# Patient Record
Sex: FEMALE | Race: WHITE | NOT HISPANIC OR LATINO | Employment: OTHER | URBAN - METROPOLITAN AREA
[De-identification: names, ages, dates, MRNs, and addresses within clinical notes are randomized per-mention and may not be internally consistent; named-entity substitution may affect disease eponyms.]

---

## 2017-02-16 ENCOUNTER — CONVERSION ENCOUNTER (OUTPATIENT)
Dept: PAIN MEDICINE | Facility: CLINIC | Age: 62
End: 2017-02-16

## 2017-02-16 ENCOUNTER — HOSPITAL ENCOUNTER (OUTPATIENT)
Dept: FAMILY MEDICINE CLINIC | Facility: CLINIC | Age: 62
Setting detail: SPECIMEN
Discharge: HOME | End: 2017-02-16
Attending: NURSE PRACTITIONER | Admitting: NURSE PRACTITIONER

## 2017-02-16 LAB
ALBUMIN SERPL-MCNC: 3.5 G/DL (ref 3.5–4.8)
ALBUMIN/GLOB SERPL: 1.2 {RATIO} (ref 1–1.7)
ALP SERPL-CCNC: 143 IU/L (ref 32–91)
ALT SERPL-CCNC: 15 IU/L (ref 14–54)
AMPICILLIN SUSC ISLT: NORMAL
ANION GAP SERPL CALC-SCNC: 12.3 MMOL/L (ref 10–20)
AST SERPL-CCNC: 17 IU/L (ref 15–41)
AZTREONAM SUSC ISLT: NORMAL
BACTERIA ISLT: NORMAL
BACTERIA SPEC AEROBE CULT: NORMAL
BILIRUB SERPL-MCNC: 0.6 MG/DL (ref 0.3–1.2)
BUN SERPL-MCNC: 10 MG/DL (ref 8–20)
BUN/CREAT SERPL: 12.5 (ref 5.4–26.2)
CALCIUM SERPL-MCNC: 9.7 MG/DL (ref 8.9–10.3)
CEFAZOLIN SUSC ISLT: NORMAL
CEFEPIME SUSC ISLT: NORMAL
CEFTRIAXONE SUSC ISLT: NORMAL
CHLORIDE SERPL-SCNC: 103 MMOL/L (ref 101–111)
CIPROFLOXACIN SUSC ISLT: NORMAL
COLONY COUNT: NORMAL
CONV CO2: 29 MMOL/L (ref 22–32)
CONV TOTAL PROTEIN: 6.5 G/DL (ref 6.1–7.9)
CREAT UR-MCNC: 0.8 MG/DL (ref 0.4–1)
ERTAPENEM SUSC ISLT: NORMAL
GLOBULIN UR ELPH-MCNC: 3 G/DL (ref 2.5–3.8)
GLUCOSE SERPL-MCNC: 94 MG/DL (ref 65–99)
LEVOFLOXACIN SUSC ISLT: NORMAL
Lab: NORMAL
MEROPENEM SUSC ISLT: NORMAL
MICRO REPORT STATUS: NORMAL
NITROFURANTOIN SUSC ISLT: NORMAL
PIP+TAZO SUSC ISLT: NORMAL
POTASSIUM SERPL-SCNC: 4.3 MMOL/L (ref 3.6–5.1)
SODIUM SERPL-SCNC: 140 MMOL/L (ref 136–144)
SPECIMEN SOURCE: NORMAL
SUSC METH SPEC: NORMAL
TETRACYCLINE SUSC ISLT: NORMAL
TOBRAMYCIN SUSC ISLT: NORMAL
TRIMETHOPRIM/SULFA: NORMAL

## 2017-04-20 ENCOUNTER — ON CAMPUS - OUTPATIENT (AMBULATORY)
Dept: URBAN - METROPOLITAN AREA HOSPITAL 77 | Facility: HOSPITAL | Age: 62
End: 2017-04-20

## 2017-04-20 DIAGNOSIS — R13.10 DYSPHAGIA, UNSPECIFIED: ICD-10-CM

## 2017-04-20 PROCEDURE — 43450 DILATE ESOPHAGUS 1/MULT PASS: CPT | Performed by: INTERNAL MEDICINE

## 2017-04-20 PROCEDURE — 43235 EGD DIAGNOSTIC BRUSH WASH: CPT | Performed by: INTERNAL MEDICINE

## 2017-07-07 ENCOUNTER — CONVERSION ENCOUNTER (OUTPATIENT)
Dept: PAIN MEDICINE | Facility: CLINIC | Age: 62
End: 2017-07-07

## 2017-09-29 ENCOUNTER — CONVERSION ENCOUNTER (OUTPATIENT)
Dept: PAIN MEDICINE | Facility: CLINIC | Age: 62
End: 2017-09-29

## 2017-12-04 ENCOUNTER — CONVERSION ENCOUNTER (OUTPATIENT)
Dept: PAIN MEDICINE | Facility: CLINIC | Age: 62
End: 2017-12-04

## 2017-12-11 ENCOUNTER — CONVERSION ENCOUNTER (OUTPATIENT)
Dept: PAIN MEDICINE | Facility: CLINIC | Age: 62
End: 2017-12-11

## 2019-01-16 ENCOUNTER — CONVERSION ENCOUNTER (OUTPATIENT)
Dept: PAIN MEDICINE | Facility: CLINIC | Age: 64
End: 2019-01-16

## 2019-01-16 ENCOUNTER — HOSPITAL ENCOUNTER (OUTPATIENT)
Dept: PAIN MEDICINE | Facility: HOSPITAL | Age: 64
Discharge: HOME | End: 2019-01-16
Attending: ANESTHESIOLOGY | Admitting: ANESTHESIOLOGY

## 2019-01-25 ENCOUNTER — CONVERSION ENCOUNTER (OUTPATIENT)
Dept: PAIN MEDICINE | Facility: CLINIC | Age: 64
End: 2019-01-25

## 2019-01-25 ENCOUNTER — HOSPITAL ENCOUNTER (OUTPATIENT)
Dept: PAIN MEDICINE | Facility: HOSPITAL | Age: 64
Discharge: HOME | End: 2019-01-25
Attending: ANESTHESIOLOGY | Admitting: ANESTHESIOLOGY

## 2019-03-08 ENCOUNTER — CONVERSION ENCOUNTER (OUTPATIENT)
Dept: PAIN MEDICINE | Facility: CLINIC | Age: 64
End: 2019-03-08

## 2019-03-08 ENCOUNTER — HOSPITAL ENCOUNTER (OUTPATIENT)
Dept: PAIN MEDICINE | Facility: HOSPITAL | Age: 64
Discharge: HOME | End: 2019-03-08
Attending: ANESTHESIOLOGY | Admitting: ANESTHESIOLOGY

## 2019-06-04 VITALS
HEART RATE: 71 BPM | SYSTOLIC BLOOD PRESSURE: 124 MMHG | HEART RATE: 81 BPM | HEIGHT: 64 IN | WEIGHT: 188 LBS | BODY MASS INDEX: 31.75 KG/M2 | DIASTOLIC BLOOD PRESSURE: 75 MMHG | BODY MASS INDEX: 32.37 KG/M2 | HEART RATE: 79 BPM | DIASTOLIC BLOOD PRESSURE: 70 MMHG | RESPIRATION RATE: 16 BRPM | OXYGEN SATURATION: 93 % | SYSTOLIC BLOOD PRESSURE: 146 MMHG | DIASTOLIC BLOOD PRESSURE: 71 MMHG | WEIGHT: 188.6 LBS | BODY MASS INDEX: 32.37 KG/M2 | HEART RATE: 70 BPM | HEIGHT: 64 IN | RESPIRATION RATE: 16 BRPM | OXYGEN SATURATION: 97 % | BODY MASS INDEX: 32.27 KG/M2 | OXYGEN SATURATION: 96 % | RESPIRATION RATE: 16 BRPM | DIASTOLIC BLOOD PRESSURE: 78 MMHG | DIASTOLIC BLOOD PRESSURE: 79 MMHG | BODY MASS INDEX: 32.95 KG/M2 | OXYGEN SATURATION: 94 % | WEIGHT: 189 LBS | SYSTOLIC BLOOD PRESSURE: 106 MMHG | WEIGHT: 194 LBS | HEART RATE: 77 BPM | SYSTOLIC BLOOD PRESSURE: 141 MMHG | DIASTOLIC BLOOD PRESSURE: 74 MMHG | RESPIRATION RATE: 16 BRPM | HEART RATE: 76 BPM | HEART RATE: 78 BPM | SYSTOLIC BLOOD PRESSURE: 120 MMHG | BODY MASS INDEX: 32.27 KG/M2 | DIASTOLIC BLOOD PRESSURE: 81 MMHG | SYSTOLIC BLOOD PRESSURE: 127 MMHG | WEIGHT: 185 LBS | SYSTOLIC BLOOD PRESSURE: 153 MMHG | OXYGEN SATURATION: 93 % | OXYGEN SATURATION: 95 % | OXYGEN SATURATION: 95 % | SYSTOLIC BLOOD PRESSURE: 134 MMHG | HEIGHT: 64 IN | HEIGHT: 64 IN | HEART RATE: 75 BPM | WEIGHT: 193 LBS | OXYGEN SATURATION: 96 % | DIASTOLIC BLOOD PRESSURE: 77 MMHG | BODY MASS INDEX: 33.12 KG/M2

## 2019-06-21 RX ORDER — ATORVASTATIN CALCIUM 80 MG/1
TABLET, FILM COATED ORAL
Qty: 60 TABLET | Refills: 0 | Status: SHIPPED | OUTPATIENT
Start: 2019-06-21 | End: 2019-09-04 | Stop reason: SDUPTHER

## 2019-06-27 RX ORDER — GABAPENTIN 600 MG/1
TABLET ORAL
Qty: 90 TABLET | Refills: 0 | Status: SHIPPED | OUTPATIENT
Start: 2019-06-27 | End: 2022-03-08 | Stop reason: HOSPADM

## 2019-08-01 RX ORDER — GABAPENTIN 600 MG/1
TABLET ORAL
Qty: 90 TABLET | Refills: 0 | OUTPATIENT
Start: 2019-08-01

## 2019-08-02 RX ORDER — GABAPENTIN 600 MG/1
TABLET ORAL
Qty: 90 TABLET | Refills: 0 | OUTPATIENT
Start: 2019-08-02

## 2019-09-04 RX ORDER — ATORVASTATIN CALCIUM 80 MG/1
TABLET, FILM COATED ORAL
Qty: 60 TABLET | Refills: 0 | Status: SHIPPED | OUTPATIENT
Start: 2019-09-04 | End: 2022-03-08 | Stop reason: SDUPTHER

## 2019-10-11 ENCOUNTER — ON CAMPUS - OUTPATIENT (AMBULATORY)
Dept: URBAN - METROPOLITAN AREA HOSPITAL 77 | Facility: HOSPITAL | Age: 64
End: 2019-10-11

## 2019-10-11 DIAGNOSIS — K55.20 ANGIODYSPLASIA OF COLON WITHOUT HEMORRHAGE: ICD-10-CM

## 2019-10-11 DIAGNOSIS — Z86.010 PERSONAL HISTORY OF COLONIC POLYPS: ICD-10-CM

## 2019-10-11 DIAGNOSIS — K63.5 POLYP OF COLON: ICD-10-CM

## 2019-10-11 PROCEDURE — 45385 COLONOSCOPY W/LESION REMOVAL: CPT | Mod: PT | Performed by: INTERNAL MEDICINE

## 2020-04-25 ENCOUNTER — ON CAMPUS - OUTPATIENT (AMBULATORY)
Dept: URBAN - METROPOLITAN AREA HOSPITAL 77 | Facility: HOSPITAL | Age: 65
End: 2020-04-25
Payer: COMMERCIAL

## 2020-04-25 DIAGNOSIS — D64.9 ANEMIA, UNSPECIFIED: ICD-10-CM

## 2020-04-25 DIAGNOSIS — K92.1 MELENA: ICD-10-CM

## 2020-04-25 DIAGNOSIS — R13.19 OTHER DYSPHAGIA: ICD-10-CM

## 2020-04-25 DIAGNOSIS — C34.90 MALIGNANT NEOPLASM OF UNSPECIFIED PART OF UNSPECIFIED BRONCH: ICD-10-CM

## 2020-04-25 DIAGNOSIS — I25.10 ATHEROSCLEROTIC HEART DISEASE OF NATIVE CORONARY ARTERY WITH: ICD-10-CM

## 2020-04-25 PROCEDURE — 99215 OFFICE O/P EST HI 40 MIN: CPT | Performed by: INTERNAL MEDICINE

## 2020-05-04 ENCOUNTER — ON CAMPUS - OUTPATIENT (AMBULATORY)
Dept: URBAN - METROPOLITAN AREA HOSPITAL 2 | Facility: HOSPITAL | Age: 65
End: 2020-05-04
Payer: COMMERCIAL

## 2020-05-04 VITALS
SYSTOLIC BLOOD PRESSURE: 135 MMHG | SYSTOLIC BLOOD PRESSURE: 64 MMHG | HEART RATE: 97 BPM | RESPIRATION RATE: 16 BRPM | HEART RATE: 92 BPM | SYSTOLIC BLOOD PRESSURE: 110 MMHG | DIASTOLIC BLOOD PRESSURE: 74 MMHG | HEART RATE: 90 BPM | SYSTOLIC BLOOD PRESSURE: 92 MMHG | TEMPERATURE: 97 F | RESPIRATION RATE: 18 BRPM | DIASTOLIC BLOOD PRESSURE: 59 MMHG | HEART RATE: 98 BPM | DIASTOLIC BLOOD PRESSURE: 55 MMHG | SYSTOLIC BLOOD PRESSURE: 109 MMHG | DIASTOLIC BLOOD PRESSURE: 65 MMHG | OXYGEN SATURATION: 100 % | DIASTOLIC BLOOD PRESSURE: 62 MMHG | SYSTOLIC BLOOD PRESSURE: 106 MMHG | HEART RATE: 110 BPM | HEART RATE: 89 BPM | WEIGHT: 157 LBS | OXYGEN SATURATION: 97 % | DIASTOLIC BLOOD PRESSURE: 43 MMHG | HEIGHT: 63 IN | SYSTOLIC BLOOD PRESSURE: 83 MMHG | DIASTOLIC BLOOD PRESSURE: 49 MMHG | SYSTOLIC BLOOD PRESSURE: 97 MMHG | HEART RATE: 109 BPM | DIASTOLIC BLOOD PRESSURE: 90 MMHG | OXYGEN SATURATION: 99 % | SYSTOLIC BLOOD PRESSURE: 133 MMHG | HEART RATE: 108 BPM | SYSTOLIC BLOOD PRESSURE: 114 MMHG | RESPIRATION RATE: 15 BRPM | SYSTOLIC BLOOD PRESSURE: 99 MMHG | DIASTOLIC BLOOD PRESSURE: 75 MMHG | DIASTOLIC BLOOD PRESSURE: 72 MMHG | HEART RATE: 118 BPM | DIASTOLIC BLOOD PRESSURE: 58 MMHG

## 2020-05-04 DIAGNOSIS — D50.0 IRON DEFICIENCY ANEMIA SECONDARY TO BLOOD LOSS (CHRONIC): ICD-10-CM

## 2020-05-04 DIAGNOSIS — K55.20 ANGIODYSPLASIA OF COLON WITHOUT HEMORRHAGE: ICD-10-CM

## 2020-05-04 DIAGNOSIS — Z86.010 PERSONAL HISTORY OF COLONIC POLYPS: ICD-10-CM

## 2020-05-04 DIAGNOSIS — K22.2 ESOPHAGEAL OBSTRUCTION: ICD-10-CM

## 2020-05-04 DIAGNOSIS — R13.10 DYSPHAGIA, UNSPECIFIED: ICD-10-CM

## 2020-05-04 DIAGNOSIS — K92.1 MELENA: ICD-10-CM

## 2020-05-04 PROCEDURE — 45388 COLONOSCOPY W/ABLATION: CPT | Performed by: INTERNAL MEDICINE

## 2020-05-04 PROCEDURE — 43235 EGD DIAGNOSTIC BRUSH WASH: CPT | Performed by: INTERNAL MEDICINE

## 2020-05-04 PROCEDURE — 43450 DILATE ESOPHAGUS 1/MULT PASS: CPT | Performed by: INTERNAL MEDICINE

## 2020-06-01 ENCOUNTER — OFFICE (AMBULATORY)
Dept: URBAN - METROPOLITAN AREA CLINIC 64 | Facility: CLINIC | Age: 65
End: 2020-06-01
Payer: COMMERCIAL

## 2020-06-01 VITALS
HEIGHT: 63 IN | SYSTOLIC BLOOD PRESSURE: 116 MMHG | HEART RATE: 104 BPM | DIASTOLIC BLOOD PRESSURE: 82 MMHG | WEIGHT: 150 LBS

## 2020-06-01 DIAGNOSIS — R63.3 FEEDING DIFFICULTIES: ICD-10-CM

## 2020-06-01 DIAGNOSIS — R11.0 NAUSEA: ICD-10-CM

## 2020-06-01 DIAGNOSIS — R63.4 ABNORMAL WEIGHT LOSS: ICD-10-CM

## 2020-06-01 DIAGNOSIS — R13.10 DYSPHAGIA, UNSPECIFIED: ICD-10-CM

## 2020-06-01 PROCEDURE — 99214 OFFICE O/P EST MOD 30 MIN: CPT | Performed by: NURSE PRACTITIONER

## 2020-06-01 RX ORDER — METOCLOPRAMIDE HYDROCHLORIDE 5 MG/1
TABLET ORAL
Qty: 90 | Refills: 0 | Status: COMPLETED
Start: 2020-06-01 | End: 2021-07-14

## 2021-03-14 ENCOUNTER — ON CAMPUS - OUTPATIENT (AMBULATORY)
Dept: URBAN - METROPOLITAN AREA HOSPITAL 77 | Facility: HOSPITAL | Age: 66
End: 2021-03-14
Payer: COMMERCIAL

## 2021-03-14 DIAGNOSIS — K22.2 ESOPHAGEAL OBSTRUCTION: ICD-10-CM

## 2021-03-14 DIAGNOSIS — T18.108A UNSPECIFIED FOREIGN BODY IN ESOPHAGUS CAUSING OTHER INJURY,: ICD-10-CM

## 2021-03-14 PROCEDURE — 43247 EGD REMOVE FOREIGN BODY: CPT | Mod: 59 | Performed by: INTERNAL MEDICINE

## 2021-03-14 PROCEDURE — 43249 ESOPH EGD DILATION <30 MM: CPT | Performed by: INTERNAL MEDICINE

## 2021-07-02 ENCOUNTER — OFFICE (AMBULATORY)
Dept: URBAN - METROPOLITAN AREA CLINIC 64 | Facility: CLINIC | Age: 66
End: 2021-07-02

## 2021-07-02 VITALS
DIASTOLIC BLOOD PRESSURE: 80 MMHG | HEIGHT: 63 IN | SYSTOLIC BLOOD PRESSURE: 126 MMHG | HEART RATE: 89 BPM | WEIGHT: 112 LBS

## 2021-07-02 DIAGNOSIS — K21.9 GASTRO-ESOPHAGEAL REFLUX DISEASE WITHOUT ESOPHAGITIS: ICD-10-CM

## 2021-07-02 DIAGNOSIS — R13.10 DYSPHAGIA, UNSPECIFIED: ICD-10-CM

## 2021-07-02 DIAGNOSIS — K59.00 CONSTIPATION, UNSPECIFIED: ICD-10-CM

## 2021-07-02 PROCEDURE — 99214 OFFICE O/P EST MOD 30 MIN: CPT | Performed by: NURSE PRACTITIONER

## 2021-07-15 ENCOUNTER — ON CAMPUS - OUTPATIENT (AMBULATORY)
Dept: URBAN - METROPOLITAN AREA HOSPITAL 2 | Facility: HOSPITAL | Age: 66
End: 2021-07-15
Payer: COMMERCIAL

## 2021-07-15 VITALS
DIASTOLIC BLOOD PRESSURE: 58 MMHG | HEIGHT: 63 IN | WEIGHT: 109 LBS | DIASTOLIC BLOOD PRESSURE: 59 MMHG | SYSTOLIC BLOOD PRESSURE: 103 MMHG | RESPIRATION RATE: 90 BRPM | HEART RATE: 97 BPM | SYSTOLIC BLOOD PRESSURE: 90 MMHG | SYSTOLIC BLOOD PRESSURE: 104 MMHG | OXYGEN SATURATION: 98 % | SYSTOLIC BLOOD PRESSURE: 86 MMHG | RESPIRATION RATE: 20 BRPM | SYSTOLIC BLOOD PRESSURE: 70 MMHG | DIASTOLIC BLOOD PRESSURE: 56 MMHG | SYSTOLIC BLOOD PRESSURE: 92 MMHG | SYSTOLIC BLOOD PRESSURE: 100 MMHG | TEMPERATURE: 96.9 F | DIASTOLIC BLOOD PRESSURE: 63 MMHG | HEART RATE: 89 BPM | DIASTOLIC BLOOD PRESSURE: 69 MMHG | OXYGEN SATURATION: 100 % | DIASTOLIC BLOOD PRESSURE: 57 MMHG | HEART RATE: 103 BPM | RESPIRATION RATE: 18 BRPM | SYSTOLIC BLOOD PRESSURE: 80 MMHG | HEART RATE: 95 BPM | HEART RATE: 99 BPM | RESPIRATION RATE: 16 BRPM | OXYGEN SATURATION: 99 % | OXYGEN SATURATION: 97 % | DIASTOLIC BLOOD PRESSURE: 49 MMHG

## 2021-07-15 DIAGNOSIS — K22.2 ESOPHAGEAL OBSTRUCTION: ICD-10-CM

## 2021-07-15 DIAGNOSIS — R13.10 DYSPHAGIA, UNSPECIFIED: ICD-10-CM

## 2021-07-15 PROCEDURE — 43249 ESOPH EGD DILATION <30 MM: CPT | Performed by: INTERNAL MEDICINE

## 2021-07-15 RX ORDER — SUCRALFATE 1 G/1
3 TABLET ORAL
Qty: 90 | Refills: 12 | Status: COMPLETED
Start: 2021-07-15 | End: 2021-09-03

## 2022-03-23 ENCOUNTER — OFFICE (AMBULATORY)
Dept: URBAN - METROPOLITAN AREA CLINIC 64 | Facility: CLINIC | Age: 67
End: 2022-03-23

## 2022-03-23 VITALS
DIASTOLIC BLOOD PRESSURE: 68 MMHG | WEIGHT: 109 LBS | HEART RATE: 109 BPM | SYSTOLIC BLOOD PRESSURE: 102 MMHG | HEIGHT: 63 IN

## 2022-03-23 DIAGNOSIS — R14.0 ABDOMINAL DISTENSION (GASEOUS): ICD-10-CM

## 2022-03-23 DIAGNOSIS — R10.9 UNSPECIFIED ABDOMINAL PAIN: ICD-10-CM

## 2022-03-23 PROCEDURE — 99214 OFFICE O/P EST MOD 30 MIN: CPT | Performed by: INTERNAL MEDICINE

## 2022-04-21 ENCOUNTER — OFFICE (AMBULATORY)
Dept: URBAN - METROPOLITAN AREA PATHOLOGY 4 | Facility: PATHOLOGY | Age: 67
End: 2022-04-21
Payer: COMMERCIAL

## 2022-04-21 ENCOUNTER — ON CAMPUS - OUTPATIENT (AMBULATORY)
Dept: URBAN - METROPOLITAN AREA HOSPITAL 2 | Facility: HOSPITAL | Age: 67
End: 2022-04-21
Payer: COMMERCIAL

## 2022-04-21 VITALS
HEART RATE: 99 BPM | SYSTOLIC BLOOD PRESSURE: 128 MMHG | OXYGEN SATURATION: 100 % | SYSTOLIC BLOOD PRESSURE: 98 MMHG | DIASTOLIC BLOOD PRESSURE: 82 MMHG | HEART RATE: 85 BPM | OXYGEN SATURATION: 97 % | HEART RATE: 88 BPM | SYSTOLIC BLOOD PRESSURE: 102 MMHG | SYSTOLIC BLOOD PRESSURE: 115 MMHG | OXYGEN SATURATION: 98 % | HEART RATE: 84 BPM | SYSTOLIC BLOOD PRESSURE: 109 MMHG | DIASTOLIC BLOOD PRESSURE: 77 MMHG | DIASTOLIC BLOOD PRESSURE: 64 MMHG | DIASTOLIC BLOOD PRESSURE: 69 MMHG | HEART RATE: 87 BPM | SYSTOLIC BLOOD PRESSURE: 105 MMHG | HEART RATE: 98 BPM | DIASTOLIC BLOOD PRESSURE: 68 MMHG | TEMPERATURE: 97.8 F | OXYGEN SATURATION: 99 % | SYSTOLIC BLOOD PRESSURE: 90 MMHG | SYSTOLIC BLOOD PRESSURE: 117 MMHG | RESPIRATION RATE: 16 BRPM | RESPIRATION RATE: 18 BRPM | DIASTOLIC BLOOD PRESSURE: 61 MMHG | SYSTOLIC BLOOD PRESSURE: 94 MMHG | HEART RATE: 101 BPM | HEART RATE: 97 BPM | WEIGHT: 108 LBS | HEIGHT: 63 IN

## 2022-04-21 DIAGNOSIS — D12.0 BENIGN NEOPLASM OF CECUM: ICD-10-CM

## 2022-04-21 DIAGNOSIS — R13.10 DYSPHAGIA, UNSPECIFIED: ICD-10-CM

## 2022-04-21 DIAGNOSIS — K55.20 ANGIODYSPLASIA OF COLON WITHOUT HEMORRHAGE: ICD-10-CM

## 2022-04-21 DIAGNOSIS — D12.5 BENIGN NEOPLASM OF SIGMOID COLON: ICD-10-CM

## 2022-04-21 DIAGNOSIS — K22.2 ESOPHAGEAL OBSTRUCTION: ICD-10-CM

## 2022-04-21 DIAGNOSIS — R93.3 ABNORMAL FINDINGS ON DIAGNOSTIC IMAGING OF OTHER PARTS OF DI: ICD-10-CM

## 2022-04-21 DIAGNOSIS — K57.30 DIVERTICULOSIS OF LARGE INTESTINE WITHOUT PERFORATION OR ABS: ICD-10-CM

## 2022-04-21 DIAGNOSIS — K64.1 SECOND DEGREE HEMORRHOIDS: ICD-10-CM

## 2022-04-21 PROBLEM — K63.5 POLYP OF COLON: Status: ACTIVE | Noted: 2022-04-21

## 2022-04-21 LAB
GI HISTOLOGY: A. UNSPECIFIED: (no result)
GI HISTOLOGY: B. UNSPECIFIED: (no result)
GI HISTOLOGY: PDF REPORT: (no result)

## 2022-04-21 PROCEDURE — 88305 TISSUE EXAM BY PATHOLOGIST: CPT | Mod: 26 | Performed by: INTERNAL MEDICINE

## 2022-04-21 PROCEDURE — 43235 EGD DIAGNOSTIC BRUSH WASH: CPT | Performed by: INTERNAL MEDICINE

## 2022-04-21 PROCEDURE — 45388 COLONOSCOPY W/ABLATION: CPT | Performed by: INTERNAL MEDICINE

## 2022-04-21 PROCEDURE — 43450 DILATE ESOPHAGUS 1/MULT PASS: CPT | Performed by: INTERNAL MEDICINE

## 2022-04-21 PROCEDURE — 45385 COLONOSCOPY W/LESION REMOVAL: CPT | Mod: 59 | Performed by: INTERNAL MEDICINE

## 2022-05-18 ENCOUNTER — OFFICE VISIT (OUTPATIENT)
Dept: CARDIOLOGY | Facility: CLINIC | Age: 67
End: 2022-05-18

## 2022-05-18 VITALS
WEIGHT: 112 LBS | OXYGEN SATURATION: 97 % | DIASTOLIC BLOOD PRESSURE: 80 MMHG | SYSTOLIC BLOOD PRESSURE: 112 MMHG | HEART RATE: 88 BPM | HEIGHT: 63 IN | BODY MASS INDEX: 19.84 KG/M2

## 2022-05-18 DIAGNOSIS — I65.21 STENOSIS OF RIGHT CAROTID ARTERY: ICD-10-CM

## 2022-05-18 DIAGNOSIS — R13.12 OROPHARYNGEAL DYSPHAGIA: ICD-10-CM

## 2022-05-18 DIAGNOSIS — C34.90 SMALL CELL LUNG CANCER: ICD-10-CM

## 2022-05-18 DIAGNOSIS — I25.118 CORONARY ARTERY DISEASE OF NATIVE ARTERY OF NATIVE HEART WITH STABLE ANGINA PECTORIS: Primary | ICD-10-CM

## 2022-05-18 DIAGNOSIS — I20.8 CHRONIC STABLE ANGINA: ICD-10-CM

## 2022-05-18 DIAGNOSIS — E78.2 MIXED HYPERLIPIDEMIA: ICD-10-CM

## 2022-05-18 PROBLEM — R23.3 BRUISES EASILY: Status: ACTIVE | Noted: 2022-05-18

## 2022-05-18 PROBLEM — G47.33 OBSTRUCTIVE SLEEP APNEA, ADULT: Status: ACTIVE | Noted: 2017-02-16

## 2022-05-18 PROBLEM — D64.9 NORMOCYTIC ANEMIA: Status: ACTIVE | Noted: 2020-04-24

## 2022-05-18 PROBLEM — R12 HEARTBURN: Status: ACTIVE | Noted: 2022-05-18

## 2022-05-18 PROBLEM — R59.0 HILAR LYMPHADENOPATHY: Status: ACTIVE | Noted: 2020-01-20

## 2022-05-18 PROBLEM — R91.1 LUNG NODULE: Status: ACTIVE | Noted: 2020-01-20

## 2022-05-18 PROBLEM — G89.29 CHRONIC PAIN: Status: ACTIVE | Noted: 2019-01-16

## 2022-05-18 PROBLEM — K21.9 GASTROESOPHAGEAL REFLUX DISEASE: Status: ACTIVE | Noted: 2022-05-18

## 2022-05-18 PROBLEM — I25.10 CAD (CORONARY ARTERY DISEASE): Status: ACTIVE | Noted: 2022-05-18

## 2022-05-18 PROCEDURE — 99214 OFFICE O/P EST MOD 30 MIN: CPT | Performed by: INTERNAL MEDICINE

## 2022-05-18 PROCEDURE — 93000 ELECTROCARDIOGRAM COMPLETE: CPT | Performed by: INTERNAL MEDICINE

## 2022-05-18 RX ORDER — METOPROLOL SUCCINATE 25 MG/1
25 TABLET, EXTENDED RELEASE ORAL DAILY
COMMUNITY
Start: 2022-05-03

## 2022-05-18 RX ORDER — GABAPENTIN 300 MG/1
300 CAPSULE ORAL 2 TIMES DAILY
COMMUNITY
Start: 2022-05-10

## 2022-05-18 RX ORDER — PRASUGREL 10 MG/1
10 TABLET, FILM COATED ORAL DAILY
Qty: 30 TABLET | Refills: 2 | Status: SHIPPED | OUTPATIENT
Start: 2022-05-18 | End: 2022-06-08 | Stop reason: SINTOL

## 2022-05-18 NOTE — PROGRESS NOTES
Cardiology Office Visit      Encounter Date:  05/18/2022    Patient ID:   Miryam Fernández is a 67 y.o. female.    Reason For Followup:  Coronary artery disease  Peripheral vascular disease    Brief Clinical History:  Dear Dr. Langford, Ted Liu MD    I had the pleasure of seeing Miryam Fernández today. As you are well aware, this is a 67 y.o. female past medical history that is significant for history of coronary artery disease history of peripheral vascular disease prior coronary intervention prior peripheral intervention prior carotid endarterectomy prior PCI and endovascular intervention on the lower extremities was recently diagnosed with a non-small cell lung cancer currently being treated and evaluated by [facility] with oncology and patient underwent radiation and chemotherapy here for follow-up from cardiac standpoint to establish care    Interval History:  Patient is having some side effects with taking Plavix some of it is secondary to dyspepsia and trouble swallowing that she is having secondary to recurrent esophageal strictures some of it she thinks it is side effects from the medication but she is not currently taking any antiplatelet therapy secondary to the side effects  She had a hospitalization in December at [facility] where pericardiocentesis was done with no repeat work-up or evaluation  Complaining of some intermittent chest discomfort  Some shortness of breath and dyspnea on exertion      Assessment & Plan    Impressions:  Coronary artery disease prior coronary intervention  Chest discomfort  Dyspepsia and trouble swallowing  Hypertension  Hyperlipidemia  Peripheral arterial disease with prior carotid endarterectomy prior PCI and stenting of the bilateral lower extremities  Non-small cell lung cancer  Prior history of subclavian steal syndrome  History of pulmonary embolism    Recommendations:  Schedule patient for an echocardiogram to rule out any pericardial  "pathology and pericardial effusion contributing to patient's symptoms  Schedule patient for myocardial perfusion study for further restratification for symptoms of chest discomfort with a prior history of known coronary artery disease prior PCI and stenting  Need for compliance with medical therapy and close monitoring and follow-up reviewed and discussed with the patient  She thinks some of her symptoms with chest discomfort could be a side effect from the Plavix  He is not on aspirin  Plans to start patient on Effient 10 mg p.o. once a day  Risk benefits and alternatives reviewed and discussed with patient  Okay to discontinue Effient due to being EGD procedure for stricture dilatation  Recent work-up and evaluation including labs CT echo findings that was done at [facility] with hematology and oncology reviewed and discussed with patient  Continue current medical therapy with Effient 10 mg p.o. once a day Lipitor 80 mg p.o. once a day Toprol-XL 25 mg p.o. once a day Lasix and potassium supplements  Further recommendations based on patient course  Need for close monitoring and follow-up reviewed and discussed with patient  Follow-up in office in 2 months    Objective:    Vitals:  Vitals:    05/18/22 1015   BP: 112/80   Pulse: 88   SpO2: 97%   Weight: 50.8 kg (112 lb)   Height: 160 cm (63\")       Physical Exam:    General: Alert, cooperative, no distress, appears stated age  Head:  Normocephalic, atraumatic, mucous membranes moist  Eyes:  Conjunctiva/corneas clear, EOM's intact     Neck:  Supple,  no adenopathy;      Lungs: Clear to auscultation bilaterally, no wheezes rhonchi rales are noted  Chest wall: No tenderness  Heart::  Regular rate and rhythm, S1 and S2 normal, no murmur, rub or gallop  Abdomen: Soft, non-tender, nondistended bowel sounds active  Extremities: No cyanosis, clubbing, or edema  Pulses: 2+ and symmetric all extremities  Skin:  No rashes or lesions  Neuro/psych: A&O x3. CN II through " XII are grossly intact with appropriate affect      Allergies:  Allergies   Allergen Reactions   • Hydrocodone Palpitations   • Sulfa Antibiotics Swelling   • Benzalkonium Chloride Hives   • Nickel Rash   • Penicillins Rash       Medication Review:     Current Outpatient Medications:   •  furosemide (LASIX) 20 MG tablet, Take 20 mg by mouth Daily., Disp: , Rfl:   •  gabapentin (NEURONTIN) 300 MG capsule, Take 300 mg by mouth 2 (Two) Times a Day., Disp: , Rfl:   •  hydroCHLOROthiazide (HYDRODIURIL) 12.5 MG tablet, 12.5 mg., Disp: , Rfl:   •  metoprolol succinate XL (TOPROL-XL) 25 MG 24 hr tablet, Take 25 mg by mouth Daily., Disp: , Rfl:   •  oxyCODONE-acetaminophen (PERCOCET) 5-325 MG per tablet, , Disp: , Rfl:   •  potassium chloride (KAYCIEL) 20 mEq/15 mL solution, Take 20 mEq by mouth Daily., Disp: , Rfl:   •  atorvastatin (LIPITOR) 80 MG tablet, 80 mg., Disp: , Rfl:   •  levalbuterol (XOPENEX) 1.25 MG/3ML nebulizer solution, XOPENEX 1.25 MG/3ML NEBU, Disp: , Rfl:   •  meclizine (ANTIVERT) 25 MG tablet, Every 8 (Eight) Hours., Disp: , Rfl:   •  prasugrel (Effient) 10 MG tablet, Take 1 tablet by mouth Daily., Disp: 30 tablet, Rfl: 2    Family History:  Family History   Problem Relation Age of Onset   • Heart disease Mother    • Hypertension Mother    • Hyperlipidemia Father    • Lung cancer Father    • Diabetes Paternal Uncle    • Leukemia Paternal Grandmother        Past Medical History:  Past Medical History:   Diagnosis Date   • Cancer (HCC)    • Carotid stenosis    • COPD (chronic obstructive pulmonary disease) (HCC)    • Coronary artery disease    • Esophageal stricture     Dr Domingo   • Hyperlipidemia    • Pulmonary embolism (HCC)     seen at [facility], fluid around her heart at the time-right lung       Past surgical History:  Past Surgical History:   Procedure Laterality Date   • ANGIOPLASTY / STENTING FEMORAL     • CARDIAC CATHETERIZATION     • CAROTID STENT     • CORONARY STENT PLACEMENT     • ESOPHAGOSCOPY  / EGD     • HYSTERECTOMY         Social History:  Social History     Socioeconomic History   • Marital status:    Tobacco Use   • Smoking status: Current Every Day Smoker     Packs/day: 2.00     Types: Cigarettes   • Smokeless tobacco: Never Used   Vaping Use   • Vaping Use: Never used   Substance and Sexual Activity   • Alcohol use: Never   • Drug use: Never   • Sexual activity: Defer       Review of Systems:  The following systems were reviewed as they relate to the cardiovascular system: Constitutional, Eyes, ENT, Cardiovascular, Respiratory, Gastrointestinal, Integumentary, Neurological, Psychiatric, Hematologic, Endocrine, Musculoskeletal, and Genitourinary. The pertinent cardiovascular findings are reported above with all other cardiovascular points within those systems being negative.    Diagnostic Study Review:     Current Electrocardiogram:    ECG 12 Lead    Date/Time: 5/18/2022 11:42 AM  Performed by: Nai Lorenzana MD  Authorized by: Nai Lorenzana MD   Comparison: compared with previous ECG   Similar to previous ECG  Rhythm: sinus rhythm  Rate: normal  BPM: 79  Conduction: conduction normal  ST Segments: ST segments normal  T Waves: T waves normal  QRS axis: normal    Clinical impression: normal ECG              NOTE: The following portions of the patient's history were reviewed and updated this visit as appropriate: allergies, current medications, past family history, past medical history, past social history, past surgical history and problem list.

## 2022-06-02 ENCOUNTER — HOSPITAL ENCOUNTER (EMERGENCY)
Facility: HOSPITAL | Age: 67
Discharge: HOME | End: 2022-06-02
Admitting: EMERGENCY MEDICINE

## 2022-06-02 ENCOUNTER — APPOINTMENT (OUTPATIENT)
Dept: GENERAL RADIOLOGY | Facility: HOSPITAL | Age: 67
End: 2022-06-02

## 2022-06-02 ENCOUNTER — APPOINTMENT (OUTPATIENT)
Dept: CT IMAGING | Facility: HOSPITAL | Age: 67
End: 2022-06-02

## 2022-06-02 VITALS
RESPIRATION RATE: 19 BRPM | BODY MASS INDEX: 19.1 KG/M2 | WEIGHT: 107.81 LBS | SYSTOLIC BLOOD PRESSURE: 89 MMHG | TEMPERATURE: 97.5 F | DIASTOLIC BLOOD PRESSURE: 61 MMHG | OXYGEN SATURATION: 100 % | HEART RATE: 94 BPM | HEIGHT: 63 IN

## 2022-06-02 DIAGNOSIS — R42 DIZZINESS: Primary | ICD-10-CM

## 2022-06-02 DIAGNOSIS — I95.1 ORTHOSTASIS: ICD-10-CM

## 2022-06-02 LAB
ALBUMIN SERPL-MCNC: 3.8 G/DL (ref 3.5–5.2)
ALBUMIN/GLOB SERPL: 1.5 G/DL
ALP SERPL-CCNC: 95 U/L (ref 39–117)
ALT SERPL W P-5'-P-CCNC: 5 U/L (ref 1–33)
ANION GAP SERPL CALCULATED.3IONS-SCNC: 10 MMOL/L (ref 5–15)
AST SERPL-CCNC: 8 U/L (ref 1–32)
BASOPHILS # BLD AUTO: 0.1 10*3/MM3 (ref 0–0.2)
BASOPHILS NFR BLD AUTO: 0.9 % (ref 0–1.5)
BILIRUB SERPL-MCNC: 0.3 MG/DL (ref 0–1.2)
BUN SERPL-MCNC: 20 MG/DL (ref 8–23)
BUN/CREAT SERPL: 24.7 (ref 7–25)
CALCIUM SPEC-SCNC: 9.1 MG/DL (ref 8.6–10.5)
CHLORIDE SERPL-SCNC: 101 MMOL/L (ref 98–107)
CO2 SERPL-SCNC: 27 MMOL/L (ref 22–29)
CREAT SERPL-MCNC: 0.81 MG/DL (ref 0.57–1)
DEPRECATED RDW RBC AUTO: 47.3 FL (ref 37–54)
EGFRCR SERPLBLD CKD-EPI 2021: 80.7 ML/MIN/1.73
EOSINOPHIL # BLD AUTO: 0.2 10*3/MM3 (ref 0–0.4)
EOSINOPHIL NFR BLD AUTO: 3.8 % (ref 0.3–6.2)
ERYTHROCYTE [DISTWIDTH] IN BLOOD BY AUTOMATED COUNT: 14.4 % (ref 12.3–15.4)
GLOBULIN UR ELPH-MCNC: 2.5 GM/DL
GLUCOSE BLDC GLUCOMTR-MCNC: 99 MG/DL (ref 70–105)
GLUCOSE SERPL-MCNC: 86 MG/DL (ref 65–99)
HCT VFR BLD AUTO: 35.6 % (ref 34–46.6)
HGB BLD-MCNC: 12.2 G/DL (ref 12–15.9)
LIPASE SERPL-CCNC: 20 U/L (ref 13–60)
LYMPHOCYTES # BLD AUTO: 0.8 10*3/MM3 (ref 0.7–3.1)
LYMPHOCYTES NFR BLD AUTO: 12.4 % (ref 19.6–45.3)
MCH RBC QN AUTO: 32.4 PG (ref 26.6–33)
MCHC RBC AUTO-ENTMCNC: 34.2 G/DL (ref 31.5–35.7)
MCV RBC AUTO: 94.6 FL (ref 79–97)
MONOCYTES # BLD AUTO: 0.6 10*3/MM3 (ref 0.1–0.9)
MONOCYTES NFR BLD AUTO: 9.6 % (ref 5–12)
NEUTROPHILS NFR BLD AUTO: 4.5 10*3/MM3 (ref 1.7–7)
NEUTROPHILS NFR BLD AUTO: 73.3 % (ref 42.7–76)
NRBC BLD AUTO-RTO: 0 /100 WBC (ref 0–0.2)
PLATELET # BLD AUTO: 212 10*3/MM3 (ref 140–450)
PMV BLD AUTO: 7.6 FL (ref 6–12)
POTASSIUM SERPL-SCNC: 3.9 MMOL/L (ref 3.5–5.2)
PROT SERPL-MCNC: 6.3 G/DL (ref 6–8.5)
RBC # BLD AUTO: 3.77 10*6/MM3 (ref 3.77–5.28)
SODIUM SERPL-SCNC: 138 MMOL/L (ref 136–145)
TROPONIN T SERPL-MCNC: <0.01 NG/ML (ref 0–0.03)
TROPONIN T SERPL-MCNC: <0.01 NG/ML (ref 0–0.03)
WBC NRBC COR # BLD: 6.1 10*3/MM3 (ref 3.4–10.8)

## 2022-06-02 PROCEDURE — 25010000002 HEPARIN LOCK FLUSH PER 10 UNITS: Performed by: EMERGENCY MEDICINE

## 2022-06-02 PROCEDURE — 36415 COLL VENOUS BLD VENIPUNCTURE: CPT

## 2022-06-02 PROCEDURE — 99283 EMERGENCY DEPT VISIT LOW MDM: CPT

## 2022-06-02 PROCEDURE — 85025 COMPLETE CBC W/AUTO DIFF WBC: CPT | Performed by: NURSE PRACTITIONER

## 2022-06-02 PROCEDURE — 70450 CT HEAD/BRAIN W/O DYE: CPT

## 2022-06-02 PROCEDURE — 80053 COMPREHEN METABOLIC PANEL: CPT | Performed by: NURSE PRACTITIONER

## 2022-06-02 PROCEDURE — 84484 ASSAY OF TROPONIN QUANT: CPT | Performed by: NURSE PRACTITIONER

## 2022-06-02 PROCEDURE — 82962 GLUCOSE BLOOD TEST: CPT

## 2022-06-02 PROCEDURE — 93005 ELECTROCARDIOGRAM TRACING: CPT

## 2022-06-02 PROCEDURE — 83690 ASSAY OF LIPASE: CPT | Performed by: NURSE PRACTITIONER

## 2022-06-02 PROCEDURE — 71045 X-RAY EXAM CHEST 1 VIEW: CPT

## 2022-06-02 RX ORDER — HEPARIN SODIUM (PORCINE) LOCK FLUSH IV SOLN 100 UNIT/ML 100 UNIT/ML
500 SOLUTION INTRAVENOUS ONCE
Status: COMPLETED | OUTPATIENT
Start: 2022-06-02 | End: 2022-06-02

## 2022-06-02 RX ADMIN — HEPARIN SODIUM (PORCINE) LOCK FLUSH IV SOLN 100 UNIT/ML 500 UNITS: 100 SOLUTION at 22:07

## 2022-06-02 RX ADMIN — SODIUM CHLORIDE 500 ML: 9 INJECTION, SOLUTION INTRAVENOUS at 20:45

## 2022-06-03 NOTE — ED PROVIDER NOTES
Subjective   Chief complaint: Fall      Context: Patient is 67-year-old female who comes in with daughter complaining of intermittent dizziness, states been a little worse over the last week but has a history of dizziness with gait abnormalities.  She denies any chest pain or shortness of breath.  Has had an intermittent nonproductive cough without hemoptysis.  She states she leaned forward today lost her balance and fell.  There was no syncope or loss of consciousness.  No unilateral focal deficits confusion ataxia or lethargy.  Reportedly took her blood pressure sitting and standing and had a drop in systolic pressure.  Denies any headache.    Location:   Duration: As above  Timing: Waxes and wanes  Quality/Severity: Denies  Modifying factors: Worse with position changes  Associated symptoms: Worse with position changes        Additional hx provided by:  daughter    PCP: toño    LNMP: Postmenopausal                 Review of Systems   Constitutional: Negative for fever.   HENT: Negative.    Eyes: Negative for visual disturbance.   Respiratory: Negative.    Cardiovascular: Negative.    Gastrointestinal: Negative.    Genitourinary: Negative.    Musculoskeletal: Negative.    Skin: Positive for wound.   Allergic/Immunologic: Negative for immunocompromised state.   Neurological: Positive for light-headedness.   Hematological: Bruises/bleeds easily.   Psychiatric/Behavioral: Negative for confusion.       Past Medical History:   Diagnosis Date   • Cancer (HCC)    • Carotid stenosis    • COPD (chronic obstructive pulmonary disease) (HCC)    • Coronary artery disease    • Esophageal stricture     Dr Domingo   • Hyperlipidemia    • Pulmonary embolism (HCC)     seen at [facility], fluid around her heart at the time-right lung       Allergies   Allergen Reactions   • Hydrocodone Palpitations   • Sulfa Antibiotics Swelling   • Benzalkonium Chloride Hives   • Nickel Rash   • Penicillins Rash       Past Surgical History:    Procedure Laterality Date   • ANGIOPLASTY / STENTING FEMORAL     • CARDIAC CATHETERIZATION     • CAROTID STENT     • CORONARY STENT PLACEMENT     • ESOPHAGOSCOPY / EGD     • HYSTERECTOMY         Family History   Problem Relation Age of Onset   • Heart disease Mother    • Hypertension Mother    • Hyperlipidemia Father    • Lung cancer Father    • Diabetes Paternal Uncle    • Leukemia Paternal Grandmother        Social History     Socioeconomic History   • Marital status:    Tobacco Use   • Smoking status: Current Every Day Smoker     Packs/day: 2.00     Types: Cigarettes   • Smokeless tobacco: Never Used   Vaping Use   • Vaping Use: Never used   Substance and Sexual Activity   • Alcohol use: Never   • Drug use: Never   • Sexual activity: Defer           Objective   Physical Exam     Vital signs in triage nurse note reviewed.  Constitutional: Awake, alert, thin  HEENT: Normocephalic, atraumatic; pupils are PERRL with intact EOM; oropharynx is pink and moist without exudate or erythema.  Neck: Supple, full range of motion without pain; no cervical lymphadenopathy.  Cardiovascular: Regular rate and rhythm, normal S1-S2.  No murmurs or rubs  Pulmonary: Respiratory effort regular, nonlabored; breath sounds clear to auscultation all fields.  Abdomen: Soft, nontender, nondistended with normoactive bowel sounds; no rebound or guarding.  Musculoskeletal: Independent range of motion of all extremities, no palpable tenderness or edema. Spine is midline without obvious curvature scoliosis. No bony tenderness, soft tissue swelling, deformity is noted. Paraspinal musculature is soft, nontender.  Neuro: Alert oriented x3, speech is clear and appropriate.  Normal shoulder shrug, equal palate rise, no peripheral vision loss, no facial asymmetry,no pronator drift, normal coordination.      ECG 12 Lead      Date/Time: 6/2/2022 2:17 PM  Performed by: Radha Cloud APRN  Authorized by: Radha Cloud APRN   Interpreted  by physician (emily)  Comparison: compared with previous ECG from 5/18/2022  Comparison to previous ECG: Sinus rhythm  Rhythm: sinus rhythm  BPM: 80  Comments: No significant change from previous                       ED Course  ED Course as of 06/02/22 2152   u Jun 02, 2022   1745 Seen after being placed in room from triage [JW]   1948 Waiting on urine to be collected [JW]   2035 Care transferred to dr todd [JW]      ED Course User Index  [JW] Radha Cloud, KENNY           Labs Reviewed   CBC WITH AUTO DIFFERENTIAL - Abnormal; Notable for the following components:       Result Value    Lymphocyte % 12.4 (*)     All other components within normal limits   TROPONIN (IN-HOUSE) - Normal    Narrative:     Troponin T Reference Range:  <= 0.03 ng/mL-   Negative for AMI  >0.03 ng/mL-     Abnormal for myocardial necrosis.  Clinicians would have to utilize clinical acumen, EKG, Troponin and serial changes to determine if it is an Acute Myocardial Infarction or myocardial injury due to an underlying chronic condition.       Results may be falsely decreased if patient taking Biotin.     LIPASE - Normal   TROPONIN (IN-HOUSE) - Normal    Narrative:     Troponin T Reference Range:  <= 0.03 ng/mL-   Negative for AMI  >0.03 ng/mL-     Abnormal for myocardial necrosis.  Clinicians would have to utilize clinical acumen, EKG, Troponin and serial changes to determine if it is an Acute Myocardial Infarction or myocardial injury due to an underlying chronic condition.       Results may be falsely decreased if patient taking Biotin.     POCT GLUCOSE FINGERSTICK - Normal   COMPREHENSIVE METABOLIC PANEL    Narrative:     GFR Normal >60  Chronic Kidney Disease <60  Kidney Failure <15     URINALYSIS W/ CULTURE IF INDICATED   CBC AND DIFFERENTIAL    Narrative:     The following orders were created for panel order CBC & Differential.  Procedure                               Abnormality         Status                     ---------                                -----------         ------                     CBC Auto Differential[[id number]]        Abnormal            Final result                 Please view results for these tests on the individual orders.     Medications   sodium chloride 0.9 % bolus 500 mL (500 mL Intravenous New Bag 6/2/22 2045)     CT Head Without Contrast    Result Date: 6/2/2022   1. No acute intracranial abnormality. 2. Diffuse white matter hypoattenuation, which could be related to advanced chronic small vessel ischemic change.  Electronically Signed By-Phoenix Carl MD On:6/2/2022 6:37 PM This report was finalized on [id number] by  Phoenix Carl MD.    XR Chest 1 View    Result Date: 6/2/2022  Findings suggestive of COPD without acute cardiopulmonary abnormality.  Electronically Signed By-Phoenix Carl MD On:6/2/2022 8:24 PM This report was finalized on [id number] by  Phoenix Carl MD.    Prior to Admission medications    Medication Sig Start Date End Date Taking? Authorizing Provider   atorvastatin (LIPITOR) 80 MG tablet 80 mg. 12/8/21   Emergency, Nurse Ervin RN   furosemide (LASIX) 20 MG tablet Take 20 mg by mouth Daily. 1/12/22   Emergency, Nurse Ervin RN   gabapentin (NEURONTIN) 300 MG capsule Take 300 mg by mouth 2 (Two) Times a Day. 5/10/22   Ted Resendiz MD   hydroCHLOROthiazide (HYDRODIURIL) 12.5 MG tablet 12.5 mg. 12/8/21   Ivonne, Nurse Ervin RN   levalbuterol (XOPENEX) 1.25 MG/3ML nebulizer solution XOPENEX 1.25 MG/3ML NEBU 2/17/17   Emergency, Nurse Epic, RN   meclizine (ANTIVERT) 25 MG tablet Every 8 (Eight) Hours. 1/24/18   Ivonne, Nurse Ervin RN   metoprolol succinate XL (TOPROL-XL) 25 MG 24 hr tablet Take 25 mg by mouth Daily. 5/3/22   Ted Langford MD   oxyCODONE-acetaminophen (PERCOCET) 5-325 MG per tablet  8/23/21   Nurse Ervin Coronado RN   potassium chloride (KAYCIEL) 20 mEq/15 mL solution Take 20 mEq by mouth Daily. 1/12/22   Ivonne, Nurse Epic, RN   prasugrel  "(Effient) 10 MG tablet Take 1 tablet by mouth Daily. 5/18/22   Nai Lorenzana MD                                           MDM  Number of Diagnoses or Management Options  Diagnosis management comments: BP (!) 89/61 (BP Location: Left arm, Patient Position: Standing)   Pulse 94   Temp 97.5 °F (36.4 °C) (Oral)   Resp 19   Ht 160 cm (63\")   Wt 48.9 kg (107 lb 12.9 oz)   SpO2 100%   BMI 19.10 kg/m²      Chart review:      Comorbidities:  has a past medical history of Cancer (HCC), Carotid stenosis, COPD (chronic obstructive pulmonary disease) (HCC), Coronary artery disease, Esophageal stricture, Hyperlipidemia, and Pulmonary embolism (HCC).  Differentials:   not all inclusive of differentials considered  Discussion with provider:  Radiology interpretation:  X-rays reviewed by me and interpreted by radiologist,   CT Head Without Contrast    Result Date: 6/2/2022   1. No acute intracranial abnormality. 2. Diffuse white matter hypoattenuation, which could be related to advanced chronic small vessel ischemic change.  Electronically Signed By-Phoenix Carl MD On:6/2/2022 6:37 PM This report was finalized on [id number] by  Phoenix Carl MD.    Lab interpretation:  Labs viewed by me significant for,    Appropriate PPE worn during exam.  Patient had an IV established labs obtained.  Noted to have some minor orthostatic hypotension    This document is intended for medical expert use only. Reading of this document by patients and/or patient's family without participating medical staff guidance may result in misinterpretation and unintended morbidity.  Any interpretation of such data is the responsibility of the patient and/or family member responsible for the patient in concert with their primary or specialist providers, not to be left for sources of online searches such as [employer], [employer] or similar queries. Relying on these approaches to knowledge may result in misinterpretation, misguided goals of care and " even death should patients or family members try recommendations outside of the realm of professional medical care in a supervised inpatient environment.     Repeat evaluation patient reports feels improved she does not feel lightheaded or dizzy she wants to go home.  Repeat troponin was negative.  Patient was discharged.  Advised to follow-up with prime provider to drink plenty fluids.  To avoid sudden position change return new or worsening symptoms    Final diagnoses:   Dizziness   Orthostasis       ED Disposition  ED Disposition     ED Disposition   Discharge    Condition   Stable    Comment   --             Ted Langford MD  [address] [place] [address]  [phone]               Medication List      No changes were made to your prescriptions during this visit.          Checo Nicole MD  06/02/22 2150       Checo Nicole MD  06/02/22 2152

## 2022-06-05 LAB — QT INTERVAL: 368 MS

## 2022-06-09 ENCOUNTER — TELEPHONE (OUTPATIENT)
Dept: CARDIOLOGY | Facility: CLINIC | Age: 67
End: 2022-06-09

## 2022-06-09 NOTE — TELEPHONE ENCOUNTER
Patient daughter Neva has been notified, verbalized understanding    ------------------------------    Lashell Banegas APRN [place], MA    I'll send Brilinta 60mg bid             Previous Messages       ----- Message -----   From: MONET Renee   Sent: 6/8/2022   4:13 PM EDT   To: KENNY Azevedo   Subject: RE: Effient                                       She said it was about 9 years ago and she had 1 stent.   She is also ok with trying the Brilinta.     She is scheduled for the stress and echo on 6/30/22     Please advise       ----- Message -----   From: Lashell Banegas APRN   Sent: 6/8/2022   3:48 PM EDT   To: MONET Renee   Subject: RE: Effient                                       In reviewing her note, I'm not sure when her heart cath was even done.  Ask her if she knows.  She had a reaction to Plavix also, so the only one left is Brilinta.  I can start Brilinta 90mg bid.  Also, Randolph  ordered stress test and echo.  Please make sure she plans to get these done.  So...the question for her is if she has any stents.  If so, when that was done.  Can she take Brilinta?  Does she have stress/echo scheduled to do.   ----- Message -----   From: MONET Renee   Sent: 6/8/2022  11:38 AM EDT   To: KENNY Azevedo   Subject: Effient                                           Patient called states Dr Lorenzana started her on Effient on 5/18/22. She has been having dizzy spells and has fallen. She went to the ER on 6/2/22 for falling and dizziness. She wants to know if she can take something else.     She has a follow up with Dr Lorenzana on 6/30/22. She doesn't want to wait to discuss it at her appt.     Please advise   Thank you

## 2022-06-30 ENCOUNTER — HOSPITAL ENCOUNTER (OUTPATIENT)
Dept: CARDIOLOGY | Facility: HOSPITAL | Age: 67
Discharge: HOME | End: 2022-06-30

## 2022-06-30 VITALS
SYSTOLIC BLOOD PRESSURE: 141 MMHG | WEIGHT: 107 LBS | BODY MASS INDEX: 18.96 KG/M2 | HEIGHT: 63 IN | HEART RATE: 87 BPM | DIASTOLIC BLOOD PRESSURE: 83 MMHG

## 2022-06-30 DIAGNOSIS — I25.118 CORONARY ARTERY DISEASE OF NATIVE ARTERY OF NATIVE HEART WITH STABLE ANGINA PECTORIS: ICD-10-CM

## 2022-06-30 DIAGNOSIS — I20.8 CHRONIC STABLE ANGINA: ICD-10-CM

## 2022-06-30 LAB
BH CV REST NUCLEAR ISOTOPE DOSE: 11.4 MCI
BH CV STRESS COMMENTS STAGE 1: NORMAL
BH CV STRESS DOSE REGADENOSON STAGE 1: 0.4
BH CV STRESS DURATION MIN STAGE 1: 0
BH CV STRESS DURATION SEC STAGE 1: 10
BH CV STRESS NUCLEAR ISOTOPE DOSE: 36.3 MCI
BH CV STRESS PROTOCOL 1: NORMAL
BH CV STRESS RECOVERY BP: NORMAL MMHG
BH CV STRESS RECOVERY HR: 118 BPM
BH CV STRESS STAGE 1: 1
LV EF NUC BP: 75 %
MAXIMAL PREDICTED HEART RATE: 155 BPM
PERCENT MAX PREDICTED HR: 83.23 %
STRESS BASELINE BP: NORMAL MMHG
STRESS BASELINE HR: 89 BPM
STRESS PERCENT HR: 98 %
STRESS POST PEAK BP: NORMAL MMHG
STRESS POST PEAK HR: 129 BPM
STRESS TARGET HR: 132 BPM

## 2022-06-30 PROCEDURE — 0 TECHNETIUM SESTAMIBI: Performed by: INTERNAL MEDICINE

## 2022-06-30 PROCEDURE — 78452 HT MUSCLE IMAGE SPECT MULT: CPT

## 2022-06-30 PROCEDURE — 93016 CV STRESS TEST SUPVJ ONLY: CPT | Performed by: INTERNAL MEDICINE

## 2022-06-30 PROCEDURE — 93017 CV STRESS TEST TRACING ONLY: CPT

## 2022-06-30 PROCEDURE — A9500 TC99M SESTAMIBI: HCPCS | Performed by: INTERNAL MEDICINE

## 2022-06-30 PROCEDURE — 78452 HT MUSCLE IMAGE SPECT MULT: CPT | Performed by: INTERNAL MEDICINE

## 2022-06-30 PROCEDURE — 93306 TTE W/DOPPLER COMPLETE: CPT | Performed by: INTERNAL MEDICINE

## 2022-06-30 PROCEDURE — 93018 CV STRESS TEST I&R ONLY: CPT | Performed by: INTERNAL MEDICINE

## 2022-06-30 PROCEDURE — 25010000002 REGADENOSON 0.4 MG/5ML SOLUTION: Performed by: INTERNAL MEDICINE

## 2022-06-30 PROCEDURE — 93306 TTE W/DOPPLER COMPLETE: CPT

## 2022-06-30 RX ADMIN — TECHNETIUM TC 99M SESTAMIBI 1 DOSE: 1 INJECTION INTRAVENOUS at 10:42

## 2022-06-30 RX ADMIN — TECHNETIUM TC 99M SESTAMIBI 1 DOSE: 1 INJECTION INTRAVENOUS at 08:22

## 2022-06-30 RX ADMIN — REGADENOSON 0.4 MG: 0.08 INJECTION, SOLUTION INTRAVENOUS at 10:42

## 2022-07-01 ENCOUNTER — TELEPHONE (OUTPATIENT)
Dept: CARDIOLOGY | Facility: CLINIC | Age: 67
End: 2022-07-01

## 2022-07-01 LAB
ASCENDING AORTA: 3.4 CM
BH CV ECHO MEAS - ACS: 2.09 CM
BH CV ECHO MEAS - AO MAX PG: 4.5 MMHG
BH CV ECHO MEAS - AO MEAN PG: 2.43 MMHG
BH CV ECHO MEAS - AO ROOT DIAM: 3.6 CM
BH CV ECHO MEAS - AO V2 MAX: 105.7 CM/SEC
BH CV ECHO MEAS - AO V2 VTI: 22.4 CM
BH CV ECHO MEAS - AVA(I,D): 2.6 CM2
BH CV ECHO MEAS - EDV(CUBED): 80.2 ML
BH CV ECHO MEAS - EDV(MOD-SP2): 62.7 ML
BH CV ECHO MEAS - EDV(MOD-SP4): 67 ML
BH CV ECHO MEAS - EF(MOD-BP): 50 %
BH CV ECHO MEAS - EF(MOD-SP2): 45.5 %
BH CV ECHO MEAS - EF(MOD-SP4): 45 %
BH CV ECHO MEAS - ESV(CUBED): 39.4 ML
BH CV ECHO MEAS - ESV(MOD-SP2): 34.2 ML
BH CV ECHO MEAS - ESV(MOD-SP4): 36.8 ML
BH CV ECHO MEAS - FS: 21.1 %
BH CV ECHO MEAS - IVS/LVPW: 1.1 CM
BH CV ECHO MEAS - IVSD: 1.06 CM
BH CV ECHO MEAS - LA A2CS (ATRIAL LENGTH): 4.6 CM
BH CV ECHO MEAS - LA A4C LENGTH: 4.4 CM
BH CV ECHO MEAS - LA DIMENSION: 3.1 CM
BH CV ECHO MEAS - LAT PEAK E' VEL: 8 CM/SEC
BH CV ECHO MEAS - LV DIASTOLIC VOL/BSA (35-75): 41.8 CM2
BH CV ECHO MEAS - LV MASS(C)D: 145 GRAMS
BH CV ECHO MEAS - LV MAX PG: 2.41 MMHG
BH CV ECHO MEAS - LV MEAN PG: 1.49 MMHG
BH CV ECHO MEAS - LV SYSTOLIC VOL/BSA (12-30): 23 CM2
BH CV ECHO MEAS - LV V1 MAX: 77.6 CM/SEC
BH CV ECHO MEAS - LV V1 VTI: 17.6 CM
BH CV ECHO MEAS - LVIDD: 4.3 CM
BH CV ECHO MEAS - LVIDS: 3.4 CM
BH CV ECHO MEAS - LVOT AREA: 3.3 CM2
BH CV ECHO MEAS - LVOT DIAM: 2.05 CM
BH CV ECHO MEAS - LVPWD: 0.96 CM
BH CV ECHO MEAS - MED PEAK E' VEL: 5 CM/SEC
BH CV ECHO MEAS - MV A MAX VEL: 63.9 CM/SEC
BH CV ECHO MEAS - MV DEC SLOPE: 593.9 CM/SEC2
BH CV ECHO MEAS - MV DEC TIME: 0.15 MSEC
BH CV ECHO MEAS - MV E MAX VEL: 86.6 CM/SEC
BH CV ECHO MEAS - MV E/A: 1.35
BH CV ECHO MEAS - MV MAX PG: 3.4 MMHG
BH CV ECHO MEAS - MV MEAN PG: 1.38 MMHG
BH CV ECHO MEAS - MV P1/2T: 50 MSEC
BH CV ECHO MEAS - MV V2 VTI: 18.3 CM
BH CV ECHO MEAS - MVA(P1/2T): 4.4 CM2
BH CV ECHO MEAS - MVA(VTI): 3.2 CM2
BH CV ECHO MEAS - PA ACC SLOPE: 356 CM/SEC2
BH CV ECHO MEAS - PA ACC TIME: 0.13 SEC
BH CV ECHO MEAS - PA PR(ACCEL): 19.3 MMHG
BH CV ECHO MEAS - PA V2 MAX: 58.1 CM/SEC
BH CV ECHO MEAS - PAPD(PI EDV): 7 MMHG
BH CV ECHO MEAS - PI END-D VEL: 102 CM/SEC
BH CV ECHO MEAS - PULM A REVS DUR: 0.08 SEC
BH CV ECHO MEAS - PULM A REVS VEL: 22.7 CM/SEC
BH CV ECHO MEAS - PULM DIAS VEL: 64.2 CM/SEC
BH CV ECHO MEAS - PULM S/D: 0.69
BH CV ECHO MEAS - PULM SYS VEL: 44.5 CM/SEC
BH CV ECHO MEAS - QP/QS: 0.88
BH CV ECHO MEAS - RAP SYSTOLE: 3 MMHG
BH CV ECHO MEAS - RV MAX PG: 1.02 MMHG
BH CV ECHO MEAS - RV V1 MAX: 50.6 CM/SEC
BH CV ECHO MEAS - RV V1 VTI: 11.9 CM
BH CV ECHO MEAS - RVOT DIAM: 2.34 CM
BH CV ECHO MEAS - RVSP: 27.9 MMHG
BH CV ECHO MEAS - SI(MOD-SP2): 17.8 ML/M2
BH CV ECHO MEAS - SI(MOD-SP4): 18.8 ML/M2
BH CV ECHO MEAS - SUP REN AO DIAM: 2.5 CM
BH CV ECHO MEAS - SV(LVOT): 58.1 ML
BH CV ECHO MEAS - SV(MOD-SP2): 28.5 ML
BH CV ECHO MEAS - SV(MOD-SP4): 30.1 ML
BH CV ECHO MEAS - SV(RVOT): 51.1 ML
BH CV ECHO MEAS - TAPSE (>1.6): 1.7 CM
BH CV ECHO MEAS - TR MAX PG: 24.9 MMHG
BH CV ECHO MEAS - TR MAX VEL: 249.7 CM/SEC
BH CV ECHO MEASUREMENTS AVERAGE E/E' RATIO: 13.32
BH CV VAS BP LEFT ARM: NORMAL MMHG
BH CV XLRA - RV BASE: 3.3 CM
BH CV XLRA - RV MID: 2.3 CM
BH CV XLRA - TDI S': 12 CM/SEC
IVRT: 83 MSEC
LEFT ATRIUM VOLUME INDEX: 22 ML/M2
LEFT ATRIUM VOLUME: 35 ML
LV EF 2D ECHO EST: 50 %
MAXIMAL PREDICTED HEART RATE: 155 BPM
PV VALVE AREA: 4.3 CM2
STRESS TARGET HR: 132 BPM

## 2022-07-01 NOTE — TELEPHONE ENCOUNTER
Called and notified, patient verbalized understanding  ------------------------------------    Nai Lorenzana MD [place], MA    Echocardiogram looks okay except for small pericardial effusion   Continue the same therapy and follow-up as scheduled     Normal stress test

## 2023-10-31 ENCOUNTER — APPOINTMENT (OUTPATIENT)
Dept: CT IMAGING | Facility: HOSPITAL | Age: 68
End: 2023-10-31
Payer: MEDICARE

## 2023-10-31 ENCOUNTER — HOSPITAL ENCOUNTER (EMERGENCY)
Facility: HOSPITAL | Age: 68
Discharge: HOME | End: 2023-10-31
Attending: EMERGENCY MEDICINE | Admitting: EMERGENCY MEDICINE
Payer: MEDICARE

## 2023-10-31 ENCOUNTER — APPOINTMENT (OUTPATIENT)
Dept: GENERAL RADIOLOGY | Facility: HOSPITAL | Age: 68
End: 2023-10-31
Payer: MEDICARE

## 2023-10-31 VITALS
HEIGHT: 63 IN | SYSTOLIC BLOOD PRESSURE: 137 MMHG | RESPIRATION RATE: 16 BRPM | TEMPERATURE: 97.9 F | DIASTOLIC BLOOD PRESSURE: 68 MMHG | WEIGHT: 127 LBS | HEART RATE: 68 BPM | OXYGEN SATURATION: 98 % | BODY MASS INDEX: 22.5 KG/M2

## 2023-10-31 DIAGNOSIS — I31.39 PERICARDIAL EFFUSION: ICD-10-CM

## 2023-10-31 DIAGNOSIS — R06.02 SHORTNESS OF BREATH: Primary | ICD-10-CM

## 2023-10-31 LAB
ALBUMIN SERPL-MCNC: 3.8 G/DL (ref 3.5–5.2)
ALBUMIN/GLOB SERPL: 1.3 G/DL
ALP SERPL-CCNC: 124 U/L (ref 39–117)
ALT SERPL W P-5'-P-CCNC: <5 U/L (ref 1–33)
ANION GAP SERPL CALCULATED.3IONS-SCNC: 7 MMOL/L (ref 5–15)
AST SERPL-CCNC: 9 U/L (ref 1–32)
BASOPHILS # BLD AUTO: 0 10*3/MM3 (ref 0–0.2)
BASOPHILS NFR BLD AUTO: 0.7 % (ref 0–1.5)
BILIRUB SERPL-MCNC: 0.5 MG/DL (ref 0–1.2)
BUN SERPL-MCNC: 14 MG/DL (ref 8–23)
BUN/CREAT SERPL: 13.6 (ref 7–25)
CALCIUM SPEC-SCNC: 9.8 MG/DL (ref 8.6–10.5)
CHLORIDE SERPL-SCNC: 105 MMOL/L (ref 98–107)
CO2 SERPL-SCNC: 30 MMOL/L (ref 22–29)
CREAT SERPL-MCNC: 1.03 MG/DL (ref 0.57–1)
D DIMER PPP FEU-MCNC: 1.42 MG/L (FEU) (ref 0–0.66)
DEPRECATED RDW RBC AUTO: 49.9 FL (ref 37–54)
EGFRCR SERPLBLD CKD-EPI 2021: 60.1 ML/MIN/1.73
EOSINOPHIL # BLD AUTO: 0.2 10*3/MM3 (ref 0–0.4)
EOSINOPHIL NFR BLD AUTO: 3.2 % (ref 0.3–6.2)
ERYTHROCYTE [DISTWIDTH] IN BLOOD BY AUTOMATED COUNT: 14.9 % (ref 12.3–15.4)
GLOBULIN UR ELPH-MCNC: 3 GM/DL
GLUCOSE SERPL-MCNC: 100 MG/DL (ref 65–99)
HCT VFR BLD AUTO: 38.1 % (ref 34–46.6)
HGB BLD-MCNC: 12.6 G/DL (ref 12–15.9)
HOLD SPECIMEN: NORMAL
LYMPHOCYTES # BLD AUTO: 0.5 10*3/MM3 (ref 0.7–3.1)
LYMPHOCYTES NFR BLD AUTO: 10 % (ref 19.6–45.3)
MCH RBC QN AUTO: 31.8 PG (ref 26.6–33)
MCHC RBC AUTO-ENTMCNC: 33 G/DL (ref 31.5–35.7)
MCV RBC AUTO: 96.2 FL (ref 79–97)
MONOCYTES # BLD AUTO: 0.5 10*3/MM3 (ref 0.1–0.9)
MONOCYTES NFR BLD AUTO: 8.7 % (ref 5–12)
NEUTROPHILS NFR BLD AUTO: 4.2 10*3/MM3 (ref 1.7–7)
NEUTROPHILS NFR BLD AUTO: 77.4 % (ref 42.7–76)
NRBC BLD AUTO-RTO: 0 /100 WBC (ref 0–0.2)
NT-PROBNP SERPL-MCNC: 550.6 PG/ML (ref 0–900)
PLATELET # BLD AUTO: 246 10*3/MM3 (ref 140–450)
PMV BLD AUTO: 7.6 FL (ref 6–12)
POTASSIUM SERPL-SCNC: 4.6 MMOL/L (ref 3.5–5.2)
PROT SERPL-MCNC: 6.8 G/DL (ref 6–8.5)
QT INTERVAL: 373 MS
QTC INTERVAL: 426 MS
RBC # BLD AUTO: 3.95 10*6/MM3 (ref 3.77–5.28)
SODIUM SERPL-SCNC: 142 MMOL/L (ref 136–145)
TROPONIN T SERPL HS-MCNC: 30 NG/L
TROPONIN T SERPL HS-MCNC: 32 NG/L
WBC NRBC COR # BLD: 5.4 10*3/MM3 (ref 3.4–10.8)

## 2023-10-31 PROCEDURE — 85379 FIBRIN DEGRADATION QUANT: CPT | Performed by: EMERGENCY MEDICINE

## 2023-10-31 PROCEDURE — 83880 ASSAY OF NATRIURETIC PEPTIDE: CPT | Performed by: EMERGENCY MEDICINE

## 2023-10-31 PROCEDURE — 36415 COLL VENOUS BLD VENIPUNCTURE: CPT

## 2023-10-31 PROCEDURE — 71275 CT ANGIOGRAPHY CHEST: CPT

## 2023-10-31 PROCEDURE — 85025 COMPLETE CBC W/AUTO DIFF WBC: CPT | Performed by: EMERGENCY MEDICINE

## 2023-10-31 PROCEDURE — 93005 ELECTROCARDIOGRAM TRACING: CPT | Performed by: EMERGENCY MEDICINE

## 2023-10-31 PROCEDURE — 99285 EMERGENCY DEPT VISIT HI MDM: CPT

## 2023-10-31 PROCEDURE — 80053 COMPREHEN METABOLIC PANEL: CPT | Performed by: EMERGENCY MEDICINE

## 2023-10-31 PROCEDURE — 71045 X-RAY EXAM CHEST 1 VIEW: CPT

## 2023-10-31 PROCEDURE — 84484 ASSAY OF TROPONIN QUANT: CPT | Performed by: EMERGENCY MEDICINE

## 2023-10-31 PROCEDURE — 93005 ELECTROCARDIOGRAM TRACING: CPT

## 2023-10-31 PROCEDURE — 25510000001 IOPAMIDOL PER 1 ML: Performed by: EMERGENCY MEDICINE

## 2023-10-31 RX ORDER — SODIUM CHLORIDE 0.9 % (FLUSH) 0.9 %
10 SYRINGE (ML) INJECTION AS NEEDED
Status: DISCONTINUED | OUTPATIENT
Start: 2023-10-31 | End: 2023-10-31 | Stop reason: HOSPADM

## 2023-10-31 RX ORDER — IPRATROPIUM BROMIDE AND ALBUTEROL SULFATE 2.5; .5 MG/3ML; MG/3ML
3 SOLUTION RESPIRATORY (INHALATION) ONCE
Status: DISCONTINUED | OUTPATIENT
Start: 2023-10-31 | End: 2023-10-31 | Stop reason: HOSPADM

## 2023-10-31 RX ADMIN — IOPAMIDOL 100 ML: 755 INJECTION, SOLUTION INTRAVENOUS at 13:22

## 2023-10-31 NOTE — DISCHARGE INSTRUCTIONS
Your CT was somewhat concerning for recurrence of your cancer.  Recommend outpatient PET scan and follow-up with oncology.

## 2023-10-31 NOTE — ED PROVIDER NOTES
Subjective   History of Present Illness  68-year-old female presents for lower extremity shortness of breath.  Been going on for weeks.  No chest pain.  States he is also had some mild lower extremity edema.  Denies orthopnea.  Has had mild cough.  Review of Systems  See HPI.  Past Medical History:   Diagnosis Date    Cancer     Carotid stenosis     COPD (chronic obstructive pulmonary disease)     Coronary artery disease     Esophageal stricture     Dr Domingo    Hyperlipidemia     Pulmonary embolism     seen at [facility], fluid around her heart at the time-right lung       Allergies   Allergen Reactions    Hydrocodone Palpitations    Sulfa Antibiotics Swelling    Bacitracin Other (See Comments)    Benzalkonium Chloride Hives    Codeine Other (See Comments)    Neomycin Other (See Comments)    Polymyxin B Other (See Comments)    Nickel Rash    Penicillins Rash       Past Surgical History:   Procedure Laterality Date    ANGIOPLASTY / STENTING FEMORAL      CARDIAC CATHETERIZATION      CAROTID STENT      CORONARY STENT PLACEMENT      ESOPHAGOSCOPY / EGD      HYSTERECTOMY         Family History   Problem Relation Age of Onset    Heart disease Mother     Hypertension Mother     Hyperlipidemia Father     Lung cancer Father     Diabetes Paternal Uncle     Leukemia Paternal Grandmother        Social History     Socioeconomic History    Marital status:    Tobacco Use    Smoking status: Every Day     Packs/day: 2     Types: Cigarettes    Smokeless tobacco: Never   Vaping Use    Vaping Use: Never used   Substance and Sexual Activity    Alcohol use: Never    Drug use: Never    Sexual activity: Defer           Objective   Physical Exam  Constitutional:  No acute distress.  Head:  Atraumatic.  Normocephalic.   Eyes:  No scleral icterus. Normal conjunctivae  ENT:  Moist mucosa.  No nasal discharge present.  Cardiovascular:  Well perfused.  Equal pulses.  Regular rhythm and normal rate.  Normal capillary refill.   "  Pulmonary/Chest:  No respiratory distress.  Airway patent.  No tachypnea.  No accessory muscle usage.  Clear auscultation bilaterally  Abdominal:  Nondistended. Nontender.   Extremities: Trace bilateral lower extremity peripheral edema.  No Deformity  Skin:  Warm, dry  Neurological:  Alert, awake, and appropriate.  Normal speech.      Procedures           ED Course      /68   Pulse 68   Temp 97.9 °F (36.6 °C) (Oral)   Resp 16   Ht 160 cm (63\")   Wt 57.6 kg (127 lb)   SpO2 98%   BMI 22.50 kg/m²   Labs Reviewed   COMPREHENSIVE METABOLIC PANEL - Abnormal; Notable for the following components:       Result Value    Glucose 100 (*)     Creatinine 1.03 (*)     CO2 30.0 (*)     Alkaline Phosphatase 124 (*)     All other components within normal limits    Narrative:     GFR Normal >60  Chronic Kidney Disease <60  Kidney Failure <15     SINGLE HSTROPONIN T - Abnormal; Notable for the following components:    HS Troponin T 32 (*)     All other components within normal limits    Narrative:     High Sensitive Troponin T Reference Range:  <10.0 ng/L- Negative Female for AMI  <15.0 ng/L- Negative Male for AMI  >=10 - Abnormal Female indicating possible myocardial injury.  >=15 - Abnormal Male indicating possible myocardial injury.   Clinicians would have to utilize clinical acumen, EKG, Troponin, and serial changes to determine if it is an Acute Myocardial Infarction or myocardial injury due to an underlying chronic condition.        D-DIMER, QUANTITATIVE - Abnormal; Notable for the following components:    D-Dimer, Quantitative 1.42 (*)     All other components within normal limits    Narrative:     According to the assay 's published package insert, a normal (<0.50 mg/L (FEU)) D-dimer result in conjunction with a non-high clinical probability assessment, excludes deep vein thrombosis (DVT) and pulmonary embolism (PE) with high sensitivity.    D-dimer values increase with age and this can make VTE " "exclusion of an older population difficult. To address this, the [employer], based on best available evidence and recent guidelines, recommends that clinicians use age-adjusted D-dimer thresholds in patients greater than 50 years of age with: a) a low probability of PE who do not meet all Pulmonary Embolism Rule Out Criteria, or b) in those with intermediate probability of PE.   The formula for an age-adjusted D-dimer cut-off is \"age/100\".  For example, a 60 year old patient would have an age-adjusted cut-off of 0.60 mg/L (FEU) and an 80 year old 0.80 mg/L (FEU).   CBC WITH AUTO DIFFERENTIAL - Abnormal; Notable for the following components:    Neutrophil % 77.4 (*)     Lymphocyte % 10.0 (*)     Lymphocytes, Absolute 0.50 (*)     All other components within normal limits   SINGLE HSTROPONIN T - Abnormal; Notable for the following components:    HS Troponin T 30 (*)     All other components within normal limits    Narrative:     High Sensitive Troponin T Reference Range:  <10.0 ng/L- Negative Female for AMI  <15.0 ng/L- Negative Male for AMI  >=10 - Abnormal Female indicating possible myocardial injury.  >=15 - Abnormal Male indicating possible myocardial injury.   Clinicians would have to utilize clinical acumen, EKG, Troponin, and serial changes to determine if it is an Acute Myocardial Infarction or myocardial injury due to an underlying chronic condition.        BNP (IN-HOUSE) - Normal    Narrative:     This assay is used as an aid in the diagnosis of individuals suspected of having heart failure. It can be used as an aid in the diagnosis of acute decompensated heart failure (ADHF) in patients presenting with signs and symptoms of ADHF to the emergency department (ED). In addition, NT-proBNP of <300 pg/mL indicates ADHF is not likely.    Age Range Result Interpretation  NT-proBNP Concentration (pg/mL:      <50             Positive            >450                   Hay                 " 300-450                    Negative             <300    50-75           Positive            >900                  Gray                300-900                  Negative            <300      >75             Positive            >1800                  Gray                300-1800                  Negative            <300   CBC AND DIFFERENTIAL    Narrative:     The following orders were created for panel order CBC & Differential.  Procedure                               Abnormality         Status                     ---------                               -----------         ------                     CBC Auto Differential[[id number]]        Abnormal            Final result                 Please view results for these tests on the individual orders.   EXTRA TUBES    Narrative:     The following orders were created for panel order Extra Tubes.  Procedure                               Abnormality         Status                     ---------                               -----------         ------                     Gold Top - SST[[id number]]                                   Final result                 Please view results for these tests on the individual orders.   GOLD TOP - SST     Medications   iopamidol (ISOVUE-370) 76 % injection 100 mL (100 mL Intravenous Given 10/31/23 1322)     CT Angiogram Chest Pulmonary Embolism    Result Date: 10/31/2023  Negative exam for pulmonary embolism. Scarring in the left suprahilar region appears slightly more prominent as does the left suprahilar adenopathy. This may be secondary to post treatment changes although recurrent neoplasm could give a similar appearance and correlation with pet imaging is  therefore recommended. Electronically Signed: Ruthy Perrin MD  10/31/2023 1:34 PM EDT  Workstation ID: [id number]    XR Chest 1 View    Result Date: 10/31/2023  Impression: Left hilar retraction and suprahilar prominence which may be treatment related change in this patient with a  history of lung cancer. Residual or active malignancy in this area cannot be excluded on this portable chest radiograph. There is underlying emphysema. No acute cardiopulmonary process. Electronically Signed: Gregory Toledo DO  10/31/2023 11:53 AM EDT  Workstation ID: [id number]                                        Medical Decision Making  Problems Addressed:  Pericardial effusion: complicated acute illness or injury  Shortness of breath: complicated acute illness or injury    Amount and/or Complexity of Data Reviewed  Labs: ordered.  Radiology: ordered.  ECG/medicine tests: ordered.    Risk  Prescription drug management.      EKG interpretation performed by ED provider: 11:15 AM, normal sinus rhythm, axis and intervals within normal limits, nonspecific ST changes    My interpretation of CT chest is central PE or pneumothorax.  See above radiology interpretation.    Troponin flat.  CTA without PE.  Somewhat concerning for possible recurrence of malignancy.  Referred to oncology.  Advise following up with PCP for possible PET scan.  Has trace pericardial effusion that was also present on echo done in July of last year.  DC'd home.  Patient agreeable with this plan.  Satting 98% on room air in no respiratory distress.  Return ER precautions discussed.  Final diagnoses:   Shortness of breath   Pericardial effusion       ED Disposition  ED Disposition       ED Disposition   Discharge    Condition   Stable    Comment   --               Ted Langford MD  [address] [facility] [place] [address]  [phone]    In 3 days      [facility]  [facility] [facility] [address]  [facility] [address] [phone]             Medication List      No changes were made to your prescriptions during this visit.            Ino Mckeon MD  10/31/23 7188

## 2023-11-08 ENCOUNTER — TELEPHONE (OUTPATIENT)
Dept: ONCOLOGY | Facility: OTHER | Age: 68
End: 2023-11-08
Payer: MEDICARE

## 2023-11-08 NOTE — TELEPHONE ENCOUNTER
PATIENT CALLING IN SAID WANTED TO SEE DR. HENDERSON FOR CONTINUE OF CARE, ALSO DR. ALVARO BELL IS OUT OF NETWORK, PHONE NUMBER ([phone]), SPOKE WITH SEMAJ AT DR. BELL OFFICE AND THEY WILL BE FAXING OVER REFERRAL [phone], WITH PATIENT'S DIAGNOSIS.   TRANSFERRED PATIENT OVER TO DR. BELL OFFICE SO SHE COULD SPEAK TO THEM AS WELL.

## 2023-11-16 ENCOUNTER — APPOINTMENT (OUTPATIENT)
Dept: LAB | Facility: HOSPITAL | Age: 68
End: 2023-11-16
Payer: MEDICARE

## 2023-11-16 ENCOUNTER — CONSULT (OUTPATIENT)
Dept: ONCOLOGY | Facility: CLINIC | Age: 68
End: 2023-11-16
Payer: MEDICARE

## 2023-11-16 VITALS
BODY MASS INDEX: 23.35 KG/M2 | HEIGHT: 63 IN | OXYGEN SATURATION: 94 % | DIASTOLIC BLOOD PRESSURE: 84 MMHG | SYSTOLIC BLOOD PRESSURE: 134 MMHG | TEMPERATURE: 96.8 F | RESPIRATION RATE: 16 BRPM | HEART RATE: 83 BPM | WEIGHT: 131.8 LBS

## 2023-11-16 DIAGNOSIS — C34.90 SMALL CELL LUNG CANCER: Primary | ICD-10-CM

## 2023-11-16 LAB
BASOPHILS # BLD AUTO: 0.03 10*3/MM3 (ref 0–0.2)
BASOPHILS NFR BLD AUTO: 0.4 % (ref 0–1.5)
DEPRECATED RDW RBC AUTO: 49.1 FL (ref 37–54)
EOSINOPHIL # BLD AUTO: 0.45 10*3/MM3 (ref 0–0.4)
EOSINOPHIL NFR BLD AUTO: 5.4 % (ref 0.3–6.2)
ERYTHROCYTE [DISTWIDTH] IN BLOOD BY AUTOMATED COUNT: 14.2 % (ref 12.3–15.4)
HCT VFR BLD AUTO: 43.2 % (ref 34–46.6)
HGB BLD-MCNC: 14.2 G/DL (ref 12–15.9)
LYMPHOCYTES # BLD AUTO: 1.15 10*3/MM3 (ref 0.7–3.1)
LYMPHOCYTES NFR BLD AUTO: 13.9 % (ref 19.6–45.3)
MCH RBC QN AUTO: 32 PG (ref 26.6–33)
MCHC RBC AUTO-ENTMCNC: 32.9 G/DL (ref 31.5–35.7)
MCV RBC AUTO: 97.3 FL (ref 79–97)
MONOCYTES # BLD AUTO: 0.84 10*3/MM3 (ref 0.1–0.9)
MONOCYTES NFR BLD AUTO: 10.2 % (ref 5–12)
NEUTROPHILS NFR BLD AUTO: 5.8 10*3/MM3 (ref 1.7–7)
NEUTROPHILS NFR BLD AUTO: 70.1 % (ref 42.7–76)
PLATELET # BLD AUTO: 215 10*3/MM3 (ref 140–450)
PMV BLD AUTO: 10.1 FL (ref 6–12)
RBC # BLD AUTO: 4.44 10*6/MM3 (ref 3.77–5.28)
WBC NRBC COR # BLD AUTO: 8.27 10*3/MM3 (ref 3.4–10.8)

## 2023-11-16 PROCEDURE — 99204 OFFICE O/P NEW MOD 45 MIN: CPT | Performed by: INTERNAL MEDICINE

## 2023-11-16 PROCEDURE — 1160F RVW MEDS BY RX/DR IN RCRD: CPT | Performed by: INTERNAL MEDICINE

## 2023-11-16 PROCEDURE — 36415 COLL VENOUS BLD VENIPUNCTURE: CPT | Performed by: INTERNAL MEDICINE

## 2023-11-16 PROCEDURE — 85025 COMPLETE CBC W/AUTO DIFF WBC: CPT | Performed by: INTERNAL MEDICINE

## 2023-11-16 PROCEDURE — 1126F AMNT PAIN NOTED NONE PRSNT: CPT | Performed by: INTERNAL MEDICINE

## 2023-11-16 PROCEDURE — 1159F MED LIST DOCD IN RCRD: CPT | Performed by: INTERNAL MEDICINE

## 2023-11-16 RX ORDER — PANTOPRAZOLE SODIUM 40 MG/1
1 TABLET, DELAYED RELEASE ORAL EVERY 12 HOURS SCHEDULED
COMMUNITY
Start: 2023-10-13

## 2023-11-16 NOTE — LETTER
2023     Ted Langford MD  [address] [facility] [place] [address]    Patient: Miryam Fernández   YOB: 1955   Date of Visit: 2023     Dear Tde Langford MD:       Thank you for referring Miryam Fernández to me for evaluation. Below are the relevant portions of my assessment and plan of care.    If you have questions, please do not hesitate to call me. I look forward to following Miryam along with you.         Sincerely,        Wyatt Morales MD        CC: No Recipients    Wyatt Morales MD  23 0948  Sign when Signing Visit                           HEMATOLOGY ONCOLOGY OUTPATIENT CONSULTATION       Patient name: Miryam Fernández  : 1955  MRN: [id number]  Primary Care Physician: Ted Langford MD  Referring Physician: Sonia Cortes MD  Reason For Consult: limited stage small cell lung cancer      History of Present Illness:  Patient is a 68 y.o. female diagnosed and treated for limited stage small cell lung cancer with chemoradiation currently undergoing surveillance.     Ms eFrnández is a 66-year-old female who was diagnosed in   with small cell lung cancer after presenting with dyspnea, chest pain, nausea and vomiting. CT chest was suspicious for lung     20 PET CT -  showed hypermetabolic lower cervical, upper and middle mediastinal lymphadenopathy and left perihilar lung nodule. There was also intense focal uptake in the right thyroid lobe.   MRI brain was negative for metastases.      20  biopsy of the left supraclavicular node that showed small cell lung  cancer. She was diagnosed with limited stage disease and was treated with definitive chemoradiation.     20 - C1 of cisplatin/etoposide with radiation  2020 C4     2020 - PCI     CT chest on 20 showed significant interval reduction in mediastinal masses though there was a new PE for which she was started on Eliquis. She continued following with Dr Castillo and has had stable  disease on imaging.      CT C/A/P on 9/1/21 showed a more conspicuous 5 mm LLL nodule compared to previous scans with numerous additional bilateral nodules that are stable and unchanged since 2020. There was an increasing moderate pericardial effusion and stable perihilar scarring as well as mediastinal soft tissue density thought to be treated carcinoma. There was no clear evidence of progressive or recurrence malignancy in the chest.    She transferred care to [facility] in September 2021 and had repeat imaging in December including MRI brain that did not show disease progression. However, the pericardial effusion had increased. 2D echo on 12/1/21 showed hemodynamic compromise and concerns  for cardiac tamponade. She was subsequently admitted to the cardiology service and had a pericardiocentesis on 12/10/21 with the fluid negative for malignancy.    No further recurrence of disease.     Last CT imaging in 5/2023 with no evidence of recurrence. No recurrence of effusion.     Subjective:  Patient presents for initial consultation. Symptomatically doing well.       Past Medical History:   Diagnosis Date   • Cancer    • Carotid stenosis    • COPD (chronic obstructive pulmonary disease)    • Coronary artery disease    • Esophageal stricture     Dr Domingo   • Hyperlipidemia    • Pulmonary embolism     seen at [facility], fluid around her heart at the time-right lung       Past Surgical History:   Procedure Laterality Date   • ANGIOPLASTY / STENTING FEMORAL     • CARDIAC CATHETERIZATION     • CAROTID STENT     • CORONARY STENT PLACEMENT     • ESOPHAGOSCOPY / EGD     • HYSTERECTOMY           Current Outpatient Medications:   •  metoprolol succinate XL (TOPROL-XL) 25 MG 24 hr tablet, Take 1 tablet by mouth Daily., Disp: , Rfl:   •  oxyCODONE-acetaminophen (PERCOCET) 5-325 MG per tablet, , Disp: , Rfl:   •  pantoprazole (PROTONIX) 40 MG EC tablet, Take 1 tablet by mouth Every 12 (Twelve) Hours., Disp: , Rfl:   •  ticagrelor  "(Brilinta) 60 MG tablet tablet, Take 1 tablet by mouth 2 (Two) Times a Day., Disp: 60 tablet, Rfl: 5    Allergies   Allergen Reactions   • Hydrocodone Palpitations   • Sulfa Antibiotics Swelling   • Bacitracin Other (See Comments)   • Benzalkonium Chloride Hives   • Codeine Other (See Comments)   • Neomycin Other (See Comments)   • Polymyxin B Other (See Comments)   • Nickel Rash   • Penicillins Rash       Family History   Problem Relation Age of Onset   • Heart disease Mother    • Hypertension Mother    • Hyperlipidemia Father    • Lung cancer Father    • Diabetes Paternal Uncle    • Leukemia Paternal Grandmother        Cancer-related family history includes Lung cancer in her father.      Social History     Tobacco Use   • Smoking status: Every Day     Packs/day: 2     Types: Cigarettes   • Smokeless tobacco: Never   Vaping Use   • Vaping Use: Never used   Substance Use Topics   • Alcohol use: Never   • Drug use: Never     Social History     Social History Narrative   • Not on file       ROS:   Review of Systems   Constitutional:  Negative for fatigue and fever.   HENT:  Negative for congestion and nosebleeds.    Eyes:  Negative for pain.   Respiratory:  Negative for cough and shortness of breath.    Cardiovascular:  Negative for chest pain.   Gastrointestinal:  Negative for abdominal pain, blood in stool, diarrhea, nausea and vomiting.   Endocrine: Negative for cold intolerance and heat intolerance.   Genitourinary:  Negative for difficulty urinating.   Musculoskeletal:  Negative for arthralgias.   Skin:  Negative for rash.   Neurological:  Negative for dizziness and headaches.   Hematological:  Does not bruise/bleed easily.   Psychiatric/Behavioral:  Negative for behavioral problems.          Objective:    Vital Signs:  Vitals:    11/16/23 1339   BP: 134/84   Pulse: 83   Resp: 16   Temp: 96.8 °F (36 °C)   SpO2: 94%   Weight: 59.8 kg (131 lb 12.8 oz)   Height: 160 cm (63\")   PainSc: 0-No pain     Body mass index " is 23.35 kg/m².    ECOG  (1) Restricted in physically strenuous activity, ambulatory and able to do work of light nature    Physical Exam:   Physical Exam  Constitutional:       Appearance: Normal appearance.   HENT:      Head: Normocephalic and atraumatic.   Eyes:      Pupils: Pupils are equal, round, and reactive to light.   Cardiovascular:      Rate and Rhythm: Normal rate and regular rhythm.      Pulses: Normal pulses.      Heart sounds: No murmur heard.  Pulmonary:      Effort: Pulmonary effort is normal.      Breath sounds: Normal breath sounds.   Abdominal:      General: There is no distension.      Palpations: Abdomen is soft. There is no mass.      Tenderness: There is no abdominal tenderness.   Musculoskeletal:         General: Normal range of motion.      Cervical back: Normal range of motion and neck supple.   Skin:     General: Skin is warm.   Neurological:      General: No focal deficit present.      Mental Status: She is alert.   Psychiatric:         Mood and Affect: Mood normal.         Lab Results - Last 18 Months   Lab Units 11/16/23  1313 10/31/23  1129 06/02/22  1918   WBC 10*3/mm3 8.27 5.40 6.10   HEMOGLOBIN g/dL 14.2 12.6 12.2   HEMATOCRIT % 43.2 38.1 35.6   PLATELETS 10*3/mm3 215 246 212   MCV fL 97.3* 96.2 94.6     Lab Results - Last 18 Months   Lab Units 10/31/23  1129 06/02/22  1918   SODIUM mmol/L 142 138   POTASSIUM mmol/L 4.6 3.9   CHLORIDE mmol/L 105 101   CO2 mmol/L 30.0* 27.0   BUN mg/dL 14 20   CREATININE mg/dL 1.03* 0.81   CALCIUM mg/dL 9.8 9.1   BILIRUBIN mg/dL 0.5 0.3   ALK PHOS U/L 124* 95   ALT (SGPT) U/L <5 5   AST (SGOT) U/L 9 8   GLUCOSE mg/dL 100* 86       Lab Results   Component Value Date    GLUCOSE 100 (H) 10/31/2023    BUN 14 10/31/2023    CREATININE 1.03 (H) 10/31/2023    BCR 13.6 10/31/2023    K 4.6 10/31/2023    CO2 30.0 (H) 10/31/2023    CALCIUM 9.8 10/31/2023    ALBUMIN 3.8 10/31/2023    LABIL2 1.2 03/10/2021    AST 9 10/31/2023    ALT <5 10/31/2023       Lab  "Results - Last 18 Months   Lab Units 06/01/23  0751   INR  0.9   APTT Second 23.3       Lab Results   Component Value Date    IRON 228 (H) 03/19/2020    TIBC 266 03/19/2020    FERRITIN 171.0 03/19/2020       No results found for: \"FOLATE\"    No results found for: \"OCCULTBLD\"    No results found for: \"RETICCTPCT\"  No results found for: \"WMKHUODW27\"  No results found for: \"SPEP\", \"UPEP\"  No results found for: \"LDH\", \"URICACID\"  No results found for: \"YOLANDA\", \"RF\", \"SEDRATE\"  No results found for: \"FIBRINOGEN\", \"HAPTOGLOBIN\"  Lab Results   Component Value Date    PTT 23.3 06/01/2023    INR 0.9 06/01/2023     No results found for: \"\"  No results found for: \"CEA\"  No components found for: \"CA-19-9\"  No results found for: \"PSA\"  No results found for: \"SEDRATE\"       Assessment & Plan    Patient is a 68 yr old female with limited stage small cell carcinoma s/p chemoradiation and PCI now on surveillance with no recurrence.    Limited stage small cell carcinoma  Good response to treatment. S/p chemo radiation with cisplatin/etoposide   5/2023 CT imaging with no evidence of recurrence  Plan for repeat CT imaging now in 11/2023 will also get MRI brain WOW contrast.    History of PE   Now off Eliquis, she took herself off eliquis after 6 months  No s/s of recurrent VTE, continue to monitor    History of pericardial effusion  S/p pericardiocentesis negative for cytology  No recurrence  Monitor for now     Order imaging now and repeat in 6 months if negative    Thank you very much for providing the opportunity to participate in this patient’s care. Please do not hesitate to call if there are any other questions.    "

## 2023-11-16 NOTE — PROGRESS NOTES
HEMATOLOGY ONCOLOGY OUTPATIENT CONSULTATION       Patient name: Miryam Fernández  : 1955  MRN: [id number]  Primary Care Physician: Ted Langford MD  Referring Physician: Sonia Cortes MD  Reason For Consult: limited stage small cell lung cancer      History of Present Illness:  Patient is a 68 y.o. female diagnosed and treated for limited stage small cell lung cancer with chemoradiation currently undergoing surveillance.     Ms Fernández is a 66-year-old female who was diagnosed in   with small cell lung cancer after presenting with dyspnea, chest pain, nausea and vomiting. CT chest was suspicious for lung     20 PET CT -  showed hypermetabolic lower cervical, upper and middle mediastinal lymphadenopathy and left perihilar lung nodule. There was also intense focal uptake in the right thyroid lobe.   MRI brain was negative for metastases.      20  biopsy of the left supraclavicular node that showed small cell lung  cancer. She was diagnosed with limited stage disease and was treated with definitive chemoradiation.     20 - C1 of cisplatin/etoposide with radiation  2020 C4     2020 - PCI     CT chest on 20 showed significant interval reduction in mediastinal masses though there was a new PE for which she was started on Eliquis. She continued following with Dr Castillo and has had stable disease on imaging.      CT C/A/P on 21 showed a more conspicuous 5 mm LLL nodule compared to previous scans with numerous additional bilateral nodules that are stable and unchanged since . There was an increasing moderate pericardial effusion and stable perihilar scarring as well as mediastinal soft tissue density thought to be treated carcinoma. There was no clear evidence of progressive or recurrence malignancy in the chest.    She transferred care to [facility] in 2021 and had repeat imaging in December including MRI brain that did not show disease  progression. However, the pericardial effusion had increased. 2D echo on 12/1/21 showed hemodynamic compromise and concerns  for cardiac tamponade. She was subsequently admitted to the cardiology service and had a pericardiocentesis on 12/10/21 with the fluid negative for malignancy.    No further recurrence of disease.     Last CT imaging in 5/2023 with no evidence of recurrence. No recurrence of effusion.     Subjective:  Patient presents for initial consultation. Symptomatically doing well.       Past Medical History:   Diagnosis Date    Cancer     Carotid stenosis     COPD (chronic obstructive pulmonary disease)     Coronary artery disease     Esophageal stricture     Dr Domingo    Hyperlipidemia     Pulmonary embolism     seen at [facility], fluid around her heart at the time-right lung       Past Surgical History:   Procedure Laterality Date    ANGIOPLASTY / STENTING FEMORAL      CARDIAC CATHETERIZATION      CAROTID STENT      CORONARY STENT PLACEMENT      ESOPHAGOSCOPY / EGD      HYSTERECTOMY           Current Outpatient Medications:     metoprolol succinate XL (TOPROL-XL) 25 MG 24 hr tablet, Take 1 tablet by mouth Daily., Disp: , Rfl:     oxyCODONE-acetaminophen (PERCOCET) 5-325 MG per tablet, , Disp: , Rfl:     pantoprazole (PROTONIX) 40 MG EC tablet, Take 1 tablet by mouth Every 12 (Twelve) Hours., Disp: , Rfl:     ticagrelor (Brilinta) 60 MG tablet tablet, Take 1 tablet by mouth 2 (Two) Times a Day., Disp: 60 tablet, Rfl: 5    Allergies   Allergen Reactions    Hydrocodone Palpitations    Sulfa Antibiotics Swelling    Bacitracin Other (See Comments)    Benzalkonium Chloride Hives    Codeine Other (See Comments)    Neomycin Other (See Comments)    Polymyxin B Other (See Comments)    Nickel Rash    Penicillins Rash       Family History   Problem Relation Age of Onset    Heart disease Mother     Hypertension Mother     Hyperlipidemia Father     Lung cancer Father     Diabetes Paternal Uncle     Leukemia Paternal  "Grandmother        Cancer-related family history includes Lung cancer in her father.      Social History     Tobacco Use    Smoking status: Every Day     Packs/day: 2     Types: Cigarettes    Smokeless tobacco: Never   Vaping Use    Vaping Use: Never used   Substance Use Topics    Alcohol use: Never    Drug use: Never     Social History     Social History Narrative    Not on file       ROS:   Review of Systems   Constitutional:  Negative for fatigue and fever.   HENT:  Negative for congestion and nosebleeds.    Eyes:  Negative for pain.   Respiratory:  Negative for cough and shortness of breath.    Cardiovascular:  Negative for chest pain.   Gastrointestinal:  Negative for abdominal pain, blood in stool, diarrhea, nausea and vomiting.   Endocrine: Negative for cold intolerance and heat intolerance.   Genitourinary:  Negative for difficulty urinating.   Musculoskeletal:  Negative for arthralgias.   Skin:  Negative for rash.   Neurological:  Negative for dizziness and headaches.   Hematological:  Does not bruise/bleed easily.   Psychiatric/Behavioral:  Negative for behavioral problems.          Objective:    Vital Signs:  Vitals:    11/16/23 1339   BP: 134/84   Pulse: 83   Resp: 16   Temp: 96.8 °F (36 °C)   SpO2: 94%   Weight: 59.8 kg (131 lb 12.8 oz)   Height: 160 cm (63\")   PainSc: 0-No pain     Body mass index is 23.35 kg/m².    ECOG  (1) Restricted in physically strenuous activity, ambulatory and able to do work of light nature    Physical Exam:   Physical Exam  Constitutional:       Appearance: Normal appearance.   HENT:      Head: Normocephalic and atraumatic.   Eyes:      Pupils: Pupils are equal, round, and reactive to light.   Cardiovascular:      Rate and Rhythm: Normal rate and regular rhythm.      Pulses: Normal pulses.      Heart sounds: No murmur heard.  Pulmonary:      Effort: Pulmonary effort is normal.      Breath sounds: Normal breath sounds.   Abdominal:      General: There is no distension.      " "Palpations: Abdomen is soft. There is no mass.      Tenderness: There is no abdominal tenderness.   Musculoskeletal:         General: Normal range of motion.      Cervical back: Normal range of motion and neck supple.   Skin:     General: Skin is warm.   Neurological:      General: No focal deficit present.      Mental Status: She is alert.   Psychiatric:         Mood and Affect: Mood normal.         Lab Results - Last 18 Months   Lab Units 11/16/23  1313 10/31/23  1129 06/02/22  1918   WBC 10*3/mm3 8.27 5.40 6.10   HEMOGLOBIN g/dL 14.2 12.6 12.2   HEMATOCRIT % 43.2 38.1 35.6   PLATELETS 10*3/mm3 215 246 212   MCV fL 97.3* 96.2 94.6     Lab Results - Last 18 Months   Lab Units 10/31/23  1129 06/02/22  1918   SODIUM mmol/L 142 138   POTASSIUM mmol/L 4.6 3.9   CHLORIDE mmol/L 105 101   CO2 mmol/L 30.0* 27.0   BUN mg/dL 14 20   CREATININE mg/dL 1.03* 0.81   CALCIUM mg/dL 9.8 9.1   BILIRUBIN mg/dL 0.5 0.3   ALK PHOS U/L 124* 95   ALT (SGPT) U/L <5 5   AST (SGOT) U/L 9 8   GLUCOSE mg/dL 100* 86       Lab Results   Component Value Date    GLUCOSE 100 (H) 10/31/2023    BUN 14 10/31/2023    CREATININE 1.03 (H) 10/31/2023    BCR 13.6 10/31/2023    K 4.6 10/31/2023    CO2 30.0 (H) 10/31/2023    CALCIUM 9.8 10/31/2023    ALBUMIN 3.8 10/31/2023    LABIL2 1.2 03/10/2021    AST 9 10/31/2023    ALT <5 10/31/2023       Lab Results - Last 18 Months   Lab Units 06/01/23  0751   INR  0.9   APTT Second 23.3       Lab Results   Component Value Date    IRON 228 (H) 03/19/2020    TIBC 266 03/19/2020    FERRITIN 171.0 03/19/2020       No results found for: \"FOLATE\"    No results found for: \"OCCULTBLD\"    No results found for: \"RETICCTPCT\"  No results found for: \"CBIRZTLF32\"  No results found for: \"SPEP\", \"UPEP\"  No results found for: \"LDH\", \"URICACID\"  No results found for: \"YOLANDA\", \"RF\", \"SEDRATE\"  No results found for: \"FIBRINOGEN\", \"HAPTOGLOBIN\"  Lab Results   Component Value Date    PTT 23.3 06/01/2023    INR 0.9 06/01/2023     No " "results found for: \"\"  No results found for: \"CEA\"  No components found for: \"CA-19-9\"  No results found for: \"PSA\"  No results found for: \"SEDRATE\"       Assessment & Plan     Patient is a 68 yr old female with limited stage small cell carcinoma s/p chemoradiation and PCI now on surveillance with no recurrence.    Limited stage small cell carcinoma  Good response to treatment. S/p chemo radiation with cisplatin/etoposide   5/2023 CT imaging with no evidence of recurrence  Plan for repeat CT imaging now in 11/2023 will also get MRI brain WOW contrast.    History of PE   Now off Eliquis, she took herself off eliquis after 6 months  No s/s of recurrent VTE, continue to monitor    History of pericardial effusion  S/p pericardiocentesis negative for cytology  No recurrence  Monitor for now     Order imaging now and repeat in 6 months if negative    Thank you very much for providing the opportunity to participate in this patient’s care. Please do not hesitate to call if there are any other questions.    "

## 2023-11-16 NOTE — LETTER
2023       No Recipients    Patient: Miryam Fernández   YOB: 1955   Date of Visit: 2023     Dear Sonia Cortes MD:       Thank you for referring Miryam Fernández to me for evaluation. Below are the relevant portions of my assessment and plan of care.    If you have questions, please do not hesitate to call me. I look forward to following Miryam along with you.         Sincerely,        Wyatt Morales MD        CC:   No Recipients    Wyatt Morales MD  23 [id number]  Sign when Signing Visit                           HEMATOLOGY ONCOLOGY OUTPATIENT CONSULTATION       Patient name: Miryam Fernández  : 1955  MRN: [id number]  Primary Care Physician: Ted Langford MD  Referring Physician: Sonia Cortes MD  Reason For Consult: limited stage small cell lung cancer      History of Present Illness:  Patient is a 68 y.o. female diagnosed and treated for limited stage small cell lung cancer with chemoradiation currently undergoing surveillance.     Ms Fernández is a 66-year-old female who was diagnosed in   with small cell lung cancer after presenting with dyspnea, chest pain, nausea and vomiting. CT chest was suspicious for lung     20 PET CT -  showed hypermetabolic lower cervical, upper and middle mediastinal lymphadenopathy and left perihilar lung nodule. There was also intense focal uptake in the right thyroid lobe.   MRI brain was negative for metastases.      20  biopsy of the left supraclavicular node that showed small cell lung  cancer. She was diagnosed with limited stage disease and was treated with definitive chemoradiation.     20 - C1 of cisplatin/etoposide with radiation  2020 C4     2020 - PCI     CT chest on 20 showed significant interval reduction in mediastinal masses though there was a new PE for which she was started on Eliquis. She continued following with Dr Castillo and has had stable disease on imaging.      CT C/A/P on 21  showed a more conspicuous 5 mm LLL nodule compared to previous scans with numerous additional bilateral nodules that are stable and unchanged since 2020. There was an increasing moderate pericardial effusion and stable perihilar scarring as well as mediastinal soft tissue density thought to be treated carcinoma. There was no clear evidence of progressive or recurrence malignancy in the chest.    She transferred care to [facility] in September 2021 and had repeat imaging in December including MRI brain that did not show disease progression. However, the pericardial effusion had increased. 2D echo on 12/1/21 showed hemodynamic compromise and concerns  for cardiac tamponade. She was subsequently admitted to the cardiology service and had a pericardiocentesis on 12/10/21 with the fluid negative for malignancy.    No further recurrence of disease.     Last CT imaging in 5/2023 with no evidence of recurrence. No recurrence of effusion.     Subjective:  Patient presents for initial consultation. Symptomatically doing well.       Past Medical History:   Diagnosis Date   • Cancer    • Carotid stenosis    • COPD (chronic obstructive pulmonary disease)    • Coronary artery disease    • Esophageal stricture     Dr Domingo   • Hyperlipidemia    • Pulmonary embolism     seen at [facility], fluid around her heart at the time-right lung       Past Surgical History:   Procedure Laterality Date   • ANGIOPLASTY / STENTING FEMORAL     • CARDIAC CATHETERIZATION     • CAROTID STENT     • CORONARY STENT PLACEMENT     • ESOPHAGOSCOPY / EGD     • HYSTERECTOMY           Current Outpatient Medications:   •  metoprolol succinate XL (TOPROL-XL) 25 MG 24 hr tablet, Take 1 tablet by mouth Daily., Disp: , Rfl:   •  oxyCODONE-acetaminophen (PERCOCET) 5-325 MG per tablet, , Disp: , Rfl:   •  pantoprazole (PROTONIX) 40 MG EC tablet, Take 1 tablet by mouth Every 12 (Twelve) Hours., Disp: , Rfl:   •  ticagrelor (Brilinta) 60 MG tablet tablet, Take 1 tablet by  "mouth 2 (Two) Times a Day., Disp: 60 tablet, Rfl: 5    Allergies   Allergen Reactions   • Hydrocodone Palpitations   • Sulfa Antibiotics Swelling   • Bacitracin Other (See Comments)   • Benzalkonium Chloride Hives   • Codeine Other (See Comments)   • Neomycin Other (See Comments)   • Polymyxin B Other (See Comments)   • Nickel Rash   • Penicillins Rash       Family History   Problem Relation Age of Onset   • Heart disease Mother    • Hypertension Mother    • Hyperlipidemia Father    • Lung cancer Father    • Diabetes Paternal Uncle    • Leukemia Paternal Grandmother        Cancer-related family history includes Lung cancer in her father.      Social History     Tobacco Use   • Smoking status: Every Day     Packs/day: 2     Types: Cigarettes   • Smokeless tobacco: Never   Vaping Use   • Vaping Use: Never used   Substance Use Topics   • Alcohol use: Never   • Drug use: Never     Social History     Social History Narrative   • Not on file       ROS:   Review of Systems   Constitutional:  Negative for fatigue and fever.   HENT:  Negative for congestion and nosebleeds.    Eyes:  Negative for pain.   Respiratory:  Negative for cough and shortness of breath.    Cardiovascular:  Negative for chest pain.   Gastrointestinal:  Negative for abdominal pain, blood in stool, diarrhea, nausea and vomiting.   Endocrine: Negative for cold intolerance and heat intolerance.   Genitourinary:  Negative for difficulty urinating.   Musculoskeletal:  Negative for arthralgias.   Skin:  Negative for rash.   Neurological:  Negative for dizziness and headaches.   Hematological:  Does not bruise/bleed easily.   Psychiatric/Behavioral:  Negative for behavioral problems.          Objective:    Vital Signs:  Vitals:    11/16/23 1339   BP: 134/84   Pulse: 83   Resp: 16   Temp: 96.8 °F (36 °C)   SpO2: 94%   Weight: 59.8 kg (131 lb 12.8 oz)   Height: 160 cm (63\")   PainSc: 0-No pain     Body mass index is 23.35 kg/m².    ECOG  (1) Restricted in " physically strenuous activity, ambulatory and able to do work of light nature    Physical Exam:   Physical Exam  Constitutional:       Appearance: Normal appearance.   HENT:      Head: Normocephalic and atraumatic.   Eyes:      Pupils: Pupils are equal, round, and reactive to light.   Cardiovascular:      Rate and Rhythm: Normal rate and regular rhythm.      Pulses: Normal pulses.      Heart sounds: No murmur heard.  Pulmonary:      Effort: Pulmonary effort is normal.      Breath sounds: Normal breath sounds.   Abdominal:      General: There is no distension.      Palpations: Abdomen is soft. There is no mass.      Tenderness: There is no abdominal tenderness.   Musculoskeletal:         General: Normal range of motion.      Cervical back: Normal range of motion and neck supple.   Skin:     General: Skin is warm.   Neurological:      General: No focal deficit present.      Mental Status: She is alert.   Psychiatric:         Mood and Affect: Mood normal.         Lab Results - Last 18 Months   Lab Units 11/16/23  1313 10/31/23  1129 06/02/22  1918   WBC 10*3/mm3 8.27 5.40 6.10   HEMOGLOBIN g/dL 14.2 12.6 12.2   HEMATOCRIT % 43.2 38.1 35.6   PLATELETS 10*3/mm3 215 246 212   MCV fL 97.3* 96.2 94.6     Lab Results - Last 18 Months   Lab Units 10/31/23  1129 06/02/22  1918   SODIUM mmol/L 142 138   POTASSIUM mmol/L 4.6 3.9   CHLORIDE mmol/L 105 101   CO2 mmol/L 30.0* 27.0   BUN mg/dL 14 20   CREATININE mg/dL 1.03* 0.81   CALCIUM mg/dL 9.8 9.1   BILIRUBIN mg/dL 0.5 0.3   ALK PHOS U/L 124* 95   ALT (SGPT) U/L <5 5   AST (SGOT) U/L 9 8   GLUCOSE mg/dL 100* 86       Lab Results   Component Value Date    GLUCOSE 100 (H) 10/31/2023    BUN 14 10/31/2023    CREATININE 1.03 (H) 10/31/2023    BCR 13.6 10/31/2023    K 4.6 10/31/2023    CO2 30.0 (H) 10/31/2023    CALCIUM 9.8 10/31/2023    ALBUMIN 3.8 10/31/2023    LABIL2 1.2 03/10/2021    AST 9 10/31/2023    ALT <5 10/31/2023       Lab Results - Last 18 Months   Lab Units  "06/01/23  0751   INR  0.9   APTT Second 23.3       Lab Results   Component Value Date    IRON 228 (H) 03/19/2020    TIBC 266 03/19/2020    FERRITIN 171.0 03/19/2020       No results found for: \"FOLATE\"    No results found for: \"OCCULTBLD\"    No results found for: \"RETICCTPCT\"  No results found for: \"[id number]\"  No results found for: \"SPEP\", \"UPEP\"  No results found for: \"LDH\", \"URICACID\"  No results found for: \"YOLANDA\", \"RF\", \"SEDRATE\"  No results found for: \"FIBRINOGEN\", \"HAPTOGLOBIN\"  Lab Results   Component Value Date    PTT 23.3 06/01/2023    INR 0.9 06/01/2023     No results found for: \"\"  No results found for: \"CEA\"  No components found for: \"CA-19-9\"  No results found for: \"PSA\"  No results found for: \"SEDRATE\"       Assessment & Plan    Patient is a 68 yr old female with limited stage small cell carcinoma s/p chemoradiation and PCI now on surveillance with no recurrence.    Limited stage small cell carcinoma  Good response to treatment. S/p chemo radiation with cisplatin/etoposide   5/2023 CT imaging with no evidence of recurrence  Plan for repeat CT imaging now in 11/2023 will also get MRI brain WOW contrast.    History of PE   Now off Eliquis, she took herself off eliquis after 6 months  No s/s of recurrent VTE, continue to monitor    History of pericardial effusion  S/p pericardiocentesis negative for cytology  No recurrence  Monitor for now     Order imaging now and repeat in 6 months if negative    Thank you very much for providing the opportunity to participate in this patient’s care. Please do not hesitate to call if there are any other questions.    "

## 2023-12-11 ENCOUNTER — OFFICE VISIT (OUTPATIENT)
Dept: FAMILY MEDICINE CLINIC | Facility: CLINIC | Age: 68
End: 2023-12-11
Payer: MEDICARE

## 2023-12-11 VITALS
OXYGEN SATURATION: 97 % | HEART RATE: 94 BPM | BODY MASS INDEX: 23.04 KG/M2 | DIASTOLIC BLOOD PRESSURE: 80 MMHG | WEIGHT: 130 LBS | HEIGHT: 63 IN | SYSTOLIC BLOOD PRESSURE: 138 MMHG

## 2023-12-11 DIAGNOSIS — M51.36 DDD (DEGENERATIVE DISC DISEASE), LUMBAR: ICD-10-CM

## 2023-12-11 DIAGNOSIS — Z12.31 SCREENING MAMMOGRAM FOR BREAST CANCER: ICD-10-CM

## 2023-12-11 DIAGNOSIS — C34.90 SMALL CELL LUNG CANCER: ICD-10-CM

## 2023-12-11 DIAGNOSIS — Z00.00 PREVENTATIVE HEALTH CARE: ICD-10-CM

## 2023-12-11 DIAGNOSIS — Z23 NEED FOR INFLUENZA VACCINATION: Primary | ICD-10-CM

## 2023-12-11 DIAGNOSIS — I10 PRIMARY HYPERTENSION: ICD-10-CM

## 2023-12-11 DIAGNOSIS — J44.9 CHRONIC OBSTRUCTIVE PULMONARY DISEASE, UNSPECIFIED COPD TYPE: ICD-10-CM

## 2023-12-11 DIAGNOSIS — Z79.891 LONG TERM (CURRENT) USE OF OPIATE ANALGESIC: ICD-10-CM

## 2023-12-11 DIAGNOSIS — I65.21 STENOSIS OF RIGHT CAROTID ARTERY: ICD-10-CM

## 2023-12-11 DIAGNOSIS — E55.9 VITAMIN D DEFICIENCY: ICD-10-CM

## 2023-12-11 DIAGNOSIS — H81.09 MENIERE'S DISEASE, UNSPECIFIED LATERALITY: ICD-10-CM

## 2023-12-11 DIAGNOSIS — F33.1 MODERATE EPISODE OF RECURRENT MAJOR DEPRESSIVE DISORDER: ICD-10-CM

## 2023-12-11 DIAGNOSIS — E78.2 MIXED HYPERLIPIDEMIA: ICD-10-CM

## 2023-12-11 PROBLEM — M51.369 DDD (DEGENERATIVE DISC DISEASE), LUMBAR: Status: ACTIVE | Noted: 2023-12-11

## 2023-12-11 PROCEDURE — 80307 DRUG TEST PRSMV CHEM ANLYZR: CPT | Performed by: NURSE PRACTITIONER

## 2023-12-11 PROCEDURE — 84443 ASSAY THYROID STIM HORMONE: CPT | Performed by: NURSE PRACTITIONER

## 2023-12-11 PROCEDURE — 80053 COMPREHEN METABOLIC PANEL: CPT | Performed by: NURSE PRACTITIONER

## 2023-12-11 PROCEDURE — 82306 VITAMIN D 25 HYDROXY: CPT | Performed by: NURSE PRACTITIONER

## 2023-12-11 PROCEDURE — 86803 HEPATITIS C AB TEST: CPT | Performed by: NURSE PRACTITIONER

## 2023-12-11 PROCEDURE — 83036 HEMOGLOBIN GLYCOSYLATED A1C: CPT | Performed by: NURSE PRACTITIONER

## 2023-12-11 PROCEDURE — 80061 LIPID PANEL: CPT | Performed by: NURSE PRACTITIONER

## 2023-12-11 PROCEDURE — 85027 COMPLETE CBC AUTOMATED: CPT | Performed by: NURSE PRACTITIONER

## 2023-12-11 RX ORDER — CITALOPRAM HYDROBROMIDE 10 MG/1
10 TABLET ORAL DAILY
COMMUNITY
Start: 2023-11-28

## 2023-12-11 RX ORDER — MONTELUKAST SODIUM 10 MG/1
10 TABLET ORAL NIGHTLY
Qty: 30 TABLET | Refills: 1 | Status: SHIPPED | OUTPATIENT
Start: 2023-12-11

## 2023-12-11 RX ORDER — MECLIZINE HYDROCHLORIDE 25 MG/1
25 TABLET ORAL 3 TIMES DAILY PRN
Qty: 30 TABLET | Refills: 0 | Status: SHIPPED | OUTPATIENT
Start: 2023-12-11

## 2023-12-11 RX ORDER — ALBUTEROL SULFATE 90 UG/1
2 AEROSOL, METERED RESPIRATORY (INHALATION) EVERY 4 HOURS PRN
Qty: 8 G | Refills: 1 | Status: SHIPPED | OUTPATIENT
Start: 2023-12-11

## 2023-12-11 NOTE — PROGRESS NOTES
Venipuncture Blood Specimen Collection  Venipuncture performed in the right arm by Leanne Palma MA with good hemostasis. Patient tolerated the procedure well without complications.   12/11/23   Leanne Palma MA

## 2023-12-11 NOTE — PROGRESS NOTES
Chief Complaint  Establish Care, Fall (Had a fall 2 days ago and injured left arm. ), and Depression (Was put on citalopram about 2 weeks ago feels dizzy since starting that medication. )    Subjective        Miryam Fernández presents to [facility] PR[facility]  History of Present Illness    Patient presents to [facility] care. PMH includes small cell lung CA, depression, COPD, PE, carotid stenosis and hyperlipidemia.  Hypertension is stable on metoprolol XL 25 mg daily.  She also takes Brilinta 60 mg twice daily per cardiology, followed by Dr. Lorenzana. Patient is followed by Dr. Morales for history of lung cancer. There is a hx PE - patient took herself off of Eliquis in the past. Hematology plans to monitor.  Patient reports HH put her on Celexa for depression - reports caused severe dizziness. Patient does have hx Ménière's disease.  She then sustained a fall. Patient is prescribed Percocet 5/325 mg PRN for chronic low back pain per previous PCP. She has LESI in 2019 without relief. MRI in 2019 showed mild degenerative changes at L4-5. Also hx spondylosis.       PHQ-9 Depression Screening  Little interest or pleasure in doing things? 3-->nearly every day   Feeling down, depressed, or hopeless? 3-->nearly every day   Trouble falling or staying asleep, or sleeping too much? 3-->nearly every day   Feeling tired or having little energy? 3-->nearly every day   Poor appetite or overeating? 3-->nearly every day   Feeling bad about yourself - or that you are a failure or have let yourself or your family down? 0-->not at all   Trouble concentrating on things, such as reading the newspaper or watching television? 3-->nearly every day   Moving or speaking so slowly that other people could have noticed? Or the opposite - being so fidgety or restless that you have been moving around a lot more than usual? 0-->not at all   Thoughts that you would be better off dead, or of hurting yourself in some way? 0-->not at  "all   PHQ-9 Total Score 18   If you checked off any problems, how difficult have these problems made it for you to do your work, take care of things at home, or get along with other people? somewhat difficult      Objective   Vital Signs:  /80 (BP Location: Left arm, Patient Position: Sitting, Cuff Size: Adult)   Pulse 94   Ht 160 cm (63\")   Wt 59 kg (130 lb)   SpO2 97%   BMI 23.03 kg/m²   Estimated body mass index is 23.03 kg/m² as calculated from the following:    Height as of this encounter: 160 cm (63\").    Weight as of this encounter: 59 kg (130 lb).       BMI is within normal parameters. No other follow-up for BMI required.      Physical Exam  Constitutional:       Appearance: Normal appearance.   HENT:      Head: Normocephalic.   Cardiovascular:      Rate and Rhythm: Normal rate and regular rhythm.   Pulmonary:      Effort: Pulmonary effort is normal.      Breath sounds: Examination of the right-lower field reveals decreased breath sounds. Examination of the left-lower field reveals decreased breath sounds. Decreased breath sounds present.   Abdominal:      General: Abdomen is flat. Bowel sounds are normal.      Palpations: Abdomen is soft.   Musculoskeletal:         General: Normal range of motion.      Cervical back: Neck supple.      Right lower leg: No edema.      Left lower leg: No edema.   Skin:     General: Skin is warm and dry.          Neurological:      Mental Status: She is alert and oriented to person, place, and time.      Gait: Gait is intact.   Psychiatric:         Attention and Perception: Attention normal.         Mood and Affect: Mood normal.         Speech: Speech normal.        Result Review :    CMP          10/31/2023    11:29   CMP   Glucose 100    BUN 14    Creatinine 1.03    EGFR 60.1    Sodium 142    Potassium 4.6    Chloride 105    Calcium 9.8    Total Protein 6.8    Albumin 3.8    Globulin 3.0    Total Bilirubin 0.5    Alkaline Phosphatase 124    AST (SGOT) 9    ALT " (SGPT) <5    Albumin/Globulin Ratio 1.3    BUN/Creatinine Ratio 13.6    Anion Gap 7.0      CBC          10/31/2023    11:29 11/16/2023    13:13   CBC   WBC 5.40  8.27    RBC 3.95  4.44    Hemoglobin 12.6  14.2    Hematocrit 38.1  43.2    MCV 96.2  97.3    MCH 31.8  32.0    MCHC 33.0  32.9    RDW 14.9  14.2    Platelets 246  215            Data reviewed : Consultant notes Hematology/Pain Management             Assessment and Plan   Diagnoses and all orders for this visit:    1. Need for influenza vaccination (Primary)  -     Fluzone High-Dose 65+yrs (2539-9507)    2. Preventative health care  -     CBC (No Diff)  -     Comprehensive Metabolic Panel  -     Hemoglobin A1c  -     Lipid Panel  -     TSH  -     Hepatitis C Antibody    3. Vitamin D deficiency  -     Vitamin D,25-Hydroxy    4. Screening mammogram for breast cancer  -     Mammo Screening Digital Tomosynthesis Bilateral With CAD; Future    5. DDD (degenerative disc disease), lumbar  Assessment & Plan:  Urine drug screen  Discussed with patient that I cannot refill this medication if using on a sparingly basis.  If she is needing medication refilled monthly, will refer to pain management    Orders:  -     Urine Drug Screen - Urine, Clean Catch    6. Long term (current) use of opiate analgesic  -     Urine Drug Screen - Urine, Clean Catch    7. Primary hypertension  Assessment & Plan:  Blood pressure at goal of less than 140/90  Continue metoprolol as prescribed      8. Mixed hyperlipidemia  Assessment & Plan:  Labs today      9. Stenosis of right carotid artery  Assessment & Plan:  Continue Brilinta per cardiology      10. Meniere's disease, unspecified laterality  Assessment & Plan:  Meclizine as needed  Refer to ENT if necessary      11. Small cell lung cancer  Assessment & Plan:  Reviewed hematology note      12. Chronic obstructive pulmonary disease, unspecified COPD type  Assessment & Plan:  Start singular 10 mg nightly  Ventolin as needed for dyspnea or  wheezing        13. Moderate episode of recurrent major depressive disorder  Assessment & Plan:  Hold Celexa this week.  If dizziness resolves, will try Lexapro.  If dizziness seems to be unrelated to Celexa, will continue Celexa as prescribed      Other orders  -     mupirocin (BACTROBAN) 2 % ointment; Apply 1 application  topically to the appropriate area as directed 3 (Three) Times a Day.  Dispense: 30 g; Refill: 0  -     meclizine (ANTIVERT) 25 MG tablet; Take 1 tablet by mouth 3 (Three) Times a Day As Needed for Dizziness.  Dispense: 30 tablet; Refill: 0  -     montelukast (Singulair) 10 MG tablet; Take 1 tablet by mouth Every Night.  Dispense: 30 tablet; Refill: 1  -     albuterol sulfate  (90 Base) MCG/ACT inhaler; Inhale 2 puffs Every 4 (Four) Hours As Needed for Wheezing or Shortness of Air.  Dispense: 8 g; Refill: 1  -     carbamide peroxide (Debrox) 6.5 % otic solution; Administer 5 drops into both ears 2 (Two) Times a Day for 4 days.  Dispense: 2 mL; Refill: 0           I spent 30 minutes caring for Miryam on this date of service. This time includes time spent by me in the following activities:preparing for the visit, reviewing tests, obtaining and/or reviewing a separately obtained history, performing a medically appropriate examination and/or evaluation , counseling and educating the patient/family/caregiver, ordering medications, tests, or procedures, documenting information in the medical record, and care coordination  Follow Up   Return in about 3 months (around 3/11/2024) for Depression/Chronic Back Pain.  Patient was given instructions and counseling regarding her condition or for health maintenance advice. Please see specific information pulled into the AVS if appropriate.

## 2023-12-11 NOTE — ASSESSMENT & PLAN NOTE
Hold Celexa this week.  If dizziness resolves, will try Lexapro.  If dizziness seems to be unrelated to Celexa, will continue Celexa as prescribed

## 2023-12-12 ENCOUNTER — PATIENT ROUNDING (BHMG ONLY) (OUTPATIENT)
Dept: FAMILY MEDICINE CLINIC | Facility: CLINIC | Age: 68
End: 2023-12-12
Payer: MEDICARE

## 2023-12-12 LAB
25(OH)D3 SERPL-MCNC: 14.7 NG/ML (ref 30–100)
ALBUMIN SERPL-MCNC: 4.7 G/DL (ref 3.5–5.2)
ALBUMIN/GLOB SERPL: 1.8 G/DL
ALP SERPL-CCNC: 117 U/L (ref 39–117)
ALT SERPL W P-5'-P-CCNC: 7 U/L (ref 1–33)
AMPHET+METHAMPHET UR QL: NEGATIVE
ANION GAP SERPL CALCULATED.3IONS-SCNC: 12 MMOL/L (ref 5–15)
AST SERPL-CCNC: 12 U/L (ref 1–32)
BARBITURATES UR QL SCN: NEGATIVE
BENZODIAZ UR QL SCN: NEGATIVE
BILIRUB SERPL-MCNC: 0.2 MG/DL (ref 0–1.2)
BUN SERPL-MCNC: 19 MG/DL (ref 8–23)
BUN/CREAT SERPL: 19.2 (ref 7–25)
CALCIUM SPEC-SCNC: 10.1 MG/DL (ref 8.6–10.5)
CANNABINOIDS SERPL QL: NEGATIVE
CHLORIDE SERPL-SCNC: 100 MMOL/L (ref 98–107)
CHOLEST SERPL-MCNC: 294 MG/DL (ref 0–200)
CO2 SERPL-SCNC: 27 MMOL/L (ref 22–29)
COCAINE UR QL: NEGATIVE
CREAT SERPL-MCNC: 0.99 MG/DL (ref 0.57–1)
DEPRECATED RDW RBC AUTO: 44.7 FL (ref 37–54)
EGFRCR SERPLBLD CKD-EPI 2021: 63 ML/MIN/1.73
ERYTHROCYTE [DISTWIDTH] IN BLOOD BY AUTOMATED COUNT: 13.1 % (ref 12.3–15.4)
FENTANYL UR-MCNC: NEGATIVE NG/ML
GLOBULIN UR ELPH-MCNC: 2.6 GM/DL
GLUCOSE SERPL-MCNC: 93 MG/DL (ref 65–99)
HBA1C MFR BLD: 5.2 % (ref 4.8–5.6)
HCT VFR BLD AUTO: 41.8 % (ref 34–46.6)
HCV AB SER DONR QL: NORMAL
HDLC SERPL-MCNC: 46 MG/DL (ref 40–60)
HGB BLD-MCNC: 14.1 G/DL (ref 12–15.9)
LDLC SERPL CALC-MCNC: 200 MG/DL (ref 0–100)
LDLC/HDLC SERPL: 4.33 {RATIO}
MCH RBC QN AUTO: 31.6 PG (ref 26.6–33)
MCHC RBC AUTO-ENTMCNC: 33.7 G/DL (ref 31.5–35.7)
MCV RBC AUTO: 93.7 FL (ref 79–97)
METHADONE UR QL SCN: NEGATIVE
OPIATES UR QL: NEGATIVE
OXYCODONE UR QL SCN: NEGATIVE
PLATELET # BLD AUTO: 284 10*3/MM3 (ref 140–450)
PMV BLD AUTO: 10.6 FL (ref 6–12)
POTASSIUM SERPL-SCNC: 4.5 MMOL/L (ref 3.5–5.2)
PROT SERPL-MCNC: 7.3 G/DL (ref 6–8.5)
RBC # BLD AUTO: 4.46 10*6/MM3 (ref 3.77–5.28)
SODIUM SERPL-SCNC: 139 MMOL/L (ref 136–145)
TRIGL SERPL-MCNC: 245 MG/DL (ref 0–150)
TSH SERPL DL<=0.05 MIU/L-ACNC: 1.25 UIU/ML (ref 0.27–4.2)
VLDLC SERPL-MCNC: 48 MG/DL (ref 5–40)
WBC NRBC COR # BLD AUTO: 5.16 10*3/MM3 (ref 3.4–10.8)

## 2023-12-12 RX ORDER — ACETAMINOPHEN 160 MG
2000 TABLET,DISINTEGRATING ORAL DAILY
Qty: 30 CAPSULE | Refills: 11 | Status: SHIPPED | OUTPATIENT
Start: 2023-12-12 | End: 2023-12-15 | Stop reason: SDUPTHER

## 2023-12-12 NOTE — PROGRESS NOTES
12/12/2023        My name is September and I am  with [facility].    I am writing to officially welcome you to our practice and ask about your recent visit.  Tell me about your visit with us. What things went well?  We're always looking for ways to make our patients' experiences even better. Do you have recommendations on ways we may improve?   Overall were you satisfied with your first visit to our practice?   Thank you for taking the time to answer a few questions today.     Thank you, and have a great day.  September

## 2023-12-12 NOTE — PROGRESS NOTES
Please let patient know that her vitamin D is very low.  I am sending in vitamin D3 for her to take daily.  Her cholesterol panel is quite elevated and I would recommend that we start her on Lipitor.  If agreeable, let me know.  The rest of her labs are normal.  Hold Celexa and call Friday with an update regarding dizziness

## 2023-12-15 ENCOUNTER — TELEPHONE (OUTPATIENT)
Dept: FAMILY MEDICINE CLINIC | Facility: CLINIC | Age: 68
End: 2023-12-15
Payer: MEDICARE

## 2023-12-15 RX ORDER — ACETAMINOPHEN 160 MG
2000 TABLET,DISINTEGRATING ORAL DAILY
Qty: 30 CAPSULE | Refills: 11 | Status: SHIPPED | OUTPATIENT
Start: 2023-12-15 | End: 2024-12-14

## 2023-12-15 RX ORDER — ATORVASTATIN CALCIUM 20 MG/1
20 TABLET, FILM COATED ORAL DAILY
Qty: 90 TABLET | Refills: 0 | Status: SHIPPED | OUTPATIENT
Start: 2023-12-15

## 2023-12-15 NOTE — TELEPHONE ENCOUNTER
Caller: TOMMY TOMLIN    Relationship: Emergency Contact    Best call back number: [phone]    What medication are you requesting: atorvastatin (LIPITOR) 80 MG tablet  AND VITAMIN D    SAMIRA WAS SEEN ON 12/11/2023 TO ESTABLISH AND STATES THERE WERE A COUPLE OF SCRIPTS THAT THE PHARMACY DID NOT HAVE THAT NEEDS TO BE RE SENT    If a prescription is needed, what is your preferred pharmacy and phone number: [facility]Y #[id number] - [facility] [place] [address] AT [place] [address] - [phone]  - [phone] FX     Additional notes:

## 2023-12-15 NOTE — TELEPHONE ENCOUNTER
Vitamin D was sent but I will resend.  Lipitor was not on patient's MAR.  After labs, I did recommend starting a statin but was waiting on a return note if patient was agreeable.  Has patient been taking Lipitor 80 mg?  Because I had sent a 20 mg tablet for her to start on

## 2023-12-15 NOTE — TELEPHONE ENCOUNTER
Spoke with Kimberley and she said that her mom was previously on the 80mg from her old PCP, but she had run out a while ago and has not had the medication for a little while. She said she will start on whatever dose you think is best. Please advise.

## 2023-12-21 ENCOUNTER — HOSPITAL ENCOUNTER (OUTPATIENT)
Dept: MAMMOGRAPHY | Facility: HOSPITAL | Age: 68
Discharge: HOME | End: 2023-12-21
Admitting: NURSE PRACTITIONER
Payer: MEDICARE

## 2023-12-21 DIAGNOSIS — Z12.31 SCREENING MAMMOGRAM FOR BREAST CANCER: ICD-10-CM

## 2023-12-21 PROCEDURE — 77067 SCR MAMMO BI INCL CAD: CPT

## 2023-12-21 PROCEDURE — 77063 BREAST TOMOSYNTHESIS BI: CPT

## 2023-12-26 ENCOUNTER — OFFICE VISIT (OUTPATIENT)
Dept: FAMILY MEDICINE CLINIC | Facility: CLINIC | Age: 68
End: 2023-12-26
Payer: MEDICARE

## 2023-12-26 VITALS
WEIGHT: 130 LBS | OXYGEN SATURATION: 96 % | SYSTOLIC BLOOD PRESSURE: 102 MMHG | DIASTOLIC BLOOD PRESSURE: 72 MMHG | BODY MASS INDEX: 23.04 KG/M2 | TEMPERATURE: 98.2 F | HEIGHT: 63 IN | RESPIRATION RATE: 19 BRPM | HEART RATE: 108 BPM

## 2023-12-26 DIAGNOSIS — R09.89 SYMPTOMS OF UPPER RESPIRATORY INFECTION (URI): Primary | ICD-10-CM

## 2023-12-26 DIAGNOSIS — U07.1 ACUTE COVID-19: Primary | ICD-10-CM

## 2023-12-26 DIAGNOSIS — J44.9 CHRONIC OBSTRUCTIVE PULMONARY DISEASE, UNSPECIFIED COPD TYPE: ICD-10-CM

## 2023-12-26 LAB
B PARAPERT DNA SPEC QL NAA+PROBE: NOT DETECTED
B PERT DNA SPEC QL NAA+PROBE: NOT DETECTED
C PNEUM DNA NPH QL NAA+NON-PROBE: NOT DETECTED
FLUAV SUBTYP SPEC NAA+PROBE: NOT DETECTED
FLUBV RNA ISLT QL NAA+PROBE: NOT DETECTED
HADV DNA SPEC NAA+PROBE: NOT DETECTED
HCOV 229E RNA SPEC QL NAA+PROBE: NOT DETECTED
HCOV HKU1 RNA SPEC QL NAA+PROBE: NOT DETECTED
HCOV NL63 RNA SPEC QL NAA+PROBE: NOT DETECTED
HCOV OC43 RNA SPEC QL NAA+PROBE: NOT DETECTED
HMPV RNA NPH QL NAA+NON-PROBE: NOT DETECTED
HPIV1 RNA ISLT QL NAA+PROBE: NOT DETECTED
HPIV2 RNA SPEC QL NAA+PROBE: NOT DETECTED
HPIV3 RNA NPH QL NAA+PROBE: NOT DETECTED
HPIV4 P GENE NPH QL NAA+PROBE: NOT DETECTED
M PNEUMO IGG SER IA-ACNC: NOT DETECTED
RHINOVIRUS RNA SPEC NAA+PROBE: NOT DETECTED
RSV RNA NPH QL NAA+NON-PROBE: NOT DETECTED
SARS-COV-2 RNA NPH QL NAA+NON-PROBE: DETECTED

## 2023-12-26 PROCEDURE — 1159F MED LIST DOCD IN RCRD: CPT | Performed by: NURSE PRACTITIONER

## 2023-12-26 PROCEDURE — 3078F DIAST BP <80 MM HG: CPT | Performed by: NURSE PRACTITIONER

## 2023-12-26 PROCEDURE — 3074F SYST BP LT 130 MM HG: CPT | Performed by: NURSE PRACTITIONER

## 2023-12-26 PROCEDURE — 0202U NFCT DS 22 TRGT SARS-COV-2: CPT | Performed by: NURSE PRACTITIONER

## 2023-12-26 PROCEDURE — 99213 OFFICE O/P EST LOW 20 MIN: CPT | Performed by: NURSE PRACTITIONER

## 2023-12-26 PROCEDURE — 1160F RVW MEDS BY RX/DR IN RCRD: CPT | Performed by: NURSE PRACTITIONER

## 2023-12-26 NOTE — ASSESSMENT & PLAN NOTE
Advised use of home rescue inhaler every 4-6 hours as needed.  Education provided.  Discussed need for pulmonary function tests; agreeable to completion.  Return for follow-up after diagnostic testing completed.

## 2023-12-26 NOTE — PROGRESS NOTES
"Chief Complaint  Sinus Problem (Congestion / going on for a while/ several weeks)    Subjective        Miryam Fernández presents to [facility]  History of Present Illness    Patient presents today for chest congestion.  Accompanied by Feng, son in law, agreeable to shared conversation.    Chest congestion:  Onset of symptoms 2 weeks ago. Severity of symptoms are moderate. Status is worsening. Frequency is persistent. Context includes history of allergies, COPD, smoker (2 PPD).  There is a positive history of small cell lung cancer.  Chest imaging completed 10/23/23 reviewed.  Follows with oncology. Symptoms are aggravated by lying down. Symptoms are relieved by nothing; tried nothing.  There has been no use of rescue inhaler-available at home.  Associated symptoms include cough, wheezing, sputum, postnasal drainage, nasal congestion, and sinus pressure. Pertinent negatives include chills, fever, dyspnea, facial pain, fatigue, fever, headache, hemoptysis, myalgia, otalgia, pharyngitis, rash, rhinitis, tooth pain, pleuritic pain, nausea, vomiting, excessive sputum, and diarrhea.       Objective   Vital Signs:  /72 (BP Location: Left arm, Patient Position: Sitting, Cuff Size: Adult)   Pulse 108   Temp 98.2 °F (36.8 °C) (Temporal)   Resp 19   Ht 160 cm (62.99\")   Wt 59 kg (130 lb)   SpO2 96%   BMI 23.03 kg/m²   Estimated body mass index is 23.03 kg/m² as calculated from the following:    Height as of this encounter: 160 cm (62.99\").    Weight as of this encounter: 59 kg (130 lb).       BMI is within normal parameters. No other follow-up for BMI required.      Physical Exam  Constitutional:       General: She is not in acute distress.  HENT:      Head: Normocephalic and atraumatic.      Right Ear: Tympanic membrane, ear canal and external ear normal.      Left Ear: Tympanic membrane, ear canal and external ear normal.      Nose: Congestion present. No rhinorrhea.      Right " Sinus: No maxillary sinus tenderness or frontal sinus tenderness.      Left Sinus: No maxillary sinus tenderness or frontal sinus tenderness.      Comments: Mild postnasal drainage     Mouth/Throat:      Mouth: Mucous membranes are moist.      Pharynx: Oropharynx is clear. No posterior oropharyngeal erythema.   Eyes:      Conjunctiva/sclera: Conjunctivae normal.   Cardiovascular:      Rate and Rhythm: Normal rate and regular rhythm.      Chest Wall: PMI is not displaced.      Heart sounds: Normal heart sounds, S1 normal and S2 normal.   Pulmonary:      Effort: Pulmonary effort is normal.      Breath sounds: No decreased air movement. No wheezing or rhonchi.      Comments: Cough is loose.  Abdominal:      General: Bowel sounds are normal.      Palpations: Abdomen is soft.      Tenderness: There is no abdominal tenderness.   Musculoskeletal:      Cervical back: Neck supple.   Lymphadenopathy:      Cervical: No cervical adenopathy.   Skin:     General: Skin is warm and dry.   Neurological:      Mental Status: She is alert and oriented to person, place, and time.      Gait: Gait is intact.   Psychiatric:         Mood and Affect: Mood and affect normal.         Speech: Speech normal.         Thought Content: Thought content normal.         Cognition and Memory: Memory normal.         Judgment: Judgment normal.        Result Review :                   Assessment and Plan   Diagnoses and all orders for this visit:    1. Symptoms of upper respiratory infection (URI) (Primary)  Comments:  Recommended Flonase over-the-counter daily.  Orders:  -     Respiratory Panel PCR w/COVID-19(SARS-CoV-2) ADRIANE/CJ/CHARLIE/PAD/COR/KOLE In-House, NP Swab in UTM/VTM, 2 HR TAT - Swab, Nasopharynx    2. Chronic obstructive pulmonary disease, unspecified COPD type  Assessment & Plan:  Advised use of home rescue inhaler every 4-6 hours as needed.  Education provided.  Discussed need for pulmonary function tests; agreeable to completion.  Return for  follow-up after diagnostic testing completed.    Orders:  -     Respiratory Panel PCR w/COVID-19(SARS-CoV-2) ADRIANE/CJ/CHARLIE/PAD/COR/KOLE In-House, NP Swab in UTM/VTM, 2 HR TAT - Swab, Nasopharynx  -     Complete PFT - Pre & Post Bronchodilator; Future             Follow Up   Return in about 6 weeks (around 2/6/2024) for follow up COPD/PFTs  .  Patient was given instructions and counseling regarding her condition or for health maintenance advice. Please see specific information pulled into the AVS if appropriate.

## 2023-12-29 ENCOUNTER — HOSPITAL ENCOUNTER (OUTPATIENT)
Dept: GENERAL RADIOLOGY | Facility: HOSPITAL | Age: 68
Discharge: HOME | End: 2023-12-29
Admitting: NURSE PRACTITIONER
Payer: MEDICARE

## 2023-12-29 DIAGNOSIS — U07.1 ACUTE COVID-19: ICD-10-CM

## 2023-12-29 PROCEDURE — 71046 X-RAY EXAM CHEST 2 VIEWS: CPT

## 2024-01-02 DIAGNOSIS — H81.09 MENIERE'S DISEASE, UNSPECIFIED LATERALITY: Primary | ICD-10-CM

## 2024-01-02 RX ORDER — MECLIZINE HYDROCHLORIDE 25 MG/1
25 TABLET ORAL 3 TIMES DAILY PRN
Qty: 30 TABLET | Refills: 0 | Status: SHIPPED | OUTPATIENT
Start: 2024-01-02

## 2024-01-03 RX ORDER — MONTELUKAST SODIUM 10 MG/1
10 TABLET ORAL NIGHTLY
Qty: 90 TABLET | Refills: 1 | Status: SHIPPED | OUTPATIENT
Start: 2024-01-03

## 2024-01-12 ENCOUNTER — HOSPITAL ENCOUNTER (OUTPATIENT)
Dept: RESPIRATORY THERAPY | Facility: HOSPITAL | Age: 69
Discharge: HOME | End: 2024-01-12
Payer: MEDICARE

## 2024-01-12 VITALS — OXYGEN SATURATION: 97 % | HEART RATE: 110 BPM | RESPIRATION RATE: 14 BRPM

## 2024-01-12 DIAGNOSIS — J44.9 CHRONIC OBSTRUCTIVE PULMONARY DISEASE, UNSPECIFIED COPD TYPE: ICD-10-CM

## 2024-01-12 PROCEDURE — 94729 DIFFUSING CAPACITY: CPT

## 2024-01-12 PROCEDURE — 94726 PLETHYSMOGRAPHY LUNG VOLUMES: CPT

## 2024-01-12 PROCEDURE — 94060 EVALUATION OF WHEEZING: CPT

## 2024-01-12 PROCEDURE — 94664 DEMO&/EVAL PT USE INHALER: CPT

## 2024-01-12 PROCEDURE — 94799 UNLISTED PULMONARY SVC/PX: CPT

## 2024-01-12 RX ADMIN — IPRATROPIUM BROMIDE 0.5 MG: 0.5 SOLUTION RESPIRATORY (INHALATION) at 14:38

## 2024-01-13 ENCOUNTER — TELEPHONE (OUTPATIENT)
Dept: FAMILY MEDICINE CLINIC | Facility: CLINIC | Age: 69
End: 2024-01-13
Payer: MEDICARE

## 2024-01-13 NOTE — TELEPHONE ENCOUNTER
"Received message from RRT on 1/12/24:     \"This pt had a PFT completed today. The pt stated she is sensitive to albuterol. She states it makes her SOA. We used atrovent for bronchodilation. I wanted to let you know because albuterol is on her list of current medications. She did say she has not used it for about a month. I added albuterol to her list of allergies in Epic.\"     Patient has follow up with you on 2/9/24.    "

## 2024-02-09 ENCOUNTER — OFFICE VISIT (OUTPATIENT)
Dept: FAMILY MEDICINE CLINIC | Facility: CLINIC | Age: 69
End: 2024-02-09
Payer: MEDICARE

## 2024-02-09 VITALS
DIASTOLIC BLOOD PRESSURE: 80 MMHG | HEIGHT: 63 IN | HEART RATE: 100 BPM | SYSTOLIC BLOOD PRESSURE: 124 MMHG | OXYGEN SATURATION: 97 % | WEIGHT: 128 LBS | BODY MASS INDEX: 22.68 KG/M2

## 2024-02-09 DIAGNOSIS — H81.09 MENIERE'S DISEASE, UNSPECIFIED LATERALITY: ICD-10-CM

## 2024-02-09 DIAGNOSIS — I10 PRIMARY HYPERTENSION: ICD-10-CM

## 2024-02-09 DIAGNOSIS — R53.1 WEAKNESS: ICD-10-CM

## 2024-02-09 DIAGNOSIS — E55.9 VITAMIN D DEFICIENCY: ICD-10-CM

## 2024-02-09 DIAGNOSIS — J44.9 CHRONIC OBSTRUCTIVE PULMONARY DISEASE, UNSPECIFIED COPD TYPE: ICD-10-CM

## 2024-02-09 DIAGNOSIS — M51.36 DDD (DEGENERATIVE DISC DISEASE), LUMBAR: ICD-10-CM

## 2024-02-09 DIAGNOSIS — Z23 NEED FOR VACCINATION AGAINST STREPTOCOCCUS PNEUMONIAE: Primary | ICD-10-CM

## 2024-02-09 DIAGNOSIS — R29.6 FREQUENT FALLS: ICD-10-CM

## 2024-02-09 DIAGNOSIS — R41.3 MEMORY LOSS: ICD-10-CM

## 2024-02-09 DIAGNOSIS — R09.81 NASAL CONGESTION: ICD-10-CM

## 2024-02-09 DIAGNOSIS — E78.2 MIXED HYPERLIPIDEMIA: ICD-10-CM

## 2024-02-09 PROCEDURE — 82306 VITAMIN D 25 HYDROXY: CPT | Performed by: NURSE PRACTITIONER

## 2024-02-09 PROCEDURE — 80061 LIPID PANEL: CPT | Performed by: NURSE PRACTITIONER

## 2024-02-09 RX ORDER — TRIAMCINOLONE ACETONIDE 55 UG/1
2 SPRAY, METERED NASAL DAILY
Qty: 16.5 G | Refills: 11 | Status: SHIPPED | OUTPATIENT
Start: 2024-02-09 | End: 2025-02-08

## 2024-02-09 RX ORDER — MEMANTINE HYDROCHLORIDE 5 MG/1
5 TABLET ORAL DAILY
Qty: 30 TABLET | Refills: 1 | Status: SHIPPED | OUTPATIENT
Start: 2024-02-09

## 2024-02-09 NOTE — ASSESSMENT & PLAN NOTE
Reviewed MMSE with patient/daughter  MRI brain ordered previously, not scheduled - phone # provided to daughter to schedule  Start Namenda 5 mg daily  Consider Neurology referral

## 2024-02-09 NOTE — PROGRESS NOTES
Venipuncture Blood Specimen Collection  Venipuncture performed in the right arm by Leanne Palma MA with good hemostasis. Patient tolerated the procedure well without complications.   02/09/24   Leanne Palma MA

## 2024-02-09 NOTE — PROGRESS NOTES
Chief Complaint  Follow-up (Follow up COPD/PFT ) and Memory Loss (Noticed that it has declined since having radiation treatment a year ago )    Subjective        Miryam Fernández presents to [facility]  History of Present Illness    Patient presents for follow-up visit.    Hypertension is stable on metoprolol XL 25 mg daily.  She also takes Brilinta 60 mg twice daily per cardiology, followed by Dr. Lorenzana. Patient is taking Lipitor 20 mg daily for hyperlipidemia.     Patient is followed by Dr. Morales for history of lung cancer.  Hx COPD, taking Singulair 10 mg nightly. She is also using albuterol PRN.  Pulmonary function testing completed, suggestive of moderate obstructive lung disease. Patient reports nasal congestion, intermittent cough. She denies wheezing or dyspnea. She has been seen by ENT. Patient has generalized weakness, falling periodically.     Hx depression, previously on Celexa but thought it was causing dizziness so medication was continued.  Patient does have history of Ménière's disease.    Patient is prescribed Percocet 5/325 mg PRN for chronic low back pain per previous PCP. She has LESI in 2019 without relief. MRI in 2019 showed mild degenerative changes at L4-5. Also hx spondylosis.      Patient is complaining of increasing memory loss since radiation treatment last year.    ATTENTION  What is the year: incorrect  What is the month of the year: correct  What is the day of the week?: incorrect  What is the date?: incorrect  MEMORY  Repeat address three times, only score third attempt: [address], [place]: 4  HOW MANY ANIMALS DID THE PATIENT NAME  Verbal Fluency -- Animal Names (0-25): 11-13  CLOCK DRAWING  Clock Drawing: All Correct  MEMORY RECALL  Tell me what you remember about that name and address we were repeating at the beginnin  ACE TOTAL SCORE  Total ACE Score - <25/30 strongly suggests cognitive impairment; <21/30 almost  "certainly shows dementia: 15    Objective   Vital Signs:  /80 (BP Location: Left arm, Patient Position: Sitting, Cuff Size: Adult)   Pulse 100   Ht 160 cm (63\")   Wt 58.1 kg (128 lb)   SpO2 97%   BMI 22.67 kg/m²   Estimated body mass index is 22.67 kg/m² as calculated from the following:    Height as of this encounter: 160 cm (63\").    Weight as of this encounter: 58.1 kg (128 lb).       BMI is within normal parameters. No other follow-up for BMI required.      Physical Exam  Constitutional:       Appearance: Normal appearance.   HENT:      Head: Normocephalic.      Right Ear: Tympanic membrane normal.      Left Ear: Tympanic membrane normal.   Cardiovascular:      Rate and Rhythm: Normal rate and regular rhythm.   Pulmonary:      Effort: Pulmonary effort is normal.      Breath sounds: Normal breath sounds.   Abdominal:      General: Abdomen is flat. Bowel sounds are normal.      Palpations: Abdomen is soft.   Musculoskeletal:         General: Normal range of motion.      Cervical back: Neck supple.      Right lower leg: No edema.      Left lower leg: No edema.   Skin:     General: Skin is warm and dry.   Neurological:      Mental Status: She is alert and oriented to person, place, and time.      Gait: Gait is intact.   Psychiatric:         Attention and Perception: Attention normal.         Mood and Affect: Mood normal.         Speech: Speech normal.        Result Review :      CMP          10/31/2023    11:29 12/11/2023    10:07   CMP   Glucose 100  93    BUN 14  19    Creatinine 1.03  0.99    EGFR 60.1  63.0    Sodium 142  139    Potassium 4.6  4.5    Chloride 105  100    Calcium 9.8  10.1    Total Protein 6.8  7.3    Albumin 3.8  4.7    Globulin 3.0  2.6    Total Bilirubin 0.5  0.2    Alkaline Phosphatase 124  117    AST (SGOT) 9  12    ALT (SGPT) <5  7    Albumin/Globulin Ratio 1.3  1.8    BUN/Creatinine Ratio 13.6  19.2    Anion Gap 7.0  12.0      CBC          10/31/2023    11:29 11/16/2023    13:13 " 12/11/2023    10:07   CBC   WBC 5.40  8.27  5.16    RBC 3.95  4.44  4.46    Hemoglobin 12.6  14.2  14.1    Hematocrit 38.1  43.2  41.8    MCV 96.2  97.3  93.7    MCH 31.8  32.0  31.6    MCHC 33.0  32.9  33.7    RDW 14.9  14.2  13.1    Platelets 246  215  284      Lipid Panel          12/11/2023    10:07   Lipid Panel   Total Cholesterol 294    Triglycerides 245    HDL Cholesterol 46    VLDL Cholesterol 48    LDL Cholesterol  200    LDL/HDL Ratio 4.33      TSH          12/11/2023    10:07   TSH   TSH 1.250      Most Recent A1C          12/11/2023    10:07   HGBA1C Most Recent   Hemoglobin A1C 5.20                   Assessment and Plan     Diagnoses and all orders for this visit:    1. Need for vaccination against Streptococcus pneumoniae (Primary)  -     Pneumococcal Conjugate Vaccine 20-Valent (PCV20)    2. DDD (degenerative disc disease), lumbar  Assessment & Plan:  Percocet PRC    Orders:  -     Ambulatory Referral to Physical Therapy Evaluate and treat    3. Frequent falls  -     Ambulatory Referral to Physical Therapy Evaluate and treat    4. Weakness  -     Ambulatory Referral to Physical Therapy Evaluate and treat    5. Chronic obstructive pulmonary disease, unspecified COPD type  Assessment & Plan:  Reviewed PFT   Patient reports she will not taking maintenance inhaler  Continue albuterol PRN  Singulair nightly          6. Meniere's disease, unspecified laterality  Assessment & Plan:  Follow up with ENT  Refer to physical therapy    Orders:  -     Ambulatory Referral to Physical Therapy Evaluate and treat    7. Primary hypertension  Assessment & Plan:  At goal of < 140/90  Continue medications as prescribed      8. Vitamin D deficiency  -     Vitamin D,25-Hydroxy    9. Mixed hyperlipidemia  Assessment & Plan:  Continue Lipitor  Repeat fasting lipid panel     Orders:  -     Lipid Panel    10. Nasal congestion  Assessment & Plan:  Start Nasacort 2 sprays each nostril daily      11. Memory loss  Assessment &  Plan:  Reviewed MMSE with patient/daughter  MRI brain ordered previously, not scheduled - phone # provided to daughter to schedule  Start Namenda 5 mg daily  Consider Neurology referral      Other orders  -     memantine (Namenda) 5 MG tablet; Take 1 tablet by mouth Daily.  Dispense: 30 tablet; Refill: 1  -     Triamcinolone Acetonide (Nasacort Allergy 24HR) 55 MCG/ACT nasal inhaler; 2 sprays into the nostril(s) as directed by provider Daily.  Dispense: 16.5 g; Refill: 11           I spent 30 minutes caring for Miryam on this date of service. This time includes time spent by me in the following activities:preparing for the visit, reviewing tests, obtaining and/or reviewing a separately obtained history, performing a medically appropriate examination and/or evaluation , counseling and educating the patient/family/caregiver, ordering medications, tests, or procedures, documenting information in the medical record, and care coordination  Follow Up     Return in about 3 months (around 5/9/2024) for Medicare Wellness.  Patient was given instructions and counseling regarding her condition or for health maintenance advice. Please see specific information pulled into the AVS if appropriate.

## 2024-02-09 NOTE — ASSESSMENT & PLAN NOTE
Reviewed PFT   Patient reports she will not taking maintenance inhaler  Continue albuterol PRN  Singulair nightly

## 2024-02-10 LAB
25(OH)D3 SERPL-MCNC: 13.1 NG/ML (ref 30–100)
CHOLEST SERPL-MCNC: 260 MG/DL (ref 0–200)
HDLC SERPL-MCNC: 45 MG/DL (ref 40–60)
LDLC SERPL CALC-MCNC: 187 MG/DL (ref 0–100)
LDLC/HDLC SERPL: 4.11 {RATIO}
TRIGL SERPL-MCNC: 150 MG/DL (ref 0–150)
VLDLC SERPL-MCNC: 28 MG/DL (ref 5–40)

## 2024-02-11 RX ORDER — ATORVASTATIN CALCIUM 40 MG/1
40 TABLET, FILM COATED ORAL DAILY
Qty: 90 TABLET | Refills: 0 | Status: SHIPPED | OUTPATIENT
Start: 2024-02-11

## 2024-02-15 ENCOUNTER — TELEPHONE (OUTPATIENT)
Dept: FAMILY MEDICINE CLINIC | Facility: CLINIC | Age: 69
End: 2024-02-15
Payer: MEDICARE

## 2024-02-15 NOTE — TELEPHONE ENCOUNTER
Patient's cardiologist needs to be the one that gives her authorization to stop the blood thinner prior to procedure

## 2024-02-15 NOTE — TELEPHONE ENCOUNTER
Pt daughter Kimberley wilson stated that pt has to have a balloon sinuplasty and they said she needed to be off the Brillinta for a week before the procedure. She is not scheduled yet and they wanted to be sure that this was okay for the patient to hold the medication for that long. Please advise.

## 2024-02-16 DIAGNOSIS — I25.10 CORONARY ARTERY DISEASE INVOLVING NATIVE HEART WITHOUT ANGINA PECTORIS, UNSPECIFIED VESSEL OR LESION TYPE: ICD-10-CM

## 2024-02-16 DIAGNOSIS — I10 PRIMARY HYPERTENSION: Primary | ICD-10-CM

## 2024-02-16 DIAGNOSIS — I65.21 STENOSIS OF RIGHT CAROTID ARTERY: ICD-10-CM

## 2024-02-16 NOTE — TELEPHONE ENCOUNTER
Lashell Banegas is the last name on her Brilinta.  I believe that the nurse practitioner is with Dr. Suh. I will send referral back for consultation

## 2024-02-19 ENCOUNTER — OFFICE VISIT (OUTPATIENT)
Dept: FAMILY MEDICINE CLINIC | Facility: CLINIC | Age: 69
End: 2024-02-19
Payer: MEDICARE

## 2024-02-19 VITALS
BODY MASS INDEX: 22.18 KG/M2 | RESPIRATION RATE: 16 BRPM | TEMPERATURE: 97.5 F | DIASTOLIC BLOOD PRESSURE: 81 MMHG | SYSTOLIC BLOOD PRESSURE: 132 MMHG | OXYGEN SATURATION: 98 % | WEIGHT: 125.2 LBS | HEART RATE: 89 BPM | HEIGHT: 63 IN

## 2024-02-19 DIAGNOSIS — B02.21 HERPES ZOSTER OF THE EAR: Primary | ICD-10-CM

## 2024-02-19 PROCEDURE — 3079F DIAST BP 80-89 MM HG: CPT | Performed by: NURSE PRACTITIONER

## 2024-02-19 PROCEDURE — 3075F SYST BP GE 130 - 139MM HG: CPT | Performed by: NURSE PRACTITIONER

## 2024-02-19 PROCEDURE — 99213 OFFICE O/P EST LOW 20 MIN: CPT | Performed by: NURSE PRACTITIONER

## 2024-02-19 PROCEDURE — 1160F RVW MEDS BY RX/DR IN RCRD: CPT | Performed by: NURSE PRACTITIONER

## 2024-02-19 PROCEDURE — 1159F MED LIST DOCD IN RCRD: CPT | Performed by: NURSE PRACTITIONER

## 2024-02-19 RX ORDER — VALACYCLOVIR HYDROCHLORIDE 1 G/1
1000 TABLET, FILM COATED ORAL 2 TIMES DAILY
Qty: 10 TABLET | Refills: 0 | Status: SHIPPED | OUTPATIENT
Start: 2024-02-19

## 2024-02-19 RX ORDER — ACYCLOVIR 50 MG/G
1 OINTMENT TOPICAL
Qty: 15 G | Refills: 0 | Status: SHIPPED | OUTPATIENT
Start: 2024-02-19

## 2024-02-19 NOTE — PROGRESS NOTES
Chief Complaint  Chief Complaint   Patient presents with    Earache     PT is concerned due to Hx of shingles in L Ear. Frequent Ear aches, sharp pains in R Ear. Using 3 drops of vinegar in ear everyday. Pain subsides w/ pain medication            Subjective          Miryam Fernández presents to [facility] for:    Patient presents for same-day acute visit for left earache, see chief complaint.    Earache   There is pain in the left ear. The current episode started yesterday. The problem occurs constantly. The problem has been worse. Associated symptoms include hearing loss and rhinorrhea. Pertinent negatives include no coughing, drainage, headaches, neck pain, rash or sore throat.       The following portions of the patient's history were reviewed and updated as appropriate: allergies, current medications, past family history, past medical history, past social history, past surgical history and problem list.    Past Medical History:   Diagnosis Date    Cancer     Carotid stenosis     COPD (chronic obstructive pulmonary disease)     Coronary artery disease     Esophageal stricture     Hyperlipidemia     Pulmonary embolism      Past Surgical History:   Procedure Laterality Date    ANGIOPLASTY / STENTING FEMORAL      CARDIAC CATHETERIZATION      CAROTID STENT      CORONARY STENT PLACEMENT      ESOPHAGOSCOPY / EGD      HYSTERECTOMY       Family History   Problem Relation Age of Onset    Heart disease Mother     Hypertension Mother     Hyperlipidemia Father     Lung cancer Father     Diabetes Paternal Uncle     Leukemia Paternal Grandmother      Social History     Tobacco Use    Smoking status: Every Day     Packs/day: 2.00     Years: 40.00     Additional pack years: 0.00     Total pack years: 80.00     Types: Cigarettes     Passive exposure: Current    Smokeless tobacco: Not on file   Substance Use Topics    Alcohol use: Not Currently       Current Outpatient Medications:     albuterol  "sulfate  (90 Base) MCG/ACT inhaler, Inhale 2 puffs Every 4 (Four) Hours As Needed for Wheezing or Shortness of Air., Disp: 8 g, Rfl: 1    atorvastatin (Lipitor) 40 MG tablet, Take 1 tablet by mouth Daily., Disp: 90 tablet, Rfl: 0    Cholecalciferol (Vitamin D3) 50 MCG (2000 UT) capsule, Take 1 capsule by mouth Daily., Disp: 30 capsule, Rfl: 11    meclizine (ANTIVERT) 25 MG tablet, Take 1 tablet by mouth 3 (Three) Times a Day As Needed for Dizziness., Disp: 30 tablet, Rfl: 0    memantine (Namenda) 5 MG tablet, Take 1 tablet by mouth Daily., Disp: 30 tablet, Rfl: 1    metoprolol succinate XL (TOPROL-XL) 25 MG 24 hr tablet, Take 1 tablet by mouth Daily., Disp: , Rfl:     montelukast (Singulair) 10 MG tablet, Take 1 tablet by mouth Every Night., Disp: 90 tablet, Rfl: 1    mupirocin (BACTROBAN) 2 % ointment, Apply 1 application  topically to the appropriate area as directed 3 (Three) Times a Day., Disp: 30 g, Rfl: 0    oxyCODONE-acetaminophen (PERCOCET) 5-325 MG per tablet, , Disp: , Rfl:     pantoprazole (PROTONIX) 40 MG EC tablet, Take 1 tablet by mouth Every 12 (Twelve) Hours., Disp: , Rfl:     ticagrelor (Brilinta) 60 MG tablet tablet, Take 1 tablet by mouth 2 (Two) Times a Day., Disp: 60 tablet, Rfl: 5    Triamcinolone Acetonide (Nasacort Allergy 24HR) 55 MCG/ACT nasal inhaler, 2 sprays into the nostril(s) as directed by provider Daily., Disp: 16.5 g, Rfl: 11    acyclovir (Zovirax) 5 % ointment, Apply 1 Application topically to the appropriate area as directed Every 3 (Three) Hours., Disp: 15 g, Rfl: 0    valACYclovir (Valtrex) 1000 MG tablet, Take 1 tablet by mouth 2 (Two) Times a Day., Disp: 10 tablet, Rfl: 0    Objective   Vital Signs:   /81 (BP Location: Left arm, Patient Position: Sitting, Cuff Size: Adult)   Pulse 89   Temp 97.5 °F (36.4 °C) (Oral)   Resp 16   Ht 160 cm (63\")   Wt 56.8 kg (125 lb 3.2 oz)   SpO2 98%   BMI 22.18 kg/m²           Physical Exam  Vitals and nursing note reviewed. "   Constitutional:       General: She is not in acute distress.     Appearance: She is well-developed. She is not diaphoretic.   HENT:      Right Ear: Tympanic membrane and ear canal normal.      Left Ear: Tympanic membrane normal.      Ears:      Comments: L canal erythemic, edematous, no herpetic vesicles present    Neck:      Thyroid: No thyromegaly.      Vascular: No JVD.   Musculoskeletal:         General: No tenderness. Normal range of motion.   Skin:     General: Skin is warm and dry.   Neurological:      Mental Status: She is alert and oriented to person, place, and time.      Sensory: No sensory deficit.   Psychiatric:         Behavior: Behavior normal.         Thought Content: Thought content normal.         Judgment: Judgment normal.          Result Review :     No visits with results within 7 Day(s) from this visit.   Latest known visit with results is:   Office Visit on 02/09/2024   Component Date Value Ref Range Status    Total Cholesterol 02/09/2024 260 (H)  0 - 200 mg/dL Final    Triglycerides 02/09/2024 150  0 - 150 mg/dL Final    HDL Cholesterol 02/09/2024 45  40 - 60 mg/dL Final    LDL Cholesterol  02/09/2024 187 (H)  0 - 100 mg/dL Final    VLDL Cholesterol 02/09/2024 28  5 - 40 mg/dL Final    LDL/HDL Ratio 02/09/2024 4.11   Final    25 Hydroxy, Vitamin D 02/09/2024 13.1 (L)  30.0 - 100.0 ng/ml Final                  BMI is within normal parameters. No other follow-up for BMI required.           Assessment and Plan    Diagnoses and all orders for this visit:    1. Herpes zoster of the ear (Primary)    Other orders  -     valACYclovir (Valtrex) 1000 MG tablet; Take 1 tablet by mouth 2 (Two) Times a Day.  Dispense: 10 tablet; Refill: 0  -     acyclovir (Zovirax) 5 % ointment; Apply 1 Application topically to the appropriate area as directed Every 3 (Three) Hours.  Dispense: 15 g; Refill: 0    Probable early onset w/o open herpetic lesions, s/s same as prior, will treat with valtrex and rec zovirax  lamar      I spent 20 minutes caring for Miryam Fernández on this date of service. This time includes time spent by me in the following activities: preparing for the visit, reviewing tests, performing a medically appropriate examination and/or evaluation , counseling and educating the patient/family/caregiver, ordering medications, tests, or procedures and documenting information in the medical record        Follow Up     Return if symptoms worsen or fail to improve in 2-3 days.  Patient was given instructions and counseling regarding her condition or for health maintenance advice. Please see specific information pulled into the AVS if appropriate.        Part of this note may be an electronic transcription/translation of spoken language to printed text using the Dragon Dictation System

## 2024-03-13 ENCOUNTER — OFFICE VISIT (OUTPATIENT)
Dept: CARDIOLOGY | Facility: CLINIC | Age: 69
End: 2024-03-13
Payer: MEDICARE

## 2024-03-13 VITALS
HEART RATE: 98 BPM | OXYGEN SATURATION: 99 % | SYSTOLIC BLOOD PRESSURE: 118 MMHG | BODY MASS INDEX: 22.5 KG/M2 | WEIGHT: 127 LBS | DIASTOLIC BLOOD PRESSURE: 75 MMHG

## 2024-03-13 DIAGNOSIS — I25.10 CORONARY ARTERY DISEASE DUE TO LIPID RICH PLAQUE: Primary | ICD-10-CM

## 2024-03-13 DIAGNOSIS — I10 PRIMARY HYPERTENSION: ICD-10-CM

## 2024-03-13 DIAGNOSIS — Z01.810 PREOPERATIVE CARDIOVASCULAR EXAMINATION: ICD-10-CM

## 2024-03-13 DIAGNOSIS — E78.2 MIXED HYPERLIPIDEMIA: ICD-10-CM

## 2024-03-13 DIAGNOSIS — I25.83 CORONARY ARTERY DISEASE DUE TO LIPID RICH PLAQUE: Primary | ICD-10-CM

## 2024-03-13 PROCEDURE — 3074F SYST BP LT 130 MM HG: CPT | Performed by: NURSE PRACTITIONER

## 2024-03-13 PROCEDURE — 93000 ELECTROCARDIOGRAM COMPLETE: CPT | Performed by: NURSE PRACTITIONER

## 2024-03-13 PROCEDURE — 1159F MED LIST DOCD IN RCRD: CPT | Performed by: NURSE PRACTITIONER

## 2024-03-13 PROCEDURE — 3078F DIAST BP <80 MM HG: CPT | Performed by: NURSE PRACTITIONER

## 2024-03-13 PROCEDURE — 99214 OFFICE O/P EST MOD 30 MIN: CPT | Performed by: NURSE PRACTITIONER

## 2024-03-13 PROCEDURE — 1160F RVW MEDS BY RX/DR IN RCRD: CPT | Performed by: NURSE PRACTITIONER

## 2024-03-13 NOTE — PROGRESS NOTES
[facility] CARDIOLOGY      REASON FOR FOLLOW-UP:  Preoperative cardiovascular risk assessment  Coronary artery disease          Chief Complaint   Patient presents with    Coronary Artery Disease     Not seen since 2022. Needing to do balloon sinuloplasty & wanting her off Brilinta.    Hypertension         Dear Lashell De La Torre APRN        History of Present Illness   It was my pleasure to see Miryam Fernández in the office today.  She is a 69-year-old female with known history of coronary artery disease and prior PCI, history of peripheral vascular disease including carotid artery disease, history of endovascular intervention on lower extremities, history of non-small cell lung cancer status post radiation and chemotherapy, history of hypertension, dyslipidemia, dysphagia, history of subclavian steal syndrome.  Her last office visit was 5/18/2022-stress testing and 2D echocardiogram ordered to further evaluate intermittent symptoms of chest discomfort, shortness of breath, dyspnea on exertion.  2D echocardiogram showed normal LV systolic function EF 50%, small pericardial effusion.  Mild MR/TR.  Pharmacological nuclear stress testing performed with no evidence of ischemia.  Ms. Fernández stated today that she is in need of sinuplasty and needs clearance to hold her antiplatelet therapy.    ASSESSMENT:  Preoperative cardiac risk assessment  Coronary artery disease with prior PCI  Primary hypertension  Dyslipidemia  Peripheral arterial disease with prior carotid endarterectomy  Prior PCI/stenting of bilateral lower extremities  History of non-small cell lung cancer  History of subclavian steal syndrome  History of pulmonary embolism    PLAN:  The patient should be low risk to hold antiplatelet therapy.  Continue statin, BB, Lasix with potassium supplement.  Follow-up with primary cardiologist      Diagnoses and all orders for this visit:    1. Preoperative cardiovascular examination  (Primary)    2. Coronary artery disease due to lipid rich plaque    3. Mixed hyperlipidemia    4. Primary hypertension          The following portions of the patient's history were reviewed and updated as appropriate: allergies, current medications, past family history, past medical history, past social history, past surgical history, and problem list.    REVIEW OF SYSTEMS:    Review of Systems   Cardiovascular:  Positive for dyspnea on exertion.   All other systems reviewed and are negative.      Vitals:    03/13/24 0940   BP: 118/75   Pulse: 98   SpO2: 99%         PHYSICAL EXAM:    General: Alert, cooperative, no distress, appears stated age  Head:  Normocephalic, atraumatic, mucous membranes moist  Eyes:  Conjunctiva/corneas clear, EOM's intact     Neck:  Supple,  no JVD or bruit     Lungs: Diminished, coarse left lower lobe, no wheezes  Chest wall: No tenderness  Musculoskeletal:   Ambulates freely without assistance  Heart::  Regular rate and rhythm, S1 and S2 normal, no murmur, rub or gallop  Abdomen: Soft, non-tender, nondistended, bowel sounds active, no abdominal bruit  Extremities: No cyanosis, clubbing, or edema   Pulses: 2+ and symmetric all extremities  Skin:  No rashes or lesions  Neuro/psych: A&O x3. CN II through XII are grossly intact with appropriate affect        Past Medical History:   Diagnosis Date    Cancer     lung cancer    Carotid stenosis     COPD (chronic obstructive pulmonary disease)     Coronary artery disease     Esophageal stricture     Dr Domingo    Hyperlipidemia     Pulmonary embolism     seen at [facility], fluid around her heart at the time-right lung       Past Surgical History:   Procedure Laterality Date    ANGIOPLASTY / STENTING FEMORAL      CARDIAC CATHETERIZATION      CAROTID STENT      CORONARY STENT PLACEMENT      ESOPHAGOSCOPY / EGD      HYSTERECTOMY           Current Outpatient Medications:     acyclovir (Zovirax) 5 % ointment, Apply 1 Application topically to the  appropriate area as directed Every 3 (Three) Hours., Disp: 15 g, Rfl: 0    albuterol sulfate  (90 Base) MCG/ACT inhaler, Inhale 2 puffs Every 4 (Four) Hours As Needed for Wheezing or Shortness of Air., Disp: 8 g, Rfl: 1    atorvastatin (Lipitor) 40 MG tablet, Take 1 tablet by mouth Daily., Disp: 90 tablet, Rfl: 0    Cholecalciferol (Vitamin D3) 50 MCG (2000 UT) capsule, Take 1 capsule by mouth Daily., Disp: 30 capsule, Rfl: 11    meclizine (ANTIVERT) 25 MG tablet, Take 1 tablet by mouth 3 (Three) Times a Day As Needed for Dizziness., Disp: 30 tablet, Rfl: 0    memantine (Namenda) 5 MG tablet, Take 1 tablet by mouth Daily., Disp: 30 tablet, Rfl: 1    metoprolol succinate XL (TOPROL-XL) 25 MG 24 hr tablet, Take 1 tablet by mouth Daily., Disp: , Rfl:     montelukast (Singulair) 10 MG tablet, Take 1 tablet by mouth Every Night., Disp: 90 tablet, Rfl: 1    mupirocin (BACTROBAN) 2 % ointment, Apply 1 application  topically to the appropriate area as directed 3 (Three) Times a Day., Disp: 30 g, Rfl: 0    oxyCODONE-acetaminophen (PERCOCET) 5-325 MG per tablet, , Disp: , Rfl:     pantoprazole (PROTONIX) 40 MG EC tablet, Take 1 tablet by mouth Every 12 (Twelve) Hours., Disp: , Rfl:     ticagrelor (Brilinta) 60 MG tablet tablet, Take 1 tablet by mouth 2 (Two) Times a Day., Disp: 60 tablet, Rfl: 5    Triamcinolone Acetonide (Nasacort Allergy 24HR) 55 MCG/ACT nasal inhaler, 2 sprays into the nostril(s) as directed by provider Daily., Disp: 16.5 g, Rfl: 11    valACYclovir (Valtrex) 1000 MG tablet, Take 1 tablet by mouth 2 (Two) Times a Day., Disp: 10 tablet, Rfl: 0    Allergies   Allergen Reactions    Albuterol Shortness Of Breath     Pt states she has sensitivity to albuterol.  She states it causes her to be SOA.    Hydrocodone Palpitations    Sulfa Antibiotics Swelling    Bacitracin Other (See Comments)    Benzalkonium Chloride Hives    Codeine Other (See Comments)    Neomycin Other (See Comments)    Polymyxin B Other  "(See Comments)    Nickel Rash    Penicillins Rash       Family History   Problem Relation Age of Onset    Heart disease Mother     Hypertension Mother     Hyperlipidemia Father     Lung cancer Father     Diabetes Paternal Uncle     Leukemia Paternal Grandmother        Social History     Tobacco Use    Smoking status: Every Day     Current packs/day: 2.00     Average packs/day: 2.0 packs/day for 40.0 years (80.0 ttl pk-yrs)     Types: Cigarettes     Passive exposure: Current    Smokeless tobacco: Not on file   Substance Use Topics    Alcohol use: Not Currently           Current Electrocardiogram:    ECG 12 Lead    Date/Time: 3/13/2024 12:54 PM  Performed by: Lashell Banegas APRN    Authorized by: Lashell Banegas APRN  Comparison: compared with previous ECG from 5/18/2022  Similar to previous ECG  Rhythm: sinus rhythm  BPM: 98              EMR Dragon/Transcription:   \"Dictated utilizing Dragon dictation\".     Copied text in this note has been reviewed by me and is accurate as of 03/13/24.    "

## 2024-03-26 ENCOUNTER — HOSPITAL ENCOUNTER (OUTPATIENT)
Dept: MRI IMAGING | Facility: HOSPITAL | Age: 69
Discharge: HOME | End: 2024-03-26
Payer: MEDICARE

## 2024-03-26 ENCOUNTER — HOSPITAL ENCOUNTER (OUTPATIENT)
Dept: CT IMAGING | Facility: HOSPITAL | Age: 69
Discharge: HOME | End: 2024-03-26
Payer: MEDICARE

## 2024-03-26 DIAGNOSIS — C34.90 SMALL CELL LUNG CANCER: ICD-10-CM

## 2024-03-26 LAB
CREAT BLDA-MCNC: 1.1 MG/DL (ref 0.6–1.3)
EGFRCR SERPLBLD CKD-EPI 2021: 55.2 ML/MIN/1.73

## 2024-03-26 PROCEDURE — 82565 ASSAY OF CREATININE: CPT

## 2024-03-26 PROCEDURE — 74177 CT ABD & PELVIS W/CONTRAST: CPT

## 2024-03-26 PROCEDURE — A9579 GAD-BASE MR CONTRAST NOS,1ML: HCPCS | Performed by: INTERNAL MEDICINE

## 2024-03-26 PROCEDURE — 25010000002 GADOTERIDOL PER 1 ML: Performed by: INTERNAL MEDICINE

## 2024-03-26 PROCEDURE — 71260 CT THORAX DX C+: CPT

## 2024-03-26 PROCEDURE — 70553 MRI BRAIN STEM W/O & W/DYE: CPT

## 2024-03-26 PROCEDURE — 25510000001 IOPAMIDOL PER 1 ML: Performed by: INTERNAL MEDICINE

## 2024-03-26 RX ADMIN — IOPAMIDOL 100 ML: 755 INJECTION, SOLUTION INTRAVENOUS at 13:36

## 2024-03-26 RX ADMIN — GADOTERIDOL 11 ML: 279.3 INJECTION, SOLUTION INTRAVENOUS at 11:51

## 2024-03-28 ENCOUNTER — TELEPHONE (OUTPATIENT)
Dept: ONCOLOGY | Facility: CLINIC | Age: 69
End: 2024-03-28
Payer: MEDICARE

## 2024-03-28 DIAGNOSIS — I77.1 ARTERY STENOSIS: Primary | ICD-10-CM

## 2024-03-28 DIAGNOSIS — I72.9 ANEURYSM: ICD-10-CM

## 2024-03-28 NOTE — TELEPHONE ENCOUNTER
Spoke w/ pt's daughter and let her know that Dr. Morales would like for the pt to see a Vascular Surgeon due to her scan showing artery stenosis and an aneurysm.  She v/u.  Order for referral entered.

## 2024-04-02 ENCOUNTER — OFFICE VISIT (OUTPATIENT)
Dept: FAMILY MEDICINE CLINIC | Facility: CLINIC | Age: 69
End: 2024-04-02
Payer: MEDICARE

## 2024-04-02 VITALS
OXYGEN SATURATION: 97 % | WEIGHT: 127 LBS | DIASTOLIC BLOOD PRESSURE: 70 MMHG | BODY MASS INDEX: 22.5 KG/M2 | HEIGHT: 63 IN | HEART RATE: 92 BPM | SYSTOLIC BLOOD PRESSURE: 138 MMHG

## 2024-04-02 DIAGNOSIS — I25.10 CORONARY ARTERY DISEASE DUE TO LIPID RICH PLAQUE: ICD-10-CM

## 2024-04-02 DIAGNOSIS — M51.36 DDD (DEGENERATIVE DISC DISEASE), LUMBAR: ICD-10-CM

## 2024-04-02 DIAGNOSIS — H81.09 MENIERE'S DISEASE, UNSPECIFIED LATERALITY: ICD-10-CM

## 2024-04-02 DIAGNOSIS — J44.9 CHRONIC OBSTRUCTIVE PULMONARY DISEASE, UNSPECIFIED COPD TYPE: ICD-10-CM

## 2024-04-02 DIAGNOSIS — R29.6 FREQUENT FALLS: ICD-10-CM

## 2024-04-02 DIAGNOSIS — I71.40 ABDOMINAL AORTIC ANEURYSM (AAA) WITHOUT RUPTURE, UNSPECIFIED PART: ICD-10-CM

## 2024-04-02 DIAGNOSIS — R22.43 LOCALIZED SWELLING OF BOTH LOWER LEGS: ICD-10-CM

## 2024-04-02 DIAGNOSIS — I25.83 CORONARY ARTERY DISEASE DUE TO LIPID RICH PLAQUE: ICD-10-CM

## 2024-04-02 DIAGNOSIS — R41.3 MEMORY LOSS: ICD-10-CM

## 2024-04-02 DIAGNOSIS — I71.20 THORACIC AORTIC ANEURYSM WITHOUT RUPTURE, UNSPECIFIED PART: ICD-10-CM

## 2024-04-02 DIAGNOSIS — I10 PRIMARY HYPERTENSION: Primary | ICD-10-CM

## 2024-04-02 PROBLEM — H81.03 MENIERE DISEASE, BILATERAL: Status: ACTIVE | Noted: 2024-04-02

## 2024-04-02 LAB
DEPRECATED RDW RBC AUTO: 50.1 FL (ref 37–54)
ERYTHROCYTE [DISTWIDTH] IN BLOOD BY AUTOMATED COUNT: 14.9 % (ref 12.3–15.4)
HCT VFR BLD AUTO: 38.2 % (ref 34–46.6)
HGB BLD-MCNC: 12.9 G/DL (ref 12–15.9)
MCH RBC QN AUTO: 31.5 PG (ref 26.6–33)
MCHC RBC AUTO-ENTMCNC: 33.8 G/DL (ref 31.5–35.7)
MCV RBC AUTO: 93.2 FL (ref 79–97)
PLATELET # BLD AUTO: 250 10*3/MM3 (ref 140–450)
PMV BLD AUTO: 10.9 FL (ref 6–12)
RBC # BLD AUTO: 4.1 10*6/MM3 (ref 3.77–5.28)
WBC NRBC COR # BLD AUTO: 4.51 10*3/MM3 (ref 3.4–10.8)

## 2024-04-02 PROCEDURE — 83880 ASSAY OF NATRIURETIC PEPTIDE: CPT | Performed by: NURSE PRACTITIONER

## 2024-04-02 PROCEDURE — 80053 COMPREHEN METABOLIC PANEL: CPT | Performed by: NURSE PRACTITIONER

## 2024-04-02 PROCEDURE — 85027 COMPLETE CBC AUTOMATED: CPT | Performed by: NURSE PRACTITIONER

## 2024-04-02 RX ORDER — FUROSEMIDE 20 MG/1
10 TABLET ORAL DAILY PRN
Qty: 15 TABLET | Refills: 2 | Status: SHIPPED | OUTPATIENT
Start: 2024-04-02 | End: 2024-05-02

## 2024-04-02 NOTE — PROGRESS NOTES
Venipuncture Blood Specimen Collection  Venipuncture performed in the right arm by Leanne Palma MA with good hemostasis. Patient tolerated the procedure well without complications.   04/02/24   Leanne Palma MA

## 2024-04-02 NOTE — PROGRESS NOTES
Chief Complaint  Follow-up (3 month follow up depression/chronic back pain ) and Edema (Bilateral leg swelling into the feet as well )    Subjective        Miryam Fernández presents to [facility]  History of Present Illness    Patient presents for follow-up visit.    Hypertension is stable on metoprolol XL 25 mg daily.  She also takes Brilinta 60 mg twice daily per cardiology, followed by Dr. Lorenzana. Patient is taking Lipitor 20 mg daily for hyperlipidemia. She is c/o swelling to bilateral lower extremities, fluctuates in severity. Patient reports previously on Lasix. She denies redness, pain or warmth.      Patient is followed by Dr. Morales for history of lung cancer.  Hx COPD, taking Singulair 10 mg nightly. She is also using albuterol PRN.  Pulmonary function testing completed, suggestive of moderate obstructive lung disease. CT chest completed per Hematology - results are as follows: 1. Features suggestive of posttreatment fibrosis in the paramediastinal/perihilar left upper lobe, without significant change from 10/31/2023. 2. Multiple small noncalcified bilateral pulmonary nodules are unchanged from 9/1/2021, confirming benignity. There are no new pulmonary nodules. 3. Moderate emphysema. 4. Severe stenosis of the distal left external iliac artery, new since 2021.   3.0 cm fusiform aneurysm of the infrarenal abdominal aorta, new since 2021. Suspected severe right renal artery ostial stenosis.  5. 3.6 cm fusiform aneurysm of the thoracic aorta at the diaphragmatic hiatus, similar to 2021.  6. No convincing CT evidence of malignant recurrence or disease progression in the chest. No evidence of metastatic disease in the abdomen, pelvis or skeleton.    Hx Ménière's disease, has intermittent dizziness. Patient is falling frequently - referred to physical therapy but has not started yet.     Patient is prescribed Percocet 5/325 mg PRN for chronic low back pain per previous PCP. She has  "LESI in 2019 without relief. MRI in 2019 showed mild degenerative changes at L4-5. Also hx spondylosis.      Cognitive impairment, started on Namenda 5 mg daily.     Objective   Vital Signs:  /70 (BP Location: Left arm, Patient Position: Sitting, Cuff Size: Adult)   Pulse 92   Ht 160 cm (63\")   Wt 57.6 kg (127 lb)   SpO2 97%   BMI 22.50 kg/m²   Estimated body mass index is 22.5 kg/m² as calculated from the following:    Height as of this encounter: 160 cm (63\").    Weight as of this encounter: 57.6 kg (127 lb).       BMI is within normal parameters. No other follow-up for BMI required.      Physical Exam  Constitutional:       Appearance: Normal appearance.   HENT:      Head: Normocephalic.   Cardiovascular:      Rate and Rhythm: Normal rate and regular rhythm.   Pulmonary:      Effort: Pulmonary effort is normal.      Breath sounds: Normal breath sounds.   Abdominal:      General: Abdomen is flat. Bowel sounds are normal.      Palpations: Abdomen is soft.   Musculoskeletal:         General: Normal range of motion.      Cervical back: Neck supple.      Right lower leg: No edema.      Left lower leg: No edema.   Skin:     General: Skin is warm and dry.   Neurological:      Mental Status: She is alert and oriented to person, place, and time.      Gait: Gait is intact.   Psychiatric:         Attention and Perception: Attention normal.         Mood and Affect: Mood normal.         Speech: Speech normal.        Result Review :      [facility]          10/31/2023    11:29 12/11/2023    10:07 3/26/2024    11:08   CMP   Glucose 100  93     BUN 14  19     Creatinine 1.03  0.99  1.10    EGFR 60.1  63.0  55.2    Sodium 142  139     Potassium 4.6  4.5     Chloride 105  100     Calcium 9.8  10.1     Total Protein 6.8  7.3     Albumin 3.8  4.7     Globulin 3.0  2.6     Total Bilirubin 0.5  0.2     Alkaline Phosphatase 124  117     AST (SGOT) 9  12     ALT (SGPT) <5  7     Albumin/Globulin Ratio 1.3  1.8     BUN/Creatinine " Ratio 13.6  19.2     Anion Gap 7.0  12.0       CBC          10/31/2023    11:29 11/16/2023    13:13 12/11/2023    10:07   CBC   WBC 5.40  8.27  5.16    RBC 3.95  4.44  4.46    Hemoglobin 12.6  14.2  14.1    Hematocrit 38.1  43.2  41.8    MCV 96.2  97.3  93.7    MCH 31.8  32.0  31.6    MCHC 33.0  32.9  33.7    RDW 14.9  14.2  13.1    Platelets 246  215  284      Lipid Panel          12/11/2023    10:07 2/9/2024    09:17   Lipid Panel   Total Cholesterol 294  260    Triglycerides 245  150    HDL Cholesterol 46  45    VLDL Cholesterol 48  28    LDL Cholesterol  200  187    LDL/HDL Ratio 4.33  4.11      TSH          12/11/2023    10:07   TSH   TSH 1.250      Most Recent A1C          12/11/2023    10:07   HGBA1C Most Recent   Hemoglobin A1C 5.20                   Assessment and Plan     Diagnoses and all orders for this visit:    1. Primary hypertension (Primary)  Assessment & Plan:  At goal of < 140/90  Continue Metoprolol as prescribed    Orders:  -     CBC (No Diff)  -     Comprehensive Metabolic Panel  -     BNP    2. Localized swelling of both lower legs  Assessment & Plan:  Labs today  Start Lasix 10 mg daily PRN  No added salt, limit NA to 1800 mg daily    Orders:  -     CBC (No Diff)  -     Comprehensive Metabolic Panel  -     BNP    3. Frequent falls    4. Chronic obstructive pulmonary disease, unspecified COPD type  Assessment & Plan:  Albuterol PRN  Continue nightly Singulair nightly  CT chest reviewed         5. DDD (degenerative disc disease), lumbar  Assessment & Plan:  Percocet PRN severe pain      6. Memory loss  Assessment & Plan:  Continue Namenda  Consider referral to Neuro        7. Coronary artery disease due to lipid rich plaque  Assessment & Plan:  Continue Brilinta as prescribed  Follow up with Cardiology      8. Meniere's disease, unspecified laterality  Assessment & Plan:  Schedule physical therapy  Consider referral to ENT      9. Abdominal aortic aneurysm (AAA) without rupture, unspecified  part  Assessment & Plan:  Referred to Vascular per Hematology      10. Thoracic aortic aneurysm without rupture, unspecified part    Other orders  -     furosemide (Lasix) 20 MG tablet; Take 0.5 tablets by mouth Daily As Needed (swelling) for up to 30 days.  Dispense: 15 tablet; Refill: 2  -     ticagrelor (Brilinta) 60 MG tablet tablet; Take 1 tablet by mouth 2 (Two) Times a Day.  Dispense: 60 tablet; Refill: 5           I spent 30 minutes caring for Miryam on this date of service. This time includes time spent by me in the following activities:preparing for the visit, reviewing tests, obtaining and/or reviewing a separately obtained history, performing a medically appropriate examination and/or evaluation , counseling and educating the patient/family/caregiver, ordering medications, tests, or procedures, documenting information in the medical record, and care coordination  Follow Up     Return in about 3 months (around 7/2/2024) for Medicare Wellness.  Patient was given instructions and counseling regarding her condition or for health maintenance advice. Please see specific information pulled into the AVS if appropriate.

## 2024-04-03 LAB
ALBUMIN SERPL-MCNC: 4.2 G/DL (ref 3.5–5.2)
ALBUMIN/GLOB SERPL: 1.3 G/DL
ALP SERPL-CCNC: 112 U/L (ref 39–117)
ALT SERPL W P-5'-P-CCNC: 6 U/L (ref 1–33)
ANION GAP SERPL CALCULATED.3IONS-SCNC: 10.3 MMOL/L (ref 5–15)
AST SERPL-CCNC: 10 U/L (ref 1–32)
BILIRUB SERPL-MCNC: 0.3 MG/DL (ref 0–1.2)
BUN SERPL-MCNC: 16 MG/DL (ref 8–23)
BUN/CREAT SERPL: 14.7 (ref 7–25)
CALCIUM SPEC-SCNC: 9.9 MG/DL (ref 8.6–10.5)
CHLORIDE SERPL-SCNC: 105 MMOL/L (ref 98–107)
CO2 SERPL-SCNC: 25.7 MMOL/L (ref 22–29)
CREAT SERPL-MCNC: 1.09 MG/DL (ref 0.57–1)
EGFRCR SERPLBLD CKD-EPI 2021: 55.8 ML/MIN/1.73
GLOBULIN UR ELPH-MCNC: 3.2 GM/DL
GLUCOSE SERPL-MCNC: 79 MG/DL (ref 65–99)
NT-PROBNP SERPL-MCNC: 470 PG/ML (ref 0–900)
POTASSIUM SERPL-SCNC: 4.3 MMOL/L (ref 3.5–5.2)
PROT SERPL-MCNC: 7.4 G/DL (ref 6–8.5)
SODIUM SERPL-SCNC: 141 MMOL/L (ref 136–145)

## 2024-04-17 ENCOUNTER — TELEPHONE (OUTPATIENT)
Age: 69
End: 2024-04-17
Payer: MEDICARE

## 2024-04-17 NOTE — TELEPHONE ENCOUNTER
I spoke with patients daughter about rescheduling appt for next week and pts daughter stated that they are going on vacation and need to know if pt is okay to get in a hot tub with her diagnosis.

## 2024-04-28 RX ORDER — FUROSEMIDE 20 MG/1
10 TABLET ORAL DAILY PRN
Qty: 15 TABLET | Refills: 2 | Status: SHIPPED | OUTPATIENT
Start: 2024-04-28 | End: 2024-05-28

## 2024-05-06 ENCOUNTER — OFFICE VISIT (OUTPATIENT)
Age: 69
End: 2024-05-06
Payer: MEDICARE

## 2024-05-06 VITALS
WEIGHT: 127 LBS | DIASTOLIC BLOOD PRESSURE: 84 MMHG | BODY MASS INDEX: 22.5 KG/M2 | HEIGHT: 63 IN | HEART RATE: 120 BPM | SYSTOLIC BLOOD PRESSURE: 161 MMHG

## 2024-05-06 DIAGNOSIS — Z72.0 TOBACCO ABUSE: ICD-10-CM

## 2024-05-06 DIAGNOSIS — K55.1 CHRONIC VASCULAR INSUFFICIENCY OF INTESTINE: ICD-10-CM

## 2024-05-06 DIAGNOSIS — Z71.6 TOBACCO ABUSE COUNSELING: ICD-10-CM

## 2024-05-06 DIAGNOSIS — I70.1 ATHEROSCLEROSIS OF RENAL ARTERY: ICD-10-CM

## 2024-05-06 DIAGNOSIS — I71.43 INFRARENAL ABDOMINAL AORTIC ANEURYSM (AAA) WITHOUT RUPTURE: Primary | ICD-10-CM

## 2024-05-06 DIAGNOSIS — I73.9 PERIPHERAL VASCULAR DISEASE, UNSPECIFIED: ICD-10-CM

## 2024-05-06 DIAGNOSIS — Z95.828 S/P INSERTION OF ILIAC ARTERY STENT: ICD-10-CM

## 2024-05-06 PROCEDURE — 3077F SYST BP >= 140 MM HG: CPT | Performed by: SURGERY

## 2024-05-06 PROCEDURE — 99204 OFFICE O/P NEW MOD 45 MIN: CPT | Performed by: SURGERY

## 2024-05-06 PROCEDURE — 3079F DIAST BP 80-89 MM HG: CPT | Performed by: SURGERY

## 2024-05-10 NOTE — PROGRESS NOTES
HEMATOLOGY ONCOLOGY OUTPATIENT FOLLOWUP       Patient name: Miryam Fernández  : 1955  MRN: [id number]  Primary Care Physician: Lashell De La Torre APRN  Referring Physician: No ref. provider found  Reason For Consult: limited stage small cell lung cancer      History of Present Illness:  Patient is a 69 y.o. female diagnosed and treated for limited stage small cell lung cancer with chemoradiation currently undergoing surveillance.     Ms Fernández is a 66-year-old female who was diagnosed in   with small cell lung cancer after presenting with dyspnea, chest pain, nausea and vomiting. CT chest was suspicious for lung     20 PET CT -  showed hypermetabolic lower cervical, upper and middle mediastinal lymphadenopathy and left perihilar lung nodule. There was also intense focal uptake in the right thyroid lobe.   MRI brain was negative for metastases.      20  biopsy of the left supraclavicular node that showed small cell lung  cancer. She was diagnosed with limited stage disease and was treated with definitive chemoradiation.     20 - C1 of cisplatin/etoposide with radiation  2020 C4     2020 - PCI     CT chest on 20 showed significant interval reduction in mediastinal masses though there was a new PE for which she was started on Eliquis. She continued following with Dr Castillo and has had stable disease on imaging.      CT C/A/P on 21 showed a more conspicuous 5 mm LLL nodule compared to previous scans with numerous additional bilateral nodules that are stable and unchanged since . There was an increasing moderate pericardial effusion and stable perihilar scarring as well as mediastinal soft tissue density thought to be treated carcinoma. There was no clear evidence of progressive or recurrence malignancy in the chest.    She transferred care to [facility] in 2021 and had repeat imaging in December including MRI brain that did not show disease  progression. However, the pericardial effusion had increased. 2D echo on 12/1/21 showed hemodynamic compromise and concerns  for cardiac tamponade. She was subsequently admitted to the cardiology service and had a pericardiocentesis on 12/10/21 with the fluid negative for malignancy.    No further recurrence of disease.     Last CT imaging in 5/2023 with no evidence of recurrence. No recurrence of effusion.     3/26/2024 CT imaging with suggestive of of posttreatment fibrosis in the paramediastinal perihilar left upper lobe with no significant change as compared to October 2023.  Multiple pulmonary nodules are unchanged from 2021 hence benign.  Moderate emphysema, severe stenosis of the distal left external iliac artery which is new suspected severe renal artery ostial stenosis, thoracic aorta fusiform aneurysm.  No convincing evidence of recurrence of disease  After this she was referred to vascular surgery recommendation was medical management without any surgical intervention given no symptoms.    3/26/2024 MRI brain with confluent foci of T2 flair signal hyperintensity within the bilateral hemispheric white matter.  Related to chronic microvascular ischemic change.    Subjective:  Patient seen for follow up. She has shortness of breath. Has had some anxiety. No cough.       Past Medical History:   Diagnosis Date    Cancer     lung cancer    Carotid stenosis     COPD (chronic obstructive pulmonary disease)     Coronary artery disease     Esophageal stricture     Dr Domingo    Hyperlipidemia     Pulmonary embolism     seen at [facility], fluid around her heart at the time-right lung       Past Surgical History:   Procedure Laterality Date    ANGIOPLASTY / STENTING FEMORAL      CARDIAC CATHETERIZATION      CAROTID STENT      CORONARY STENT PLACEMENT      ESOPHAGOSCOPY / EGD      HYSTERECTOMY           Current Outpatient Medications:     albuterol sulfate  (90 Base) MCG/ACT inhaler, Inhale 2 puffs Every 4 (Four)  Hours As Needed for Wheezing or Shortness of Air., Disp: 8 g, Rfl: 1    atorvastatin (LIPITOR) 40 MG tablet, TAKE 1 TABLET BY MOUTH EVERY DAY, Disp: 90 tablet, Rfl: 0    Cholecalciferol (Vitamin D3) 50 MCG (2000 UT) capsule, Take 1 capsule by mouth Daily., Disp: 30 capsule, Rfl: 11    furosemide (Lasix) 20 MG tablet, Take 0.5 tablets by mouth Daily As Needed (swelling) for up to 30 days., Disp: 15 tablet, Rfl: 2    meclizine (ANTIVERT) 25 MG tablet, Take 1 tablet by mouth 3 (Three) Times a Day As Needed for Dizziness., Disp: 30 tablet, Rfl: 0    memantine (Namenda) 5 MG tablet, Take 1 tablet by mouth Daily., Disp: 30 tablet, Rfl: 1    metoprolol succinate XL (TOPROL-XL) 25 MG 24 hr tablet, Take 1 tablet by mouth Daily., Disp: , Rfl:     montelukast (Singulair) 10 MG tablet, Take 1 tablet by mouth Every Night., Disp: 90 tablet, Rfl: 1    mupirocin (BACTROBAN) 2 % ointment, Apply 1 application  topically to the appropriate area as directed 3 (Three) Times a Day., Disp: 30 g, Rfl: 0    oxyCODONE-acetaminophen (PERCOCET) 5-325 MG per tablet, , Disp: , Rfl:     pantoprazole (PROTONIX) 40 MG EC tablet, Take 1 tablet by mouth Every 12 (Twelve) Hours., Disp: , Rfl:     ticagrelor (Brilinta) 60 MG tablet tablet, Take 1 tablet by mouth 2 (Two) Times a Day., Disp: 60 tablet, Rfl: 5    Triamcinolone Acetonide (Nasacort Allergy 24HR) 55 MCG/ACT nasal inhaler, 2 sprays into the nostril(s) as directed by provider Daily., Disp: 16.5 g, Rfl: 11    Allergies   Allergen Reactions    Albuterol Shortness Of Breath     Pt states she has sensitivity to albuterol.  She states it causes her to be SOA.    Hydrocodone Palpitations    Sulfa Antibiotics Swelling    Bacitracin Other (See Comments)    Benzalkonium Chloride Hives    Codeine Other (See Comments)    Neomycin Other (See Comments)    Polymyxin B Other (See Comments)    Nickel Rash    Penicillins Rash       Family History   Problem Relation Age of Onset    Heart disease Mother      "Hypertension Mother     Hyperlipidemia Father     Lung cancer Father     Diabetes Paternal Uncle     Leukemia Paternal Grandmother        Cancer-related family history includes Lung cancer in her father.      Social History     Tobacco Use    Smoking status: Every Day     Current packs/day: 2.00     Average packs/day: 2.0 packs/day for 40.0 years (80.0 ttl pk-yrs)     Types: Cigarettes     Passive exposure: Current   Vaping Use    Vaping status: Never Used   Substance Use Topics    Alcohol use: Not Currently    Drug use: Never     Social History     Social History Narrative    Not on file       ROS:   Review of Systems   Constitutional:  Negative for fatigue and fever.   HENT:  Negative for congestion and nosebleeds.    Eyes:  Negative for pain.   Respiratory:  Negative for cough and shortness of breath.    Cardiovascular:  Negative for chest pain.   Gastrointestinal:  Negative for abdominal pain, blood in stool, diarrhea, nausea and vomiting.   Endocrine: Negative for cold intolerance and heat intolerance.   Genitourinary:  Negative for difficulty urinating.   Musculoskeletal:  Negative for arthralgias.   Skin:  Negative for rash.   Neurological:  Negative for dizziness and headaches.   Hematological:  Does not bruise/bleed easily.   Psychiatric/Behavioral:  Negative for behavioral problems.          Objective:    Vital Signs:  Vitals:    05/14/24 1054   BP: 147/81   Pulse: 79   Resp: 14   Temp: 97.4 °F (36.3 °C)   SpO2: 98%   Weight: 57.9 kg (127 lb 9.6 oz)   Height: 160 cm (63\")   PainSc: 0-No pain       Body mass index is 22.6 kg/m².    ECOG  (1) Restricted in physically strenuous activity, ambulatory and able to do work of light nature    Physical Exam:   Physical Exam  Constitutional:       Appearance: Normal appearance.   HENT:      Head: Normocephalic and atraumatic.   Eyes:      Pupils: Pupils are equal, round, and reactive to light.   Cardiovascular:      Rate and Rhythm: Normal rate and regular rhythm.    " "  Pulses: Normal pulses.      Heart sounds: No murmur heard.  Pulmonary:      Effort: Pulmonary effort is normal.      Breath sounds: Rhonchi present.   Abdominal:      General: There is no distension.      Palpations: Abdomen is soft. There is no mass.      Tenderness: There is no abdominal tenderness.   Musculoskeletal:         General: Normal range of motion.      Cervical back: Normal range of motion and neck supple.   Skin:     General: Skin is warm.   Neurological:      General: No focal deficit present.      Mental Status: She is alert.   Psychiatric:         Mood and Affect: Mood normal.         Lab Results - Last 18 Months   Lab Units 05/14/24  1052 04/02/24  1050 12/11/23  1007   WBC 10*3/mm3 4.53 4.51 5.16   HEMOGLOBIN g/dL 13.3 12.9 14.1   HEMATOCRIT % 41.5 38.2 41.8   PLATELETS 10*3/mm3 246 250 284   MCV fL 98.1* 93.2 93.7     Lab Results - Last 18 Months   Lab Units 04/02/24  1050 03/26/24  1108 12/11/23  1007 10/31/23  1129   SODIUM mmol/L 141  --  139 142   POTASSIUM mmol/L 4.3  --  4.5 4.6   CHLORIDE mmol/L 105  --  100 105   CO2 mmol/L 25.7  --  27.0 30.0*   BUN mg/dL 16  --  19 14   CREATININE mg/dL 1.09* 1.10 0.99 1.03*   CALCIUM mg/dL 9.9  --  10.1 9.8   BILIRUBIN mg/dL 0.3  --  0.2 0.5   ALK PHOS U/L 112  --  117 124*   ALT (SGPT) U/L 6  --  7 <5   AST (SGOT) U/L 10  --  12 9   GLUCOSE mg/dL 79  --  93 100*       Lab Results   Component Value Date    GLUCOSE 79 04/02/2024    BUN 16 04/02/2024    CREATININE 1.09 (H) 04/02/2024    EGFRIFAFRI >60 09/09/2021    BCR 14.7 04/02/2024    K 4.3 04/02/2024    CO2 25.7 04/02/2024    CALCIUM 9.9 04/02/2024    ALBUMIN 4.2 04/02/2024    LABIL2 1.3 09/09/2021    AST 10 04/02/2024    ALT 6 04/02/2024       Lab Results - Last 18 Months   Lab Units 06/01/23  0751   INR  0.9   APTT Second 23.3       Lab Results   Component Value Date    IRON 228 (H) 03/19/2020    TIBC 266 03/19/2020    FERRITIN 171.0 03/19/2020       No results found for: \"FOLATE\"    No results " "found for: \"OCCULTBLD\"    No results found for: \"RETICCTPCT\"  No results found for: \"YXWCRRVR74\"  No results found for: \"SPEP\", \"UPEP\"  No results found for: \"LDH\", \"URICACID\"  No results found for: \"YOLANDA\", \"RF\", \"SEDRATE\"  No results found for: \"FIBRINOGEN\", \"HAPTOGLOBIN\"  Lab Results   Component Value Date    PTT 23.3 06/01/2023    INR 0.9 06/01/2023     No results found for: \"\"  No results found for: \"CEA\"  No components found for: \"CA-19-9\"  No results found for: \"PSA\"  No results found for: \"SEDRATE\"       Assessment & Plan     Patient is a 69 yr old female with limited stage small cell carcinoma s/p chemoradiation and PCI now on surveillance with no recurrence.    Limited stage small cell carcinoma  Good response to treatment. S/p chemo radiation with cisplatin/etoposide   5/2023 CT imaging with no evidence of recurrence  Repeat imaging as above in March 2024 with no concerns for recurrence MRI brain reviewed as well  We will repeat CT imaging in 6 months from prior imaging which will be in September 2024 and follow-up.    History of PE   Now off Eliquis, she took herself off eliquis after 6 months  No signs or symptoms of recurrent VTE continue monitor.    History of pericardial effusion  S/p pericardiocentesis negative for cytology  No recurrence  Monitor for now     Smoking   Continue to smoking, recommend stopping.    Thank you very much for providing the opportunity to participate in this patient’s care. Please do not hesitate to call if there are any other questions.    "

## 2024-05-13 RX ORDER — ATORVASTATIN CALCIUM 40 MG/1
40 TABLET, FILM COATED ORAL DAILY
Qty: 90 TABLET | Refills: 0 | Status: SHIPPED | OUTPATIENT
Start: 2024-05-13

## 2024-05-14 ENCOUNTER — OFFICE VISIT (OUTPATIENT)
Dept: ONCOLOGY | Facility: CLINIC | Age: 69
End: 2024-05-14
Payer: MEDICARE

## 2024-05-14 ENCOUNTER — LAB (OUTPATIENT)
Dept: LAB | Facility: HOSPITAL | Age: 69
End: 2024-05-14
Payer: MEDICARE

## 2024-05-14 VITALS
SYSTOLIC BLOOD PRESSURE: 147 MMHG | BODY MASS INDEX: 22.61 KG/M2 | WEIGHT: 127.6 LBS | HEART RATE: 79 BPM | RESPIRATION RATE: 14 BRPM | DIASTOLIC BLOOD PRESSURE: 81 MMHG | HEIGHT: 63 IN | OXYGEN SATURATION: 98 % | TEMPERATURE: 97.4 F

## 2024-05-14 DIAGNOSIS — C34.90 SMALL CELL LUNG CANCER: Primary | ICD-10-CM

## 2024-05-14 LAB
BASOPHILS # BLD AUTO: 0.02 10*3/MM3 (ref 0–0.2)
BASOPHILS NFR BLD AUTO: 0.4 % (ref 0–1.5)
DEPRECATED RDW RBC AUTO: 50.5 FL (ref 37–54)
EOSINOPHIL # BLD AUTO: 0.16 10*3/MM3 (ref 0–0.4)
EOSINOPHIL NFR BLD AUTO: 3.5 % (ref 0.3–6.2)
ERYTHROCYTE [DISTWIDTH] IN BLOOD BY AUTOMATED COUNT: 14.3 % (ref 12.3–15.4)
HCT VFR BLD AUTO: 41.5 % (ref 34–46.6)
HGB BLD-MCNC: 13.3 G/DL (ref 12–15.9)
HOLD SPECIMEN: NORMAL
HOLD SPECIMEN: NORMAL
LYMPHOCYTES # BLD AUTO: 0.59 10*3/MM3 (ref 0.7–3.1)
LYMPHOCYTES NFR BLD AUTO: 13 % (ref 19.6–45.3)
MCH RBC QN AUTO: 31.4 PG (ref 26.6–33)
MCHC RBC AUTO-ENTMCNC: 32 G/DL (ref 31.5–35.7)
MCV RBC AUTO: 98.1 FL (ref 79–97)
MONOCYTES # BLD AUTO: 0.34 10*3/MM3 (ref 0.1–0.9)
MONOCYTES NFR BLD AUTO: 7.5 % (ref 5–12)
NEUTROPHILS NFR BLD AUTO: 3.42 10*3/MM3 (ref 1.7–7)
NEUTROPHILS NFR BLD AUTO: 75.6 % (ref 42.7–76)
PLATELET # BLD AUTO: 246 10*3/MM3 (ref 140–450)
PMV BLD AUTO: 9.4 FL (ref 6–12)
RBC # BLD AUTO: 4.23 10*6/MM3 (ref 3.77–5.28)
WBC NRBC COR # BLD AUTO: 4.53 10*3/MM3 (ref 3.4–10.8)

## 2024-05-14 PROCEDURE — 3077F SYST BP >= 140 MM HG: CPT | Performed by: INTERNAL MEDICINE

## 2024-05-14 PROCEDURE — 99214 OFFICE O/P EST MOD 30 MIN: CPT | Performed by: INTERNAL MEDICINE

## 2024-05-14 PROCEDURE — 36415 COLL VENOUS BLD VENIPUNCTURE: CPT

## 2024-05-14 PROCEDURE — 3079F DIAST BP 80-89 MM HG: CPT | Performed by: INTERNAL MEDICINE

## 2024-05-14 PROCEDURE — 1126F AMNT PAIN NOTED NONE PRSNT: CPT | Performed by: INTERNAL MEDICINE

## 2024-05-14 PROCEDURE — 85025 COMPLETE CBC W/AUTO DIFF WBC: CPT | Performed by: INTERNAL MEDICINE

## 2024-05-28 RX ORDER — FUROSEMIDE 20 MG/1
10 TABLET ORAL DAILY PRN
Qty: 15 TABLET | Refills: 2 | OUTPATIENT
Start: 2024-05-28 | End: 2024-06-27

## 2024-06-21 ENCOUNTER — OFFICE VISIT (OUTPATIENT)
Dept: FAMILY MEDICINE CLINIC | Facility: CLINIC | Age: 69
End: 2024-06-21
Payer: MEDICARE

## 2024-06-21 VITALS
TEMPERATURE: 98.5 F | HEART RATE: 100 BPM | BODY MASS INDEX: 21.56 KG/M2 | DIASTOLIC BLOOD PRESSURE: 75 MMHG | HEIGHT: 63 IN | SYSTOLIC BLOOD PRESSURE: 109 MMHG | WEIGHT: 121.7 LBS | OXYGEN SATURATION: 96 %

## 2024-06-21 DIAGNOSIS — Z00.00 ENCOUNTER FOR SUBSEQUENT ANNUAL WELLNESS VISIT (AWV) IN MEDICARE PATIENT: ICD-10-CM

## 2024-06-21 DIAGNOSIS — J44.9 CHRONIC OBSTRUCTIVE PULMONARY DISEASE, UNSPECIFIED COPD TYPE: ICD-10-CM

## 2024-06-21 DIAGNOSIS — I10 PRIMARY HYPERTENSION: ICD-10-CM

## 2024-06-21 DIAGNOSIS — S51.812A SKIN TEAR OF LEFT FOREARM WITHOUT COMPLICATION, INITIAL ENCOUNTER: ICD-10-CM

## 2024-06-21 DIAGNOSIS — B07.0 PLANTAR WART: ICD-10-CM

## 2024-06-21 DIAGNOSIS — I71.40 ABDOMINAL AORTIC ANEURYSM (AAA) WITHOUT RUPTURE, UNSPECIFIED PART: ICD-10-CM

## 2024-06-21 DIAGNOSIS — M51.36 DDD (DEGENERATIVE DISC DISEASE), LUMBAR: ICD-10-CM

## 2024-06-21 DIAGNOSIS — R41.3 MEMORY LOSS: Primary | ICD-10-CM

## 2024-06-21 DIAGNOSIS — Z13.820 SCREENING FOR OSTEOPOROSIS: ICD-10-CM

## 2024-06-21 DIAGNOSIS — R29.6 FREQUENT FALLS: ICD-10-CM

## 2024-06-21 DIAGNOSIS — Z12.11 SCREENING FOR MALIGNANT NEOPLASM OF COLON: ICD-10-CM

## 2024-06-21 DIAGNOSIS — Z78.0 ASYMPTOMATIC MENOPAUSE: ICD-10-CM

## 2024-06-21 DIAGNOSIS — H81.09 MENIERE'S DISEASE, UNSPECIFIED LATERALITY: ICD-10-CM

## 2024-06-21 RX ORDER — MEMANTINE HYDROCHLORIDE 5 MG/1
5 TABLET ORAL 2 TIMES DAILY
Qty: 60 TABLET | Refills: 1 | Status: SHIPPED | OUTPATIENT
Start: 2024-06-21

## 2024-06-21 NOTE — PROGRESS NOTES
The ABCs of the Annual Wellness Visit  Subsequent Medicare Wellness Visit    Subjective    Miryam Fernández is a 69 y.o. female who presents for a Subsequent Medicare Wellness Visit.    Hypertension is stable on metoprolol XL 25 mg daily.  She also takes Brilinta 60 mg twice daily per cardiology, followed by Dr. Lorenzana. Patient is taking Lipitor 20 mg daily for hyperlipidemia.     Patient is followed by Dr. Morales for history of lung cancer.  Hx COPD, taking Singulair 10 mg nightly. She is also using albuterol PRN.     Hx Ménière's disease, has intermittent dizziness. Patient referred for PT, has not startd     Patient is prescribed Percocet 5/325 mg PRN for chronic low back pain. She has LESI in 2019 without relief. MRI in 2019 showed mild degenerative changes at L4-5. Also hx spondylosis.       Cognitive impairment, prescribed Namenda 5 mg daily. MMSE 12/30.     AAA, followed by Vascular surgery.        The following portions of the patient's history were reviewed and   updated as appropriate: allergies, current medications, past family history, past medical history, past social history, past surgical history, and problem list.    Compared to one year ago, the patient feels her physical   health is the same.    Compared to one year ago, the patient feels her mental   health is worse.    Recent Hospitalizations:  She was not admitted to the hospital during the last year.       Current Medical Providers:  Patient Care Team:  Lashell De La Torre APRN as PCP - General (Nurse Practitioner)  Juan Luis Mattson MD as Consulting Physician (Cardiothoracic Surgery)  Shola Hatch MD as Consulting Physician (Urology)    Outpatient Medications Prior to Visit   Medication Sig Dispense Refill    albuterol sulfate  (90 Base) MCG/ACT inhaler Inhale 2 puffs Every 4 (Four) Hours As Needed for Wheezing or Shortness of Air. 8 g 1    atorvastatin (LIPITOR) 40 MG tablet TAKE 1 TABLET BY MOUTH EVERY DAY 90 tablet 0     Cholecalciferol (Vitamin D3) 50 MCG (2000 UT) capsule Take 1 capsule by mouth Daily. 30 capsule 11    meclizine (ANTIVERT) 25 MG tablet Take 1 tablet by mouth 3 (Three) Times a Day As Needed for Dizziness. 30 tablet 0    metoprolol succinate XL (TOPROL-XL) 25 MG 24 hr tablet Take 1 tablet by mouth Daily.      montelukast (Singulair) 10 MG tablet Take 1 tablet by mouth Every Night. 90 tablet 1    oxyCODONE-acetaminophen (PERCOCET) 5-325 MG per tablet       pantoprazole (PROTONIX) 40 MG EC tablet Take 1 tablet by mouth Every 12 (Twelve) Hours.      ticagrelor (Brilinta) 60 MG tablet tablet Take 1 tablet by mouth 2 (Two) Times a Day. 60 tablet 5    Triamcinolone Acetonide (Nasacort Allergy 24HR) 55 MCG/ACT nasal inhaler 2 sprays into the nostril(s) as directed by provider Daily. 16.5 g 11    memantine (Namenda) 5 MG tablet Take 1 tablet by mouth Daily. 30 tablet 1    mupirocin (BACTROBAN) 2 % ointment Apply 1 application  topically to the appropriate area as directed 3 (Three) Times a Day. 30 g 0    furosemide (Lasix) 20 MG tablet Take 0.5 tablets by mouth Daily As Needed (swelling) for up to 30 days. 15 tablet 2     No facility-administered medications prior to visit.       Opioid medication/s are on active medication list.  and I have evaluated her active treatment plan and pain score trends (see table).  Vitals:    06/21/24 1429   PainSc: 0-No pain     I have reviewed the chart for potential of high risk medication and harmful drug interactions in the elderly.          Aspirin is not on active medication list.  Aspirin use is not indicated based on review of current medical condition/s. Risk of harm outweighs potential benefits.  .    Patient Active Problem List   Diagnosis    Obstructive sleep apnea, adult    Tobacco use    Normocytic anemia    Meniere's disease    Lung nodule    Hilar lymphadenopathy    Hyperlipidemia    Heartburn    Gastroesophageal reflux disease    Dysphagia    Chronic obstructive pulmonary  "disease    CAD (coronary artery disease)    Bruises easily    Chronic pain    Small cell lung cancer    Carotid stenosis    Coronary artery disease    Primary hypertension    Long term (current) use of opiate analgesic    DDD (degenerative disc disease), lumbar    Screening mammogram for breast cancer    Preventative health care    Vitamin D deficiency    Need for influenza vaccination    Moderate episode of recurrent major depressive disorder    Need for vaccination against Streptococcus pneumoniae    Frequent falls    Weakness    Nasal congestion    Memory loss    Localized swelling of both lower legs    Meniere disease, bilateral    Abdominal aortic aneurysm (AAA) without rupture    Thoracic aortic aneurysm without rupture    Plantar wart    Screening for osteoporosis    Asymptomatic menopause    Screening for malignant neoplasm of colon    Skin tear of left forearm without complication    Encounter for subsequent annual wellness visit (AWV) in Medicare patient     Advance Care Planning   Advance Care Planning     Advance Directive is not on file.  ACP discussion was held with the patient during this visit. Patient does not have an advance directive, information provided.     Objective    Vitals:    06/21/24 1429   BP: 109/75   BP Location: Left arm   Patient Position: Sitting   Cuff Size: Adult   Pulse: 100   Temp: 98.5 °F (36.9 °C)   TempSrc: Temporal   SpO2: 96%   Weight: 55.2 kg (121 lb 11.2 oz)   Height: 160 cm (63\")   PainSc: 0-No pain     Estimated body mass index is 21.56 kg/m² as calculated from the following:    Height as of this encounter: 160 cm (63\").    Weight as of this encounter: 55.2 kg (121 lb 11.2 oz).    BMI is within normal parameters. No other follow-up for BMI required.      Does the patient have evidence of cognitive impairment? Yes    ATTENTION  What is the year: incorrect  What is the month of the year: incorrect  What is the day of the week?: correct  What is the date?: " incorrect  MEMORY  Repeat address three times, only score third attempt: [address] [place], [place]: 2  HOW MANY ANIMALS DID THE PATIENT NAME  Verbal Fluency -- Animal Names (0-25): 7-8  CLOCK DRAWING  Clock Drawing: All Correct  MEMORY RECALL  Tell me what you remember about that name and address we were repeating at the beginnin  ACE TOTAL SCORE  Total ACE Score - <25/30 strongly suggests cognitive impairment; <21/30 almost certainly shows dementia: 12            HEALTH RISK ASSESSMENT    Smoking Status:  Social History     Tobacco Use   Smoking Status Every Day    Current packs/day: 2.00    Average packs/day: 2.0 packs/day for 40.0 years (80.0 ttl pk-yrs)    Types: Cigarettes    Passive exposure: Current   Smokeless Tobacco Not on file     Alcohol Consumption:  Social History     Substance and Sexual Activity   Alcohol Use Not Currently     Fall Risk Screen:    DALILA Fall Risk Assessment was completed, and patient is at LOW risk for falls.Assessment completed on:2024    Depression Screenin/21/2024     2:30 PM   PHQ-2/PHQ-9 Depression Screening   Little Interest or Pleasure in Doing Things 0-->not at all   Feeling Down, Depressed or Hopeless 0-->not at all   PHQ-9: Brief Depression Severity Measure Score 0       Health Habits and Functional and Cognitive Screenin/21/2024     2:00 PM   Functional & Cognitive Status   Do you have difficulty preparing food and eating? No   Do you have difficulty bathing yourself, getting dressed or grooming yourself? No   Do you have difficulty using the toilet? No   Do you have difficulty moving around from place to place? Yes   Do you have trouble with steps or getting out of a bed or a chair? Yes   Current Diet Well Balanced Diet   Dental Exam Up to date   Eye Exam Up to date   Exercise (times per week) 0 times per week   Current Exercises Include No Regular Exercise   Do you need help using the phone?  No   Are you deaf or do you  have serious difficulty hearing?  Yes   Do you need help to go to places out of walking distance? Yes   Do you need help shopping? Yes   Do you need help preparing meals?  No   Do you need help with housework?  Yes   Do you need help with laundry? Yes   Do you need help taking your medications? No   Do you need help managing money? No   Do you ever drive or ride in a car without wearing a seat belt? No   Have you felt unusual stress, anger or loneliness in the last month? No   Who do you live with? Spouse   If you need help, do you have trouble finding someone available to you? No   Have you been bothered in the last four weeks by sexual problems? No   Do you have difficulty concentrating, remembering or making decisions? Yes       Age-appropriate Screening Schedule:  Refer to the list below for future screening recommendations based on patient's age, sex and/or medical conditions. Orders for these recommended tests are listed in the plan section. The patient has been provided with a written plan.    Health Maintenance   Topic Date Due    DXA SCAN  Never done    COLORECTAL CANCER SCREENING  Never done    TDAP/TD VACCINES (1 - Tdap) Never done    ZOSTER VACCINE (1 of 2) Never done    RSV Vaccine - Adults (1 - 1-dose 60+ series) Never done    PAP SMEAR  Never done    COVID-19 Vaccine (4 - 2023-24 season) 09/01/2023    INFLUENZA VACCINE  08/01/2024    LIPID PANEL  02/09/2025    ANNUAL WELLNESS VISIT  06/21/2025    MAMMOGRAM  12/21/2025    HEPATITIS C SCREENING  Completed    Pneumococcal Vaccine 65+  Completed    LUNG CANCER SCREENING  Discontinued                  CMS Preventative Services Quick Reference  Risk Factors Identified During Encounter  Chronic Pain:  on Percocet PRN   Fall Risk-High or Moderate: Discussed Fall Prevention in the home  Immunizations Discussed/Encouraged: Tdap, Influenza, Prevnar 20 (Pneumococcal 20-valent conjugate), Shingrix, COVID19, and RSV (Respiratory Syncytial Virus)  Dental Screening  "Recommended  Vision Screening Recommended  The above risks/problems have been discussed with the patient.  Pertinent information has been shared with the patient in the After Visit Summary.  An After Visit Summary and PPPS were made available to the patient.    Follow Up:   Next Medicare Wellness visit to be scheduled in 1 year.       Additional E&M Note during same encounter follows:  Patient has multiple medical problems which are significant and separately identifiable that require additional work above and beyond the Medicare Wellness Visit.      Chief Complaint  Medicare Wellness-subsequent    Subjective        HPI  Miryam Fernández is also being seen today for skin tear to left arm s/p fall.     Patient also c/o bilateral foot pain.       Objective   Vital Signs:  /75 (BP Location: Left arm, Patient Position: Sitting, Cuff Size: Adult)   Pulse 100   Temp 98.5 °F (36.9 °C) (Temporal)   Ht 160 cm (63\")   Wt 55.2 kg (121 lb 11.2 oz)   SpO2 96%   BMI 21.56 kg/m²     Physical Exam  Constitutional:       Appearance: Normal appearance.   HENT:      Head: Normocephalic.   Cardiovascular:      Rate and Rhythm: Normal rate and regular rhythm.   Pulmonary:      Effort: Pulmonary effort is normal.      Breath sounds: Normal breath sounds.   Abdominal:      General: Abdomen is flat. Bowel sounds are normal.      Palpations: Abdomen is soft.   Musculoskeletal:         General: Normal range of motion.      Cervical back: Neck supple.      Right lower leg: No edema.      Left lower leg: No edema.        Feet:    Feet:      Comments: Areas consistent with plantar warts  Skin:     General: Skin is warm and dry.             Comments: Skin tear, cleaned and dressed   Neurological:      Mental Status: She is alert and oriented to person, place, and time.      Gait: Gait is intact.   Psychiatric:         Attention and Perception: Attention normal.         Mood and Affect: Mood normal.         Speech: Speech normal.      "       CMP          12/11/2023    10:07 3/26/2024    11:08 4/2/2024    10:50   CMP   Glucose 93   79    BUN 19   16    Creatinine 0.99  1.10  1.09    EGFR 63.0  55.2  55.8    Sodium 139   141    Potassium 4.5   4.3    Chloride 100   105    Calcium 10.1   9.9    Total Protein 7.3   7.4    Albumin 4.7   4.2    Globulin 2.6   3.2    Total Bilirubin 0.2   0.3    Alkaline Phosphatase 117   112    AST (SGOT) 12   10    ALT (SGPT) 7   6    Albumin/Globulin Ratio 1.8   1.3    BUN/Creatinine Ratio 19.2   14.7    Anion Gap 12.0   10.3      CBC          12/11/2023    10:07 4/2/2024    10:50 5/14/2024    10:52   CBC   WBC 5.16  4.51  4.53    RBC 4.46  4.10  4.23    Hemoglobin 14.1  12.9  13.3    Hematocrit 41.8  38.2  41.5    MCV 93.7  93.2  98.1    MCH 31.6  31.5  31.4    MCHC 33.7  33.8  32.0    RDW 13.1  14.9  14.3    Platelets 284  250  246      Lipid Panel          12/11/2023    10:07 2/9/2024    09:17   Lipid Panel   Total Cholesterol 294  260    Triglycerides 245  150    HDL Cholesterol 46  45    VLDL Cholesterol 48  28    LDL Cholesterol  200  187    LDL/HDL Ratio 4.33  4.11      TSH          12/11/2023    10:07   TSH   TSH 1.250      Most Recent A1C          12/11/2023    10:07   HGBA1C Most Recent   Hemoglobin A1C 5.20                 Assessment and Plan   Diagnoses and all orders for this visit:    1. Memory loss (Primary)  Assessment & Plan:  Increase Namenda to 5 mg BID  Refer to Neurology    Orders:  -     Ambulatory Referral to Neurology    2. Chronic obstructive pulmonary disease, unspecified COPD type    3. DDD (degenerative disc disease), lumbar    4. Plantar wart  -     Ambulatory Referral to Podiatry    5. Screening for osteoporosis  -     DEXA Bone Density Axial; Future    6. Asymptomatic menopause  -     DEXA Bone Density Axial; Future    7. Screening for malignant neoplasm of colon  -     Cologuard - Stool, Per Rectum; Future    8. Primary hypertension    9. Frequent falls  Assessment & Plan:  Recommended  patient start PT  Use walker at home      10. Meniere's disease, unspecified laterality    11. Abdominal aortic aneurysm (AAA) without rupture, unspecified part    12. Skin tear of left forearm without complication, initial encounter  Assessment & Plan:  Clean twice daily with normal saline  Apply mupirocin twice daily  Keep covered  Call with any signs or symptoms of infection      13. Encounter for subsequent annual wellness visit (AWV) in Medicare patient  Assessment & Plan:  DEXA scan ordered  Patient agreeable to Cologuard  Labs reviewed      Other orders  -     mupirocin (BACTROBAN) 2 % ointment; Apply 1 Application topically to the appropriate area as directed 3 (Three) Times a Day.  Dispense: 30 g; Refill: 0  -     memantine (Namenda) 5 MG tablet; Take 1 tablet by mouth 2 (Two) Times a Day.  Dispense: 60 tablet; Refill: 1           I spent 35 minutes caring for Miryam on this date of service. This time includes time spent by me in the following activities:preparing for the visit, reviewing tests, obtaining and/or reviewing a separately obtained history, performing a medically appropriate examination and/or evaluation , counseling and educating the patient/family/caregiver, ordering medications, tests, or procedures, referring and communicating with other health care professionals , documenting information in the medical record, and care coordination  Follow Up   Return in about 3 months (around 9/21/2024) for Medicare Wellness.  Patient was given instructions and counseling regarding her condition or for health maintenance advice. Please see specific information pulled into the AVS if appropriate.

## 2024-06-21 NOTE — ASSESSMENT & PLAN NOTE
Clean twice daily with normal saline  Apply mupirocin twice daily  Keep covered  Call with any signs or symptoms of infection

## 2024-06-28 ENCOUNTER — HOSPITAL ENCOUNTER (INPATIENT)
Facility: HOSPITAL | Age: 69
LOS: 2 days | Discharge: HOME | End: 2024-06-30
Attending: INTERNAL MEDICINE | Admitting: INTERNAL MEDICINE
Payer: MEDICARE

## 2024-06-28 ENCOUNTER — APPOINTMENT (OUTPATIENT)
Dept: GENERAL RADIOLOGY | Facility: HOSPITAL | Age: 69
End: 2024-06-28
Payer: MEDICARE

## 2024-06-28 ENCOUNTER — APPOINTMENT (OUTPATIENT)
Dept: CT IMAGING | Facility: HOSPITAL | Age: 69
End: 2024-06-28
Payer: MEDICARE

## 2024-06-28 DIAGNOSIS — N30.01 ACUTE CYSTITIS WITH HEMATURIA: Primary | ICD-10-CM

## 2024-06-28 DIAGNOSIS — N05.9 NEPHRITIS: ICD-10-CM

## 2024-06-28 DIAGNOSIS — R63.0 DECREASED APPETITE: ICD-10-CM

## 2024-06-28 DIAGNOSIS — R53.1 WEAKNESS: ICD-10-CM

## 2024-06-28 DIAGNOSIS — R41.0 CONFUSION: ICD-10-CM

## 2024-06-28 PROBLEM — N30.00 ACUTE CYSTITIS: Status: ACTIVE | Noted: 2024-06-28

## 2024-06-28 LAB
ALBUMIN SERPL-MCNC: 4.2 G/DL (ref 3.5–5.2)
ALBUMIN/GLOB SERPL: 1.1 G/DL
ALP SERPL-CCNC: 120 U/L (ref 39–117)
ALT SERPL W P-5'-P-CCNC: <5 U/L (ref 1–33)
ANION GAP SERPL CALCULATED.3IONS-SCNC: 15.1 MMOL/L (ref 5–15)
AST SERPL-CCNC: 13 U/L (ref 1–32)
B PARAPERT DNA SPEC QL NAA+PROBE: NOT DETECTED
B PERT DNA SPEC QL NAA+PROBE: NOT DETECTED
BACTERIA UR QL AUTO: ABNORMAL /HPF
BASOPHILS # BLD AUTO: 0.02 10*3/MM3 (ref 0–0.2)
BASOPHILS NFR BLD AUTO: 0.2 % (ref 0–1.5)
BILIRUB SERPL-MCNC: 0.7 MG/DL (ref 0–1.2)
BILIRUB UR QL STRIP: NEGATIVE
BUN SERPL-MCNC: 34 MG/DL (ref 8–23)
BUN/CREAT SERPL: 24.8 (ref 7–25)
C PNEUM DNA NPH QL NAA+NON-PROBE: NOT DETECTED
CALCIUM SPEC-SCNC: 10.1 MG/DL (ref 8.6–10.5)
CHLORIDE SERPL-SCNC: 98 MMOL/L (ref 98–107)
CLARITY UR: ABNORMAL
CO2 SERPL-SCNC: 26.9 MMOL/L (ref 22–29)
COLOR UR: ABNORMAL
CREAT SERPL-MCNC: 1.37 MG/DL (ref 0.57–1)
DEPRECATED RDW RBC AUTO: 48.6 FL (ref 37–54)
EGFRCR SERPLBLD CKD-EPI 2021: 42.4 ML/MIN/1.73
EOSINOPHIL # BLD AUTO: 0 10*3/MM3 (ref 0–0.4)
EOSINOPHIL NFR BLD AUTO: 0 % (ref 0.3–6.2)
ERYTHROCYTE [DISTWIDTH] IN BLOOD BY AUTOMATED COUNT: 13.6 % (ref 12.3–15.4)
FLUAV SUBTYP SPEC NAA+PROBE: NOT DETECTED
FLUBV RNA ISLT QL NAA+PROBE: NOT DETECTED
GEN 5 2HR TROPONIN T REFLEX: 40 NG/L
GLOBULIN UR ELPH-MCNC: 3.8 GM/DL
GLUCOSE SERPL-MCNC: 129 MG/DL (ref 65–99)
GLUCOSE UR STRIP-MCNC: NEGATIVE MG/DL
HADV DNA SPEC NAA+PROBE: NOT DETECTED
HCOV 229E RNA SPEC QL NAA+PROBE: NOT DETECTED
HCOV HKU1 RNA SPEC QL NAA+PROBE: NOT DETECTED
HCOV NL63 RNA SPEC QL NAA+PROBE: NOT DETECTED
HCOV OC43 RNA SPEC QL NAA+PROBE: NOT DETECTED
HCT VFR BLD AUTO: 42.6 % (ref 34–46.6)
HGB BLD-MCNC: 13.7 G/DL (ref 12–15.9)
HGB UR QL STRIP.AUTO: ABNORMAL
HMPV RNA NPH QL NAA+NON-PROBE: NOT DETECTED
HOLD SPECIMEN: NORMAL
HPIV1 RNA ISLT QL NAA+PROBE: NOT DETECTED
HPIV2 RNA SPEC QL NAA+PROBE: NOT DETECTED
HPIV3 RNA NPH QL NAA+PROBE: NOT DETECTED
HPIV4 P GENE NPH QL NAA+PROBE: NOT DETECTED
HYALINE CASTS UR QL AUTO: ABNORMAL /LPF
IMM GRANULOCYTES # BLD AUTO: 0.04 10*3/MM3 (ref 0–0.05)
IMM GRANULOCYTES NFR BLD AUTO: 0.5 % (ref 0–0.5)
KETONES UR QL STRIP: ABNORMAL
LEUKOCYTE ESTERASE UR QL STRIP.AUTO: ABNORMAL
LIPASE SERPL-CCNC: 13 U/L (ref 13–60)
LYMPHOCYTES # BLD AUTO: 0.1 10*3/MM3 (ref 0.7–3.1)
LYMPHOCYTES NFR BLD AUTO: 1.1 % (ref 19.6–45.3)
M PNEUMO IGG SER IA-ACNC: NOT DETECTED
MCH RBC QN AUTO: 30.6 PG (ref 26.6–33)
MCHC RBC AUTO-ENTMCNC: 32.2 G/DL (ref 31.5–35.7)
MCV RBC AUTO: 95.3 FL (ref 79–97)
MONOCYTES # BLD AUTO: 0.3 10*3/MM3 (ref 0.1–0.9)
MONOCYTES NFR BLD AUTO: 3.4 % (ref 5–12)
NEUTROPHILS NFR BLD AUTO: 8.39 10*3/MM3 (ref 1.7–7)
NEUTROPHILS NFR BLD AUTO: 94.8 % (ref 42.7–76)
NITRITE UR QL STRIP: POSITIVE
NRBC BLD AUTO-RTO: 0 /100 WBC (ref 0–0.2)
NT-PROBNP SERPL-MCNC: 1541 PG/ML (ref 0–900)
PH UR STRIP.AUTO: <=5 [PH] (ref 5–8)
PLATELET # BLD AUTO: 200 10*3/MM3 (ref 140–450)
PMV BLD AUTO: 10.1 FL (ref 6–12)
POTASSIUM SERPL-SCNC: 4.2 MMOL/L (ref 3.5–5.2)
PROT SERPL-MCNC: 8 G/DL (ref 6–8.5)
PROT UR QL STRIP: ABNORMAL
RBC # BLD AUTO: 4.47 10*6/MM3 (ref 3.77–5.28)
RBC # UR STRIP: ABNORMAL /HPF
REF LAB TEST METHOD: ABNORMAL
RHINOVIRUS RNA SPEC NAA+PROBE: NOT DETECTED
RSV RNA NPH QL NAA+NON-PROBE: NOT DETECTED
SARS-COV-2 RNA NPH QL NAA+NON-PROBE: NOT DETECTED
SODIUM SERPL-SCNC: 140 MMOL/L (ref 136–145)
SP GR UR STRIP: 1.02 (ref 1–1.03)
SQUAMOUS #/AREA URNS HPF: ABNORMAL /HPF
TROPONIN T DELTA: -4 NG/L
TROPONIN T SERPL HS-MCNC: 44 NG/L
UROBILINOGEN UR QL STRIP: ABNORMAL
WBC # UR STRIP: ABNORMAL /HPF
WBC NRBC COR # BLD AUTO: 8.85 10*3/MM3 (ref 3.4–10.8)
WHOLE BLOOD HOLD COAG: NORMAL

## 2024-06-28 PROCEDURE — 25510000001 IOPAMIDOL PER 1 ML: Performed by: PHYSICIAN ASSISTANT

## 2024-06-28 PROCEDURE — 87040 BLOOD CULTURE FOR BACTERIA: CPT | Performed by: PHYSICIAN ASSISTANT

## 2024-06-28 PROCEDURE — 83880 ASSAY OF NATRIURETIC PEPTIDE: CPT | Performed by: PHYSICIAN ASSISTANT

## 2024-06-28 PROCEDURE — 87086 URINE CULTURE/COLONY COUNT: CPT | Performed by: PHYSICIAN ASSISTANT

## 2024-06-28 PROCEDURE — 0202U NFCT DS 22 TRGT SARS-COV-2: CPT | Performed by: PHYSICIAN ASSISTANT

## 2024-06-28 PROCEDURE — 83690 ASSAY OF LIPASE: CPT | Performed by: PHYSICIAN ASSISTANT

## 2024-06-28 PROCEDURE — 25010000002 CEFTRIAXONE PER 250 MG: Performed by: PHYSICIAN ASSISTANT

## 2024-06-28 PROCEDURE — 25810000003 SODIUM CHLORIDE 0.9 % SOLUTION: Performed by: NURSE PRACTITIONER

## 2024-06-28 PROCEDURE — 87077 CULTURE AEROBIC IDENTIFY: CPT | Performed by: PHYSICIAN ASSISTANT

## 2024-06-28 PROCEDURE — 81001 URINALYSIS AUTO W/SCOPE: CPT | Performed by: PHYSICIAN ASSISTANT

## 2024-06-28 PROCEDURE — 25810000003 SODIUM CHLORIDE 0.9 % SOLUTION: Performed by: PHYSICIAN ASSISTANT

## 2024-06-28 PROCEDURE — 87186 SC STD MICRODIL/AGAR DIL: CPT | Performed by: PHYSICIAN ASSISTANT

## 2024-06-28 PROCEDURE — 80053 COMPREHEN METABOLIC PANEL: CPT | Performed by: PHYSICIAN ASSISTANT

## 2024-06-28 PROCEDURE — 85025 COMPLETE CBC W/AUTO DIFF WBC: CPT | Performed by: PHYSICIAN ASSISTANT

## 2024-06-28 PROCEDURE — 84484 ASSAY OF TROPONIN QUANT: CPT | Performed by: PHYSICIAN ASSISTANT

## 2024-06-28 PROCEDURE — 36415 COLL VENOUS BLD VENIPUNCTURE: CPT

## 2024-06-28 PROCEDURE — 71045 X-RAY EXAM CHEST 1 VIEW: CPT

## 2024-06-28 PROCEDURE — 99285 EMERGENCY DEPT VISIT HI MDM: CPT

## 2024-06-28 PROCEDURE — 74177 CT ABD & PELVIS W/CONTRAST: CPT

## 2024-06-28 RX ORDER — PANTOPRAZOLE SODIUM 40 MG/1
40 TABLET, DELAYED RELEASE ORAL DAILY
Status: DISCONTINUED | OUTPATIENT
Start: 2024-06-28 | End: 2024-06-30 | Stop reason: HOSPADM

## 2024-06-28 RX ORDER — SODIUM CHLORIDE 9 MG/ML
40 INJECTION, SOLUTION INTRAVENOUS AS NEEDED
Status: DISCONTINUED | OUTPATIENT
Start: 2024-06-28 | End: 2024-06-30 | Stop reason: HOSPADM

## 2024-06-28 RX ORDER — POLYETHYLENE GLYCOL 3350 17 G/17G
17 POWDER, FOR SOLUTION ORAL DAILY PRN
Status: DISCONTINUED | OUTPATIENT
Start: 2024-06-28 | End: 2024-06-30 | Stop reason: HOSPADM

## 2024-06-28 RX ORDER — FUROSEMIDE 20 MG/1
20 TABLET ORAL AS NEEDED
COMMUNITY

## 2024-06-28 RX ORDER — SODIUM CHLORIDE 0.9 % (FLUSH) 0.9 %
10 SYRINGE (ML) INJECTION AS NEEDED
Status: DISCONTINUED | OUTPATIENT
Start: 2024-06-28 | End: 2024-06-30 | Stop reason: HOSPADM

## 2024-06-28 RX ORDER — ONDANSETRON 2 MG/ML
4 INJECTION INTRAMUSCULAR; INTRAVENOUS EVERY 6 HOURS PRN
Status: DISCONTINUED | OUTPATIENT
Start: 2024-06-28 | End: 2024-06-30 | Stop reason: HOSPADM

## 2024-06-28 RX ORDER — SODIUM CHLORIDE 0.9 % (FLUSH) 0.9 %
10 SYRINGE (ML) INJECTION EVERY 12 HOURS SCHEDULED
Status: DISCONTINUED | OUTPATIENT
Start: 2024-06-28 | End: 2024-06-30 | Stop reason: HOSPADM

## 2024-06-28 RX ORDER — METOPROLOL SUCCINATE 25 MG/1
25 TABLET, EXTENDED RELEASE ORAL DAILY
Status: DISCONTINUED | OUTPATIENT
Start: 2024-06-28 | End: 2024-06-30 | Stop reason: HOSPADM

## 2024-06-28 RX ORDER — SODIUM CHLORIDE 9 MG/ML
75 INJECTION, SOLUTION INTRAVENOUS CONTINUOUS
Status: DISCONTINUED | OUTPATIENT
Start: 2024-06-28 | End: 2024-06-30 | Stop reason: HOSPADM

## 2024-06-28 RX ORDER — BISACODYL 10 MG
10 SUPPOSITORY, RECTAL RECTAL DAILY PRN
Status: DISCONTINUED | OUTPATIENT
Start: 2024-06-28 | End: 2024-06-30 | Stop reason: HOSPADM

## 2024-06-28 RX ORDER — NICOTINE 21 MG/24HR
1 PATCH, TRANSDERMAL 24 HOURS TRANSDERMAL EVERY 24 HOURS
Status: DISCONTINUED | OUTPATIENT
Start: 2024-06-28 | End: 2024-06-29

## 2024-06-28 RX ORDER — MEMANTINE HYDROCHLORIDE 5 MG/1
5 TABLET ORAL 2 TIMES DAILY
Status: DISCONTINUED | OUTPATIENT
Start: 2024-06-28 | End: 2024-06-30 | Stop reason: HOSPADM

## 2024-06-28 RX ORDER — BISACODYL 5 MG/1
5 TABLET, DELAYED RELEASE ORAL DAILY PRN
Status: DISCONTINUED | OUTPATIENT
Start: 2024-06-28 | End: 2024-06-30 | Stop reason: HOSPADM

## 2024-06-28 RX ORDER — AMOXICILLIN 250 MG
2 CAPSULE ORAL 2 TIMES DAILY PRN
Status: DISCONTINUED | OUTPATIENT
Start: 2024-06-28 | End: 2024-06-30 | Stop reason: HOSPADM

## 2024-06-28 RX ORDER — ATORVASTATIN CALCIUM 40 MG/1
40 TABLET, FILM COATED ORAL NIGHTLY
Status: DISCONTINUED | OUTPATIENT
Start: 2024-06-28 | End: 2024-06-30 | Stop reason: HOSPADM

## 2024-06-28 RX ADMIN — IOPAMIDOL 100 ML: 755 INJECTION, SOLUTION INTRAVENOUS at 13:16

## 2024-06-28 RX ADMIN — MUPIROCIN 1 APPLICATION: 20 OINTMENT TOPICAL at 21:34

## 2024-06-28 RX ADMIN — SODIUM CHLORIDE 1000 ML: 9 INJECTION, SOLUTION INTRAVENOUS at 11:30

## 2024-06-28 RX ADMIN — SODIUM CHLORIDE 75 ML/HR: 9 INJECTION, SOLUTION INTRAVENOUS at 17:53

## 2024-06-28 RX ADMIN — TICAGRELOR 60 MG: 60 TABLET ORAL at 21:33

## 2024-06-28 RX ADMIN — ATORVASTATIN CALCIUM 40 MG: 40 TABLET, FILM COATED ORAL at 21:33

## 2024-06-28 RX ADMIN — MEMANTINE 5 MG: 5 TABLET ORAL at 21:33

## 2024-06-28 RX ADMIN — METOPROLOL SUCCINATE 25 MG: 25 TABLET, EXTENDED RELEASE ORAL at 17:53

## 2024-06-28 RX ADMIN — PANTOPRAZOLE SODIUM 40 MG: 40 TABLET, DELAYED RELEASE ORAL at 17:53

## 2024-06-28 RX ADMIN — Medication 10 ML: at 21:34

## 2024-06-28 RX ADMIN — CEFTRIAXONE 1000 MG: 1 INJECTION, POWDER, FOR SOLUTION INTRAMUSCULAR; INTRAVENOUS at 14:06

## 2024-06-28 NOTE — ED PROVIDER NOTES
Subjective   History of Present Illness  Patient is a 69-year-old female who presents with 1 week of generalized weakness decreased appetite not eating or drinking and no longer smoking.  Daughter reports this is abnormal for her because she is a chain smoker.  Patient also seems confused per daughter's report.  She has no complaints for me.        Review of Systems   Unable to perform ROS: Dementia       Past Medical History:   Diagnosis Date    Cancer     lung cancer    Carotid stenosis     COPD (chronic obstructive pulmonary disease)     Coronary artery disease     Esophageal stricture     Dr Domingo    Hyperlipidemia     Pulmonary embolism     seen at [facility], fluid around her heart at the time-right lung       Allergies   Allergen Reactions    Albuterol Shortness Of Breath     Pt states she has sensitivity to albuterol.  She states it causes her to be SOA.    Hydrocodone Palpitations    Sulfa Antibiotics Swelling    Bacitracin Other (See Comments)    Benzalkonium Chloride Hives    Codeine Other (See Comments)    Neomycin Other (See Comments)    Polymyxin B Other (See Comments)    Nickel Rash    Penicillins Rash       Past Surgical History:   Procedure Laterality Date    ANGIOPLASTY / STENTING FEMORAL      CARDIAC CATHETERIZATION      CAROTID STENT      CORONARY STENT PLACEMENT      ESOPHAGOSCOPY / EGD      HYSTERECTOMY         Family History   Problem Relation Age of Onset    Heart disease Mother     Hypertension Mother     Hyperlipidemia Father     Lung cancer Father     Diabetes Paternal Uncle     Leukemia Paternal Grandmother        Social History     Socioeconomic History    Marital status:    Tobacco Use    Smoking status: Every Day     Current packs/day: 2.00     Average packs/day: 2.0 packs/day for 40.0 years (80.0 ttl pk-yrs)     Types: Cigarettes     Passive exposure: Current   Vaping Use    Vaping status: Never Used   Substance and Sexual Activity    Alcohol use: Not Currently    Drug use: Never  "   Sexual activity: Defer           Objective   Physical Exam  Constitutional:       General: She is not in acute distress.     Appearance: Normal appearance. She is well-developed. She is ill-appearing. She is not toxic-appearing or diaphoretic.   HENT:      Head: Normocephalic and atraumatic.      Nose: Nose normal.   Eyes:      Conjunctiva/sclera: Conjunctivae normal.   Cardiovascular:      Rate and Rhythm: Normal rate and regular rhythm.   Pulmonary:      Effort: Pulmonary effort is normal. No respiratory distress.      Breath sounds: Normal breath sounds. No stridor. No wheezing, rhonchi or rales.   Abdominal:      General: Bowel sounds are normal. There is no distension.      Palpations: Abdomen is soft. There is no mass.      Tenderness: There is no abdominal tenderness. There is no guarding or rebound.      Hernia: No hernia is present.   Musculoskeletal:         General: Normal range of motion.      Cervical back: Normal range of motion.   Skin:     General: Skin is warm and dry.   Neurological:      General: No focal deficit present.      Mental Status: She is alert and oriented to person, place, and time. Mental status is at baseline.   Psychiatric:         Mood and Affect: Mood normal.         Behavior: Behavior normal.         Thought Content: Thought content normal.         Judgment: Judgment normal.         Procedures           ED Course                                             Medical Decision Making  Appropriate PPE worn during exam.    /65 (BP Location: Left arm)   Pulse 97   Temp 97.9 °F (36.6 °C)   Resp 17   Ht 160 cm (63\")   Wt 51.6 kg (113 lb 12.8 oz)   SpO2 97%   BMI 20.16 kg/m²      Co-morbidities --  has a past medical history of Cancer, Carotid stenosis, COPD (chronic obstructive pulmonary disease), Coronary artery disease, Esophageal stricture, Hyperlipidemia, and Pulmonary embolism.  Radiology interpretation --  X-rays reviewed by me and independently interpreted by " radiologist:  CT Abdomen Pelvis With Contrast    Result Date: 6/28/2024  Impression: 1. Heterogeneous diminished enhancement in the right kidney. Correlate for symptoms of nephritis. 2. 2 mm nonobstructing stone in the right upper renal pole. 3. Stable 5 mm right lower lobe and 3 mm left lower lobe noncalcified nodules. According to previous report, these nodules have been stable since 2021, suggesting benign etiology. 4. 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 Suspected stenosis of the right renal artery ostium again noted. 5. Fusiform aneurysm of the distal descending thoracic aorta and infrarenal abdominal aorta, unchanged Electronically Signed: Demetrice Crowell MD  6/28/2024 1:25 PM EDT  Workstation ID: [id number]    XR Chest 1 View    Result Date: 6/28/2024  Impression: No acute cardiopulmonary findings. Electronically Signed: Demetrice Crowell MD  6/28/2024 11:45 AM EDT  Workstation ID: [id number]      Patient is a 69-year-old female past medical history significant for previous lung cancer currently in remission who presents with weakness generalised loss of appetite.  Over the last week increasing confusion she does have baseline dementia.  She has no complaints for me.  Patient does have significant UTI.  Her CT scan also had concern for possible nephritis.  Patient will be admitted to the hospitalist.  Patient's daughter and patient were in agreement with plan.  I discussed the findings and recommendations with the patient who voices understanding. Stable while in the ER.     Note Disclaimer: At [facility], we believe that sharing information builds trust and better relationships. You are receiving this note because you are receiving care at [facility] or recently visited. It is possible you will see health information before a provider has talked with you about it. This kind of information can be easy to misunderstand. To help you fully understand what it means for your  health, we urge you to discuss this note with your provider.        Problems Addressed:  Acute cystitis with hematuria: complicated acute illness or injury  Confusion: complicated acute illness or injury  Decreased appetite: complicated acute illness or injury  Nephritis: complicated acute illness or injury  Weakness: complicated acute illness or injury    Amount and/or Complexity of Data Reviewed  Labs: ordered.  Radiology: ordered.    Risk  Prescription drug management.        Final diagnoses:   Acute cystitis with hematuria   Confusion   Decreased appetite   Weakness   Nephritis       ED Disposition  ED Disposition       ED Disposition   Intended Admit    Condition   --    Comment   --               No follow-up provider specified.       Medication List      No changes were made to your prescriptions during this visit.            Anastasia Neal PA-C  06/28/24 0361

## 2024-06-28 NOTE — ED NOTES
"Nursing report ED to floor  Miryam Fernández  69 y.o.  female    HPI:   Chief Complaint   Patient presents with    Weakness - Generalized     Weakness x 1 weeks, losing weight, pt not eating or drinking much       Admitting doctor:   Vincenzo Gaytan MD    Admitting diagnosis:   The primary encounter diagnosis was Acute cystitis with hematuria. Diagnoses of Confusion, Decreased appetite, Weakness, and Nephritis were also pertinent to this visit.    Code status:   Current Code Status       Date Active Code Status Order ID Comments User Context       Not on file            Allergies:   Albuterol, Hydrocodone, Sulfa antibiotics, Bacitracin, Benzalkonium chloride, Codeine, Neomycin, Polymyxin b, Nickel, and Penicillins    Isolation:  No active isolations     Fall Risk:  Fall Risk Assessment was completed, and patient is at high risk for falls.   Predictive Model Details         7 (Low) Factor Value    Calculated 6/28/2024 14:34 Age 69    Risk of Fall Model Active Peripheral IV Present     Imaging order in this encounter Present     Magnesium not on file     Diastolic BP 65     Number of Distinct Medication Classes administered 3     Respiratory Rate 17     Tobacco Use Current     Crow Scale not on file     Creatinine 1.37 mg/dL     Chloride 98 mmol/L     Total Bilirubin 0.7 mg/dL     ALT <5 U/L     Albumin 4.2 g/dL     Days after Admission 0.169     Potassium 4.2 mmol/L     Calcium 10.1 mg/dL         Weight:       06/28/24  1026   Weight: 51.6 kg (113 lb 12.8 oz)       Intake and Output  No intake or output data in the 24 hours ending 06/28/24 1434    Diet:        Most recent vitals:   Vitals:    06/28/24 1026 06/28/24 1030 06/28/24 1300   BP: 125/65     BP Location: Left arm     Pulse: 107  97   Resp:  18 17   Temp: 97.9 °F (36.6 °C)     SpO2: 97%  97%   Weight: 51.6 kg (113 lb 12.8 oz)     Height: 160 cm (63\")         Active LDAs/IV Access:   Lines, Drains & Airways       Active LDAs       Name Placement date Placement " time Site Days    Peripheral IV 06/28/24 1128 Right Antecubital 06/28/24  1128  Antecubital  less than 1                    Skin Condition:   Skin Assessments (last day)       None             Labs (abnormal labs have a star):   Labs Reviewed   COMPREHENSIVE METABOLIC PANEL - Abnormal; Notable for the following components:       Result Value    Glucose 129 (*)     BUN 34 (*)     Creatinine 1.37 (*)     Alkaline Phosphatase 120 (*)     Anion Gap 15.1 (*)     eGFR 42.4 (*)     All other components within normal limits    Narrative:     GFR Normal >60  Chronic Kidney Disease <60  Kidney Failure <15     URINALYSIS W/ CULTURE IF INDICATED - Abnormal; Notable for the following components:    Color, UA Dark Yellow (*)     Appearance, UA Cloudy (*)     Ketones, UA 15 mg/dL (1+) (*)     Blood, UA Large (3+) (*)     Protein,  mg/dL (2+) (*)     Leuk Esterase, UA Moderate (2+) (*)     Nitrite, UA Positive (*)     All other components within normal limits    Narrative:     In absence of clinical symptoms, the presence of pyuria, bacteria, and/or nitrites on the urinalysis result does not correlate with infection.   CBC WITH AUTO DIFFERENTIAL - Abnormal; Notable for the following components:    Neutrophil % 94.8 (*)     Lymphocyte % 1.1 (*)     Monocyte % 3.4 (*)     Eosinophil % 0.0 (*)     Neutrophils, Absolute 8.39 (*)     Lymphocytes, Absolute 0.10 (*)     All other components within normal limits   TROPONIN - Abnormal; Notable for the following components:    HS Troponin T 44 (*)     All other components within normal limits    Narrative:     High Sensitive Troponin T Reference Range:  <14.0 ng/L- Negative Female for AMI  <22.0 ng/L- Negative Male for AMI  >=14 - Abnormal Female indicating possible myocardial injury.  >=22 - Abnormal Male indicating possible myocardial injury.   Clinicians would have to utilize clinical acumen, EKG, Troponin, and serial changes to determine if it is an Acute Myocardial Infarction or  myocardial injury due to an underlying chronic condition.        BNP (IN-HOUSE) - Abnormal; Notable for the following components:    proBNP 1,541.0 (*)     All other components within normal limits    Narrative:     This assay is used as an aid in the diagnosis of individuals suspected of having heart failure. It can be used as an aid in the diagnosis of acute decompensated heart failure (ADHF) in patients presenting with signs and symptoms of ADHF to the emergency department (ED). In addition, NT-proBNP of <300 pg/mL indicates ADHF is not likely.    Age Range Result Interpretation  NT-proBNP Concentration (pg/mL:      <50             Positive            >450                   Gray                 300-450                    Negative             <300    50-75           Positive            >900                  Gray                300-900                  Negative            <300      >75             Positive            >1800                  Gray                300-1800                  Negative            <300   URINALYSIS, MICROSCOPIC ONLY - Abnormal; Notable for the following components:    RBC, UA 3-5 (*)     WBC, UA 21-50 (*)     Bacteria, UA 4+ (*)     Squamous Epithelial Cells, UA 3-6 (*)     All other components within normal limits   RESPIRATORY PANEL PCR W/ COVID-19 (SARS-COV-2), NP SWAB IN UTM/VTP, 2 HR TAT - Normal    Narrative:     In the setting of a positive respiratory panel with a viral infection PLUS a negative procalcitonin without other underlying concern for bacterial infection, consider observing off antibiotics or discontinuation of antibiotics and continue supportive care. If the respiratory panel is positive for atypical bacterial infection (Bordetella pertussis, Chlamydophila pneumoniae, or Mycoplasma pneumoniae), consider antibiotic de-escalation to target atypical bacterial infection.   LIPASE - Normal   URINE CULTURE   BLOOD CULTURE   BLOOD CULTURE   HIGH SENSITIVITIY TROPONIN T 2HR   CBC  AND DIFFERENTIAL    Narrative:     The following orders were created for panel order CBC & Differential.  Procedure                               Abnormality         Status                     ---------                               -----------         ------                     CBC Auto Differential[[id number]]        Abnormal            Final result                 Please view results for these tests on the individual orders.   EXTRA TUBES    Narrative:     The following orders were created for panel order Extra Tubes.  Procedure                               Abnormality         Status                     ---------                               -----------         ------                     Green Top (Gel)[[id number]]                                                             Light Blue Top[[id number]]                                   Final result                 Please view results for these tests on the individual orders.   LIGHT BLUE TOP   EXTRA TUBES    Narrative:     The following orders were created for panel order Extra Tubes.  Procedure                               Abnormality         Status                     ---------                               -----------         ------                     Gold Top - SST[[id number]]                                   Final result                 Please view results for these tests on the individual orders.   GOLD TOP - SST       LOC: Person and Place    Telemetry:  Telemetry    Cardiac Monitoring Ordered: yes    EKG:   No orders to display       Medications Given in the ED:   Medications   sodium chloride 0.9 % flush 10 mL (has no administration in time range)   cefTRIAXone (ROCEPHIN) 1,000 mg in sodium chloride 0.9 % 100 mL MBP (1,000 mg Intravenous New Bag 6/28/24 1406)   sodium chloride 0.9 % bolus 1,000 mL (1,000 mL Intravenous New Bag 6/28/24 1130)   iopamidol (ISOVUE-370) 76 % injection 100 mL (100 mL Intravenous Given 6/28/24 1316)       Imaging results:  CT  Abdomen Pelvis With Contrast    Result Date: 6/28/2024  Impression: 1. Heterogeneous diminished enhancement in the right kidney. Correlate for symptoms of nephritis. 2. 2 mm nonobstructing stone in the right upper renal pole. 3. Stable 5 mm right lower lobe and 3 mm left lower lobe noncalcified nodules. According to previous report, these nodules have been stable since 2021, suggesting benign etiology. 4. 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 Suspected stenosis of the right renal artery ostium again noted. 5. 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 Electronically Signed: Demetrice Crowell MD  6/28/2024 11:45 AM EDT  Workstation ID: [id number]     Social issues:   Social History     Socioeconomic History    Marital status:    Tobacco Use    Smoking status: Every Day     Current packs/day: 2.00     Average packs/day: 2.0 packs/day for 40.0 years (80.0 ttl pk-yrs)     Types: Cigarettes     Passive exposure: Current   Vaping Use    Vaping status: Never Used   Substance and Sexual Activity    Alcohol use: Not Currently    Drug use: Never    Sexual activity: Defer       NIH Stroke Scale:  Interval: (not recorded)  1a. Level of Consciousness: (not recorded)  1b. LOC Questions: (not recorded)  1c. LOC Commands: (not recorded)  2. Best Gaze: (not recorded)  3. Visual: (not recorded)  4. Facial Palsy: (not recorded)  5a. Motor Arm, Left: (not recorded)  5b. Motor Arm, Right: (not recorded)  6a. Motor Leg, Left: (not recorded)  6b. Motor Leg, Right: (not recorded)  7. Limb Ataxia: (not recorded)  8. Sensory: (not recorded)  9. Best Language: (not recorded)  10. Dysarthria: (not recorded)  11. Extinction and Inattention (formerly Neglect): (not  recorded)    Total (NIH Stroke Scale): (not recorded)     Additional notable assessment information    Nursing report ED to floor:  Shahana Stiles RN   06/28/24 14:34 EDT

## 2024-06-28 NOTE — CASE MANAGEMENT/SOCIAL WORK
Discharge Planning Assessment   Oh     Patient Name: Miryam Fernández  MRN: [id number]  Today's Date: 6/28/2024    Admit Date: 6/28/2024    Plan: from home with spouse   Discharge Needs Assessment       Row Name 06/28/24 1420       Living Environment    People in Home spouse    Current Living Arrangements home    Potentially Unsafe Housing Conditions none    In the past 12 months has the electric, gas, oil, or water company threatened to shut off services in your home? No    Primary Care Provided by self    Provides Primary Care For no one    Family Caregiver if Needed spouse;child(tram), adult    Family Caregiver Names spouse and dtr, Neva    Quality of Family Relationships helpful;involved;supportive    Able to Return to Prior Arrangements yes       Resource/Environmental Concerns    Resource/Environmental Concerns none    Transportation Concerns none       Transportation Needs    In the past 12 months, has lack of transportation kept you from medical appointments or from getting medications? no    In the past 12 months, has lack of transportation kept you from meetings, work, or from getting things needed for daily living? No       Food Insecurity    Within the past 12 months, you worried that your food would run out before you got the money to buy more. Never true    Within the past 12 months, the food you bought just didn't last and you didn't have money to get more. Never true       Transition Planning    Patient/Family Anticipates Transition to home with family    Patient/Family Anticipated Services at Transition none    Transportation Anticipated car, drives self;family or friend will provide       Discharge Needs Assessment    Readmission Within the Last 30 Days no previous admission in last 30 days    Equipment Currently Used at Home rollator    Anticipated Changes Related to Illness none    Equipment Needed After Discharge none    Provided Post Acute Provider List? Yes    Post Acute Provider List  Home Health;Nursing Home    Delivered To Support Person    Support Person shelton Hooks    Method of Delivery In person                   Discharge Plan       Row Name 06/28/24 1421       Plan    Plan from home with spouse    Plan Comments met with patient and daughter Neva at bedside.  pt lives at home with spouse.  requires assistance with bathing and dressing at baseline, appears frail. has Rollator but denies other dme.  no home services.  reports [facility] calls monthly for assessments.  pcp and pharm verified.  provided ECF and HH list to daughter.  dtr to provide transportation at time of d/c                  Continued Care and Services - Admitted Since 6/28/2024    No active coordination exists for this encounter.          Demographic Summary       Row Name 06/28/24 1418       General Information    Admission Type --  no order at this time.    Arrived From emergency department    Referral Source admission list    Reason for Consult discharge planning    Preferred Language English                   Functional Status       Row Name 06/28/24 1420       Functional Status    Usual Activity Tolerance poor    Current Activity Tolerance poor       Functional Status, IADL    Medications completely dependent    Meal Preparation completely dependent    Housekeeping completely dependent    Laundry completely dependent    Shopping completely dependent       Mental Status    General Appearance WDL WDL       Mental Status Summary    Recent Changes in Mental Status/Cognitive Functioning no changes      Row Name 06/28/24 1419       Functional Status    Usual Activity Tolerance poor    Current Activity Tolerance poor       Physical Activity    On average, how many days per week do you engage in moderate to strenuous exercise (like a brisk walk)? 0 days    On average, how many minutes do you engage in exercise at this level? 0 min    Number of minutes of exercise per week 0       Functional Status, IADL    Medications completely  dependent    Meal Preparation completely dependent    Housekeeping completely dependent    Laundry completely dependent    Shopping completely dependent    IADL Comments requires assistance with all ADLS                      Pricila Currie RN

## 2024-06-28 NOTE — H&P
[facility]  History & Physical    Patient Name: Miryam Fernández  : 1955  MRN: [id number]  Primary Care Physician:  Lashell De La Torre APRN  Date of admission: 2024  Date and Time of Service: 2024 at 2:30 PM    Subjective      Chief Complaint: Weakness, confusion    History of Present Illness: Miryam Fernández is a 69 y.o. female with a CMH of lung cancer, currently in remission, GERD, CAD with stent placement, MORA, memory loss, AAA who presented to [facility] on 2024 with generalized weakness, and confusion.  Daughter who is at bedside states the patient has had decreased p.o. intake since her cancer diagnosis, however has had no p.o. intake for the last 2 days.  Daughter also states that patient has not smoked any cigarettes in the last 2 days, which is very uncommon for patient.  Daughter states that patient typically has a little bit of confusion at baseline, however has increasingly worse over the last 2 days.  Patient did have a fall about a week ago which she was seen at her primary care doctor's office for..    While in the emergency department labs were remarkable for troponin of 44, 48 respectively.  proBNP 1541 glucose of 129, alk phos 120, BUNs/creatinine 30/1.37.  WBC within normal limits, patient has left shift.  Urinalysis was noted for: Dark and cloudy urine, positive blood nitrites ketones, RBCs, and WBCs with 4+ bacteria in urine    Patient was given IV fluids, started on Rocephin.    Review of Systems  Please see above, review of systems otherwise negative   Personal History     Past Medical History:   Diagnosis Date    Cancer     lung cancer    Carotid stenosis     COPD (chronic obstructive pulmonary disease)     Coronary artery disease     Esophageal stricture     Dr Domingo    Hyperlipidemia     Pulmonary embolism     seen at [facility], fluid around her heart at the time-right lung       Past Surgical History:   Procedure Laterality Date     ANGIOPLASTY / STENTING FEMORAL      CARDIAC CATHETERIZATION      CAROTID STENT      CORONARY STENT PLACEMENT      ESOPHAGOSCOPY / EGD      HYSTERECTOMY         Family History: family history includes Diabetes in her paternal uncle; Heart disease in her mother; Hyperlipidemia in her father; Hypertension in her mother; Leukemia in her paternal grandmother; Lung cancer in her father. Otherwise pertinent FHx was reviewed and not pertinent to current issue.    Social History:  reports that she has been smoking cigarettes. She has a 80 pack-year smoking history. She has been exposed to tobacco smoke. She does not have any smokeless tobacco history on file. She reports that she does not currently use alcohol. She reports that she does not use drugs.    Home Medications:  Prior to Admission Medications       Prescriptions Last Dose Informant Patient Reported? Taking?    atorvastatin (LIPITOR) 40 MG tablet 6/27/2024  No Yes    TAKE 1 TABLET BY MOUTH EVERY DAY    memantine (Namenda) 5 MG tablet 6/27/2024  No Yes    Take 1 tablet by mouth 2 (Two) Times a Day.    furosemide (LASIX) 20 MG tablet   Yes No    Take 1 tablet by mouth As Needed. PRN for swelling    metoprolol succinate XL (TOPROL-XL) 25 MG 24 hr tablet   Yes No    Take 1 tablet by mouth Daily.    mupirocin (BACTROBAN) 2 % ointment   No No    Apply 1 Application topically to the appropriate area as directed 3 (Three) Times a Day.    oxyCODONE-acetaminophen (PERCOCET) 5-325 MG per tablet   Yes No    Take 1 tablet by mouth Every 6 (Six) Hours As Needed.    pantoprazole (PROTONIX) 40 MG EC tablet   Yes No    Take 1 tablet by mouth Daily.    ticagrelor (Brilinta) 60 MG tablet tablet   No No    Take 1 tablet by mouth 2 (Two) Times a Day.              Allergies:  Allergies   Allergen Reactions    Albuterol Shortness Of Breath     Pt states she has sensitivity to albuterol.  She states it causes her to be SOA.    Hydrocodone Palpitations    Sulfa Antibiotics Swelling     Bacitracin Other (See Comments)    Benzalkonium Chloride Hives    Codeine Other (See Comments)    Neomycin Other (See Comments)    Polymyxin B Other (See Comments)    Nickel Rash    Penicillins Rash       Objective      Vitals:   Temp:  [97.9 °F (36.6 °C)] 97.9 °F (36.6 °C)  Heart Rate:  [] 95  Resp:  [17-18] 17  BP: (125-156)/(65-82) 156/82  Body mass index is 20.16 kg/m².  Physical Exam  PHYSICAL EXAM  Constitutional:  Well developed, well nourished, no acute distress, non-toxic appearance   Eyes:  PERRL, conjunctiva normal, EOMI   HENT:  Atraumatic, external ears normal, nose normal, oropharynx moist, no pharyngeal exudates. Neck-normal range of motion, no tenderness, supple, trachea midline  Respiratory: CTAB, non-labored respirations without accessory muscle use.  Smoker's cough noted  Cardiovascular:  Normal rate, normal rhythm, no murmurs, no gallops, no rubs   GI:  Soft, nondistended, normal bowel sounds, nontender, no organomegaly, no mass, no rebound, no guarding   :  No costovertebral angle tenderness   Musculoskeletal:  No edema, no tenderness, no deformities  Integument:  Well hydrated, no rash.  Healing abrasion noted to left forearm  Lymphatic:  No lymphadenopathy noted   Neurologic:  Alert & oriented x 3, CN 2-12 normal, normal motor function, normal sensory function, no focal deficits noted   Psychiatric:  Speech and behavior appropriate    Diagnostic Data:  Lab Results (last 24 hours)       Procedure Component Value Units Date/Time    High Sensitivity Troponin T 2Hr [[id number]]  (Abnormal) Collected: 06/28/24 1401    Specimen: Blood Updated: 06/28/24 1436     HS Troponin T 40 ng/L      Troponin T Delta -4 ng/L     Narrative:      High Sensitive Troponin T Reference Range:  <14.0 ng/L- Negative Female for AMI  <22.0 ng/L- Negative Male for AMI  >=14 - Abnormal Female indicating possible myocardial injury.  >=22 - Abnormal Male indicating possible myocardial injury.   Clinicians would have to  utilize clinical acumen, EKG, Troponin, and serial changes to determine if it is an Acute Myocardial Infarction or myocardial injury due to an underlying chronic condition.         Blood Culture - Blood, Hand, Right [[id number]] Collected: 06/28/24 1401    Specimen: Blood from Hand, Right Updated: 06/28/24 1409    Blood Culture - Blood, Arm, Right [[id number]] Collected: 06/28/24 1401    Specimen: Blood from Arm, Right Updated: 06/28/24 1409    Respiratory Panel PCR w/COVID-19(SARS-CoV-2) ADRIANE/CJ/CHARLIE/PAD/COR/KOLE In-House, NP Swab in UTM/VTM, 2 HR TAT - Swab, Nasopharynx [[id number]]  (Normal) Collected: 06/28/24 1125    Specimen: Swab from Nasopharynx Updated: 06/28/24 1226     ADENOVIRUS, PCR Not Detected     Coronavirus 229E Not Detected     Coronavirus HKU1 Not Detected     Coronavirus NL63 Not Detected     Coronavirus OC43 Not Detected     COVID19 Not Detected     Human Metapneumovirus Not Detected     Human Rhinovirus/Enterovirus Not Detected     Influenza A PCR Not Detected     Influenza B PCR Not Detected     Parainfluenza Virus 1 Not Detected     Parainfluenza Virus 2 Not Detected     Parainfluenza Virus 3 Not Detected     Parainfluenza Virus 4 Not Detected     RSV, PCR Not Detected     Bordetella pertussis pcr Not Detected     Bordetella parapertussis PCR Not Detected     Chlamydophila pneumoniae PCR Not Detected     Mycoplasma pneumo by PCR Not Detected    Narrative:      In the setting of a positive respiratory panel with a viral infection PLUS a negative procalcitonin without other underlying concern for bacterial infection, consider observing off antibiotics or discontinuation of antibiotics and continue supportive care. If the respiratory panel is positive for atypical bacterial infection (Bordetella pertussis, Chlamydophila pneumoniae, or Mycoplasma pneumoniae), consider antibiotic de-escalation to target atypical bacterial infection.    Urinalysis, Microscopic Only - Urine, Clean Catch [[id number]]   (Abnormal) Collected: 06/28/24 1118    Specimen: Urine, Clean Catch Updated: 06/28/24 1225     RBC, UA 3-5 /HPF      WBC, UA 21-50 /HPF      Bacteria, UA 4+ /HPF      Squamous Epithelial Cells, UA 3-6 /HPF      Hyaline Casts, UA 3-6 /LPF      Methodology Manual Light Microscopy    Urine Culture - Urine, Urine, Clean Catch [[id number]] Collected: 06/28/24 1118    Specimen: Urine, Clean Catch Updated: 06/28/24 1225    Comprehensive Metabolic Panel [[id number]]  (Abnormal) Collected: 06/28/24 1117    Specimen: Blood Updated: 06/28/24 1157     Glucose 129 mg/dL      BUN 34 mg/dL      Creatinine 1.37 mg/dL      Sodium 140 mmol/L      Potassium 4.2 mmol/L      Comment: Slight hemolysis detected by analyzer. Result may be falsely elevated.        Chloride 98 mmol/L      CO2 26.9 mmol/L      Calcium 10.1 mg/dL      Total Protein 8.0 g/dL      Albumin 4.2 g/dL      ALT (SGPT) <5 U/L      AST (SGOT) 13 U/L      Alkaline Phosphatase 120 U/L      Total Bilirubin 0.7 mg/dL      Globulin 3.8 gm/dL      A/G Ratio 1.1 g/dL      BUN/Creatinine Ratio 24.8     Anion Gap 15.1 mmol/L      eGFR 42.4 mL/min/1.73     Narrative:      GFR Normal >60  Chronic Kidney Disease <60  Kidney Failure <15      Lipase [[id number]]  (Normal) Collected: 06/28/24 1117    Specimen: Blood Updated: 06/28/24 1156     Lipase 13 U/L     High Sensitivity Troponin T [[id number]]  (Abnormal) Collected: 06/28/24 1117    Specimen: Blood Updated: 06/28/24 1156     HS Troponin T 44 ng/L     Narrative:      High Sensitive Troponin T Reference Range:  <14.0 ng/L- Negative Female for AMI  <22.0 ng/L- Negative Male for AMI  >=14 - Abnormal Female indicating possible myocardial injury.  >=22 - Abnormal Male indicating possible myocardial injury.   Clinicians would have to utilize clinical acumen, EKG, Troponin, and serial changes to determine if it is an Acute Myocardial Infarction or myocardial injury due to an underlying chronic condition.         BNP [[id number]]   (Abnormal) Collected: 06/28/24 1117    Specimen: Blood Updated: 06/28/24 1156     proBNP 1,541.0 pg/mL     Narrative:      This assay is used as an aid in the diagnosis of individuals suspected of having heart failure. It can be used as an aid in the diagnosis of acute decompensated heart failure (ADHF) in patients presenting with signs and symptoms of ADHF to the emergency department (ED). In addition, NT-proBNP of <300 pg/mL indicates ADHF is not likely.    Age Range Result Interpretation  NT-proBNP Concentration (pg/mL:      <50             Positive            >450                   Gray                 300-450                    Negative             <300    50-75           Positive            >900                  Gray                300-900                  Negative            <300      >75             Positive            >1800                  Gray                300-1800                  Negative            <300    Extra Tubes [[id number]] Collected: 06/28/24 1117    Specimen: Blood, Venous Line Updated: 06/28/24 1145    Narrative:      The following orders were created for panel order Extra Tubes.  Procedure                               Abnormality         Status                     ---------                               -----------         ------                     Green Top (Gel)[[id number]]                                                             Light Blue Top[[id number]]                                   Final result                 Please view results for these tests on the individual orders.    Light Blue Top [[id number]] Collected: 06/28/24 1117    Specimen: Blood Updated: 06/28/24 1145     Extra Tube Hold for add-ons.     Comment: Auto resulted       Extra Tubes [[id number]] Collected: 06/28/24 1117    Specimen: Blood, Venous Line Updated: 06/28/24 1145    Narrative:      The following orders were created for panel order Extra Tubes.  Procedure                               Abnormality          Status                     ---------                               -----------         ------                     Gold Top - [facility][[id number]]                                   Final result                 Please view results for these tests on the individual orders.    [facility] - [facility] [[id number]] Collected: 06/28/24 1117    Specimen: Blood Updated: 06/28/24 1145     Extra Tube Hold for add-ons.     Comment: Auto resulted.       Urinalysis With Culture If Indicated - Urine, Clean Catch [[id number]]  (Abnormal) Collected: 06/28/24 1118    Specimen: Urine, Clean Catch Updated: 06/28/24 1136     Color, UA Dark Yellow     Appearance, UA Cloudy     pH, UA <=5.0     Specific Gravity, UA 1.021     Glucose, UA Negative     Ketones, UA 15 mg/dL (1+)     Bilirubin, UA Negative     Blood, UA Large (3+)     Protein,  mg/dL (2+)     Leuk Esterase, UA Moderate (2+)     Nitrite, UA Positive     Urobilinogen, UA 1.0 E.U./dL    Narrative:      In absence of clinical symptoms, the presence of pyuria, bacteria, and/or nitrites on the urinalysis result does not correlate with infection.    CBC & Differential [[id number]]  (Abnormal) Collected: 06/28/24 1117    Specimen: Blood Updated: 06/28/24 1134    Narrative:      The following orders were created for panel order CBC & Differential.  Procedure                               Abnormality         Status                     ---------                               -----------         ------                     CBC Auto Differential[[id number]]        Abnormal            Final result                 Please view results for these tests on the individual orders.    CBC Auto Differential [[id number]]  (Abnormal) Collected: 06/28/24 1117    Specimen: Blood Updated: 06/28/24 1134     WBC 8.85 10*3/mm3      RBC 4.47 10*6/mm3      Hemoglobin 13.7 g/dL      Hematocrit 42.6 %      MCV 95.3 fL      MCH 30.6 pg      MCHC 32.2 g/dL      RDW 13.6 %      RDW-SD 48.6 fl      MPV 10.1 fL      Platelets 200  10*3/mm3      Neutrophil % 94.8 %      Lymphocyte % 1.1 %      Monocyte % 3.4 %      Eosinophil % 0.0 %      Basophil % 0.2 %      Immature Grans % 0.5 %      Neutrophils, Absolute 8.39 10*3/mm3      Lymphocytes, Absolute 0.10 10*3/mm3      Monocytes, Absolute 0.30 10*3/mm3      Eosinophils, Absolute 0.00 10*3/mm3      Basophils, Absolute 0.02 10*3/mm3      Immature Grans, Absolute 0.04 10*3/mm3      nRBC 0.0 /100 WBC              Imaging Results (Last 24 Hours)       Procedure Component Value Units Date/Time    CT Abdomen Pelvis With Contrast [[id number]] Collected: 06/28/24 1318     Updated: 06/28/24 1327    Narrative:      CT ABDOMEN PELVIS W CONTRAST    Date of Exam: 6/28/2024 1:06 PM EDT    Indication: Nausea, decrease appetitie, rule out stone.    Comparison: CT abdomen pelvis 3/26/2024.    Technique: Axial CT images were obtained of the abdomen and pelvis following the uneventful intravenous administration of iodinated contrast. Sagittal and coronal reconstructions were performed.  Automated exposure control and iterative reconstruction   methods were used.        Findings:  3.1 cm fusiform infrarenal abdominal aortic aneurysm with moderate eccentric mural thrombus (3/59), unchanged. Bilateral common iliac artery stents appear patent. Right external iliac artery stent is patent. Severe stenosis of the left external iliac   artery, unchanged. Suspected severe right renal artery ostial stenosis, unchanged.    Liver, gallbladder, spleen, pancreas, adrenals are normal. Small right renal cyst. Heterogeneous diminished cortical enhancement in the right kidney, suggestive of nephritis. No urinary tract stone or hydronephrosis is seen. Mild right renal pelviectasis   is noted. Tiny 2 mm nonobstructing stone is seen in the right upper renal pole.    Urinary bladder and rectum are normal. Hysterectomy changes.    5 mm noncalcified right lower lobe nodule (3/8), unchanged. 3 mm noncalcified left lower lobe nodule (3/7),  unchanged. No acute basilar airspace disease. Heart size is normal. Coronary artery calcifications are present.    3.7 cm fusiform aneurysm of the distal descending thoracic aorta above the diaphragmatic hiatus (3/26), unchanged.    No acute or suspicious osseous abnormalities.      Impression:      Impression:    1. Heterogeneous diminished enhancement in the right kidney. Correlate for symptoms of nephritis.  2. 2 mm nonobstructing stone in the right upper renal pole.  3. Stable 5 mm right lower lobe and 3 mm left lower lobe noncalcified nodules. According to previous report, these nodules have been stable since 2021, suggesting benign etiology.  4. 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 Suspected stenosis of the right renal artery ostium again noted.  5. Fusiform aneurysm of the distal descending thoracic aorta and infrarenal abdominal aorta, unchanged            Electronically Signed: Demetrice Crowell MD    6/28/2024 1:25 PM EDT    Workstation ID: [id number]    XR Chest 1 View [[id number]] Collected: 06/28/24 1144     Updated: 06/28/24 1147    Narrative:      XR CHEST 1 VW    Date of Exam: 6/28/2024 11:30 AM EDT    Indication: Change in behavior - more confused, No appetite    Comparison: CT chest 3/26/2024, PA lateral chest 11/29/2023    Findings:  No acute airspace disease. Heart size is within normal's. There is no pleural effusion, pneumothorax or acute osseous abnormality. Surgical clips project over the region of the left apex.      Impression:      Impression:  No acute cardiopulmonary findings.      Electronically Signed: Demetrice Crowell MD    6/28/2024 11:45 AM EDT    Workstation ID: [id number]              Assessment & Plan        This is a 69 y.o. female with:    Active and Resolved Problems  Active Hospital Problems    Diagnosis  POA    **Acute cystitis [N30.00]  Yes      Resolved Hospital Problems   No resolved problems to display.     Generalized weakness  - Likely  secondary to UTI  - IV fluids for rehydration  - Zofran as needed for nausea  - Regular diet, encourage p.o. intake  - PT OT consult    UTI  - CT of abdomen showed right kidney with possible nephritis  - Urine noted for 4+ bacteria  - Rocephin every 24 hours  - Culture pending    Coronary artery disease  - History of cardiac surgery, stent placement  - Stable  - Continue home regimen    COPD  History of lung cancer  Current smoker  - Nicotine patch ordered  - Continue home regimen    VTE Prophylaxis:  Mechanical VTE prophylaxis orders are present.        The patient desires to be as follows:    CODE STATUS:    Level Of Support Discussed With: Patient; Next of Kin (If No Surrogate)  Code Status (Patient has no pulse and is not breathing): CPR (Attempt to Resuscitate)  Medical Interventions (Patient has pulse or is breathing): Full Support        Neva Burroughs, who can be contacted at [phone], is the designated person to make medical decisions on the patient's behalf if She is incapable of doing so. This was clarified with patient and/or next of kin on 6/28/2024 during the course of this H&P.    Admission Status:  I believe this patient meets inpatient status.    Expected Length of Stay: 3 nights    PDMP and Medication Dispenses via Sidebar reviewed and consistent with patient reported medications.    I discussed the patient's findings and my recommendations with patient and family.      Signature:     This document has been electronically signed by KENNY Segal on June 28, 2024 15:11 EDT   [facility] Team

## 2024-06-29 LAB
ALBUMIN SERPL-MCNC: 3.2 G/DL (ref 3.5–5.2)
ALBUMIN/GLOB SERPL: 1.1 G/DL
ALP SERPL-CCNC: 87 U/L (ref 39–117)
ALT SERPL W P-5'-P-CCNC: <5 U/L (ref 1–33)
ANION GAP SERPL CALCULATED.3IONS-SCNC: 9 MMOL/L (ref 5–15)
AST SERPL-CCNC: 16 U/L (ref 1–32)
BASOPHILS # BLD AUTO: 0.01 10*3/MM3 (ref 0–0.2)
BASOPHILS NFR BLD AUTO: 0.2 % (ref 0–1.5)
BILIRUB SERPL-MCNC: 0.4 MG/DL (ref 0–1.2)
BUN SERPL-MCNC: 27 MG/DL (ref 8–23)
BUN/CREAT SERPL: 24.8 (ref 7–25)
CALCIUM SPEC-SCNC: 8.9 MG/DL (ref 8.6–10.5)
CHLORIDE SERPL-SCNC: 106 MMOL/L (ref 98–107)
CO2 SERPL-SCNC: 24 MMOL/L (ref 22–29)
CREAT SERPL-MCNC: 1.09 MG/DL (ref 0.57–1)
DEPRECATED RDW RBC AUTO: 48.3 FL (ref 37–54)
EGFRCR SERPLBLD CKD-EPI 2021: 55.8 ML/MIN/1.73
EOSINOPHIL # BLD AUTO: 0 10*3/MM3 (ref 0–0.4)
EOSINOPHIL NFR BLD AUTO: 0 % (ref 0.3–6.2)
ERYTHROCYTE [DISTWIDTH] IN BLOOD BY AUTOMATED COUNT: 13.8 % (ref 12.3–15.4)
GLOBULIN UR ELPH-MCNC: 2.8 GM/DL
GLUCOSE SERPL-MCNC: 116 MG/DL (ref 65–99)
HCT VFR BLD AUTO: 34.6 % (ref 34–46.6)
HGB BLD-MCNC: 11.3 G/DL (ref 12–15.9)
IMM GRANULOCYTES # BLD AUTO: 0.02 10*3/MM3 (ref 0–0.05)
IMM GRANULOCYTES NFR BLD AUTO: 0.3 % (ref 0–0.5)
LYMPHOCYTES # BLD AUTO: 0.14 10*3/MM3 (ref 0.7–3.1)
LYMPHOCYTES NFR BLD AUTO: 2.2 % (ref 19.6–45.3)
MCH RBC QN AUTO: 31 PG (ref 26.6–33)
MCHC RBC AUTO-ENTMCNC: 32.7 G/DL (ref 31.5–35.7)
MCV RBC AUTO: 94.8 FL (ref 79–97)
MONOCYTES # BLD AUTO: 0.41 10*3/MM3 (ref 0.1–0.9)
MONOCYTES NFR BLD AUTO: 6.6 % (ref 5–12)
NEUTROPHILS NFR BLD AUTO: 5.67 10*3/MM3 (ref 1.7–7)
NEUTROPHILS NFR BLD AUTO: 90.7 % (ref 42.7–76)
NRBC BLD AUTO-RTO: 0 /100 WBC (ref 0–0.2)
PLATELET # BLD AUTO: 169 10*3/MM3 (ref 140–450)
PMV BLD AUTO: 10.5 FL (ref 6–12)
POTASSIUM SERPL-SCNC: 3.4 MMOL/L (ref 3.5–5.2)
POTASSIUM SERPL-SCNC: 4.5 MMOL/L (ref 3.5–5.2)
PROT SERPL-MCNC: 6 G/DL (ref 6–8.5)
RBC # BLD AUTO: 3.65 10*6/MM3 (ref 3.77–5.28)
SODIUM SERPL-SCNC: 139 MMOL/L (ref 136–145)
WBC NRBC COR # BLD AUTO: 6.25 10*3/MM3 (ref 3.4–10.8)

## 2024-06-29 PROCEDURE — 97162 PT EVAL MOD COMPLEX 30 MIN: CPT

## 2024-06-29 PROCEDURE — 85025 COMPLETE CBC W/AUTO DIFF WBC: CPT | Performed by: NURSE PRACTITIONER

## 2024-06-29 PROCEDURE — 25010000002 CEFTRIAXONE PER 250 MG: Performed by: NURSE PRACTITIONER

## 2024-06-29 PROCEDURE — 80053 COMPREHEN METABOLIC PANEL: CPT | Performed by: NURSE PRACTITIONER

## 2024-06-29 PROCEDURE — 25810000003 SODIUM CHLORIDE 0.9 % SOLUTION: Performed by: NURSE PRACTITIONER

## 2024-06-29 PROCEDURE — 84132 ASSAY OF SERUM POTASSIUM: CPT | Performed by: STUDENT IN AN ORGANIZED HEALTH CARE EDUCATION/TRAINING PROGRAM

## 2024-06-29 RX ORDER — POTASSIUM CHLORIDE 20 MEQ/1
40 TABLET, EXTENDED RELEASE ORAL EVERY 4 HOURS
Qty: 4 TABLET | Refills: 0 | Status: COMPLETED | OUTPATIENT
Start: 2024-06-29 | End: 2024-06-29

## 2024-06-29 RX ORDER — NICOTINE 21 MG/24HR
1 PATCH, TRANSDERMAL 24 HOURS TRANSDERMAL EVERY 24 HOURS
Status: DISCONTINUED | OUTPATIENT
Start: 2024-06-29 | End: 2024-06-30 | Stop reason: HOSPADM

## 2024-06-29 RX ADMIN — MUPIROCIN 1 APPLICATION: 20 OINTMENT TOPICAL at 15:51

## 2024-06-29 RX ADMIN — PANTOPRAZOLE SODIUM 40 MG: 40 TABLET, DELAYED RELEASE ORAL at 08:43

## 2024-06-29 RX ADMIN — SODIUM CHLORIDE 75 ML/HR: 9 INJECTION, SOLUTION INTRAVENOUS at 08:42

## 2024-06-29 RX ADMIN — METOPROLOL SUCCINATE 25 MG: 25 TABLET, EXTENDED RELEASE ORAL at 08:43

## 2024-06-29 RX ADMIN — POTASSIUM CHLORIDE 40 MEQ: 1500 TABLET, EXTENDED RELEASE ORAL at 06:14

## 2024-06-29 RX ADMIN — MEMANTINE 5 MG: 5 TABLET ORAL at 21:20

## 2024-06-29 RX ADMIN — SENNOSIDES AND DOCUSATE SODIUM 2 TABLET: 50; 8.6 TABLET ORAL at 08:44

## 2024-06-29 RX ADMIN — Medication 1 PATCH: at 08:42

## 2024-06-29 RX ADMIN — TICAGRELOR 60 MG: 60 TABLET ORAL at 08:43

## 2024-06-29 RX ADMIN — MUPIROCIN 1 APPLICATION: 20 OINTMENT TOPICAL at 21:22

## 2024-06-29 RX ADMIN — ATORVASTATIN CALCIUM 40 MG: 40 TABLET, FILM COATED ORAL at 21:20

## 2024-06-29 RX ADMIN — Medication 10 ML: at 08:43

## 2024-06-29 RX ADMIN — CEFTRIAXONE 1000 MG: 1 INJECTION, POWDER, FOR SOLUTION INTRAMUSCULAR; INTRAVENOUS at 14:53

## 2024-06-29 RX ADMIN — TICAGRELOR 60 MG: 60 TABLET ORAL at 21:20

## 2024-06-29 RX ADMIN — MEMANTINE 5 MG: 5 TABLET ORAL at 08:43

## 2024-06-29 RX ADMIN — MUPIROCIN 1 APPLICATION: 20 OINTMENT TOPICAL at 08:43

## 2024-06-29 RX ADMIN — Medication 10 ML: at 21:22

## 2024-06-29 RX ADMIN — POTASSIUM CHLORIDE 40 MEQ: 1500 TABLET, EXTENDED RELEASE ORAL at 10:13

## 2024-06-29 RX ADMIN — SODIUM CHLORIDE 75 ML/HR: 9 INJECTION, SOLUTION INTRAVENOUS at 14:53

## 2024-06-29 NOTE — PROGRESS NOTES
[facility] MEDICINE SERVICE  DAILY PROGRESS NOTE    NAME: Miryam Fernández  : 1955  MRN: [id number]      LOS: 1 day     PROVIDER OF SERVICE: Carlos Llanes Alvarez, MD    Chief Complaint: Acute cystitis    Subjective:     Interval History:  History taken from: patient chart family  Patient Complaints: Dysuria.   Patient Denies:  abdominal pain, focal weakness    Patient is being followed for AMS due to UTI. She was seen and examined. She states has no complains.        Review of Systems   Constitutional:  Negative for fever.   Genitourinary:  Positive for dysuria. Negative for flank pain and hematuria.   Psychiatric/Behavioral:  Positive for confusion.        Objective:     Vital Signs  Temp:  [97.7 °F (36.5 °C)-98.1 °F (36.7 °C)] 98.1 °F (36.7 °C)  Heart Rate:  [78-99] 78  Resp:  [19-24] 23  BP: (107-156)/(55-84) 136/75   Body mass index is 22.22 kg/m².      Physical Exam  Constitutional:       General: She is not in acute distress.     Appearance: Normal appearance.   HENT:      Head: Normocephalic.      Nose: Nose normal.      Mouth/Throat:      Mouth: Mucous membranes are moist. Mucous membranes are dry.   Eyes:      Extraocular Movements: Extraocular movements intact.      Pupils: Pupils are equal, round, and reactive to light.   Cardiovascular:      Rate and Rhythm: Normal rate and regular rhythm.      Pulses: Normal pulses.      Heart sounds: Normal heart sounds.   Pulmonary:      Effort: Pulmonary effort is normal.      Breath sounds: Normal breath sounds.   Abdominal:      General: Abdomen is flat.      Palpations: Abdomen is soft.   Musculoskeletal:         General: Normal range of motion.      Cervical back: Neck supple.   Skin:     General: Skin is warm.      Capillary Refill: Capillary refill takes less than 2 seconds.   Neurological:      General: No focal deficit present.      Mental Status: She is alert and oriented to person, place, and time.   Psychiatric:         Behavior: Behavior  normal.         Scheduled Meds   atorvastatin, 40 mg, Oral, Nightly  cefTRIAXone, 1,000 mg, Intravenous, Q24H  memantine, 5 mg, Oral, BID  metoprolol succinate XL, 25 mg, Oral, Daily  mupirocin, 1 Application, Topical, TID  nicotine, 1 patch, Transdermal, Q24H  pantoprazole, 40 mg, Oral, Daily  sodium chloride, 10 mL, Intravenous, Q12H  ticagrelor, 60 mg, Oral, BID       PRN Meds     senna-docusate sodium **AND** polyethylene glycol **AND** bisacodyl **AND** bisacodyl    Calcium Replacement - Follow Nurse / BPA Driven Protocol    Magnesium Standard Dose Replacement - Follow Nurse / BPA Driven Protocol    ondansetron    Phosphorus Replacement - Follow Nurse / BPA Driven Protocol    Potassium Replacement - Follow Nurse / BPA Driven Protocol    [COMPLETED] Insert Peripheral IV **AND** sodium chloride    sodium chloride    sodium chloride   Infusions  sodium chloride, 75 mL/hr, Last Rate: 75 mL/hr (06/29/24 0842)          Diagnostic Data    Results from last 7 days   Lab Units 06/29/24  0438   WBC 10*3/mm3 6.25   HEMOGLOBIN g/dL 11.3*   HEMATOCRIT % 34.6   PLATELETS 10*3/mm3 169   GLUCOSE mg/dL 116*   CREATININE mg/dL 1.09*   BUN mg/dL 27*   SODIUM mmol/L 139   POTASSIUM mmol/L 3.4*   AST (SGOT) U/L 16   ALT (SGPT) U/L <5   ALK PHOS U/L 87   BILIRUBIN mg/dL 0.4   ANION GAP mmol/L 9.0       CT Abdomen Pelvis With Contrast    Result Date: 6/28/2024  Impression: 1. Heterogeneous diminished enhancement in the right kidney. Correlate for symptoms of nephritis. 2. 2 mm nonobstructing stone in the right upper renal pole. 3. Stable 5 mm right lower lobe and 3 mm left lower lobe noncalcified nodules. According to previous report, these nodules have been stable since 2021, suggesting benign etiology. 4. 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 Suspected stenosis of the right renal artery ostium again noted. 5. Fusiform aneurysm of the distal descending thoracic aorta and infrarenal abdominal aorta,  unchanged Electronically Signed: Demetrice Crowell MD  6/28/2024 1:25 PM EDT  Workstation ID: [id number]    XR Chest 1 View    Result Date: 6/28/2024  Impression: No acute cardiopulmonary findings. Electronically Signed: Demetrice Crowell MD  6/28/2024 11:45 AM EDT  Workstation ID: [id number]       I reviewed the patient's new clinical results.  I reviewed the patient's new imaging results and agree with the interpretation.  I reviewed the patient's other test results and agree with the interpretation    Assessment/Plan:     Active and Resolved Problems  Active Hospital Problems    Diagnosis  POA    **Acute cystitis [N30.00]  Yes      Resolved Hospital Problems   No resolved problems to display.       Metabolic encephalopathy due to UTI  Continue ceftriaxone course  Urine Cx preliminarily growing gram - bacilli   Follow blood culture    History of CAD  Continue statin, toprol, brillinta    History of COPD  Not in exacerbation      VTE Prophylaxis:  Mechanical VTE prophylaxis orders are present.         Code status is   Code Status and Medical Interventions:   Ordered at: 06/28/24 1507     Level Of Support Discussed With:    Patient    Next of Kin (If No Surrogate)     Code Status (Patient has no pulse and is not breathing):    CPR (Attempt to Resuscitate)     Medical Interventions (Patient has pulse or is breathing):    Full Support       Plan for disposition:home in 1 days    Time: 30 minutes    Signature: Electronically signed by Carlos Llanes Alvarez, MD, 06/29/24, 13:54 EDT.  [facility] Hospitalist Team

## 2024-06-29 NOTE — THERAPY EVALUATION
Patient Name: Miryam Fernández  : 1955    MRN: [id number]                              Today's Date: 2024       Admit Date: 2024    Visit Dx:     ICD-10-CM ICD-9-CM   1. Acute cystitis with hematuria  N30.01 595.0   2. Confusion  R41.0 298.9   3. Decreased appetite  R63.0 783.0   4. Weakness  R53.1 780.79   5. Nephritis  N05.9 583.9     Patient Active Problem List   Diagnosis    Obstructive sleep apnea, adult    Tobacco use    Normocytic anemia    Meniere's disease    Lung nodule    Hilar lymphadenopathy    Hyperlipidemia    Heartburn    Gastroesophageal reflux disease    Dysphagia    Chronic obstructive pulmonary disease    CAD (coronary artery disease)    Bruises easily    Chronic pain    Small cell lung cancer    Carotid stenosis    Coronary artery disease    Primary hypertension    Long term (current) use of opiate analgesic    DDD (degenerative disc disease), lumbar    Screening mammogram for breast cancer    Preventative health care    Vitamin D deficiency    Need for influenza vaccination    Moderate episode of recurrent major depressive disorder    Need for vaccination against Streptococcus pneumoniae    Frequent falls    Weakness    Nasal congestion    Memory loss    Localized swelling of both lower legs    Meniere disease, bilateral    Abdominal aortic aneurysm (AAA) without rupture    Thoracic aortic aneurysm without rupture    Plantar wart    Screening for osteoporosis    Asymptomatic menopause    Screening for malignant neoplasm of colon    Skin tear of left forearm without complication    Encounter for subsequent annual wellness visit (AWV) in Medicare patient    Acute cystitis     Past Medical History:   Diagnosis Date    Cancer     lung cancer    Carotid stenosis     COPD (chronic obstructive pulmonary disease)     Coronary artery disease     Esophageal stricture     Dr Domingo    Hyperlipidemia     Pulmonary embolism     seen at [facility], fluid around her heart at the time-right lung      Past Surgical History:   Procedure Laterality Date    ANGIOPLASTY / STENTING FEMORAL      CARDIAC CATHETERIZATION      CAROTID STENT      CORONARY STENT PLACEMENT      ESOPHAGOSCOPY / EGD      HYSTERECTOMY        General Information       [facility] Name 06/29/24 1911          Physical Therapy Time and Intention    Document Type evaluation  -     Mode of Treatment individual therapy;physical therapy  -[facility] Name 06/29/24 1911          General Information    Prior Level of Function independent:;all household mobility;ADL's  -EL       Row Name 06/29/24 1911          Living Environment    People in Home spouse  -[facility] Name 06/29/24 [facility]          Home Main Entrance    Number of Stairs, Main Entrance none  -EL       Row Name 06/29/24 1911          Stairs Within Home, Primary    Number of Stairs, Within Home, Primary none  -[facility] Name 06/29/24 1911          Cognition    Orientation Status (Cognition) oriented x 3;disoriented to;time  -[facility] Name 06/29/24 1911          Safety Issues, Functional Mobility    Impairments Affecting Function (Mobility) balance;endurance/activity tolerance;strength;cognition  -     Cognitive Impairments, Mobility Safety/Performance awareness, need for assistance;insight into deficits/self-awareness;problem-solving/reasoning;judgment  -               User Key  (r) = Recorded By, (t) = Taken By, (c) = Cosigned By      Initials Name Provider Type    EL Ronaldo Flores PT Physical Therapist                   Mobility       Row Name 06/29/24 1911          Bed Mobility    Bed Mobility bed mobility (all) activities  -     All Activities, [place] (Bed Mobility) minimum assist (75% patient effort)  -EL       Row Name 06/29/24 1911          Sit-Stand Transfer    Sit-Stand [place] (Transfers) minimum assist (75% patient effort)  -EL     Assistive Device (Sit-Stand Transfers) walker, front-wheeled  -[facility] Name 06/29/24 1911          Gait/Stairs (Locomotion)     [place] Level (Gait) contact guard;minimum assist (75% patient effort)  -EL     Assistive Device (Gait) walker, front-wheeled  -EL     Distance in Feet (Gait) 50  -EL     Deviations/Abnormal Patterns (Gait) gait speed decreased  -EL     Bilateral Gait Deviations forward flexed posture  -EL     Comment, (Gait/Stairs) reliant on RWx, impaired balance. High falls risk  -EL               User Key  (r) = Recorded By, (t) = Taken By, (c) = Cosigned By      Initials Name Provider Type    Ronaldo Samson PT Physical Therapist                   Obj/Interventions       Row Name 06/29/24 1912          Range of Motion Comprehensive    General Range of Motion bilateral lower extremity ROM WFL  -EL       Row Name 06/29/24 1912          Strength Comprehensive (MMT)    General Manual Muscle Testing (MMT) Assessment lower extremity strength deficits identified  -EL     Comment, General Manual Muscle Testing (MMT) Assessment BLE 3+/5 gross  -EL       Row Name 06/29/24 1912          Sensory Assessment (Somatosensory)    Sensory Assessment (Somatosensory) sensation intact  -EL               User Key  (r) = Recorded By, (t) = Taken By, (c) = Cosigned By      Initials Name Provider Type    Ronaldo Samson, JANNET Physical Therapist                   Goals/Plan       Row Name 06/29/24 1916          Bed Mobility Goal 1 (PT)    Activity/Assistive Device (Bed Mobility Goal 1, PT) bed mobility activities, all  -EL     [place] Level/Cues Needed (Bed Mobility Goal 1, PT) modified independence  -EL     Time Frame (Bed Mobility Goal 1, PT) long term goal (LTG);2 weeks  -       Row Name 06/29/24 1916          Transfer Goal 1 (PT)    Activity/Assistive Device (Transfer Goal 1, PT) transfers, all;walker, rolling  -EL     [place] Level/Cues Needed (Transfer Goal 1, PT) modified independence  -EL     Time Frame (Transfer Goal 1, PT) long term goal (LTG);2 weeks  -EL       Row Name 06/29/24 1916          Gait Training Goal 1 (PT)     Activity/Assistive Device (Gait Training Goal 1, PT) gait (walking locomotion);walker, rolling  -EL     [place] Level (Gait Training Goal 1, PT) modified independence  -EL     Distance (Gait Training Goal 1, PT) 200  -EL     Time Frame (Gait Training Goal 1, PT) long term goal (LTG);2 weeks  -EL       Row Name 06/29/24 1916          Therapy Assessment/Plan (PT)    Planned Therapy Interventions (PT) balance training;bed mobility training;gait training;transfer training;strengthening;neuromuscular re-education  -EL               User Key  (r) = Recorded By, (t) = Taken By, (c) = Cosigned By      Initials Name Provider Type    Ronaldo Samson, PT Physical Therapist                   Clinical Impression       Row Name 06/29/24 1913          Pain    Pretreatment Pain Rating 0/10 - no pain  -EL     Posttreatment Pain Rating 0/10 - no pain  -EL       Row Name 06/29/24 1913          Plan of Care Review    Plan of Care Reviewed With patient;spouse;daughter  -EL     Outcome Evaluation Pt is a 68 YO F admitted with decreased appetite, decreased want to smoke, constipation, and confusion. Pt spouse states that this has been going on for 3 days prior to admission. Pt lives at home with spouse, who works during the day, is independent with ambulation using rollator and has had multiple falls. Pt this date with mild confusion, but spouse states this is near baseline. Pt with global weakness and impaired balance when ambluating wiht RWx. Pt appears below baseline, remains a high falls risk and does not appear safe to return home at this time, recommendaiton is SNF at d/c. Pt to continue to follow.  -EL       Row Name 06/29/24 1913          Therapy Assessment/Plan (PT)    Rehab Potential (PT) good, to achieve stated therapy goals  -EL     Criteria for Skilled Interventions Met (PT) yes  -EL     Therapy Frequency (PT) 5 times/wk  -EL     Predicted Duration of Therapy Intervention (PT) Until d/c  -EL       Row Name 06/29/24 1913           Vital Signs    O2 Delivery Pre Treatment room air  -EL     O2 Delivery Intra Treatment room air  -EL     O2 Delivery Post Treatment room air  -EL     Pre Patient Position Supine  -EL     Intra Patient Position Standing  -EL     Post Patient Position Supine  -EL       Row Name 06/29/24 1913          Positioning and Restraints    Pre-Treatment Position in bed  -EL     Post Treatment Position bed  -EL     In Bed notified nsg;supine;call light within reach;encouraged to call for assist;exit alarm on  -EL               User Key  (r) = Recorded By, (t) = Taken By, (c) = Cosigned By      Initials Name Provider Type    Ronaldo Samson PT Physical Therapist                   Outcome Measures       Row Name 06/29/24 1916 06/29/24 0840       How much help from another person do you currently need...    Turning from your back to your side while in flat bed without using bedrails? 3  -EL 3  -OLAF (r)  MB (c)    Moving from lying on back to sitting on the side of a flat bed without bedrails? 3  -EL 2  -OLAF (r)  MB (c)    Moving to and from a bed to a chair (including a wheelchair)? 3  -EL 3  -OLAF (r)  MB (c)    Standing up from a chair using your arms (e.g., wheelchair, bedside chair)? 3  -EL 3  -OLAF (r)  MB (c)    Climbing 3-5 steps with a railing? 2  -EL 1  -OLAF (r)  MB (c)    To walk in hospital room? 3  -EL 2  -OLAF (r)  MB (c)    AM-PAC 6 Clicks Score (PT) 17  -EL 14  -OLAF    Highest Level of Mobility Goal 5 --> Static standing  -EL 4 --> Transfer to chair/commode  -OLAF      Row Name 06/29/24 1916          Functional Assessment    Outcome Measure Options AM-PAC 6 Clicks Basic Mobility (PT)  -EL               User Key  (r) = Recorded By, (t) = Taken By, (c) = Cosigned By      Initials Name Provider Type    Ronaldo Samson PT Physical Therapist    Brenda Winchester LPN Licensed Nurse    Cheryl Galicia LPN Licensed Nurse                                 Physical Therapy Education       Title: PT OT SLP Therapies (Done)        Topic: Physical Therapy (Done)       Point: Mobility training (Done)       Learning Progress Summary             Patient Acceptance, E,TB, VU by  at 6/29/2024 1917                         Point: Precautions (Done)       Learning Progress Summary             Patient Acceptance, E,TB, VU by  at 6/29/2024 1917                                         User Key       Initials Effective Dates Name Provider Type Discipline     06/23/20 -  Ronaldo Flores, PT Physical Therapist PT                  PT Recommendation and Plan  Planned Therapy Interventions (PT): balance training, bed mobility training, gait training, transfer training, strengthening, neuromuscular re-education  Plan of Care Reviewed With: patient, spouse, daughter  Outcome Evaluation: Pt is a 68 YO F admitted with decreased appetite, decreased want to smoke, constipation, and confusion. Pt spouse states that this has been going on for 3 days prior to admission. Pt lives at home with spouse, who works during the day, is independent with ambulation using rollator and has had multiple falls. Pt this date with mild confusion, but spouse states this is near baseline. Pt with global weakness and impaired balance when ambluating wiht RWx. Pt appears below baseline, remains a high falls risk and does not appear safe to return home at this time, recommendaiton is SNF at d/c. Pt to continue to follow.     Time Calculation:   PT Evaluation Complexity  History, PT Evaluation Complexity: 1-2 personal factors and/or comorbidities  Examination of Body Systems (PT Eval Complexity): total of 3 or more elements  Clinical Presentation (PT Evaluation Complexity): evolving  Clinical Decision Making (PT Evaluation Complexity): moderate complexity  Overall Complexity (PT Evaluation Complexity): moderate complexity     PT Charges       Row Name 06/29/24 1917             Time Calculation    Start Time 1514  -EL      Stop Time 1535  -EL      Time Calculation (min) 21 min  -EL      PT  Received On 06/29/24  -MARIAH      PT - Next Appointment 07/01/24  -MARIAH      PT Goal Re-Cert Due Date 07/13/24  -MARIAH                User Key  (r) = Recorded By, (t) = Taken By, (c) = Cosigned By      Initials Name Provider Type    Ronaldo Samson, JANNET Physical Therapist                  Therapy Charges for Today       Code Description Service Date Service Provider Modifiers Qty    [id number] HC PT EVAL MOD COMPLEXITY 4 6/29/2024 Ronaldo Flores, PT GP 1            PT G-Codes  Outcome Measure Options: AM-PAC 6 Clicks Basic Mobility (PT)  AM-PAC 6 Clicks Score (PT): 17  PT Discharge Summary  Anticipated Discharge Disposition (PT): skilled nursing facility    Ronaldo Flores PT  6/29/2024

## 2024-06-30 ENCOUNTER — READMISSION MANAGEMENT (OUTPATIENT)
Dept: CALL CENTER | Facility: HOSPITAL | Age: 69
End: 2024-06-30
Payer: MEDICARE

## 2024-06-30 VITALS
WEIGHT: 125.44 LBS | BODY MASS INDEX: 22.23 KG/M2 | RESPIRATION RATE: 21 BRPM | HEIGHT: 63 IN | OXYGEN SATURATION: 98 % | TEMPERATURE: 97.9 F | DIASTOLIC BLOOD PRESSURE: 67 MMHG | HEART RATE: 74 BPM | SYSTOLIC BLOOD PRESSURE: 109 MMHG

## 2024-06-30 LAB
ALBUMIN SERPL-MCNC: 3 G/DL (ref 3.5–5.2)
ALBUMIN/GLOB SERPL: 1.1 G/DL
ALP SERPL-CCNC: 80 U/L (ref 39–117)
ALT SERPL W P-5'-P-CCNC: 6 U/L (ref 1–33)
ANION GAP SERPL CALCULATED.3IONS-SCNC: 8.4 MMOL/L (ref 5–15)
AST SERPL-CCNC: 21 U/L (ref 1–32)
BACTERIA SPEC AEROBE CULT: ABNORMAL
BASOPHILS # BLD AUTO: 0.02 10*3/MM3 (ref 0–0.2)
BASOPHILS NFR BLD AUTO: 0.5 % (ref 0–1.5)
BILIRUB SERPL-MCNC: 0.3 MG/DL (ref 0–1.2)
BUN SERPL-MCNC: 18 MG/DL (ref 8–23)
BUN/CREAT SERPL: 18.6 (ref 7–25)
CALCIUM SPEC-SCNC: 8.6 MG/DL (ref 8.6–10.5)
CHLORIDE SERPL-SCNC: 107 MMOL/L (ref 98–107)
CO2 SERPL-SCNC: 21.6 MMOL/L (ref 22–29)
CREAT SERPL-MCNC: 0.97 MG/DL (ref 0.57–1)
DEPRECATED RDW RBC AUTO: 48.1 FL (ref 37–54)
EGFRCR SERPLBLD CKD-EPI 2021: 64.2 ML/MIN/1.73
EOSINOPHIL # BLD AUTO: 0.12 10*3/MM3 (ref 0–0.4)
EOSINOPHIL NFR BLD AUTO: 2.9 % (ref 0.3–6.2)
ERYTHROCYTE [DISTWIDTH] IN BLOOD BY AUTOMATED COUNT: 13.8 % (ref 12.3–15.4)
GLOBULIN UR ELPH-MCNC: 2.7 GM/DL
GLUCOSE SERPL-MCNC: 97 MG/DL (ref 65–99)
HCT VFR BLD AUTO: 33.1 % (ref 34–46.6)
HGB BLD-MCNC: 10.7 G/DL (ref 12–15.9)
IMM GRANULOCYTES # BLD AUTO: 0.02 10*3/MM3 (ref 0–0.05)
IMM GRANULOCYTES NFR BLD AUTO: 0.5 % (ref 0–0.5)
LYMPHOCYTES # BLD AUTO: 0.25 10*3/MM3 (ref 0.7–3.1)
LYMPHOCYTES NFR BLD AUTO: 6 % (ref 19.6–45.3)
MCH RBC QN AUTO: 30.6 PG (ref 26.6–33)
MCHC RBC AUTO-ENTMCNC: 32.3 G/DL (ref 31.5–35.7)
MCV RBC AUTO: 94.6 FL (ref 79–97)
MONOCYTES # BLD AUTO: 0.41 10*3/MM3 (ref 0.1–0.9)
MONOCYTES NFR BLD AUTO: 9.8 % (ref 5–12)
NEUTROPHILS NFR BLD AUTO: 3.35 10*3/MM3 (ref 1.7–7)
NEUTROPHILS NFR BLD AUTO: 80.3 % (ref 42.7–76)
NRBC BLD AUTO-RTO: 0 /100 WBC (ref 0–0.2)
PLATELET # BLD AUTO: 152 10*3/MM3 (ref 140–450)
PMV BLD AUTO: 10.5 FL (ref 6–12)
POTASSIUM SERPL-SCNC: 3.9 MMOL/L (ref 3.5–5.2)
PROT SERPL-MCNC: 5.7 G/DL (ref 6–8.5)
RBC # BLD AUTO: 3.5 10*6/MM3 (ref 3.77–5.28)
SODIUM SERPL-SCNC: 137 MMOL/L (ref 136–145)
WBC NRBC COR # BLD AUTO: 4.17 10*3/MM3 (ref 3.4–10.8)

## 2024-06-30 PROCEDURE — 25810000003 SODIUM CHLORIDE 0.9 % SOLUTION: Performed by: NURSE PRACTITIONER

## 2024-06-30 PROCEDURE — 80053 COMPREHEN METABOLIC PANEL: CPT | Performed by: NURSE PRACTITIONER

## 2024-06-30 PROCEDURE — 25010000002 CEFTRIAXONE PER 250 MG: Performed by: NURSE PRACTITIONER

## 2024-06-30 PROCEDURE — 85025 COMPLETE CBC W/AUTO DIFF WBC: CPT | Performed by: NURSE PRACTITIONER

## 2024-06-30 RX ORDER — CEPHALEXIN 500 MG/1
500 CAPSULE ORAL 3 TIMES DAILY
Qty: 12 CAPSULE | Refills: 0 | Status: SHIPPED | OUTPATIENT
Start: 2024-06-30 | End: 2024-07-04

## 2024-06-30 RX ADMIN — PANTOPRAZOLE SODIUM 40 MG: 40 TABLET, DELAYED RELEASE ORAL at 07:33

## 2024-06-30 RX ADMIN — Medication 10 ML: at 07:34

## 2024-06-30 RX ADMIN — Medication 1 PATCH: at 07:34

## 2024-06-30 RX ADMIN — METOPROLOL SUCCINATE 25 MG: 25 TABLET, EXTENDED RELEASE ORAL at 07:33

## 2024-06-30 RX ADMIN — SODIUM CHLORIDE 75 ML/HR: 9 INJECTION, SOLUTION INTRAVENOUS at 04:47

## 2024-06-30 RX ADMIN — MEMANTINE 5 MG: 5 TABLET ORAL at 07:33

## 2024-06-30 RX ADMIN — TICAGRELOR 60 MG: 60 TABLET ORAL at 07:33

## 2024-06-30 RX ADMIN — MUPIROCIN 1 APPLICATION: 20 OINTMENT TOPICAL at 07:34

## 2024-06-30 RX ADMIN — CEFTRIAXONE 1000 MG: 1 INJECTION, POWDER, FOR SOLUTION INTRAMUSCULAR; INTRAVENOUS at 14:03

## 2024-06-30 NOTE — DISCHARGE SUMMARY
"             [facility] Medicine Services  Discharge Summary    Date of Service: 2024  Patient Name: Miryam Fernández  : 1955  MRN: [id number]    Date of Admission: 2024  Discharge Diagnosis:     Metabolic encephalopathy due to UTI, improving  Anemia  Poor oral intake, probable malnutrition    Date of Discharge:  2024  Primary Care Physician: Lashell De La Torre APRN      Presenting Problem:   Acute cystitis [N30.00]  Confusion [R41.0]  Weakness [R53.1]  Decreased appetite [R63.0]  Nephritis [N05.9]  Acute cystitis with hematuria [N30.01]    Active and Resolved Hospital Problems:  Active Hospital Problems    Diagnosis POA    **Acute cystitis [N30.00] Yes      Resolved Hospital Problems   No resolved problems to display.         Hospital Course     HPI:  Per the H&P written by Carmen Tomas, dated 2024:    \"Miryam Fernández is a 69 y.o. female with a CMH of lung cancer, currently in remission, GERD, CAD with stent placement, MORA, memory loss, AAA who presented to [facility] on 2024 with generalized weakness, and confusion.  Daughter who is at bedside states the patient has had decreased p.o. intake since her cancer diagnosis, however has had no p.o. intake for the last 2 days.  Daughter also states that patient has not smoked any cigarettes in the last 2 days, which is very uncommon for patient.  Daughter states that patient typically has a little bit of confusion at baseline, however has increasingly worse over the last 2 days.  Patient did have a fall about a week ago which she was seen at her primary care doctor's office for..     While in the emergency department labs were remarkable for troponin of 44, 48 respectively.  proBNP 1541 glucose of 129, alk phos 120, BUNs/creatinine 30/1.37.  WBC within normal limits, patient has left shift.  Urinalysis was noted for: Dark and cloudy urine, positive blood nitrites ketones, RBCs, and WBCs with 4+ bacteria in urine   " "  Patient was given IV fluids, started on Rocephin.\"    Hospital Course:  Patient remained stable, afebrile during inpatient stay. Mentation has gradually improved since admitted and UTI treatment started. Her urine culture grew pansensitive E coli. She completed 3 days of ceftriaxone. Will be discharged on 4 more days PO keflex to complete 7 days AB course. Patient's daughter is not interested in placement in SNF at present. She agrees with taking patient home. Stable for discharge at present.         Reasons For Change In Medications and Indications for New Medications:    Start PO keflex 500 mg q 8 hours for 4 more days.     Day of Discharge     Vital Signs:  Temp:  [97.5 °F (36.4 °C)-98 °F (36.7 °C)] 97.9 °F (36.6 °C)  Heart Rate:  [73-83] 74  Resp:  [17-23] 21  BP: (109-142)/(67-78) 109/67    Physical Exam  Constitutional:       General: She is not in acute distress.     Appearance: Normal appearance.   HENT:      Head: Normocephalic.      Nose: Nose normal.      Mouth/Throat:      Mouth: Mucous membranes are moist. Mucous membranes are dry.   Eyes:      Extraocular Movements: Extraocular movements intact.      Pupils: Pupils are equal, round, and reactive to light.   Cardiovascular:      Rate and Rhythm: Normal rate and regular rhythm.      Pulses: Normal pulses.      Heart sounds: Normal heart sounds.   Pulmonary:      Effort: Pulmonary effort is normal.      Breath sounds: Normal breath sounds.   Abdominal:      General: Abdomen is flat.      Palpations: Abdomen is soft.   Musculoskeletal:         General: Normal range of motion.      Cervical back: Neck supple.   Skin:     General: Skin is warm.      Capillary Refill: Capillary refill takes less than 2 seconds.   Neurological:      General: No focal deficit present.      Mental Status: She is alert and oriented to person, place, and time.   Psychiatric:         Behavior: Behavior normal.       Pertinent  and/or Most Recent Results     LAB RESULTS:      Lab " 06/30/24  0029 06/29/24  0438 06/28/24  1117   WBC 4.17 6.25 8.85   HEMOGLOBIN 10.7* 11.3* 13.7   HEMATOCRIT 33.1* 34.6 42.6   PLATELETS 152 169 200   NEUTROS ABS 3.35 5.67 8.39*   IMMATURE GRANS (ABS) 0.02 0.02 0.04   LYMPHS ABS 0.25* 0.14* 0.10*   MONOS ABS 0.41 0.41 0.30   EOS ABS 0.12 0.00 0.00   MCV 94.6 94.8 95.3         Lab 06/30/24  0029 06/29/24  1506 06/29/24  0438 06/28/24  1117   SODIUM 137  --  139 140   POTASSIUM 3.9 4.5 3.4* 4.2   CHLORIDE 107  --  106 98   CO2 21.6*  --  24.0 26.9   ANION GAP 8.4  --  9.0 15.1*   BUN 18  --  27* 34*   CREATININE 0.97  --  1.09* 1.37*   EGFR 64.2  --  55.8* 42.4*   GLUCOSE 97  --  116* 129*   CALCIUM 8.6  --  8.9 10.1         Lab 06/30/24  0029 06/29/24  0438 06/28/24  1117   TOTAL PROTEIN 5.7* 6.0 8.0   ALBUMIN 3.0* 3.2* 4.2   GLOBULIN 2.7 2.8 3.8   ALT (SGPT) 6 <5 <5   AST (SGOT) 21 16 13   BILIRUBIN 0.3 0.4 0.7   ALK PHOS 80 87 120*   LIPASE  --   --  13         Lab 06/28/24  1401 06/28/24  1117   PROBNP  --  1,541.0*   HSTROP T 40* 44*                 Brief Urine Lab Results  (Last result in the past 365 days)        Color   Clarity   Blood   Leuk Est   Nitrite   Protein   CREAT   Urine HCG        06/28/24 1118 Dark Yellow   Cloudy   Large (3+)   Moderate (2+)   Positive   100 mg/dL (2+)                 Microbiology Results (last 10 days)       Procedure Component Value - Date/Time    Blood Culture - Blood, Hand, Right [[id number]]  (Normal) Collected: 06/28/24 1401    Lab Status: Preliminary result Specimen: Blood from Hand, Right Updated: 06/29/24 1415     Blood Culture No growth at 24 hours    Narrative:      Less than seven (7) mL's of blood was collected.  Insufficient quantity may yield false negative results.    Blood Culture - Blood, Arm, Right [[id number]]  (Normal) Collected: 06/28/24 1401    Lab Status: Preliminary result Specimen: Blood from Arm, Right Updated: 06/29/24 1415     Blood Culture No growth at 24 hours    Respiratory Panel PCR  w/COVID-19(SARS-CoV-2) [facility]/PAD/[facility]/KOLE In-House, NP Swab in UTM/VTM, 2 HR TAT - Swab, Nasopharynx [[id number]]  (Normal) Collected: 06/28/24 1125    Lab Status: Final result Specimen: Swab from Nasopharynx Updated: 06/28/24 1226     ADENOVIRUS, PCR Not Detected     Coronavirus 229E Not Detected     Coronavirus HKU1 Not Detected     Coronavirus NL63 Not Detected     Coronavirus OC43 Not Detected     COVID19 Not Detected     Human Metapneumovirus Not Detected     Human Rhinovirus/Enterovirus Not Detected     Influenza A PCR Not Detected     Influenza B PCR Not Detected     Parainfluenza Virus 1 Not Detected     Parainfluenza Virus 2 Not Detected     Parainfluenza Virus 3 Not Detected     Parainfluenza Virus 4 Not Detected     RSV, PCR Not Detected     Bordetella pertussis pcr Not Detected     Bordetella parapertussis PCR Not Detected     Chlamydophila pneumoniae PCR Not Detected     Mycoplasma pneumo by PCR Not Detected    Narrative:      In the setting of a positive respiratory panel with a viral infection PLUS a negative procalcitonin without other underlying concern for bacterial infection, consider observing off antibiotics or discontinuation of antibiotics and continue supportive care. If the respiratory panel is positive for atypical bacterial infection (Bordetella pertussis, Chlamydophila pneumoniae, or Mycoplasma pneumoniae), consider antibiotic de-escalation to target atypical bacterial infection.    Urine Culture - Urine, Urine, Clean Catch [[id number]]  (Abnormal)  (Susceptibility) Collected: 06/28/24 1118    Lab Status: Final result Specimen: Urine, Clean Catch Updated: 06/30/24 0944     Urine Culture >100,000 CFU/mL Escherichia coli    Narrative:      Colonization of the urinary tract without infection is common. Treatment is discouraged unless the patient is symptomatic, pregnant, or undergoing an invasive urologic procedure.    Susceptibility        Escherichia coli      AARON      Amoxicillin +  Clavulanate Susceptible      Ampicillin Susceptible      Ampicillin + Sulbactam Susceptible      Cefazolin Susceptible      Cefepime Susceptible      Ceftazidime Susceptible      Ceftriaxone Susceptible      Gentamicin Susceptible      Levofloxacin Susceptible      Nitrofurantoin Susceptible      Piperacillin + Tazobactam Susceptible      Trimethoprim + Sulfamethoxazole Susceptible                                   CT Abdomen Pelvis With Contrast    Result Date: 6/28/2024  Impression: Impression: 1. Heterogeneous diminished enhancement in the right kidney. Correlate for symptoms of nephritis. 2. 2 mm nonobstructing stone in the right upper renal pole. 3. Stable 5 mm right lower lobe and 3 mm left lower lobe noncalcified nodules. According to previous report, these nodules have been stable since 2021, suggesting benign etiology. 4. 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 Suspected stenosis of the right renal artery ostium again noted. 5. Fusiform aneurysm of the distal descending thoracic aorta and infrarenal abdominal aorta, unchanged Electronically Signed: Demetrice Crowell MD  6/28/2024 1:25 PM EDT  Workstation ID: [id number]    XR Chest 1 View    Result Date: 6/28/2024  Impression: Impression: No acute cardiopulmonary findings. Electronically Signed: Demetrice Crowell MD  6/28/2024 11:45 AM EDT  Workstation ID: [id number]             Results for orders placed during the hospital encounter of 06/30/22    Adult Transthoracic Echo Complete W/ Cont if Necessary Per Protocol    Interpretation Summary  · Left ventricular wall thickness is consistent with mild concentric hypertrophy.  · There is a small (<1cm) pericardial effusion.  · Estimated right ventricular systolic pressure from tricuspid regurgitation is normal (<35 mmHg).  · Estimated left ventricular EF = 50% Left ventricular systolic function is normal.  · Left ventricular diastolic function is consistent with (grade I) impaired  relaxation.      Labs Pending at Discharge:  Pending Labs       Order Current Status    Blood Culture - Blood, Arm, Right Preliminary result    Blood Culture - Blood, Hand, Right Preliminary result            Procedures Performed           Consults:   Consults       Date and Time Order Name Status Description    6/28/2024  1:30 PM Hospitalist (on-call MD unless specified)                Discharge Details        Discharge Medications        New Medications        Instructions Start Date   cephalexin 500 MG capsule  Commonly known as: Keflex   500 mg, Oral, 3 Times Daily             Continue These Medications        Instructions Start Date   atorvastatin 40 MG tablet  Commonly known as: LIPITOR   40 mg, Oral, Daily      furosemide 20 MG tablet  Commonly known as: LASIX   20 mg, Oral, As Needed, PRN for swelling       memantine 5 MG tablet  Commonly known as: Namenda   5 mg, Oral, 2 Times Daily      metoprolol succinate XL 25 MG 24 hr tablet  Commonly known as: TOPROL-XL   25 mg, Oral, Daily      mupirocin 2 % ointment  Commonly known as: BACTROBAN   1 Application, Topical, 3 Times Daily      oxyCODONE-acetaminophen 5-325 MG per tablet  Commonly known as: PERCOCET   1 tablet, Oral, Every 6 Hours PRN      pantoprazole 40 MG EC tablet  Commonly known as: PROTONIX   1 tablet, Oral, Daily      ticagrelor 60 MG tablet tablet  Commonly known as: Brilinta   60 mg, Oral, 2 Times Daily               Allergies   Allergen Reactions    Albuterol Shortness Of Breath     Pt states she has sensitivity to albuterol.  She states it causes her to be SOA.    Hydrocodone Palpitations    Sulfa Antibiotics Swelling    Bacitracin Other (See Comments)    Benzalkonium Chloride Hives    Codeine Other (See Comments)    Neomycin Other (See Comments)    Polymyxin B Other (See Comments)    Nickel Rash    Penicillins Rash         Discharge Disposition:   Home or Self Care    Diet:  Hospital:  Diet Order   Procedures    Diet: Cardiac; Healthy Heart  (2-3 Na+); Fluid Consistency: Thin (IDDSI 0)         Discharge Activity:         CODE STATUS:  Code Status and Medical Interventions:   Ordered at: 06/28/24 1507     Level Of Support Discussed With:    Patient    Next of Kin (If No Surrogate)     Code Status (Patient has no pulse and is not breathing):    CPR (Attempt to Resuscitate)     Medical Interventions (Patient has pulse or is breathing):    Full Support         Future Appointments   Date Time Provider Department Center   7/3/2024  8:30 AM [facility] DEXA 1  [facility] D[facility]A [facility]   8/16/2024  8:30 AM Sonia Colvin APRN [facility] PODIATRY [facility]   9/10/2024 10:30 AM LAB MD  [facility] ON[facility] LAB NA  [facility] ONAL [facility]   9/10/2024 10:30 AM Gunnar Moon MD [facility] ONC NA [facility]   9/24/2024 10:15 AM Lashell De La Torre APRN [facility] PC SB [facility]   9/30/2024 10:15 AM Nai Lorenzana MD [facility] CAR JFSC [facility]   10/23/2024  8:00 AM Kandice Tomlinson APRN [facility] NEURO NA [facility]   11/8/2024 10:00 AM [facility] VASC MACHINE 1  [facility] CARDI [facility]   11/8/2024 10:45 AM [facility] VASC MACHINE 4  [facility] CARDI [facility]   11/13/2024 10:45 AM [facility] VASC MACHINE 1  [facility] CARDI [facility]   11/15/2024  8:30 AM [facility] VASC MACHINE 4  [facility] CARDI [facility]   11/18/2024 12:15 PM Dev Landrum MD [facility] VS [facility]           Time spent on Discharge including face to face service:  >30 minutes    Signature: Electronically signed by Carlos Llanes Alvarez, MD, 06/30/24, 14:11 EDT.  [facility] Hospitalist Team

## 2024-06-30 NOTE — OUTREACH NOTE
Prep Survey      Flowsheet Row Responses   [facility] [place] patient discharged from? [place]   Is LACE score < 7 ? No   Eligibility [facility]   Date of Admission 06/28/24   Date of Discharge 06/30/24   Discharge Disposition Home or Self Care   Discharge diagnosis Acute cystitis   Does the patient have one of the following disease processes/diagnoses(primary or secondary)? Other   Does the patient have Home health ordered? No   Is there a DME ordered? No   Medication alerts for this patient Karine   Prep survey completed? Yes            Yudelka ALLEN - Registered Nurse

## 2024-06-30 NOTE — CASE MANAGEMENT/SOCIAL WORK
Case Management Discharge Note      Final Note: home.  pt's daughter declined SNF    Provided Post Acute Provider List?: Yes  Post Acute Provider List: Home Health, Nursing Home  Delivered To: Support Person  Support Person: shelton Hooks  Method of Delivery: In person            Transportation Services  Private: Car    Final Discharge Disposition Code: 01 - home or self-care

## 2024-07-01 ENCOUNTER — TRANSITIONAL CARE MANAGEMENT TELEPHONE ENCOUNTER (OUTPATIENT)
Dept: CALL CENTER | Facility: HOSPITAL | Age: 69
End: 2024-07-01
Payer: MEDICARE

## 2024-07-01 NOTE — OUTREACH NOTE
Call Center TCM Note      Flowsheet Row Responses   [facility] [place] patient discharged from? [facility]   Does the patient have one of the following disease processes/diagnoses(primary or secondary)? Other   TCM attempt successful? No   Unsuccessful attempts Attempt 2            Eve Reyes RN    7/1/2024, 16:49 EDT

## 2024-07-01 NOTE — OUTREACH NOTE
Call Center TCM Note      Flowsheet Row Responses   [facility] patient discharged from? [facility]   Does the patient have one of the following disease processes/diagnoses(primary or secondary)? Other   TCM attempt successful? No   Unsuccessful attempts Attempt 1            Eve Reyes RN    7/1/2024, 10:49 EDT

## 2024-07-02 ENCOUNTER — TRANSITIONAL CARE MANAGEMENT TELEPHONE ENCOUNTER (OUTPATIENT)
Dept: CALL CENTER | Facility: HOSPITAL | Age: 69
End: 2024-07-02
Payer: MEDICARE

## 2024-07-02 NOTE — OUTREACH NOTE
Call Center TCM Note      Flowsheet Row Responses   [facility] facility patient discharged from? [facility]   Does the patient have one of the following disease processes/diagnoses(primary or secondary)? Other   TCM attempt successful? No   Unsuccessful attempts Attempt 3            Shelly Land LPN    7/2/2024, 14:08 EDT

## 2024-07-03 ENCOUNTER — HOSPITAL ENCOUNTER (OUTPATIENT)
Dept: BONE DENSITY | Facility: HOSPITAL | Age: 69
Discharge: HOME | End: 2024-07-03
Admitting: NURSE PRACTITIONER
Payer: MEDICARE

## 2024-07-03 DIAGNOSIS — Z78.0 ASYMPTOMATIC MENOPAUSE: ICD-10-CM

## 2024-07-03 DIAGNOSIS — Z13.820 SCREENING FOR OSTEOPOROSIS: ICD-10-CM

## 2024-07-03 LAB
BACTERIA SPEC AEROBE CULT: NORMAL
BACTERIA SPEC AEROBE CULT: NORMAL

## 2024-07-03 PROCEDURE — 77080 DXA BONE DENSITY AXIAL: CPT

## 2024-07-16 RX ORDER — MEMANTINE HYDROCHLORIDE 5 MG/1
5 TABLET ORAL 2 TIMES DAILY
Qty: 180 TABLET | Refills: 1 | Status: ON HOLD | OUTPATIENT
Start: 2024-07-16

## 2024-07-17 ENCOUNTER — TELEPHONE (OUTPATIENT)
Dept: FAMILY MEDICINE CLINIC | Facility: CLINIC | Age: 69
End: 2024-07-17
Payer: MEDICARE

## 2024-07-17 NOTE — TELEPHONE ENCOUNTER
Daughter Neva called stating mother was recently in Hosp for UTI. Neva feels like she may still have the UTI. Patient is confused and decrease memory. No reported pain with urination and no fever. No appts available.    Phone Neva:[phone]

## 2024-07-18 ENCOUNTER — LAB (OUTPATIENT)
Dept: FAMILY MEDICINE CLINIC | Facility: CLINIC | Age: 69
End: 2024-07-18
Payer: MEDICARE

## 2024-07-18 ENCOUNTER — TELEPHONE (OUTPATIENT)
Dept: FAMILY MEDICINE CLINIC | Facility: CLINIC | Age: 69
End: 2024-07-18

## 2024-07-18 DIAGNOSIS — Z87.440 RECENT URINARY TRACT INFECTION: Primary | ICD-10-CM

## 2024-07-18 DIAGNOSIS — R30.0 DYSURIA: ICD-10-CM

## 2024-07-18 LAB
BILIRUB BLD-MCNC: NEGATIVE MG/DL
CLARITY, POC: ABNORMAL
COLOR UR: YELLOW
GLUCOSE UR STRIP-MCNC: NEGATIVE MG/DL
KETONES UR QL: NEGATIVE
LEUKOCYTE EST, POC: ABNORMAL
NITRITE UR-MCNC: POSITIVE MG/ML
PH UR: 7 [PH] (ref 5–8)
PROT UR STRIP-MCNC: NEGATIVE MG/DL
RBC # UR STRIP: ABNORMAL /UL
SP GR UR: 1.02 (ref 1–1.03)
UROBILINOGEN UR QL: NORMAL

## 2024-07-18 PROCEDURE — 81003 URINALYSIS AUTO W/O SCOPE: CPT | Performed by: NURSE PRACTITIONER

## 2024-07-18 PROCEDURE — 87086 URINE CULTURE/COLONY COUNT: CPT | Performed by: NURSE PRACTITIONER

## 2024-07-18 PROCEDURE — 87088 URINE BACTERIA CULTURE: CPT | Performed by: NURSE PRACTITIONER

## 2024-07-18 PROCEDURE — 87186 SC STD MICRODIL/AGAR DIL: CPT | Performed by: NURSE PRACTITIONER

## 2024-07-18 RX ORDER — NITROFURANTOIN 25; 75 MG/1; MG/1
100 CAPSULE ORAL 2 TIMES DAILY
Qty: 14 CAPSULE | Refills: 0 | Status: SHIPPED | OUTPATIENT
Start: 2024-07-18 | End: 2024-07-18 | Stop reason: SDUPTHER

## 2024-07-18 RX ORDER — NITROFURANTOIN 25; 75 MG/1; MG/1
100 CAPSULE ORAL 2 TIMES DAILY
Qty: 14 CAPSULE | Refills: 0 | Status: ON HOLD | OUTPATIENT
Start: 2024-07-18

## 2024-07-18 NOTE — TELEPHONE ENCOUNTER
Pharmacy Name: [facility] [address] [place] [address] - [address] [facility] [phone]  - [phone] FX     What medication are you calling in regards to: nitrofurantoin, macrocrystal-monohydrate, (Macrobid) 100 MG capsule     What question does the pharmacy have: [facility] DOES NOT HAVE IN STOCK. REQUESTING TRANSFERRED TO [facility]

## 2024-07-19 ENCOUNTER — HOSPITAL ENCOUNTER (INPATIENT)
Facility: HOSPITAL | Age: 69
LOS: 4 days | Discharge: NURSING, REHAB OR LONG-TERM CARE | DRG: 065 | End: 2024-07-23
Attending: INTERNAL MEDICINE | Admitting: INTERNAL MEDICINE
Payer: MEDICARE

## 2024-07-19 ENCOUNTER — APPOINTMENT (OUTPATIENT)
Dept: CT IMAGING | Facility: HOSPITAL | Age: 69
DRG: 065 | End: 2024-07-19
Payer: MEDICARE

## 2024-07-19 ENCOUNTER — APPOINTMENT (OUTPATIENT)
Dept: GENERAL RADIOLOGY | Facility: HOSPITAL | Age: 69
DRG: 065 | End: 2024-07-19
Payer: MEDICARE

## 2024-07-19 DIAGNOSIS — R41.82 ALTERED MENTAL STATUS, UNSPECIFIED ALTERED MENTAL STATUS TYPE: Primary | ICD-10-CM

## 2024-07-19 LAB
ALBUMIN SERPL-MCNC: 3.6 G/DL (ref 3.5–5.2)
ALBUMIN/GLOB SERPL: 1.1 G/DL
ALP SERPL-CCNC: 107 U/L (ref 39–117)
ALT SERPL W P-5'-P-CCNC: <5 U/L (ref 1–33)
AMMONIA BLD-SCNC: 15 UMOL/L (ref 11–51)
AMPHET+METHAMPHET UR QL: NEGATIVE
ANION GAP SERPL CALCULATED.3IONS-SCNC: 12.5 MMOL/L (ref 5–15)
AST SERPL-CCNC: 13 U/L (ref 1–32)
BACTERIA UR QL AUTO: ABNORMAL /HPF
BARBITURATES UR QL SCN: NEGATIVE
BASOPHILS # BLD AUTO: 0.02 10*3/MM3 (ref 0–0.2)
BASOPHILS # BLD AUTO: 0.02 10*3/MM3 (ref 0–0.2)
BASOPHILS NFR BLD AUTO: 0.2 % (ref 0–1.5)
BASOPHILS NFR BLD AUTO: 0.4 % (ref 0–1.5)
BENZODIAZ UR QL SCN: NEGATIVE
BILIRUB SERPL-MCNC: 0.9 MG/DL (ref 0–1.2)
BILIRUB UR QL STRIP: NEGATIVE
BUN SERPL-MCNC: 25 MG/DL (ref 8–23)
BUN/CREAT SERPL: 26 (ref 7–25)
CALCIUM SPEC-SCNC: 9.4 MG/DL (ref 8.6–10.5)
CANNABINOIDS SERPL QL: NEGATIVE
CHLORIDE SERPL-SCNC: 102 MMOL/L (ref 98–107)
CLARITY UR: CLEAR
CO2 SERPL-SCNC: 23.5 MMOL/L (ref 22–29)
COCAINE UR QL: NEGATIVE
COLOR UR: YELLOW
CREAT SERPL-MCNC: 0.96 MG/DL (ref 0.57–1)
DEPRECATED RDW RBC AUTO: 48.4 FL (ref 37–54)
DEPRECATED RDW RBC AUTO: 49.3 FL (ref 37–54)
EGFRCR SERPLBLD CKD-EPI 2021: 65 ML/MIN/1.73
EOSINOPHIL # BLD AUTO: 0.05 10*3/MM3 (ref 0–0.4)
EOSINOPHIL # BLD AUTO: 0.21 10*3/MM3 (ref 0–0.4)
EOSINOPHIL NFR BLD AUTO: 0.5 % (ref 0.3–6.2)
EOSINOPHIL NFR BLD AUTO: 4.2 % (ref 0.3–6.2)
ERYTHROCYTE [DISTWIDTH] IN BLOOD BY AUTOMATED COUNT: 14 % (ref 12.3–15.4)
ERYTHROCYTE [DISTWIDTH] IN BLOOD BY AUTOMATED COUNT: 14.2 % (ref 12.3–15.4)
ETHANOL UR QL: <0.01 %
GLOBULIN UR ELPH-MCNC: 3.2 GM/DL
GLUCOSE SERPL-MCNC: 114 MG/DL (ref 65–99)
GLUCOSE UR STRIP-MCNC: NEGATIVE MG/DL
HCT VFR BLD AUTO: 32.7 % (ref 34–46.6)
HCT VFR BLD AUTO: 37.2 % (ref 34–46.6)
HGB BLD-MCNC: 10.5 G/DL (ref 12–15.9)
HGB BLD-MCNC: 11.8 G/DL (ref 12–15.9)
HGB UR QL STRIP.AUTO: ABNORMAL
HYALINE CASTS UR QL AUTO: ABNORMAL /LPF
IMM GRANULOCYTES # BLD AUTO: 0.01 10*3/MM3 (ref 0–0.05)
IMM GRANULOCYTES # BLD AUTO: 0.04 10*3/MM3 (ref 0–0.05)
IMM GRANULOCYTES NFR BLD AUTO: 0.2 % (ref 0–0.5)
IMM GRANULOCYTES NFR BLD AUTO: 0.4 % (ref 0–0.5)
KETONES UR QL STRIP: NEGATIVE
LEUKOCYTE ESTERASE UR QL STRIP.AUTO: ABNORMAL
LYMPHOCYTES # BLD AUTO: 0.14 10*3/MM3 (ref 0.7–3.1)
LYMPHOCYTES # BLD AUTO: 0.31 10*3/MM3 (ref 0.7–3.1)
LYMPHOCYTES NFR BLD AUTO: 1.4 % (ref 19.6–45.3)
LYMPHOCYTES NFR BLD AUTO: 6.2 % (ref 19.6–45.3)
MCH RBC QN AUTO: 30.2 PG (ref 26.6–33)
MCH RBC QN AUTO: 30.3 PG (ref 26.6–33)
MCHC RBC AUTO-ENTMCNC: 31.7 G/DL (ref 31.5–35.7)
MCHC RBC AUTO-ENTMCNC: 32.1 G/DL (ref 31.5–35.7)
MCV RBC AUTO: 94.5 FL (ref 79–97)
MCV RBC AUTO: 95.1 FL (ref 79–97)
METHADONE UR QL SCN: NEGATIVE
MONOCYTES # BLD AUTO: 0.25 10*3/MM3 (ref 0.1–0.9)
MONOCYTES # BLD AUTO: 0.3 10*3/MM3 (ref 0.1–0.9)
MONOCYTES NFR BLD AUTO: 2.6 % (ref 5–12)
MONOCYTES NFR BLD AUTO: 6 % (ref 5–12)
NEUTROPHILS NFR BLD AUTO: 4.12 10*3/MM3 (ref 1.7–7)
NEUTROPHILS NFR BLD AUTO: 83 % (ref 42.7–76)
NEUTROPHILS NFR BLD AUTO: 9.17 10*3/MM3 (ref 1.7–7)
NEUTROPHILS NFR BLD AUTO: 94.9 % (ref 42.7–76)
NITRITE UR QL STRIP: NEGATIVE
NRBC BLD AUTO-RTO: 0 /100 WBC (ref 0–0.2)
NRBC BLD AUTO-RTO: 0 /100 WBC (ref 0–0.2)
OPIATES UR QL: NEGATIVE
OXYCODONE UR QL SCN: NEGATIVE
PH UR STRIP.AUTO: <=5 [PH] (ref 5–8)
PLATELET # BLD AUTO: 172 10*3/MM3 (ref 140–450)
PLATELET # BLD AUTO: 199 10*3/MM3 (ref 140–450)
PMV BLD AUTO: 10.3 FL (ref 6–12)
PMV BLD AUTO: 9.9 FL (ref 6–12)
POTASSIUM SERPL-SCNC: 4.2 MMOL/L (ref 3.5–5.2)
PROT SERPL-MCNC: 6.8 G/DL (ref 6–8.5)
PROT UR QL STRIP: ABNORMAL
RBC # BLD AUTO: 3.46 10*6/MM3 (ref 3.77–5.28)
RBC # BLD AUTO: 3.91 10*6/MM3 (ref 3.77–5.28)
RBC # UR STRIP: ABNORMAL /HPF
REF LAB TEST METHOD: ABNORMAL
SODIUM SERPL-SCNC: 138 MMOL/L (ref 136–145)
SP GR UR STRIP: 1.02 (ref 1–1.03)
SQUAMOUS #/AREA URNS HPF: ABNORMAL /HPF
TROPONIN T SERPL HS-MCNC: 29 NG/L
UROBILINOGEN UR QL STRIP: ABNORMAL
WBC # UR STRIP: ABNORMAL /HPF
WBC NRBC COR # BLD AUTO: 4.97 10*3/MM3 (ref 3.4–10.8)
WBC NRBC COR # BLD AUTO: 9.67 10*3/MM3 (ref 3.4–10.8)

## 2024-07-19 PROCEDURE — 80307 DRUG TEST PRSMV CHEM ANLYZR: CPT | Performed by: INTERNAL MEDICINE

## 2024-07-19 PROCEDURE — P9612 CATHETERIZE FOR URINE SPEC: HCPCS

## 2024-07-19 PROCEDURE — 85025 COMPLETE CBC W/AUTO DIFF WBC: CPT

## 2024-07-19 PROCEDURE — 36415 COLL VENOUS BLD VENIPUNCTURE: CPT

## 2024-07-19 PROCEDURE — 85025 COMPLETE CBC W/AUTO DIFF WBC: CPT | Performed by: INTERNAL MEDICINE

## 2024-07-19 PROCEDURE — 70450 CT HEAD/BRAIN W/O DYE: CPT

## 2024-07-19 PROCEDURE — 87086 URINE CULTURE/COLONY COUNT: CPT | Performed by: INTERNAL MEDICINE

## 2024-07-19 PROCEDURE — 80053 COMPREHEN METABOLIC PANEL: CPT | Performed by: INTERNAL MEDICINE

## 2024-07-19 PROCEDURE — 82140 ASSAY OF AMMONIA: CPT | Performed by: INTERNAL MEDICINE

## 2024-07-19 PROCEDURE — 81001 URINALYSIS AUTO W/SCOPE: CPT | Performed by: INTERNAL MEDICINE

## 2024-07-19 PROCEDURE — 99285 EMERGENCY DEPT VISIT HI MDM: CPT

## 2024-07-19 PROCEDURE — 84484 ASSAY OF TROPONIN QUANT: CPT | Performed by: INTERNAL MEDICINE

## 2024-07-19 PROCEDURE — 82077 ASSAY SPEC XCP UR&BREATH IA: CPT | Performed by: INTERNAL MEDICINE

## 2024-07-19 PROCEDURE — 84443 ASSAY THYROID STIM HORMONE: CPT

## 2024-07-19 PROCEDURE — 80053 COMPREHEN METABOLIC PANEL: CPT

## 2024-07-19 PROCEDURE — 87040 BLOOD CULTURE FOR BACTERIA: CPT | Performed by: INTERNAL MEDICINE

## 2024-07-19 PROCEDURE — 71045 X-RAY EXAM CHEST 1 VIEW: CPT

## 2024-07-19 RX ORDER — POLYETHYLENE GLYCOL 3350 17 G/17G
17 POWDER, FOR SOLUTION ORAL DAILY PRN
Status: DISCONTINUED | OUTPATIENT
Start: 2024-07-19 | End: 2024-07-23 | Stop reason: HOSPADM

## 2024-07-19 RX ORDER — MEMANTINE HYDROCHLORIDE 5 MG/1
5 TABLET ORAL 2 TIMES DAILY
Status: DISCONTINUED | OUTPATIENT
Start: 2024-07-19 | End: 2024-07-23 | Stop reason: HOSPADM

## 2024-07-19 RX ORDER — BISACODYL 5 MG/1
5 TABLET, DELAYED RELEASE ORAL DAILY PRN
Status: DISCONTINUED | OUTPATIENT
Start: 2024-07-19 | End: 2024-07-23 | Stop reason: HOSPADM

## 2024-07-19 RX ORDER — ATORVASTATIN CALCIUM 40 MG/1
40 TABLET, FILM COATED ORAL DAILY
Status: DISCONTINUED | OUTPATIENT
Start: 2024-07-19 | End: 2024-07-22

## 2024-07-19 RX ORDER — PANTOPRAZOLE SODIUM 40 MG/1
40 TABLET, DELAYED RELEASE ORAL DAILY
Status: DISCONTINUED | OUTPATIENT
Start: 2024-07-19 | End: 2024-07-23 | Stop reason: HOSPADM

## 2024-07-19 RX ORDER — METOPROLOL SUCCINATE 25 MG/1
25 TABLET, EXTENDED RELEASE ORAL DAILY
Status: DISCONTINUED | OUTPATIENT
Start: 2024-07-19 | End: 2024-07-23 | Stop reason: HOSPADM

## 2024-07-19 RX ORDER — BISACODYL 10 MG
10 SUPPOSITORY, RECTAL RECTAL DAILY PRN
Status: DISCONTINUED | OUTPATIENT
Start: 2024-07-19 | End: 2024-07-23 | Stop reason: HOSPADM

## 2024-07-19 RX ORDER — AMOXICILLIN 250 MG
2 CAPSULE ORAL 2 TIMES DAILY PRN
Status: DISCONTINUED | OUTPATIENT
Start: 2024-07-19 | End: 2024-07-23 | Stop reason: HOSPADM

## 2024-07-19 RX ADMIN — PANTOPRAZOLE SODIUM 40 MG: 40 TABLET, DELAYED RELEASE ORAL at 18:41

## 2024-07-19 RX ADMIN — ATORVASTATIN CALCIUM 40 MG: 40 TABLET, FILM COATED ORAL at 18:41

## 2024-07-19 RX ADMIN — MEMANTINE 5 MG: 5 TABLET ORAL at 22:44

## 2024-07-19 RX ADMIN — METOPROLOL SUCCINATE 25 MG: 25 TABLET, EXTENDED RELEASE ORAL at 18:41

## 2024-07-19 RX ADMIN — TICAGRELOR 60 MG: 60 TABLET ORAL at 22:44

## 2024-07-19 NOTE — H&P
[facility] Medicine Services  History & Physical    Patient Name: Miryam Fernández  : 1955  MRN: [id number]  Primary Care Physician:  Lashell De La Torre APRN  Date of admission: 2024  Date and Time of Service: 2024 at 1300    Subjective      Chief Complaint: confusion    History of Present Illness: Miryam Fernández is a 69 y.o. female with a CMH of COPD, HLD, HTN, lung cancer who presented to [facility] on 2024 with altered mental status. Over the past several days, she hasn't been eating or drinking much, using her walker or wanting to walk much at all. Her family reports she has been very confused at times. Patient is typically alert and oriented x4 and uses a walker at home. Her daughter and  are present at bedside and deny any prior history of dementia. However, Namenda is on patient's home medication list.     Patient recently admitted here and treated for metabolic encephalopathy due to UTI from  to .  SNF was recommended at discharge, but patient's daughter declined.     On ED evaluation her UA was negative for infection. CT of the head showed no acute abnormality. No leukocytosis. Neurology consulted for further workup. Hospitalist team to admit for further medical management.         Review of Systems   Unable to perform ROS: Mental status change       Personal History     Past Medical History:   Diagnosis Date    Cancer     lung cancer    Carotid stenosis     COPD (chronic obstructive pulmonary disease)     Coronary artery disease     Esophageal stricture     Dr Domingo    Hyperlipidemia     Pulmonary embolism     seen at [facility], fluid around her heart at the time-right lung       Past Surgical History:   Procedure Laterality Date    ANGIOPLASTY / STENTING FEMORAL      CARDIAC CATHETERIZATION      CAROTID STENT      CORONARY STENT PLACEMENT      ESOPHAGOSCOPY / EGD      HYSTERECTOMY         Family History: family history includes Diabetes in her  paternal uncle; Heart disease in her mother; Hyperlipidemia in her father; Hypertension in her mother; Leukemia in her paternal grandmother; Lung cancer in her father. Otherwise pertinent FHx was reviewed and not pertinent to current issue.    Social History:  reports that she has been smoking cigarettes. She has a 80 pack-year smoking history. She has been exposed to tobacco smoke. She does not have any smokeless tobacco history on file. She reports that she does not currently use alcohol. She reports that she does not use drugs.    Home Medications:  Prior to Admission Medications       Prescriptions Last Dose Informant Patient Reported? Taking?    atorvastatin (LIPITOR) 40 MG tablet   No No    TAKE 1 TABLET BY MOUTH EVERY DAY    furosemide (LASIX) 20 MG tablet   Yes No    Take 1 tablet by mouth As Needed. PRN for swelling    memantine (Namenda) 5 MG tablet   No No    Take 1 tablet by mouth 2 (Two) Times a Day.    metoprolol succinate XL (TOPROL-XL) 25 MG 24 hr tablet   Yes No    Take 1 tablet by mouth Daily.    mupirocin (BACTROBAN) 2 % ointment   No No    Apply 1 Application topically to the appropriate area as directed 3 (Three) Times a Day.    nitrofurantoin, macrocrystal-monohydrate, (Macrobid) 100 MG capsule   No No    Take 1 capsule by mouth 2 (Two) Times a Day.    oxyCODONE-acetaminophen (PERCOCET) 5-325 MG per tablet   Yes No    Take 1 tablet by mouth Every 6 (Six) Hours As Needed.    pantoprazole (PROTONIX) 40 MG EC tablet   Yes No    Take 1 tablet by mouth Daily.    ticagrelor (Brilinta) 60 MG tablet tablet   No No    Take 1 tablet by mouth 2 (Two) Times a Day.              Allergies:  Allergies   Allergen Reactions    Albuterol Shortness Of Breath     Pt states she has sensitivity to albuterol.  She states it causes her to be SOA.    Hydrocodone Palpitations    Sulfa Antibiotics Swelling    Bacitracin Other (See Comments)    Benzalkonium Chloride Hives    Codeine Other (See Comments)    Neomycin Other  (See Comments)    Polymyxin B Other (See Comments)    Nickel Rash    Penicillins Rash       Objective      Vitals:   Temp:  [97.7 °F (36.5 °C)] 97.7 °F (36.5 °C)  Heart Rate:  [82] 82  Resp:  [16] 16  BP: (128)/(67) 128/67  There is no height or weight on file to calculate BMI.  Physical Exam  Vitals and nursing note reviewed.   Constitutional:       Appearance: Normal appearance.   HENT:      Head: Normocephalic and atraumatic.      Nose: Nose normal.      Mouth/Throat:      Mouth: Mucous membranes are moist.   Eyes:      Extraocular Movements: Extraocular movements intact.      Pupils: Pupils are equal, round, and reactive to light.   Cardiovascular:      Rate and Rhythm: Normal rate and regular rhythm.      Pulses: Normal pulses.   Pulmonary:      Effort: Pulmonary effort is normal.      Breath sounds: Normal breath sounds.   Abdominal:      General: Bowel sounds are normal.      Palpations: Abdomen is soft.   Musculoskeletal:         General: Normal range of motion.      Cervical back: Normal range of motion and neck supple.   Skin:     General: Skin is warm.      Capillary Refill: Capillary refill takes less than 2 seconds.   Neurological:      Mental Status: She is alert. She is disoriented.      GCS: GCS eye subscore is 4. GCS verbal subscore is 5. GCS motor subscore is 6.      Cranial Nerves: Cranial nerves 2-12 are intact.      Sensory: Sensation is intact.      Motor: Weakness present.      Comments: Patient only oriented to person and place         Diagnostic Data:  Lab Results (last 24 hours)       ** No results found for the last 24 hours. **             Imaging Results (Last 24 Hours)       ** No results found for the last 24 hours. **              Assessment & Plan        This is a 69 y.o. female with:    Active and Resolved Problems  Active Hospital Problems    Diagnosis  POA    **Altered mental status [R41.82]  Yes      Resolved Hospital Problems   No resolved problems to display.       Altered  mental status  - consider delirium or progressive dementia  - CT head normal   - no focal deficits  - no s/sx of infection  - CXR WNL  - pt/ot for eval   - neurology consult      Lung Cancer  - history of copd  - current smoker  - oncology following outpatient    GERD  - continue PPI once med rec complete    HLD  - continue atorvastatin once med rec complete      CAD  - history of stent placement  - takes Brilinta home, continue once med rec complete      Hypertension   - BP stable  - Resume home medications once reconciled         VTE Prophylaxis:  Mechanical VTE prophylaxis orders are present.        The patient desires to be as follows:    CODE STATUS:    Code Status (Patient has no pulse and is not breathing): CPR (Attempt to Resuscitate)  Medical Interventions (Patient has pulse or is breathing): Full Support      Admission Status:  I believe this patient meets inpatient status.    Expected Length of Stay: > 24 hours, will likely need rehab/SNF placement    PDMP and Medication Dispenses via Sidebar reviewed and consistent with patient reported medications.    I discussed the patient's findings and my recommendations with patient and family.      Signature:     This document has been electronically signed by KENNY Ac on July 19, 2024 17:39 EDT   [facility] [place] Hospitalist Team

## 2024-07-20 ENCOUNTER — APPOINTMENT (OUTPATIENT)
Dept: MRI IMAGING | Facility: HOSPITAL | Age: 69
DRG: 065 | End: 2024-07-20
Payer: MEDICARE

## 2024-07-20 LAB
ALBUMIN SERPL-MCNC: 3.4 G/DL (ref 3.5–5.2)
ALBUMIN/GLOB SERPL: 1.2 G/DL
ALP SERPL-CCNC: 98 U/L (ref 39–117)
ALT SERPL W P-5'-P-CCNC: <5 U/L (ref 1–33)
ANION GAP SERPL CALCULATED.3IONS-SCNC: 8.9 MMOL/L (ref 5–15)
AST SERPL-CCNC: 11 U/L (ref 1–32)
BACTERIA SPEC AEROBE CULT: ABNORMAL
BILIRUB SERPL-MCNC: 0.9 MG/DL (ref 0–1.2)
BUN SERPL-MCNC: 22 MG/DL (ref 8–23)
BUN/CREAT SERPL: 24.2 (ref 7–25)
CALCIUM SPEC-SCNC: 9.1 MG/DL (ref 8.6–10.5)
CHLORIDE SERPL-SCNC: 104 MMOL/L (ref 98–107)
CO2 SERPL-SCNC: 25.1 MMOL/L (ref 22–29)
CREAT SERPL-MCNC: 0.91 MG/DL (ref 0.57–1)
EGFRCR SERPLBLD CKD-EPI 2021: 69.3 ML/MIN/1.73
GEN 5 2HR TROPONIN T REFLEX: 35 NG/L
GLOBULIN UR ELPH-MCNC: 2.8 GM/DL
GLUCOSE SERPL-MCNC: 98 MG/DL (ref 65–99)
POTASSIUM SERPL-SCNC: 4.1 MMOL/L (ref 3.5–5.2)
PROT SERPL-MCNC: 6.2 G/DL (ref 6–8.5)
SODIUM SERPL-SCNC: 138 MMOL/L (ref 136–145)
T4 FREE SERPL-MCNC: 1.13 NG/DL (ref 0.93–1.7)
TROPONIN T DELTA: 6 NG/L
TROPONIN T SERPL HS-MCNC: 32 NG/L
TSH SERPL DL<=0.05 MIU/L-ACNC: 3.06 UIU/ML (ref 0.27–4.2)

## 2024-07-20 PROCEDURE — 25010000002 GADOTERIDOL PER 1 ML: Performed by: HOSPITALIST

## 2024-07-20 PROCEDURE — 84439 ASSAY OF FREE THYROXINE: CPT | Performed by: PSYCHIATRY & NEUROLOGY

## 2024-07-20 PROCEDURE — 70553 MRI BRAIN STEM W/O & W/DYE: CPT

## 2024-07-20 PROCEDURE — A9579 GAD-BASE MR CONTRAST NOS,1ML: HCPCS | Performed by: HOSPITALIST

## 2024-07-20 PROCEDURE — 84425 ASSAY OF VITAMIN B-1: CPT | Performed by: PSYCHIATRY & NEUROLOGY

## 2024-07-20 PROCEDURE — 82607 VITAMIN B-12: CPT | Performed by: PSYCHIATRY & NEUROLOGY

## 2024-07-20 PROCEDURE — 82746 ASSAY OF FOLIC ACID SERUM: CPT | Performed by: PSYCHIATRY & NEUROLOGY

## 2024-07-20 PROCEDURE — 84484 ASSAY OF TROPONIN QUANT: CPT | Performed by: NURSE PRACTITIONER

## 2024-07-20 PROCEDURE — 99221 1ST HOSP IP/OBS SF/LOW 40: CPT | Performed by: PSYCHIATRY & NEUROLOGY

## 2024-07-20 RX ADMIN — TICAGRELOR 60 MG: 60 TABLET ORAL at 20:45

## 2024-07-20 RX ADMIN — MEMANTINE 5 MG: 5 TABLET ORAL at 20:45

## 2024-07-20 RX ADMIN — PANTOPRAZOLE SODIUM 40 MG: 40 TABLET, DELAYED RELEASE ORAL at 08:33

## 2024-07-20 RX ADMIN — MEMANTINE 5 MG: 5 TABLET ORAL at 08:33

## 2024-07-20 RX ADMIN — TICAGRELOR 60 MG: 60 TABLET ORAL at 08:33

## 2024-07-20 RX ADMIN — METOPROLOL SUCCINATE 25 MG: 25 TABLET, EXTENDED RELEASE ORAL at 08:33

## 2024-07-20 RX ADMIN — GADOTERIDOL 16 ML: 279.3 INJECTION, SOLUTION INTRAVENOUS at 17:22

## 2024-07-20 RX ADMIN — ATORVASTATIN CALCIUM 40 MG: 40 TABLET, FILM COATED ORAL at 08:33

## 2024-07-20 NOTE — PROGRESS NOTES
[facility] MEDICINE SERVICE  DAILY PROGRESS NOTE    NAME: Miryam Fernández  : 1955  MRN: [id number]      LOS: 1 day     PROVIDER OF SERVICE: Timothy Duane Brammell, MD    Chief Complaint: Altered mental status    Subjective:     Interval History:  History taken from: patient  Patient Complaints: Patient denies any acute issues.  Denies any pain complaints.  Denies any nausea or vomiting.  Denies any shortness of breath.  When asked about her confusion she is vague in her answering.  She denies any headache.  There is no family present to discuss.  She is cooperative and otherwise without any acute issues.    Review of Systems:   Review of Systems   All other systems reviewed and are negative.      Objective:     Vital Signs  Temp:  [97.6 °F (36.4 °C)-97.8 °F (36.6 °C)] 97.6 °F (36.4 °C)  Heart Rate:  [63-91] 67  Resp:  [12-17] 16  BP: (118-153)/(62-81) 133/75   Body mass index is 30.11 kg/m².    Physical Exam  Physical Exam  Vitals reviewed.   Constitutional:       General: She is not in acute distress.     Appearance: Normal appearance.   HENT:      Head: Normocephalic.   Cardiovascular:      Rate and Rhythm: Normal rate and regular rhythm.   Pulmonary:      Effort: Pulmonary effort is normal.      Breath sounds: Normal breath sounds.   Abdominal:      General: Bowel sounds are normal.      Palpations: Abdomen is soft.      Tenderness: There is no abdominal tenderness.   Neurological:      Mental Status: She is alert.      Comments: Patient alert and cooperative with no discernible acute abnormalities and no baseline for comparison.         Scheduled Meds   atorvastatin, 40 mg, Oral, Daily  memantine, 5 mg, Oral, BID  metoprolol succinate XL, 25 mg, Oral, Daily  pantoprazole, 40 mg, Oral, Daily  ticagrelor, 60 mg, Oral, BID       PRN Meds     senna-docusate sodium **AND** polyethylene glycol **AND** bisacodyl **AND** bisacodyl    Calcium Replacement - Follow Nurse / BPA Driven Protocol    Magnesium  Standard Dose Replacement - Follow Nurse / BPA Driven Protocol    Phosphorus Replacement - Follow Nurse / BPA Driven Protocol    Potassium Replacement - Follow Nurse / BPA Driven Protocol   Infusions         Diagnostic Data    Results from last 7 days   Lab Units 07/19/24  2323   WBC 10*3/mm3 4.97   HEMOGLOBIN g/dL 10.5*   HEMATOCRIT % 32.7*   PLATELETS 10*3/mm3 172   GLUCOSE mg/dL 98   CREATININE mg/dL 0.91   BUN mg/dL 22   SODIUM mmol/L 138   POTASSIUM mmol/L 4.1   AST (SGOT) U/L 11   ALT (SGPT) U/L <5   ALK PHOS U/L 98   BILIRUBIN mg/dL 0.9   ANION GAP mmol/L 8.9       XR Chest 1 View    Result Date: 7/19/2024  Impression: Stable findings without definite acute cardiopulmonary abnormality. This exam was completed during a system downtime. Preliminary report was provided earlier in written form. Electronically Signed: Samina Escobedo  7/19/2024 11:16 PM EDT  Workstation ID: [id number]           Assessment:    Reported confusion  COPD  History of lung carcinoma  History of coronary artery disease  Anemia   Plan: Continue to observe  For any worsening baseline.  Case management to discuss with family as to discharge planning.  Active and Resolved Problems  Active Hospital Problems    Diagnosis  POA    **Altered mental status [R41.82]  Yes      Resolved Hospital Problems   No resolved problems to display.           VTE Prophylaxis:  Mechanical VTE prophylaxis orders are present.         Code status is   Code Status and Medical Interventions:   Ordered at: 07/19/24 1652     Code Status (Patient has no pulse and is not breathing):    CPR (Attempt to Resuscitate)     Medical Interventions (Patient has pulse or is breathing):    Full Support       Plan for disposition:home in 2 days    Time: 30 minutes    Signature: Electronically signed by Timothy Duane Brammell, MD, 07/20/24, 14:27 EDT.  [facility] Hospitalist Team

## 2024-07-20 NOTE — CONSULTS
"Primary Care Provider: Provider, No Known     Consult requested by: Admitting team    Reason for Consultation: Neurological evaluation /mental status changes    History taken from: patient chart family RN    Chief complaint: Mental status changes       SUBJECTIVE:    History of present illness: Background per H&P: Miryam Fernández is a 69 y.o. year old female who was evaluated in room 302 at [facility]    Source of information is the medical records and my discussions with the patient's daughter over the phone    This is a very interesting case, this patient with lung cancer and other issues, who has been having problems over the last several days.  More than a week now    The problem is that she is confused, they had to use diapers and she was having incontinence and then she was touching her feces    This morning she had some hallucinations, she thought there was a \"colored \"woman disturbing her    When I saw her she is pleasantly confused  She is walking and talking but not fully oriented    She does not look like in distress    There is no white count, the basic labs are okay    Ammonia level is okay    Vital signs are okay    For some reason she is on Namenda but the family reports that before all that she was totally fine    There is no witnessed seizure    CT head showed significant white matter disease and there was an MRI in March of this year which was showing very very impressive white matter changes    Has had chemo in the past        Chief Complaint: confusion     History of Present Illness: Miryam Fernández is a 69 y.o. female with a CMH of COPD, HLD, HTN, lung cancer who presented to [facility] on 7/19/2024 with altered mental status. Over the past several days, she hasn't been eating or drinking much, using her walker or wanting to walk much at all. Her family reports she has been very confused at times. Patient is typically alert and oriented x4 and uses a walker at home. Her " daughter and  are present at bedside and deny any prior history of dementia. However, Namenda is on patient's home medication list.      Patient recently admitted here and treated for metabolic encephalopathy due to UTI from 6-28 to 6-30.  SNF was recommended at discharge, but patient's daughter declined.      On ED evaluation her UA was negative for infection. CT of the head showed no acute abnormality. No leukocytosis. Neurology consulted for further workup. Hospitalist team to admit for further medical management.             - Portions of the above HPI were copied from previous encounters and edited as appropriate. PMH as detailed below.     Review of Systems   Not really possible because of her present condition, but she does not look like any acute respiratory or circulatory distress    No falls or injuries        PATIENT HISTORY:  Past Medical History:   Diagnosis Date    Cancer     lung cancer    Carotid stenosis     COPD (chronic obstructive pulmonary disease)     Coronary artery disease     Esophageal stricture     Dr Domingo    Hyperlipidemia     Pulmonary embolism     seen at [facility], fluid around her heart at the time-right lung   ,   Past Surgical History:   Procedure Laterality Date    ANGIOPLASTY / STENTING FEMORAL      CARDIAC CATHETERIZATION      CAROTID STENT      CORONARY STENT PLACEMENT      ESOPHAGOSCOPY / EGD      HYSTERECTOMY     ,   Family History   Problem Relation Age of Onset    Heart disease Mother     Hypertension Mother     Hyperlipidemia Father     Lung cancer Father     Diabetes Paternal Uncle     Leukemia Paternal Grandmother    ,   Social History     Tobacco Use    Smoking status: Every Day     Current packs/day: 2.00     Average packs/day: 2.0 packs/day for 40.0 years (80.0 ttl pk-yrs)     Types: Cigarettes     Passive exposure: Current   Vaping Use    Vaping status: Never Used   Substance Use Topics    Alcohol use: Not Currently    Drug use: Never   ,   Prior to Admission  medications    Medication Sig Start Date End Date Taking? Authorizing Provider   nitrofurantoin, macrocrystal-monohydrate, (Macrobid) 100 MG capsule Take 1 capsule by mouth 2 (Two) Times a Day. 7/18/24  Yes Lashell De La Torre APRN   atorvastatin (LIPITOR) 40 MG tablet TAKE 1 TABLET BY MOUTH EVERY DAY 5/13/24   Lashell De La Torre APRN   furosemide (LASIX) 20 MG tablet Take 1 tablet by mouth As Needed. PRN for swelling    ProviderMiguel MD   memantine (Namenda) 5 MG tablet Take 1 tablet by mouth 2 (Two) Times a Day. 7/16/24   Lashell De La Torre APRN   metoprolol succinate XL (TOPROL-XL) 25 MG 24 hr tablet Take 1 tablet by mouth Daily. 5/3/22   Ted Langford MD   mupirocin (BACTROBAN) 2 % ointment Apply 1 Application topically to the appropriate area as directed 3 (Three) Times a Day. 6/21/24   Lashell De La Torre APRN   pantoprazole (PROTONIX) 40 MG EC tablet Take 1 tablet by mouth Daily. 10/13/23   ProviderMiguel MD   ticagrelor (Brilinta) 60 MG tablet tablet Take 1 tablet by mouth 2 (Two) Times a Day. 4/2/24   Lashell De La Torre APRN    Allergies:  Albuterol, Hydrocodone, Sulfa antibiotics, Bacitracin, Benzalkonium chloride, Codeine, Neomycin, Polymyxin b, Nickel, and Penicillins    Current Facility-Administered Medications   Medication Dose Route Frequency Provider Last Rate Last Admin    atorvastatin (LIPITOR) tablet 40 mg  40 mg Oral Daily Yoon Adams APRN   40 mg at 07/20/24 0833    sennosides-docusate (PERICOLACE) 8.6-50 MG per tablet 2 tablet  2 tablet Oral BID PRN Yoon Adams APRN        And    polyethylene glycol (MIRALAX) packet 17 g  17 g Oral Daily PRN Yoon Adams APRN        And    bisacodyl (DULCOLAX) EC tablet 5 mg  5 mg Oral Daily PRN Yoon Adams APRN        And    bisacodyl (DULCOLAX) suppository 10 mg  10 mg Rectal Daily PRN Adams, Yoon, APRN        Calcium Replacement - Follow Nurse / BPA Driven Protocol   Does not apply PRN Yoon Adams, KENNY        Magnesium Standard  Dose Replacement - Follow Nurse / BPA Driven Protocol   Does not apply PRN Yoon Adams APRN        memantine (NAMENDA) tablet 5 mg  5 mg Oral BID Bryan OG NettlesN   5 mg at 07/20/24 0833    metoprolol succinate XL (TOPROL-XL) 24 hr tablet 25 mg  25 mg Oral Daily Bryan OG NettlesN   25 mg at 07/20/24 0833    pantoprazole (PROTONIX) EC tablet 40 mg  40 mg Oral Daily AdamsYoon APRN   40 mg at 07/20/24 0833    Phosphorus Replacement - Follow Nurse / BPA Driven Protocol   Does not apply PRN Yoon Adams APRN        Potassium Replacement - Follow Nurse / BPA Driven Protocol   Does not apply PRN Yoon Adams APRN        ticagrelor (BRILINTA) tablet 60 mg  60 mg Oral BID Bryan OG NettlesN   60 mg at 07/20/24 0833        ________________________________________________________        OBJECTIVE:  Upon today's exam, the patient is pleasantly confused, sitting in bedside chair in no acute distress     Neurologic Exam    The patient is awake and alert and oriented x self only  She can name and she can follow commands and repeat     Cranial nerve examination demonstrate:  Full fields of vision to confrontation  Pupils are round, reactive to light and accommodation and size of about 3 mm  No ptosis or nystagmus  Funduscopic examination was not successful  Eye movements are conjugate     Sensation on the face and scalp are normal  Muscles of mastication are normal and symmetric  Muscles of  facial expression are normal and symmetric  Hearing is intact bilaterally  Head turning and shoulder shrugs were unremarkable  Tongue was midline  I could not visualize  oropharynx or uvula     Motor examination:  Normal bulk, tone and strength was 5-/5  No fasciculations     Sensory examination:  Intact for soft touch, pain   Romberg was not evaluated     Reflexes:  0/4     Coordination:  Normal finger-to-nose to finger, rapid alternating movements and toe to finger     Gait:  Saw her gotten up from her chair and was walking towards the door when  asked came to see her and she had normal base, stride and turns     Toe signs:  [place]    ________________________________________________________   RESULTS REVIEW:    VITAL SIGNS:   Temp:  [97.6 °F (36.4 °C)-97.8 °F (36.6 °C)] 97.6 °F (36.4 °C)  Heart Rate:  [63-91] 67  Resp:  [12-17] 16  BP: (118-153)/(62-81) 133/75     LABS:      Lab 07/19/24 2323 07/19/24 1922   WBC 4.97 9.67   HEMOGLOBIN 10.5* 11.8*   HEMATOCRIT 32.7* 37.2   PLATELETS 172 199   NEUTROS ABS 4.12 9.17*   IMMATURE GRANS (ABS) 0.01 0.04   LYMPHS ABS 0.31* 0.14*   MONOS ABS 0.30 0.25   EOS ABS 0.21 0.05   MCV 94.5 95.1         Lab 07/19/24 2323 07/19/24 1922   SODIUM 138 138   POTASSIUM 4.1 4.2   CHLORIDE 104 102   CO2 25.1 23.5   ANION GAP 8.9 12.5   BUN 22 25*   CREATININE 0.91 0.96   EGFR 69.3 65.0   GLUCOSE 98 114*   CALCIUM 9.1 9.4   TSH 3.060  --          Lab 07/19/24 2323 07/19/24 1922   TOTAL PROTEIN 6.2 6.8   ALBUMIN 3.4* 3.6   GLOBULIN 2.8 3.2   ALT (SGPT) <5 <5   AST (SGOT) 11 13   BILIRUBIN 0.9 0.9   ALK PHOS 98 107         Lab 07/20/24  0614 07/19/24 2323 07/19/24 1922   HSTROP T 32* 35* 29*                 UA          6/28/2024    11:18 7/18/2024    09:55 7/19/2024    19:22   Urinalysis   Squamous Epithelial Cells, UA 3-6   3-6    Specific Gravity, UA 1.021   1.025    Ketones, UA 15 mg/dL (1+)  Negative  Negative    Blood, UA Large (3+)   Moderate (2+)    Leukocytes, UA Moderate (2+)  Small (1+)  Trace    Nitrite, UA Positive   Negative    RBC, UA 3-5   3-5    WBC, UA 21-50   11-20    Bacteria, UA 4+   None Seen        Lab Results   Component Value Date    TSH 3.060 07/19/2024     (H) 02/09/2024    HGBA1C 5.20 12/11/2023       IMAGING STUDIES:  XR Chest 1 View    Result Date: 7/19/2024  Impression: Stable findings without definite acute cardiopulmonary abnormality. This exam was completed during a system downtime. Preliminary report was provided earlier in written form. Electronically Signed: Samina Escobedo  7/19/2024 11:16  PM EDT  Workstation ID: [id number]     I reviewed the patient's new clinical results.    ________________________________________________________     PROBLEM LIST:    Altered mental status            ASSESSMENT/PLAN:  Mental status changes, more like delirium today but it has been happening for the last week    She has had chemo in the past    There is nothing else active going on    She has had UTI recently    Basic labs are okay    Her MRI is not normal, looking at the one done in the past and I will repeat that.  Could she have something like paraneoplastic syndrome?    Medication effect?    Will check her thyroid functions again and B12 and folate  Thiamine?    There is nothing suggesting seizures or CNS infection or large stroke    I discussed the patient's findings and my recommendations with patient, family, and nursing staff    Hans Montoya MD  07/20/24  13:28 EDT

## 2024-07-20 NOTE — PLAN OF CARE
Problem: Adult Inpatient Plan of Care  Goal: Absence of Hospital-Acquired Illness or Injury  Intervention: Identify and Manage Fall Risk  Recent Flowsheet Documentation  Taken 7/20/2024 1620 by Pricila Salguero RNA  Safety Promotion/Fall Prevention: safety round/check completed  Taken 7/20/2024 1429 by Pricila Salguero RNA  Safety Promotion/Fall Prevention: safety round/check completed  Taken 7/20/2024 1235 by Pricila Salguero RNA  Safety Promotion/Fall Prevention: safety round/check completed  Taken 7/20/2024 1031 by Pricila Salguero RNA  Safety Promotion/Fall Prevention: safety round/check completed  Taken 7/20/2024 0833 by Pricila Salguero RNA  Safety Promotion/Fall Prevention: safety round/check completed  Intervention: Prevent Skin Injury  Recent Flowsheet Documentation  Taken 7/20/2024 0833 by Pircila Salguero RNA  Body Position: position changed independently  Skin Protection:   adhesive use limited   skin-to-device areas padded  Intervention: Prevent and Manage VTE (Venous Thromboembolism) Risk  Recent Flowsheet Documentation  Taken 7/20/2024 0833 by Pricila Salguero RNA  Activity Management: up in chair  Range of Motion: active ROM (range of motion) encouraged  Goal: Optimal Comfort and Wellbeing  Intervention: Provide Person-Centered Care  Recent Flowsheet Documentation  Taken 7/20/2024 0833 by Pricila Salguero RNA  Trust Relationship/Rapport:   care explained   choices provided     Problem: Skin Injury Risk Increased  Goal: Skin Health and Integrity  Intervention: Optimize Skin Protection  Recent Flowsheet Documentation  Taken 7/20/2024 0833 by Pricila Salguero RNA  Pressure Reduction Techniques: frequent weight shift encouraged  Head of Bed (HOB) Positioning: (up in chair) other (see comments)  Skin Protection:   adhesive use limited   skin-to-device areas padded     Problem: Fall Injury Risk  Goal: Absence of Fall and Fall-Related Injury  Intervention: Identify and Manage Contributors  Recent Flowsheet  Documentation  Taken 7/20/2024 0833 by Pricila Salguero RNA  Medication Review/Management: medications reviewed  Intervention: Promote Injury-Free Environment  Recent Flowsheet Documentation  Taken 7/20/2024 1620 by Pricila Salguero RNA  Safety Promotion/Fall Prevention: safety round/check completed  Taken 7/20/2024 1429 by Pricila Salguero RNA  Safety Promotion/Fall Prevention: safety round/check completed  Taken 7/20/2024 1235 by Pricila Salguero RNA  Safety Promotion/Fall Prevention: safety round/check completed  Taken 7/20/2024 1031 by Pricila Salguero RNA  Safety Promotion/Fall Prevention: safety round/check completed  Taken 7/20/2024 0833 by Pricila Salguero RNA  Safety Promotion/Fall Prevention: safety round/check completed   Goal Outcome Evaluation:   Pt currently resting peacefully in bed with daughter at bedside. Pt alert to person and time. Bed in lowest position and call light within reach. Pt and family have no concerns or questions at this time.

## 2024-07-20 NOTE — PAYOR COMM NOTE
"Miryam Wood (69 y.o. Female)       Date of Birth   1955    Social Security Number       Address   [address] [place] [address] [place] [facility]    Home Phone   [phone]    MRN   [id number]       Adventism   None    Marital Status                               Admission Date   24    Admission Type   Emergency    Admitting Provider   Gabriela Tripathi MD    Attending Provider   Brammell, Timothy Duane, MD    Department, Room/Bed   [facility] 3A MEDICAL INPATIENT, 302/1       Discharge Date       Discharge Disposition       Discharge Destination                                 Attending Provider: Brammell, Timothy Duane, MD    Allergies: Albuterol, Hydrocodone, Sulfa Antibiotics, Bacitracin, Benzalkonium Chloride, Codeine, Neomycin, Polymyxin B, Nickel, Penicillins    Isolation: None   Infection: None   Code Status: CPR    Ht: 160 cm (63\")   Wt: 77.1 kg (170 lb)    Admission Cmt: None   Principal Problem: Altered mental status [R41.82]                   Active Insurance as of 2024       Primary Coverage       Payor Plan Insurance Group Employer/Plan Group    ANTHEM MEDICARE REPLACEMENT ANTHEM MEDICARE ADVANTAGE [id number]       Payor Plan Address Payor Plan Phone Number Payor Plan Fax Number Effective Dates    [address] [phone]  2020 - None Entered    [facility] [address]         Subscriber Name Subscriber Birth Date Member ID       MIRYAM WOOD 1955 [id number]                     Emergency Contacts        (Rel.) Home Phone Work Phone Mobile Phone    TOMMY TOMLIN (Daughter) [phone] -- [phone]                 History & Physical        Yoon Adams APRN at 24 1653       Attestation signed by Gabriela Tripathi MD at 24 [phone]    I have reviewed this documentation and agree.                      [facility]  History & Physical    Patient Name: Miryam Wood  : 1955  MRN: " [id number]  Primary Care Physician:  Lashell De La Torre APRN  Date of admission: 7/19/2024  Date and Time of Service: 7/19/2024 at 1300    Subjective      Chief Complaint: confusion    History of Present Illness: Miryam Fernández is a 69 y.o. female with a CMH of COPD, HLD, HTN, lung cancer who presented to [facility] on 7/19/2024 with altered mental status. Over the past several days, she hasn't been eating or drinking much, using her walker or wanting to walk much at all. Her family reports she has been very confused at times. Patient is typically alert and oriented x4 and uses a walker at home. Her daughter and  are present at bedside and deny any prior history of dementia. However, Namenda is on patient's home medication list.     Patient recently admitted here and treated for metabolic encephalopathy due to UTI from 6-28 to 6-30.  SNF was recommended at discharge, but patient's daughter declined.     On ED evaluation her UA was negative for infection. CT of the head showed no acute abnormality. No leukocytosis. Neurology consulted for further workup. Hospitalist team to admit for further medical management.         Review of Systems   Unable to perform ROS: Mental status change       Personal History     Past Medical History:   Diagnosis Date    Cancer     lung cancer    Carotid stenosis     COPD (chronic obstructive pulmonary disease)     Coronary artery disease     Esophageal stricture     Dr Domingo    Hyperlipidemia     Pulmonary embolism     seen at [facility], fluid around her heart at the time-right lung       Past Surgical History:   Procedure Laterality Date    ANGIOPLASTY / STENTING FEMORAL      CARDIAC CATHETERIZATION      CAROTID STENT      CORONARY STENT PLACEMENT      ESOPHAGOSCOPY / EGD      HYSTERECTOMY         Family History: family history includes Diabetes in her paternal uncle; Heart disease in her mother; Hyperlipidemia in her father; Hypertension in her mother; Leukemia in her  paternal grandmother; Lung cancer in her father. Otherwise pertinent FHx was reviewed and not pertinent to current issue.    Social History:  reports that she has been smoking cigarettes. She has a 80 pack-year smoking history. She has been exposed to tobacco smoke. She does not have any smokeless tobacco history on file. She reports that she does not currently use alcohol. She reports that she does not use drugs.    Home Medications:  Prior to Admission Medications       Prescriptions Last Dose Informant Patient Reported? Taking?    atorvastatin (LIPITOR) 40 MG tablet   No No    TAKE 1 TABLET BY MOUTH EVERY DAY    furosemide (LASIX) 20 MG tablet   Yes No    Take 1 tablet by mouth As Needed. PRN for swelling    memantine (Namenda) 5 MG tablet   No No    Take 1 tablet by mouth 2 (Two) Times a Day.    metoprolol succinate XL (TOPROL-XL) 25 MG 24 hr tablet   Yes No    Take 1 tablet by mouth Daily.    mupirocin (BACTROBAN) 2 % ointment   No No    Apply 1 Application topically to the appropriate area as directed 3 (Three) Times a Day.    nitrofurantoin, macrocrystal-monohydrate, (Macrobid) 100 MG capsule   No No    Take 1 capsule by mouth 2 (Two) Times a Day.    oxyCODONE-acetaminophen (PERCOCET) 5-325 MG per tablet   Yes No    Take 1 tablet by mouth Every 6 (Six) Hours As Needed.    pantoprazole (PROTONIX) 40 MG EC tablet   Yes No    Take 1 tablet by mouth Daily.    ticagrelor (Brilinta) 60 MG tablet tablet   No No    Take 1 tablet by mouth 2 (Two) Times a Day.              Allergies:  Allergies   Allergen Reactions    Albuterol Shortness Of Breath     Pt states she has sensitivity to albuterol.  She states it causes her to be SOA.    Hydrocodone Palpitations    Sulfa Antibiotics Swelling    Bacitracin Other (See Comments)    Benzalkonium Chloride Hives    Codeine Other (See Comments)    Neomycin Other (See Comments)    Polymyxin B Other (See Comments)    Nickel Rash    Penicillins Rash       Objective      Vitals:    Temp:  [97.7 °F (36.5 °C)] 97.7 °F (36.5 °C)  Heart Rate:  [82] 82  Resp:  [16] 16  BP: (128)/(67) 128/67  There is no height or weight on file to calculate BMI.  Physical Exam  Vitals and nursing note reviewed.   Constitutional:       Appearance: Normal appearance.   HENT:      Head: Normocephalic and atraumatic.      Nose: Nose normal.      Mouth/Throat:      Mouth: Mucous membranes are moist.   Eyes:      Extraocular Movements: Extraocular movements intact.      Pupils: Pupils are equal, round, and reactive to light.   Cardiovascular:      Rate and Rhythm: Normal rate and regular rhythm.      Pulses: Normal pulses.   Pulmonary:      Effort: Pulmonary effort is normal.      Breath sounds: Normal breath sounds.   Abdominal:      General: Bowel sounds are normal.      Palpations: Abdomen is soft.   Musculoskeletal:         General: Normal range of motion.      Cervical back: Normal range of motion and neck supple.   Skin:     General: Skin is warm.      Capillary Refill: Capillary refill takes less than 2 seconds.   Neurological:      Mental Status: She is alert. She is disoriented.      GCS: GCS eye subscore is 4. GCS verbal subscore is 5. GCS motor subscore is 6.      Cranial Nerves: Cranial nerves 2-12 are intact.      Sensory: Sensation is intact.      Motor: Weakness present.      Comments: Patient only oriented to person and place         Diagnostic Data:  Lab Results (last 24 hours)       ** No results found for the last 24 hours. **             Imaging Results (Last 24 Hours)       ** No results found for the last 24 hours. **              Assessment & Plan        This is a 69 y.o. female with:    Active and Resolved Problems  Active Hospital Problems    Diagnosis  POA    **Altered mental status [R41.82]  Yes      Resolved Hospital Problems   No resolved problems to display.       Altered mental status  - consider delirium or progressive dementia  - CT head normal   - no focal deficits  - no s/sx of  infection  - CXR WNL  - pt/ot for eval   - neurology consult      Lung Cancer  - history of copd  - current smoker  - oncology following outpatient    GERD  - continue PPI once med rec complete    HLD  - continue atorvastatin once med rec complete      CAD  - history of stent placement  - takes Brilinta home, continue once med rec complete      Hypertension   - BP stable  - Resume home medications once reconciled         VTE Prophylaxis:  Mechanical VTE prophylaxis orders are present.        The patient desires to be as follows:    CODE STATUS:    Code Status (Patient has no pulse and is not breathing): CPR (Attempt to Resuscitate)  Medical Interventions (Patient has pulse or is breathing): Full Support      Admission Status:  I believe this patient meets inpatient status.    Expected Length of Stay: > 24 hours, will likely need rehab/SNF placement    PDMP and Medication Dispenses via Sidebar reviewed and consistent with patient reported medications.    I discussed the patient's findings and my recommendations with patient and family.      Signature:     This document has been electronically signed by KENNY Ac on July 19, 2024 17:39 EDT   [facility]      Electronically signed by Gabriela Tripathi MD at 07/19/24 1747       Emergency Department Notes    No notes of this type exist for this encounter.       Vital Signs (last day)       Date/Time Temp Temp src Pulse Resp BP Patient Position SpO2    07/20/24 0812 97.7 (36.5) Oral 91 15 153/81 Sitting 99    07/20/24 0340 97.7 (36.5) Oral -- 12 118/62 Lying --    07/20/24 0025 -- -- 63 16 143/75 Lying --    07/19/24 1952 97.8 (36.6) Oral 74 17 136/73 Lying 99    07/19/24 1450 97.7 (36.5) Oral 82 16 128/67 Lying 99          Lab Results (last 24 hours)       Procedure Component Value Units Date/Time    High Sensitivity Troponin T [[id number]]  (Abnormal) Collected: 07/20/24 0614    Specimen: Blood from Arm, Left Updated: 07/20/24 0706     HS  Troponin T 32 ng/L     Narrative:      High Sensitive Troponin T Reference Range:  <14.0 ng/L- Negative Female for AMI  <22.0 ng/L- Negative Male for AMI  >=14 - Abnormal Female indicating possible myocardial injury.  >=22 - Abnormal Male indicating possible myocardial injury.   Clinicians would have to utilize clinical acumen, EKG, Troponin, and serial changes to determine if it is an Acute Myocardial Infarction or myocardial injury due to an underlying chronic condition.         Blood Culture - Blood, Arm, Right [[id number]] Collected: 07/19/24 1922    Specimen: Blood from Arm, Right Updated: 07/20/24 0704    High Sensitivity Troponin T 2Hr [[id number]]  (Abnormal) Collected: 07/19/24 2323    Specimen: Blood Updated: 07/20/24 0020     HS Troponin T 35 ng/L      Troponin T Delta 6 ng/L     Narrative:      High Sensitive Troponin T Reference Range:  <14.0 ng/L- Negative Female for AMI  <22.0 ng/L- Negative Male for AMI  >=14 - Abnormal Female indicating possible myocardial injury.  >=22 - Abnormal Male indicating possible myocardial injury.   Clinicians would have to utilize clinical acumen, EKG, Troponin, and serial changes to determine if it is an Acute Myocardial Infarction or myocardial injury due to an underlying chronic condition.         Comprehensive Metabolic Panel [[id number]]  (Abnormal) Collected: 07/19/24 2323    Specimen: Blood Updated: 07/20/24 0000     Glucose 98 mg/dL      BUN 22 mg/dL      Creatinine 0.91 mg/dL      Sodium 138 mmol/L      Potassium 4.1 mmol/L      Chloride 104 mmol/L      CO2 25.1 mmol/L      Calcium 9.1 mg/dL      Total Protein 6.2 g/dL      Albumin 3.4 g/dL      ALT (SGPT) <5 U/L      AST (SGOT) 11 U/L      Alkaline Phosphatase 98 U/L      Total Bilirubin 0.9 mg/dL      Globulin 2.8 gm/dL      A/G Ratio 1.2 g/dL      BUN/Creatinine Ratio 24.2     Anion Gap 8.9 mmol/L      eGFR 69.3 mL/min/1.73     Narrative:      GFR Normal >60  Chronic Kidney Disease <60  Kidney Failure <15       TSH [[id number]]  (Normal) Collected: 07/19/24 2323    Specimen: Blood Updated: 07/20/24 0000     TSH 3.060 uIU/mL     CBC Auto Differential [[id number]]  (Abnormal) Collected: 07/19/24 2323    Specimen: Blood Updated: 07/19/24 2328     WBC 4.97 10*3/mm3      RBC 3.46 10*6/mm3      Hemoglobin 10.5 g/dL      Hematocrit 32.7 %      MCV 94.5 fL      MCH 30.3 pg      MCHC 32.1 g/dL      RDW 14.0 %      RDW-SD 48.4 fl      MPV 9.9 fL      Platelets 172 10*3/mm3      Neutrophil % 83.0 %      Lymphocyte % 6.2 %      Monocyte % 6.0 %      Eosinophil % 4.2 %      Basophil % 0.4 %      Immature Grans % 0.2 %      Neutrophils, Absolute 4.12 10*3/mm3      Lymphocytes, Absolute 0.31 10*3/mm3      Monocytes, Absolute 0.30 10*3/mm3      Eosinophils, Absolute 0.21 10*3/mm3      Basophils, Absolute 0.02 10*3/mm3      Immature Grans, Absolute 0.01 10*3/mm3      nRBC 0.0 /100 WBC     Urine Drug Screen - Straight Cath [[id number]]  (Normal) Collected: 07/19/24 1922    Specimen: Urine from Straight Cath Updated: 07/19/24 2140     Amphet/Methamphet, Screen Negative     Barbiturates Screen, Urine Negative     Benzodiazepine Screen, Urine Negative     Cocaine Screen, Urine Negative     Opiate Screen Negative     THC, Screen, Urine Negative     Methadone Screen, Urine Negative     Oxycodone Screen, Urine Negative    Narrative:      Negative Thresholds Per Drugs Screened:    Amphetamines                 500 ng/ml  Barbiturates                 200 ng/ml  Benzodiazepines              100 ng/ml  Cocaine                      300 ng/ml  Methadone                    300 ng/ml  Opiates                      300 ng/ml  Oxycodone                    100 ng/ml  THC                           50 ng/ml    The Normal Value for all drugs tested is negative. This report includes final unconfirmed screening results to be used for medical treatment purposes only. Unconfirmed results must not be used for non-medical purposes such as employment or legal testing.  Clinical consideration should be applied to any drug of abuse test, particularly when unconfirmed results are used.          All urine drugs of abuse requests without chain of custody are for medical screening purposes only.  False positives are possible.      High Sensitivity Troponin T [[id number]]  (Abnormal) Collected: 07/19/24 1922    Specimen: Blood Updated: 07/19/24 2105     HS Troponin T 29 ng/L     Narrative:      High Sensitive Troponin T Reference Range:  <14.0 ng/L- Negative Female for AMI  <22.0 ng/L- Negative Male for AMI  >=14 - Abnormal Female indicating possible myocardial injury.  >=22 - Abnormal Male indicating possible myocardial injury.   Clinicians would have to utilize clinical acumen, EKG, Troponin, and serial changes to determine if it is an Acute Myocardial Infarction or myocardial injury due to an underlying chronic condition.         Comprehensive Metabolic Panel [[id number]]  (Abnormal) Collected: 07/19/24 1922    Specimen: Blood Updated: 07/19/24 2105     Glucose 114 mg/dL      BUN 25 mg/dL      Creatinine 0.96 mg/dL      Sodium 138 mmol/L      Potassium 4.2 mmol/L      Chloride 102 mmol/L      CO2 23.5 mmol/L      Calcium 9.4 mg/dL      Total Protein 6.8 g/dL      Albumin 3.6 g/dL      ALT (SGPT) <5 U/L      AST (SGOT) 13 U/L      Alkaline Phosphatase 107 U/L      Total Bilirubin 0.9 mg/dL      Globulin 3.2 gm/dL      A/G Ratio 1.1 g/dL      BUN/Creatinine Ratio 26.0     Anion Gap 12.5 mmol/L      eGFR 65.0 mL/min/1.73     Narrative:      GFR Normal >60  Chronic Kidney Disease <60  Kidney Failure <15      Ethanol [[id number]] Collected: 07/19/24 1922    Specimen: Blood Updated: 07/19/24 2054     Ethanol % <0.010 %     Narrative:      Plasma Ethanol Clinical Symptoms:    ETOH (%)               Clinical Symptom  .01-.05              No apparent influence  .03-.12              Euphoria, Diminished judgment and attention   .09-.25              Impaired comprehension, Muscle  incoordination  .18-.30              Confusion, Staggered gait, Slurred speech  .25-.40              Markedly decreased response to stimuli, unable to stand or                        walk, vomitting, sleep or stupor  .35-.50              Comatose, Anesthesia, Subnormal body temperature        Ammonia [[id number]]  (Normal) Collected: 07/19/24 1922    Specimen: Blood Updated: 07/19/24 2018     Ammonia 15 umol/L     Urinalysis, Microscopic Only - Straight Cath [[id number]]  (Abnormal) Collected: 07/19/24 1922    Specimen: Urine from Straight Cath Updated: 07/19/24 1947     RBC, UA 3-5 /HPF      WBC, UA 11-20 /HPF      Bacteria, UA None Seen /HPF      Squamous Epithelial Cells, UA 3-6 /HPF      Hyaline Casts, UA None Seen /LPF      Methodology Manual Light Microscopy    Urinalysis With Culture If Indicated - Straight Cath [[id number]]  (Abnormal) Collected: 07/19/24 1922    Specimen: Urine from Straight Cath Updated: 07/19/24 1946     Color, UA Yellow     Appearance, UA Clear     pH, UA <=5.0     Specific Gravity, UA 1.025     Glucose, UA Negative     Ketones, UA Negative     Bilirubin, UA Negative     Blood, UA Moderate (2+)     Protein, UA Trace     Leuk Esterase, UA Trace     Nitrite, UA Negative     Urobilinogen, UA 0.2 E.U./dL    Narrative:      In absence of clinical symptoms, the presence of pyuria, bacteria, and/or nitrites on the urinalysis result does not correlate with infection.    Urine Culture - Urine, Straight Cath [[id number]] Collected: 07/19/24 1922    Specimen: Urine from Straight Cath Updated: 07/19/24 1946    CBC & Differential [[id number]]  (Abnormal) Collected: 07/19/24 1922    Specimen: Blood Updated: 07/19/24 1941    Narrative:      The following orders were created for panel order CBC & Differential.  Procedure                               Abnormality         Status                     ---------                               -----------         ------                     CBC Auto  Differential[[id number]]        Abnormal            Final result                 Please view results for these tests on the individual orders.    CBC Auto Differential [[id number]]  (Abnormal) Collected: 07/19/24 1922    Specimen: Blood Updated: 07/19/24 1941     WBC 9.67 10*3/mm3      RBC 3.91 10*6/mm3      Hemoglobin 11.8 g/dL      Hematocrit 37.2 %      MCV 95.1 fL      MCH 30.2 pg      MCHC 31.7 g/dL      RDW 14.2 %      RDW-SD 49.3 fl      MPV 10.3 fL      Platelets 199 10*3/mm3      Neutrophil % 94.9 %      Lymphocyte % 1.4 %      Monocyte % 2.6 %      Eosinophil % 0.5 %      Basophil % 0.2 %      Immature Grans % 0.4 %      Neutrophils, Absolute 9.17 10*3/mm3      Lymphocytes, Absolute 0.14 10*3/mm3      Monocytes, Absolute 0.25 10*3/mm3      Eosinophils, Absolute 0.05 10*3/mm3      Basophils, Absolute 0.02 10*3/mm3      Immature Grans, Absolute 0.04 10*3/mm3      nRBC 0.0 /100 WBC           Imaging Results (Last 24 Hours)       Procedure Component Value Units Date/Time    XR Chest 1 View [[id number]] Collected: 07/19/24 2314     Updated: 07/19/24 2318    Narrative:      XR CHEST 1 VW    Date of Exam: 7/19/2024 10:20 AM EDT    Indication: Cough, hypertension, chest pain    Comparison: AP chest x-ray 6/20/2024, CT chest 3/26/2024    Findings:  There is stable left suprahilar opacity, compatible with post treatment change when correlated with CT. There is stable right midlung opacity, likely due to scarring. No focal consolidation, pneumothorax, or large pleural effusion is seen. Heart size is   normal.      Impression:      Impression:  Stable findings without definite acute cardiopulmonary abnormality.    This exam was completed during a system downtime. Preliminary report was provided earlier in written form.    Electronically Signed: Samina Escobedo    7/19/2024 11:16 PM EDT    Workstation ID: [id number]    CT Head Without Contrast [[id number]] Resulted: 07/19/24 2210     Updated: 07/19/24 2211           Operative/Procedure Notes (last 24 hours)  Notes from 07/19/24 0925 through 07/20/24 0925   No notes of this type exist for this encounter.       Physician Progress Notes (last 24 hours)  Notes from 07/19/24 0925 through 07/20/24 0925   No notes of this type exist for this encounter.       Consult Notes (last 24 hours)  Notes from 07/19/24 0925 through 07/20/24 0925   No notes of this type exist for this encounter.

## 2024-07-20 NOTE — PLAN OF CARE
Problem: Adult Inpatient Plan of Care  Goal: Plan of Care Review  7/20/2024 0033 by Valdo Gould RN  Outcome: Ongoing, Not Progressing  7/20/2024 0033 by Valdo Gould RN  Outcome: Ongoing, Not Progressing  Goal: Patient-Specific Goal (Individualized)  7/20/2024 0033 by Valdo Gould RN  Outcome: Ongoing, Not Progressing  7/20/2024 0033 by Valdo Gould RN  Outcome: Ongoing, Not Progressing  Goal: Absence of Hospital-Acquired Illness or Injury  7/20/2024 0033 by Valdo Gould RN  Outcome: Ongoing, Not Progressing  7/20/2024 0033 by Valdo Gould RN  Outcome: Ongoing, Not Progressing  Intervention: Identify and Manage Fall Risk  Recent Flowsheet Documentation  Taken 7/20/2024 0025 by Valdo Gould RN  Safety Promotion/Fall Prevention:   nonskid shoes/slippers when out of bed   safety round/check completed  Intervention: Prevent Skin Injury  Recent Flowsheet Documentation  Taken 7/20/2024 0025 by Valdo Gould RN  Body Position:   side-lying   neutral body alignment   neutral head position   supine, legs elevated  Intervention: Prevent and Manage VTE (Venous Thromboembolism) Risk  Recent Flowsheet Documentation  Taken 7/20/2024 0025 by Valdo Gould RN  Activity Management:   dorsiflexion/plantar flexion performed   activity encouraged  Range of Motion:   active ROM (range of motion) encouraged   ROM (range of motion) performed  Goal: Optimal Comfort and Wellbeing  7/20/2024 0033 by Valdo Gould RN  Outcome: Ongoing, Not Progressing  7/20/2024 0033 by Valdo Gould RN  Outcome: Ongoing, Not Progressing  Intervention: Provide Person-Centered Care  Recent Flowsheet Documentation  Taken 7/20/2024 0025 by Valdo Gould RN  Trust Relationship/Rapport:   care explained   choices provided   empathic listening provided   emotional support provided   reassurance provided   thoughts/feelings acknowledged   questions encouraged   questions answered  Goal: Readiness for  Transition of Care  7/20/2024 0033 by Valdo Gould RN  Outcome: Ongoing, Not Progressing  7/20/2024 0033 by Valdo Gould RN  Outcome: Ongoing, Not Progressing  Intervention: Mutually Develop Transition Plan  Recent Flowsheet Documentation  Taken 7/20/2024 0023 by Valdo Gould RN  Transportation Anticipated: family or friend will provide  Patient/Family Anticipated Services at Transition: none  Patient/Family Anticipates Transition to: home  Taken 7/20/2024 0022 by Valdo Gould RN  Equipment Currently Used at Home: none     Problem: Skin Injury Risk Increased  Goal: Skin Health and Integrity  7/20/2024 0033 by Valdo Gould RN  Outcome: Ongoing, Not Progressing  7/20/2024 0033 by Valdo Gould RN  Outcome: Ongoing, Not Progressing  Intervention: Optimize Skin Protection  Recent Flowsheet Documentation  Taken 7/20/2024 0025 by Valdo Gould, RN  Head of Bed (HOB) Positioning: HOB elevated   Goal Outcome Evaluation:      Pt arrived to floor freq falls and alt mental status, pt ambulated to toilet and is alert to self and year. Call light in reach and bed alarm set.

## 2024-07-21 ENCOUNTER — APPOINTMENT (OUTPATIENT)
Dept: CT IMAGING | Facility: HOSPITAL | Age: 69
DRG: 065 | End: 2024-07-21
Payer: MEDICARE

## 2024-07-21 LAB
BACTERIA SPEC AEROBE CULT: NO GROWTH
BACTERIA UR QL AUTO: ABNORMAL /HPF
BILIRUB UR QL STRIP: NEGATIVE
CLARITY UR: CLEAR
COLOR UR: YELLOW
FOLATE SERPL-MCNC: 6.43 NG/ML (ref 4.78–24.2)
GLUCOSE UR STRIP-MCNC: NEGATIVE MG/DL
HGB UR QL STRIP.AUTO: NEGATIVE
HYALINE CASTS UR QL AUTO: ABNORMAL /LPF
KETONES UR QL STRIP: NEGATIVE
LEUKOCYTE ESTERASE UR QL STRIP.AUTO: ABNORMAL
NITRITE UR QL STRIP: POSITIVE
PH UR STRIP.AUTO: 6 [PH] (ref 5–8)
PROT UR QL STRIP: NEGATIVE
RBC # UR STRIP: ABNORMAL /HPF
REF LAB TEST METHOD: ABNORMAL
SP GR UR STRIP: 1.01 (ref 1–1.03)
SQUAMOUS #/AREA URNS HPF: ABNORMAL /HPF
UROBILINOGEN UR QL STRIP: ABNORMAL
VIT B12 BLD-MCNC: 353 PG/ML (ref 211–946)
WBC # UR STRIP: ABNORMAL /HPF

## 2024-07-21 PROCEDURE — 97162 PT EVAL MOD COMPLEX 30 MIN: CPT

## 2024-07-21 PROCEDURE — 97166 OT EVAL MOD COMPLEX 45 MIN: CPT | Performed by: OCCUPATIONAL THERAPIST

## 2024-07-21 PROCEDURE — 70496 CT ANGIOGRAPHY HEAD: CPT

## 2024-07-21 PROCEDURE — 87186 SC STD MICRODIL/AGAR DIL: CPT | Performed by: INTERNAL MEDICINE

## 2024-07-21 PROCEDURE — 87086 URINE CULTURE/COLONY COUNT: CPT | Performed by: INTERNAL MEDICINE

## 2024-07-21 PROCEDURE — 25010000002 CEFTRIAXONE PER 250 MG: Performed by: HOSPITALIST

## 2024-07-21 PROCEDURE — 25510000001 IOPAMIDOL PER 1 ML: Performed by: HOSPITALIST

## 2024-07-21 PROCEDURE — 87088 URINE BACTERIA CULTURE: CPT | Performed by: INTERNAL MEDICINE

## 2024-07-21 PROCEDURE — 70498 CT ANGIOGRAPHY NECK: CPT

## 2024-07-21 PROCEDURE — 81001 URINALYSIS AUTO W/SCOPE: CPT | Performed by: INTERNAL MEDICINE

## 2024-07-21 PROCEDURE — 99233 SBSQ HOSP IP/OBS HIGH 50: CPT | Performed by: PSYCHIATRY & NEUROLOGY

## 2024-07-21 RX ORDER — DIPHENOXYLATE HYDROCHLORIDE AND ATROPINE SULFATE 2.5; .025 MG/1; MG/1
1 TABLET ORAL DAILY
Status: DISCONTINUED | OUTPATIENT
Start: 2024-07-21 | End: 2024-07-23 | Stop reason: HOSPADM

## 2024-07-21 RX ORDER — ASPIRIN 81 MG/1
81 TABLET ORAL DAILY
Status: DISCONTINUED | OUTPATIENT
Start: 2024-07-21 | End: 2024-07-23 | Stop reason: HOSPADM

## 2024-07-21 RX ADMIN — TICAGRELOR 90 MG: 90 TABLET ORAL at 20:24

## 2024-07-21 RX ADMIN — ATORVASTATIN CALCIUM 40 MG: 40 TABLET, FILM COATED ORAL at 09:44

## 2024-07-21 RX ADMIN — TICAGRELOR 60 MG: 60 TABLET ORAL at 09:44

## 2024-07-21 RX ADMIN — ASPIRIN 81 MG: 81 TABLET, COATED ORAL at 15:23

## 2024-07-21 RX ADMIN — THERA TABS 1 TABLET: TAB at 12:27

## 2024-07-21 RX ADMIN — MEMANTINE 5 MG: 5 TABLET ORAL at 20:24

## 2024-07-21 RX ADMIN — IOPAMIDOL 100 ML: 755 INJECTION, SOLUTION INTRAVENOUS at 19:00

## 2024-07-21 RX ADMIN — PANTOPRAZOLE SODIUM 40 MG: 40 TABLET, DELAYED RELEASE ORAL at 09:44

## 2024-07-21 RX ADMIN — MEMANTINE 5 MG: 5 TABLET ORAL at 09:44

## 2024-07-21 RX ADMIN — METOPROLOL SUCCINATE 25 MG: 25 TABLET, EXTENDED RELEASE ORAL at 09:44

## 2024-07-21 RX ADMIN — CEFTRIAXONE 2000 MG: 2 INJECTION, POWDER, FOR SOLUTION INTRAMUSCULAR; INTRAVENOUS at 12:27

## 2024-07-21 NOTE — PROGRESS NOTES
LOS: 2 days     Chief Complaint: Mental status changes       SUBJECTIVE:    History taken from: chart family RN    Interval History: Miryam Fernández was admitted on 7/19/2024   I saw her yesterday and examined her and talk to the family.  Today her  is here    She was asleep but I woke her up    She is more awake today and reacted very well    Still disoriented regarding time and space but recognizes her     MRI brain which demonstrated,  Impression:     1. Small less than 1 cm areas of increased signal on diffusion imaging with associated FLAIR and T2 signal abnormality within the right basal ganglia, corona radiata suggesting evolution of small subacute infarcts. No convincing evidence for acute   ischemic changes otherwise noted.     2. Mild increased T1 signal and questionable enhancement in this region may represent sequela of evolving small infarct. Metastatic lesion is thought to be less likely although this will need to be followed per patient's oncologic protocol     3. No abnormal enhancement otherwise noted within the brain parenchyma.     4. Extensive confluent white matter changes again noted which can be seen with small vessel ischemic disease as well as represent sequela of prior therapy       That is nothing to do with her symptoms also repeat UA    Demonstrates she may have UTI    We may have to do stroke workup anyway    Repeat MRI is recommended in few weeks with contrast as advised and the report    I have to increase Brilinta to 90 mg twice daily  And baby aspirin    She may need LP for their typical MRI, PML?    - Portions of the above HPI were copied from previous encounters and edited as appropriate.    Patient Complaints: None essentially      Review of Systems   Review of system otherwise very questionable, she denies any problem    Pertinent PMH:  has a past medical history of Cancer, Carotid stenosis, COPD (chronic obstructive pulmonary disease), Coronary artery  disease, Esophageal stricture, Hyperlipidemia, and Pulmonary embolism.   ________________________________________________     OBJECTIVE:  Patient mostly lethargic    Neurologic Exam    Arousable but oriented to herself and family, not oriented to time or place    Mild weakness but otherwise nonfocal  ________________________________________________   RESULTS REVIEW    VITAL SIGNS:  Temp:  [97.4 °F (36.3 °C)-98.2 °F (36.8 °C)] 97.8 °F (36.6 °C)  Heart Rate:  [66-77] 69  Resp:  [11-18] 11  BP: (108-146)/(60-79) 108/61    LABS:       Lab 07/19/24 2323 07/19/24 1922   WBC 4.97 9.67   HEMOGLOBIN 10.5* 11.8*   HEMATOCRIT 32.7* 37.2   PLATELETS 172 199   NEUTROS ABS 4.12 9.17*   IMMATURE GRANS (ABS) 0.01 0.04   LYMPHS ABS 0.31* 0.14*   MONOS ABS 0.30 0.25   EOS ABS 0.21 0.05   MCV 94.5 95.1         Lab 07/19/24 2323 07/19/24 1922   SODIUM 138 138   POTASSIUM 4.1 4.2   CHLORIDE 104 102   CO2 25.1 23.5   ANION GAP 8.9 12.5   BUN 22 25*   CREATININE 0.91 0.96   EGFR 69.3 65.0   GLUCOSE 98 114*   CALCIUM 9.1 9.4   TSH 3.060  --          Lab 07/19/24 2323 07/19/24 1922   TOTAL PROTEIN 6.2 6.8   ALBUMIN 3.4* 3.6   GLOBULIN 2.8 3.2   ALT (SGPT) <5 <5   AST (SGOT) 11 13   BILIRUBIN 0.9 0.9   ALK PHOS 98 107         Lab 07/20/24  0614 07/19/24 2323 07/19/24 1922   HSTROP T 32* 35* 29*                 UA          7/18/2024    09:55 7/19/2024    19:22 7/21/2024    05:13   Urinalysis   Squamous Epithelial Cells, UA  3-6  0-2    Specific Gravity, UA  1.025  1.013    Ketones, UA Negative  Negative  Negative    Blood, UA  Moderate (2+)  Negative    Leukocytes, UA Small (1+)  Trace  Trace    Nitrite, UA  Negative  Positive    RBC, UA  3-5  0-2    WBC, UA  11-20  6-10    Bacteria, UA  None Seen  2+        Lab Results   Component Value Date    TSH 3.060 07/19/2024     (H) 02/09/2024    HGBA1C 5.20 12/11/2023         IMAGING STUDIES:  MRI Brain With & Without Contrast    Result Date: 7/20/2024  Impression: 1. Small less than 1  cm areas of increased signal on diffusion imaging with associated FLAIR and T2 signal abnormality within the right basal ganglia, corona radiata suggesting evolution of small subacute infarcts. No convincing evidence for acute ischemic changes otherwise noted. 2. Mild increased T1 signal and questionable enhancement in this region may represent sequela of evolving small infarct. Metastatic lesion is thought to be less likely although this will need to be followed per patient's oncologic protocol 3. No abnormal enhancement otherwise noted within the brain parenchyma. 4. Extensive confluent white matter changes again noted which can be seen with small vessel ischemic disease as well as represent sequela of prior therapy Electronically Signed: John Posada MD  7/20/2024 6:07 PM EDT  Workstation ID: [id number]     I reviewed the patient's new clinical results.    ________________________________________________      PROBLEM LIST:    Altered mental status        ASSESSMENT/PLAN:  Mental status changes  Abnormal MRI, could be stroke or mets    We will do stroke stroke workup anyway    Change medication as advised    Repeat MRI in a few weeks    May need LP for something like atypical findings like PML because she has had chemo and MRI is totally abnormal    Neurology team will follow    **Please refer to previous notes for further details and recommendations.     I discussed the patient's findings and my recommendations with family and nursing staff    Hans Montoya MD  07/21/24  14:37 EDT

## 2024-07-21 NOTE — PLAN OF CARE
Goal Outcome Evaluation:  Plan of Care Reviewed With: patient, spouse           Outcome Evaluation: 70 yo female adm 24 for altered mental status w/ increased confusion and falls. Pt has also had poor oral intake over past several days per . Had recent admit for uti. PMH: major depression, memory loss, menieres disease, copd, cad, lung ca. At baseline, pt lives w/ her  in single level functioning home. Pt normally has mild memory loss but  reports she can remember most things. She is normally oriented x 4 per . She ambulates short community distances w/ rollator wx. Does not drive.  has been home w/ pt full time for past several weeks, as he has been recovering from a wrist fx. However, he is scheduled to get his cast off tomorrow and is expected to return to full time work after that. Today, pt is alert and confused. She is not oriented to time at all, including her . She is able to come to sitting at eob w/ verbal cues and cga. Comes to stand at rw w/ cga/min assist, and requires min assist to amb 40 ft w/ rw. Pt is unsteady, despite use of rw for support. Has lateral sway as well as posterior loss of balance. High risk for falls if she were to d/c home. Will need SNF at d/c. Will follow.

## 2024-07-21 NOTE — PLAN OF CARE
Goal Outcome Evaluation:  Plan of Care Reviewed With: patient        Progress: no change     No new issues addressed. Will continue to monitor.

## 2024-07-21 NOTE — THERAPY EVALUATION
Patient Name: Miryam Fernández  : 1955    MRN: [id number]                              Today's Date: 2024       Admit Date: 2024    Visit Dx: No diagnosis found.  Patient Active Problem List   Diagnosis    Obstructive sleep apnea, adult    Tobacco use    Normocytic anemia    Meniere's disease    Lung nodule    Hilar lymphadenopathy    Hyperlipidemia    Heartburn    Gastroesophageal reflux disease    Dysphagia    Chronic obstructive pulmonary disease    CAD (coronary artery disease)    Bruises easily    Chronic pain    Small cell lung cancer    Carotid stenosis    Coronary artery disease    Primary hypertension    Long term (current) use of opiate analgesic    DDD (degenerative disc disease), lumbar    Screening mammogram for breast cancer    Preventative health care    Vitamin D deficiency    Need for influenza vaccination    Moderate episode of recurrent major depressive disorder    Need for vaccination against Streptococcus pneumoniae    Frequent falls    Weakness    Nasal congestion    Memory loss    Localized swelling of both lower legs    Meniere disease, bilateral    Abdominal aortic aneurysm (AAA) without rupture    Thoracic aortic aneurysm without rupture    Plantar wart    Screening for osteoporosis    Asymptomatic menopause    Screening for malignant neoplasm of colon    Skin tear of left forearm without complication    Encounter for subsequent annual wellness visit (AWV) in Medicare patient    Acute cystitis    Altered mental status     Past Medical History:   Diagnosis Date    Cancer     lung cancer    Carotid stenosis     COPD (chronic obstructive pulmonary disease)     Coronary artery disease     Esophageal stricture     Dr Domingo    Hyperlipidemia     Pulmonary embolism     seen at [facility], fluid around her heart at the time-right lung     Past Surgical History:   Procedure Laterality Date    ANGIOPLASTY / STENTING FEMORAL      CARDIAC CATHETERIZATION      CAROTID STENT      CORONARY  STENT PLACEMENT      ESOPHAGOSCOPY / EGD      HYSTERECTOMY        General Information       Row Name 07/21/24 1548          OT Time and Intention    Document Type evaluation  -DT     Mode of Treatment occupational therapy  -DT       Row Name 07/21/24 1548          General Information    Patient Profile Reviewed yes  -DT     Prior Level of Function independent:;all household mobility;ADL's  Pt was ind with BADLs & cognition prior to 3 weeks ago before experiencing a UTI & she has had difficulty since that time. She uses a RW for mobility.  -DT     Existing Precautions/Restrictions fall  -DT     Barriers to Rehab medically complex  -DT       Row Name 07/21/24 1542          Living Environment    People in Home spouse  Pt is alone during the day when spouse is at work. He has been off work for a broken wrist, but anticipates returning soon. Dtr has been assisting, but is planning to have back sx in the beginning of August.  -DT       Row Name 07/21/24 1549          Home Main Entrance    Number of Stairs, Main Entrance other (see comments)  ramp  -DT       Row Name 07/21/24 1540          Stairs Within Home, Primary    Number of Stairs, Within Home, Primary none  -DT       Row Name 07/21/24 1541          Cognition    Orientation Status (Cognition) oriented to;person;place;disoriented to;situation;time  -DT       Row Name 07/21/24 1546          Safety Issues, Functional Mobility    Impairments Affecting Function (Mobility) balance;cognition;postural/trunk control;motor control;strength;endurance/activity tolerance;motor planning  -DT     Cognitive Impairments, Mobility Safety/Performance sequencing abilities;problem-solving/reasoning;insight into deficits/self-awareness;judgment  -DT               User Key  (r) = Recorded By, (t) = Taken By, (c) = Cosigned By      Initials Name Provider Type    DT Veronica Rush, OT Occupational Therapist                     Mobility/ADL's       Row Name 07/21/24 0593          Bed  Mobility    Bed Mobility sit-supine  -DT     Supine-Sit [place] (Bed Mobility) contact guard;verbal cues  -DT       Row Name 07/21/24 1545          Transfers    Transfers sit-stand transfer;stand-sit transfer;toilet transfer  -DT       Row Name 07/21/24 1545          Sit-Stand Transfer    Sit-Stand [place] (Transfers) minimum assist (75% patient effort)  -DT     Comment, (Sit-Stand Transfer) HHA  -DT       Row Name 07/21/24 1545          Stand-Sit Transfer    Stand-Sit [place] (Transfers) minimum assist (75% patient effort)  -DT     Comment, (Stand-Sit Transfer) HHA  -DT       Row Name 07/21/24 1545          Toilet Transfer    [place] Level (Toilet Transfer) minimum assist (75% patient effort)  -DT     Assistive Device (Toilet Transfer) commode, bedside without drop arms  -DT       Row Name 07/21/24 1545          Functional Mobility    Functional Mobility- Ind. Level minimum assist (75% patient effort);moderate assist (50% patient effort)  -DT     Functional Mobility-Distance (Feet) 30  -DT     Functional Mobility- Safety Issues balance decreased during turns;weight-shifting ability decreased  -DT     Patient was able to Ambulate yes  -DT       Row Name 07/21/24 1545          Activities of Daily Living    BADL Assessment/Intervention grooming;toileting;lower body dressing  -DT       Row Name 07/21/24 1545          Grooming Assessment/Training    [place] Level (Grooming) grooming skills;minimum assist (75% patient effort);verbal cues  v.c. for initiation, follow through, seq to change brush into other hand to access right side of head.  -DT     Position (Grooming) sink side;supported standing  -DT       Row Name 07/21/24 1545          Toileting Assessment/Training    [place] Level (Toileting) toileting skills;moderate assist (50% patient effort)  -DT     Assistive Devices (Toileting) commode, bedside without drop arms  -DT       Row Name 07/21/24 1545          Lower Body Dressing  Assessment/Training    [place] Level (Lower Body Dressing) lower body dressing skills;minimum assist (75% patient effort)  -DT     Position (Lower Body Dressing) edge of bed sitting;supported standing  -DT               User Key  (r) = Recorded By, (t) = Taken By, (c) = Cosigned By      Initials Name Provider Type    Veronica Pineda OT Occupational Therapist                   Obj/Interventions       Row Name 07/21/24 1548          Sensory Assessment (Somatosensory)    Sensory Assessment (Somatosensory) sensation intact  -DT       Row Name 07/21/24 1548          Range of Motion Comprehensive    General Range of Motion bilateral upper extremity ROM WFL  -DT       [facility] Name 07/21/24 1548          Strength Comprehensive (MMT)    General Manual Muscle Testing (MMT) Assessment upper extremity strength deficits identified  -DT     Comment, General Manual Muscle Testing (MMT) Assessment BUE = 4/5  -DT       [facility] Name 07/21/24 1548          Balance    Balance Assessment sitting static balance;sitting dynamic balance;standing static balance;standing dynamic balance  -DT     Static Sitting Balance supervision  -DT     Dynamic Sitting Balance standby assist  -DT     Position, Sitting Balance sitting edge of bed  -DT     Static Standing Balance contact guard  -DT     Dynamic Standing Balance minimal assist;moderate assist  -DT               User Key  (r) = Recorded By, (t) = Taken By, (c) = Cosigned By      Initials Name Provider Type    DT Veronica Rush OT Occupational Therapist                   Goals/Plan       Row Name 07/21/24 1559          Transfer Goal 1 (OT)    Activity/Assistive Device (Transfer Goal 1, OT) transfers, all  -DT     [place] Level/Cues Needed (Transfer Goal 1, OT) contact guard required  -DT     Time Frame (Transfer Goal 1, OT) 2 weeks  -DT       [facility] Name 07/21/24 6689          Bathing Goal 1 (OT)    Activity/Device (Bathing Goal 1, OT) bathing skills, all  -DT      [place] Level/Cues Needed (Bathing Goal 1, OT) contact guard required  -DT     Time Frame (Bathing Goal 1, OT) 2 weeks  -DT       Row Name 07/21/24 1555          Dressing Goal 1 (OT)    [place]/Cues Needed (Dressing Goal 1, OT) contact guard required  -DT     Time Frame (Dressing Goal 1, OT) 2 weeks  -DT       Row Name 07/21/24 1554          Toileting Goal 1 (OT)    Activity/Device (Toileting Goal 1, OT) toileting skills, all  -DT     [place] Level/Cues Needed (Toileting Goal 1, OT) contact guard required  -DT     Time Frame (Toileting Goal 1, OT) 2 weeks  -DT       Row Name 07/21/24 9527          Therapy Assessment/Plan (OT)    Planned Therapy Interventions (OT) activity tolerance training;BADL retraining;cognitive/visual perception retraining;functional balance retraining;neuromuscular control/coordination retraining;occupation/activity based interventions;patient/caregiver education/training;ROM/therapeutic exercise;strengthening exercise;transfer/mobility retraining  -DT               User Key  (r) = Recorded By, (t) = Taken By, (c) = Cosigned By      Initials Name Provider Type    DT Veronica Rush, OT Occupational Therapist                   Clinical Impression       Row Name 07/21/24 1544          Pain Assessment    Pretreatment Pain Rating 0/10 - no pain  -DT     Posttreatment Pain Rating 0/10 - no pain  -DT       Row Name 07/21/24 1546          Plan of Care Review    Plan of Care Reviewed With patient;daughter  -DT     Outcome Evaluation Pt is a 69 y.o. F adm to [facility] on 07/19/24 with AMS, inc confusion & hx of falls. Pt also had a recent admission for UTI. It was recommended she discharge to a SNF at that time, but pt returned home with spouse. PMH: major depression, meniere's disease, COPD, CAD, lung CA. At baseline, pt lives at home with spouse in a H. Prior to 3 weeks ago (before recent UTI), she was ambulating with a RW & ind with BADLs. Her spouse works and pt was at home alone  during the day. Her spouse has been off work with a broken wrist, but anticipates returning to work soon. Her daughter assists at times, but is scheduled to get back sx in the beginning of August. Upon eval, pt is A&O to person & place. She follows 1 step commands, but requires v.c. for sequencing, initiation & follow through of tasks & safety awareness. She requires assistance for grooming, toileting & LB ADLS & for ambulation. She has generalized weakness & impaired balance & is at high risk for falls. OT will continue to follow for tx & recommends SNF upon discharge.  -DT       Row Name 07/21/24 1549          Therapy Assessment/Plan (OT)    Rehab Potential (OT) good, to achieve stated therapy goals  -DT     Criteria for Skilled Therapeutic Interventions Met (OT) yes;meets criteria;skilled treatment is necessary  -DT     Therapy Frequency (OT) 5 times/wk  -DT     Predicted Duration of Therapy Intervention (OT) until d/c  -DT       Row Name 07/21/24 1549          Therapy Plan Review/Discharge Plan (OT)    Anticipated Discharge Disposition (OT) skilled nursing facility  -DT       Row Name 07/21/24 1549          Positioning and Restraints    Pre-Treatment Position bedside commode  -DT     Post Treatment Position bed  -DT     In Bed supine;with nsg;with family/caregiver;call light within reach;encouraged to call for assist;exit alarm on  -DT               User Key  (r) = Recorded By, (t) = Taken By, (c) = Cosigned By      Initials Name Provider Type    DT Veronica Rush, OT Occupational Therapist                   Outcome Measures       Row Name 07/21/24 1321 07/21/24 0809       How much help from another person do you currently need...    Turning from your back to your side while in flat bed without using bedrails? 4  -CM 4  -EJ (r) RJ (t) EJ (c)    Moving from lying on back to sitting on the side of a flat bed without bedrails? 4  -CM 4  -EJ (r) RJ (t) EJ (c)    Moving to and from a bed to a chair  (including a wheelchair)? 3  -CM 3  -EJ (r) RJ (t) EJ (c)    Standing up from a chair using your arms (e.g., wheelchair, bedside chair)? 3  -CM 3  -EJ (r) RJ (t) EJ (c)    Climbing 3-5 steps with a railing? 2  -CM 2  -EJ (r) RJ (t) EJ (c)    To walk in hospital room? 3  -CM 2  -EJ (r) RJ (t) EJ (c)    AM-PAC 6 Clicks Score (PT) 19  -CM 18  -RJ    Highest Level of Mobility Goal 6 --> Walk 10 steps or more  -CM 6 --> Walk 10 steps or more  -RJ      Row Name 07/21/24 1321          Modified [place] Scale    Pre-Stroke Modified [place] Scale 6 - Unable to determine (UTD) from the medical record documentation  -CM     Modified [place] Scale 4 - Moderately severe disability.  Unable to walk without assistance, and unable to attend to own bodily needs without assistance.  -CM       Row Name 07/21/24 1321          Functional Assessment    Outcome Measure Options Modified [place]  -CM               User Key  (r) = Recorded By, (t) = Taken By, (c) = Cosigned By      Initials Name Provider Type    CM Dayanna Rivera, PT Physical Therapist    Pricila Correia RNA Registered Nurse    Zari Geiger, RN Registered Nurse                    Occupational Therapy Education       Title: PT OT SLP Therapies (In Progress)       Topic: Occupational Therapy (In Progress)       Point: ADL training (Done)       Description:   Instruct learner(s) on proper safety adaptation and remediation techniques during self care or transfers.   Instruct in proper use of assistive devices.                  Learning Progress Summary             Patient Acceptance, E,TB, VU by DT at 7/21/2024 6827    Comment: role of OT, goals & POC, safety prec.   Family Acceptance, E,TB, VU by DT at 7/21/2024 2584    Comment: role of OT, goals & POC, safety prec.                         Point: Home exercise program (Not Started)       Description:   Instruct learner(s) on appropriate technique for monitoring, assisting and/or progressing therapeutic  exercises/activities.                  Learner Progress:  Not documented in this visit.              Point: Precautions (Done)       Description:   Instruct learner(s) on prescribed precautions during self-care and functional transfers.                  Learning Progress Summary             Patient Acceptance, E,TB, VU by DT at 7/21/2024 1559    Comment: role of OT, goals & POC, safety prec.   Family Acceptance, E,TB, VU by DT at 7/21/2024 1559    Comment: role of OT, goals & POC, safety prec.                         Point: Body mechanics (Done)       Description:   Instruct learner(s) on proper positioning and spine alignment during self-care, functional mobility activities and/or exercises.                  Learning Progress Summary             Patient Acceptance, E,TB, VU by DT at 7/21/2024 1559    Comment: role of OT, goals & POC, safety prec.   Family Acceptance, E,TB, VU by DT at 7/21/2024 1559    Comment: role of OT, goals & POC, safety prec.                                         User Key       Initials Effective Dates Name Provider Type Discipline    DT 07/11/23 -  Veronica Rush, OT Occupational Therapist OT                  OT Recommendation and Plan  Planned Therapy Interventions (OT): activity tolerance training, BADL retraining, cognitive/visual perception retraining, functional balance retraining, neuromuscular control/coordination retraining, occupation/activity based interventions, patient/caregiver education/training, ROM/therapeutic exercise, strengthening exercise, transfer/mobility retraining  Therapy Frequency (OT): 5 times/wk  Plan of Care Review  Plan of Care Reviewed With: patient, daughter  Outcome Evaluation: Pt is a 69 y.o. F adm to [facility] on 07/19/24 with AMS, inc confusion & hx of falls. Pt also had a recent admission for UTI. It was recommended she discharge to a SNF at that time, but pt returned home with spouse. PMH: major depression, meniere's disease, COPD, CAD, lung CA. At  baseline, pt lives at home with spouse in a [facility]. Prior to 3 weeks ago (before recent UTI), she was ambulating with a RW & ind with BADLs. Her spouse works and pt was at home alone during the day. Her spouse has been off work with a broken wrist, but anticipates returning to work soon. Her daughter assists at times, but is scheduled to get back sx in the beginning of August. Upon eval, pt is A&O to person & place. She follows 1 step commands, but requires v.c. for sequencing, initiation & follow through of tasks & safety awareness. She requires assistance for grooming, toileting & LB ADLS & for ambulation. She has generalized weakness & impaired balance & is at high risk for falls. OT will continue to follow for tx & recommends SNF upon discharge.     Time Calculation:         Time Calculation- OT       Row Name 07/21/24 1559             Time Calculation- OT    OT Start Time 1436  -DT      OT Stop Time 1501  -DT      OT Time Calculation (min) 25 min  -DT      OT Received On 07/21/24  -DT      OT - Next Appointment 07/23/24  -DT      OT Goal Re-Cert Due Date 08/04/24  -DT                User Key  (r) = Recorded By, (t) = Taken By, (c) = Cosigned By      Initials Name Provider Type    Veronica Pineda, OT Occupational Therapist                           Veronica Rush OT  7/21/2024

## 2024-07-21 NOTE — THERAPY EVALUATION
Patient Name: Miryam Fernández  : 1955    MRN: [id number]                              Today's Date: 2024       Admit Date: 2024    Visit Dx: No diagnosis found.  Patient Active Problem List   Diagnosis    Obstructive sleep apnea, adult    Tobacco use    Normocytic anemia    Meniere's disease    Lung nodule    Hilar lymphadenopathy    Hyperlipidemia    Heartburn    Gastroesophageal reflux disease    Dysphagia    Chronic obstructive pulmonary disease    CAD (coronary artery disease)    Bruises easily    Chronic pain    Small cell lung cancer    Carotid stenosis    Coronary artery disease    Primary hypertension    Long term (current) use of opiate analgesic    DDD (degenerative disc disease), lumbar    Screening mammogram for breast cancer    Preventative health care    Vitamin D deficiency    Need for influenza vaccination    Moderate episode of recurrent major depressive disorder    Need for vaccination against Streptococcus pneumoniae    Frequent falls    Weakness    Nasal congestion    Memory loss    Localized swelling of both lower legs    Meniere disease, bilateral    Abdominal aortic aneurysm (AAA) without rupture    Thoracic aortic aneurysm without rupture    Plantar wart    Screening for osteoporosis    Asymptomatic menopause    Screening for malignant neoplasm of colon    Skin tear of left forearm without complication    Encounter for subsequent annual wellness visit (AWV) in Medicare patient    Acute cystitis    Altered mental status     Past Medical History:   Diagnosis Date    Cancer     lung cancer    Carotid stenosis     COPD (chronic obstructive pulmonary disease)     Coronary artery disease     Esophageal stricture     Dr Domingo    Hyperlipidemia     Pulmonary embolism     seen at [facility], fluid around her heart at the time-right lung     Past Surgical History:   Procedure Laterality Date    ANGIOPLASTY / STENTING FEMORAL      CARDIAC CATHETERIZATION      CAROTID STENT      CORONARY  STENT PLACEMENT      ESOPHAGOSCOPY / EGD      HYSTERECTOMY        General Information       Row Name 07/21/24 1309          Physical Therapy Time and Intention    Document Type evaluation  -CM     Mode of Treatment physical therapy  -CM       Row Name 07/21/24 1309          General Information    Patient Profile Reviewed yes  -CM     Prior Level of Function independent:;all household mobility;gait  does not drive;  reports normally oriented x 4; able to amb w/ rollator wx  -CM     Existing Precautions/Restrictions fall  -CM     Barriers to Rehab medically complex;previous functional deficit;cognitive status  -CM       Row Name 07/21/24 1309          Living Environment    People in Home spouse  spouse has been off work for several weeks due to broken wrist but is to get cast off tomorrow and plan is that he will return to work then; this would leave pt alone during day at home  -CM       Row Name 07/21/24 1309          Home Main Entrance    Number of Stairs, Main Entrance other (see comments)  ramp access  -CM       Row Name 07/21/24 1309          Stairs Within Home, Primary    Number of Stairs, Within Home, Primary none  -CM       Row Name 07/21/24 1309          Cognition    Orientation Status (Cognition) oriented to;person;place;other (see comments)  knows  by name; did not know [employer] but able to state correctly w/ cues  -CM       Row Name 07/21/24 1309          Safety Issues, Functional Mobility    Impairments Affecting Function (Mobility) balance;cognition;postural/trunk control;motor control;motor planning;strength  -CM               User Key  (r) = Recorded By, (t) = Taken By, (c) = Cosigned By      Initials Name Provider Type    CM Dayanna Rivera, PT Physical Therapist                   Mobility       Row Name 07/21/24 1312          Bed Mobility    Bed Mobility supine-sit  -CM     Supine-Sit [place] (Bed Mobility) contact guard;minimum assist (75% patient effort);nonverbal cues  (demo/gesture);verbal cues  -CM     Assistive Device (Bed Mobility) head of bed elevated  -CM       Row Name 07/21/24 1312          Sit-Stand Transfer    Sit-Stand [place] (Transfers) contact guard;minimum assist (75% patient effort)  -CM     Assistive Device (Sit-Stand Transfers) walker, front-wheeled  -CM       Row Name 07/21/24 1312          Gait/Stairs (Locomotion)    [place] Level (Gait) minimum assist (75% patient effort)  -CM     Assistive Device (Gait) walker, front-wheeled  -CM     Patient was able to Ambulate yes  -CM     Distance in Feet (Gait) 40  slowed tatyana; shuffled steps; mild lateral sway; posterior loss of balance while amb  -CM               User Key  (r) = Recorded By, (t) = Taken By, (c) = Cosigned By      Initials Name Provider Type    CM Dayanna Rivera, PT Physical Therapist                   Obj/Interventions       Row Name 07/21/24 1313          Range of Motion Comprehensive    General Range of Motion bilateral upper extremity ROM WFL;bilateral lower extremity ROM WFL  -CM       Row Name 07/21/24 1313          Strength Comprehensive (MMT)    General Manual Muscle Testing (MMT) Assessment upper extremity strength deficits identified;lower extremity strength deficits identified  -CM     Comment, General Manual Muscle Testing (MMT) Assessment BUEs 4/5, BLEs 4-/5  -CM       Row Name 07/21/24 1313          Balance    Balance Assessment sitting static balance;sitting dynamic balance;standing static balance;standing dynamic balance  -CM     Static Sitting Balance supervision  -CM     Dynamic Sitting Balance supervision;contact guard  -CM     Static Standing Balance contact guard;minimal assist  -CM     Dynamic Standing Balance minimal assist  -CM     Position/Device Used, Standing Balance supported;walker, rolling  -CM       Row Name 07/21/24 1313          Sensory Assessment (Somatosensory)    Sensory Assessment (Somatosensory) unable/difficult to assess  -CM               User Key   (r) = Recorded By, (t) = Taken By, (c) = Cosigned By      Initials Name Provider Type    Dayanna Perez, PT Physical Therapist                   Goals/Plan       Row Name 07/21/24 1320          Bed Mobility Goal 1 (PT)    Activity/Assistive Device (Bed Mobility Goal 1, PT) bed mobility activities, all  -CM     [place] Level/Cues Needed (Bed Mobility Goal 1, PT) independent  -CM     Time Frame (Bed Mobility Goal 1, PT) 2 weeks  -CM       Row Name 07/21/24 1320          Transfer Goal 1 (PT)    Activity/Assistive Device (Transfer Goal 1, PT) transfers, all;walker, rolling  -CM     [place] Level/Cues Needed (Transfer Goal 1, PT) modified independence  -CM     Time Frame (Transfer Goal 1, PT) 2 weeks  -CM       Row Name 07/21/24 1320          Gait Training Goal 1 (PT)    Activity/Assistive Device (Gait Training Goal 1, PT) gait (walking locomotion);walker, rolling  -CM     [place] Level (Gait Training Goal 1, PT) modified independence  -CM     Distance (Gait Training Goal 1, PT) 100 ft w/o loss of balance or soa  -CM     Time Frame (Gait Training Goal 1, PT) 2 weeks  -CM       [facility] Name 07/21/24 1320          Therapy Assessment/Plan (PT)    Planned Therapy Interventions (PT) balance training;bed mobility training;gait training;motor coordination training;strengthening;ROM (range of motion);postural re-education;patient/family education;neuromuscular re-education;transfer training  -CM               User Key  (r) = Recorded By, (t) = Taken By, (c) = Cosigned By      Initials Name Provider Type    Dayanna Perez, PT Physical Therapist                   Clinical Impression       Row Name 07/21/24 1313          Pain    Pretreatment Pain Rating 0/10 - no pain  -CM     Posttreatment Pain Rating 0/10 - no pain  -CM     Pain Intervention(s) Ambulation/increased activity;Emotional support;Repositioned  -CM       Row Name 07/21/24 1313          Plan of Care Review    Plan of Care Reviewed With  patient;spouse  -CM     Outcome Evaluation 70 yo female adm 24 for altered mental status w/ increased confusion and falls. Pt has also had poor oral intake over past several days per . Had recent admit for uti. PMH: major depression, memory loss, menieres disease, copd, cad, lung ca. At baseline, pt lives w/ her  in single level functioning home. Pt normally has mild memory loss but  reports she can remember most things. She is normally oriented x 4 per . She ambulates short community distances w/ rollator wx. Does not drive.  has been home w/ pt full time for past several weeks, as he has been recovering from a wrist fx. However, he is scheduled to get his cast off tomorrow and is expected to return to full time work after that. Today, pt is alert and confused. She is not oriented to time at all, including her . She is able to come to sitting at eob w/ verbal cues and cga. Comes to stand at rw w/ cga/min assist, and requires min assist to amb 40 ft w/ rw. Pt is unsteady, despite use of rw for support. Has lateral sway as well as posterior loss of balance. High risk for falls if she were to d/c home. Will need SNF at d/c. Will follow.  -CM       Row Name 24 1320          Therapy Assessment/Plan (PT)    Rehab Potential (PT) good, to achieve stated therapy goals  -CM     Criteria for Skilled Interventions Met (PT) yes;meets criteria;skilled treatment is necessary  -CM     Therapy Frequency (PT) 5 times/wk  -CM     Predicted Duration of Therapy Intervention (PT) until d/c  -CM       Row Name 24 1320          Vital Signs    O2 Delivery Pre Treatment room air  -CM     O2 Delivery Intra Treatment room air  -CM     O2 Delivery Post Treatment room air  -CM     Recovery Time VSS  -CM       Row Name 24 1320          Positioning and Restraints    Pre-Treatment Position in bed  -CM     Post Treatment Position chair  -CM     In Chair notified nsg;sitting;call light  within reach;encouraged to call for assist;exit alarm on;with family/caregiver;legs elevated  -CM               User Key  (r) = Recorded By, (t) = Taken By, (c) = Cosigned By      Initials Name Provider Type    Dayanna Perez, PT Physical Therapist                   Outcome Measures       Row Name 07/21/24 1321 07/21/24 0809       How much help from another person do you currently need...    Turning from your back to your side while in flat bed without using bedrails? 4  -CM 4  -RJ    Moving from lying on back to sitting on the side of a flat bed without bedrails? 4  -CM 4  -RJ    Moving to and from a bed to a chair (including a wheelchair)? 3  -CM 3  -RJ    Standing up from a chair using your arms (e.g., wheelchair, bedside chair)? 3  -CM 3  -RJ    Climbing 3-5 steps with a railing? 2  -CM 2  -RJ    To walk in hospital room? 3  -CM 2  -RJ    AM-PAC 6 Clicks Score (PT) 19  -CM 18  -RJ    Highest Level of Mobility Goal 6 --> Walk 10 steps or more  -CM 6 --> Walk 10 steps or more  -RJ      Row Name 07/21/24 0400          How much help from another person do you currently need...    Turning from your back to your side while in flat bed without using bedrails? 4  -KB     Moving from lying on back to sitting on the side of a flat bed without bedrails? 4  -KB     Moving to and from a bed to a chair (including a wheelchair)? 3  -KB     Standing up from a chair using your arms (e.g., wheelchair, bedside chair)? 3  -KB     Climbing 3-5 steps with a railing? 2  -KB     To walk in hospital room? 2  -KB     AM-PAC 6 Clicks Score (PT) 18  -KB     Highest Level of Mobility Goal 6 --> Walk 10 steps or more  -KB       Row Name 07/21/24 1321          Modified [place] Scale    Pre-Stroke Modified [place] Scale 6 - Unable to determine (UTD) from the medical record documentation  -CM     Modified [place] Scale 4 - Moderately severe disability.  Unable to walk without assistance, and unable to attend to own bodily needs without  assistance.  -CM       Row Name 07/21/24 1321          Functional Assessment    Outcome Measure Options Modified [place]  -CM               User Key  (r) = Recorded By, (t) = Taken By, (c) = Cosigned By      Initials Name Provider Type    CM Dayanna Rivera, PT Physical Therapist    Joe Barnhart RN Registered Nurse    Pricila Correia RNA Registered Nurse                                 Physical Therapy Education       Title: PT OT SLP Therapies (Done)       Topic: Physical Therapy (Done)       Point: Mobility training (Done)       Learning Progress Summary             Patient Acceptance, E,TB, VU by CM at 7/21/2024 1321   Significant Other Acceptance, E,TB, VU by CM at 7/21/2024 1321                                         User Key       Initials Effective Dates Name Provider Type Discipline     06/16/21 -  Dayanna Rivera, PT Physical Therapist PT                  PT Recommendation and Plan  Planned Therapy Interventions (PT): balance training, bed mobility training, gait training, motor coordination training, strengthening, ROM (range of motion), postural re-education, patient/family education, neuromuscular re-education, transfer training  Plan of Care Reviewed With: patient, spouse  Outcome Evaluation: 70 yo female adm 7/19/24 for altered mental status w/ increased confusion and falls. Pt has also had poor oral intake over past several days per . Had recent admit for uti. PMH: major depression, memory loss, menieres disease, copd, cad, lung ca. At baseline, pt lives w/ her  in single level functioning home. Pt normally has mild memory loss but  reports she can remember most things. She is normally oriented x 4 per . She ambulates short community distances w/ rollator wx. Does not drive.  has been home w/ pt full time for past several weeks, as he has been recovering from a wrist fx. However, he is scheduled to get his cast off tomorrow and is expected to return to  full time work after that. Today, pt is alert and confused. She is not oriented to time at all, including her . She is able to come to sitting at eob w/ verbal cues and cga. Comes to stand at rw w/ cga/min assist, and requires min assist to amb 40 ft w/ rw. Pt is unsteady, despite use of rw for support. Has lateral sway as well as posterior loss of balance. High risk for falls if she were to d/c home. Will need SNF at d/c. Will follow.     Time Calculation:   PT Evaluation Complexity  History, PT Evaluation Complexity: 3 or more personal factors and/or comorbidities  Examination of Body Systems (PT Eval Complexity): total of 4 or more elements  Clinical Presentation (PT Evaluation Complexity): evolving  Clinical Decision Making (PT Evaluation Complexity): moderate complexity  Overall Complexity (PT Evaluation Complexity): moderate complexity     PT Charges       Row Name 24 1322             Time Calculation    Start Time 1019  -CM      Stop Time 1034  -CM      Time Calculation (min) 15 min  -CM      PT Received On 24  -CM      PT - Next Appointment 24  -CM      PT Goal Re-Cert Due Date 24  -CM         Time Calculation- PT    Total Timed Code Minutes- PT 0 minute(s)  -CM                User Key  (r) = Recorded By, (t) = Taken By, (c) = Cosigned By      Initials Name Provider Type    Dayanna Perez, PT Physical Therapist                  Therapy Charges for Today       Code Description Service Date Service Provider Modifiers Qty    51788405172 HC PT EVAL MOD COMPLEXITY 3 2024 Dayanna Rivera, PT GP 1            PT G-Codes  Outcome Measure Options: Modified [place]  AM-PAC 6 Clicks Score (PT): 19  Modified [place] Scale: 4 - Moderately severe disability.  Unable to walk without assistance, and unable to attend to own bodily needs without assistance.  PT Discharge Summary  Anticipated Discharge Disposition (PT): skilled nursing facility    Dayanna Rivera, PT  2024

## 2024-07-21 NOTE — PLAN OF CARE
Problem: Adult Inpatient Plan of Care  Goal: Absence of Hospital-Acquired Illness or Injury  Intervention: Identify and Manage Fall Risk  Recent Flowsheet Documentation  Taken 7/21/2024 1632 by Pricila Salguero RNA  Safety Promotion/Fall Prevention: safety round/check completed  Taken 7/21/2024 1451 by Pricila Salguero RNA  Safety Promotion/Fall Prevention: safety round/check completed  Taken 7/21/2024 1227 by Pricila Salguero RNA  Safety Promotion/Fall Prevention: safety round/check completed  Taken 7/21/2024 1019 by Pricila Salguero RNA  Safety Promotion/Fall Prevention: safety round/check completed  Taken 7/21/2024 0809 by Pricila Salguero RNA  Safety Promotion/Fall Prevention: safety round/check completed  Intervention: Prevent Skin Injury  Recent Flowsheet Documentation  Taken 7/21/2024 0809 by Pricila Salguero RNA  Body Position: position changed independently  Skin Protection:   adhesive use limited   tubing/devices free from skin contact  Intervention: Prevent and Manage VTE (Venous Thromboembolism) Risk  Recent Flowsheet Documentation  Taken 7/21/2024 0809 by Pricila Salguero RNA  Activity Management: activity encouraged  VTE Prevention/Management: (brilinta) other (see comments)  Range of Motion: active ROM (range of motion) encouraged  Goal: Optimal Comfort and Wellbeing  Intervention: Provide Person-Centered Care  Recent Flowsheet Documentation  Taken 7/21/2024 0809 by Pricila Salguero RNA  Trust Relationship/Rapport:   care explained   choices provided     Problem: Skin Injury Risk Increased  Goal: Skin Health and Integrity  Intervention: Optimize Skin Protection  Recent Flowsheet Documentation  Taken 7/21/2024 0809 by Pricila Salguero RNA  Pressure Reduction Techniques: frequent weight shift encouraged  Head of Bed (HOB) Positioning: HOB at 30-45 degrees  Skin Protection:   adhesive use limited   tubing/devices free from skin contact     Problem: Fall Injury Risk  Goal: Absence of Fall and Fall-Related  Injury  Intervention: Identify and Manage Contributors  Recent Flowsheet Documentation  Taken 7/21/2024 0809 by Pricila Salguero RNA  Medication Review/Management: medications reviewed  Intervention: Promote Injury-Free Environment  Recent Flowsheet Documentation  Taken 7/21/2024 1632 by Pricila Salguero RNA  Safety Promotion/Fall Prevention: safety round/check completed  Taken 7/21/2024 1451 by Pricila Salguero RNA  Safety Promotion/Fall Prevention: safety round/check completed  Taken 7/21/2024 1227 by Pricila Salguero RNA  Safety Promotion/Fall Prevention: safety round/check completed  Taken 7/21/2024 1019 by Pricila Salguero RNA  Safety Promotion/Fall Prevention: safety round/check completed  Taken 7/21/2024 0809 by Pricila Salguero RNA  Safety Promotion/Fall Prevention: safety round/check completed   Goal Outcome Evaluation:   Pt tolerated frequently getting up to the chair and bedside commode today. Rested comfortable between care with daughter and spouse at bedside. Pt calm and pleasant with no concerns at this time. Bed is lowered and call light is within reach.

## 2024-07-21 NOTE — PLAN OF CARE
Goal Outcome Evaluation:  Plan of Care Reviewed With: patient, daughter           Outcome Evaluation: Pt is a 69 y.o. F adm to [facility] on 07/19/24 with AMS, inc confusion & hx of falls. Pt also had a recent admission for UTI. It was recommended she discharge to a SNF at that time, but pt returned home with spouse. PMH: major depression, meniere's disease, COPD, CAD, lung CA. At baseline, pt lives at home with spouse in a H. Prior to 3 weeks ago (before recent UTI), she was ambulating with a RW & ind with BADLs. Her spouse works and pt was at home alone during the day. Her spouse has been off work with a broken wrist, but anticipates returning to work soon. Her daughter assists at times, but is scheduled to get back sx in the beginning of August. Upon eval, pt is A&O to person & place. She follows 1 step commands, but requires v.c. for sequencing, initiation & follow through of tasks & safety awareness. She requires assistance for grooming, toileting & LB ADLS & for ambulation. She has generalized weakness & impaired balance & is at high risk for falls. OT will continue to follow for tx & recommends SNF upon discharge.      Anticipated Discharge Disposition (OT): skilled nursing facility

## 2024-07-21 NOTE — PROGRESS NOTES
[facility] MEDICINE SERVICE  DAILY PROGRESS NOTE    NAME: Miryam Fernández  : 1955  MRN: [id number]      LOS: 2 days     PROVIDER OF SERVICE: Timothy Duane Brammell, MD    Chief Complaint: Altered mental status    Subjective:     Interval History:  History taken from: patient/  Patient Complaints: Patient reportedly not at her baseline by her .  Nurses feel that she is confused with details at times.  Oriented when questioned.  Eating poorly.  Physical therapy with noted unsteady gait with need for discharge to rehab.  Son and patient agreeable to discharge to rehab plans.    Review of Systems:   Review of Systems   All other systems reviewed and are negative.      Objective:     Vital Signs  Temp:  [97.4 °F (36.3 °C)-98.2 °F (36.8 °C)] 97.4 °F (36.3 °C)  Heart Rate:  [66-77] 66  Resp:  [14-18] 14  BP: (125-146)/(60-79) 146/72   Body mass index is 30.11 kg/m².    Physical Exam  Physical Exam  Vitals reviewed.   Constitutional:       General: She is not in acute distress.  HENT:      Head: Normocephalic.   Cardiovascular:      Rate and Rhythm: Normal rate.   Pulmonary:      Effort: Pulmonary effort is normal.      Breath sounds: Normal breath sounds.   Abdominal:      General: Bowel sounds are normal.      Palpations: Abdomen is soft.      Tenderness: There is no abdominal tenderness.   Neurological:      Mental Status: She is alert. Mental status is at baseline.   Psychiatric:         Behavior: Behavior normal.         Scheduled Meds   atorvastatin, 40 mg, Oral, Daily  memantine, 5 mg, Oral, BID  metoprolol succinate XL, 25 mg, Oral, Daily  pantoprazole, 40 mg, Oral, Daily  ticagrelor, 60 mg, Oral, BID       PRN Meds     senna-docusate sodium **AND** polyethylene glycol **AND** bisacodyl **AND** bisacodyl    Calcium Replacement - Follow Nurse / BPA Driven Protocol    Magnesium Standard Dose Replacement - Follow Nurse / BPA Driven Protocol    Phosphorus Replacement - Follow Nurse / BPA  Driven Protocol    Potassium Replacement - Follow Nurse / BPA Driven Protocol   Infusions         Diagnostic Data    Results from last 7 days   Lab Units 07/19/24  2323   WBC 10*3/mm3 4.97   HEMOGLOBIN g/dL 10.5*   HEMATOCRIT % 32.7*   PLATELETS 10*3/mm3 172   GLUCOSE mg/dL 98   CREATININE mg/dL 0.91   BUN mg/dL 22   SODIUM mmol/L 138   POTASSIUM mmol/L 4.1   AST (SGOT) U/L 11   ALT (SGPT) U/L <5   ALK PHOS U/L 98   BILIRUBIN mg/dL 0.9   ANION GAP mmol/L 8.9       MRI Brain With & Without Contrast    Result Date: 7/20/2024  Impression: 1. Small less than 1 cm areas of increased signal on diffusion imaging with associated FLAIR and T2 signal abnormality within the right basal ganglia, corona radiata suggesting evolution of small subacute infarcts. No convincing evidence for acute ischemic changes otherwise noted. 2. Mild increased T1 signal and questionable enhancement in this region may represent sequela of evolving small infarct. Metastatic lesion is thought to be less likely although this will need to be followed per patient's oncologic protocol 3. No abnormal enhancement otherwise noted within the brain parenchyma. 4. Extensive confluent white matter changes again noted which can be seen with small vessel ischemic disease as well as represent sequela of prior therapy Electronically Signed: John Posada MD  7/20/2024 6:07 PM EDT  Workstation ID: [id number]           Assessment:    Confusion from baseline reported  History of lung carcinoma  History of coronary artery disease  COPD  Moderate protein malnutrition  Anemia  UTI        Plan: Appreciate neurology input. Ongoing attempts at physical and Occupational Therapy.  Case management to assess patient's ability to be placed for rehab.      Active and Resolved Problems  Active Hospital Problems    Diagnosis  POA    **Altered mental status [R41.82]  Yes      Resolved Hospital Problems   No resolved problems to display.           VTE Prophylaxis:  Mechanical VTE  prophylaxis orders are present.         Code status is   Code Status and Medical Interventions:   Ordered at: 07/19/24 8351     Code Status (Patient has no pulse and is not breathing):    CPR (Attempt to Resuscitate)     Medical Interventions (Patient has pulse or is breathing):    Full Support       Plan for disposition:rehab in 2 days    Time: 30 minutes    Signature: Electronically signed by Timothy Duane Brammell, MD, 07/21/24, 11:04 EDT.  [facility]

## 2024-07-22 ENCOUNTER — APPOINTMENT (OUTPATIENT)
Dept: CARDIOLOGY | Facility: HOSPITAL | Age: 69
DRG: 065 | End: 2024-07-22
Payer: MEDICARE

## 2024-07-22 LAB
ANION GAP SERPL CALCULATED.3IONS-SCNC: 11.2 MMOL/L (ref 5–15)
BASOPHILS # BLD AUTO: 0.02 10*3/MM3 (ref 0–0.2)
BASOPHILS NFR BLD AUTO: 0.6 % (ref 0–1.5)
BUN SERPL-MCNC: 19 MG/DL (ref 8–23)
BUN/CREAT SERPL: 19.8 (ref 7–25)
CALCIUM SPEC-SCNC: 9.2 MG/DL (ref 8.6–10.5)
CHLORIDE SERPL-SCNC: 104 MMOL/L (ref 98–107)
CHOLEST SERPL-MCNC: 207 MG/DL (ref 0–200)
CO2 SERPL-SCNC: 22.8 MMOL/L (ref 22–29)
CREAT SERPL-MCNC: 0.96 MG/DL (ref 0.57–1)
DEPRECATED RDW RBC AUTO: 48.2 FL (ref 37–54)
EGFRCR SERPLBLD CKD-EPI 2021: 65 ML/MIN/1.73
EOSINOPHIL # BLD AUTO: 0.23 10*3/MM3 (ref 0–0.4)
EOSINOPHIL NFR BLD AUTO: 6.9 % (ref 0.3–6.2)
ERYTHROCYTE [DISTWIDTH] IN BLOOD BY AUTOMATED COUNT: 14.2 % (ref 12.3–15.4)
GLUCOSE SERPL-MCNC: 89 MG/DL (ref 65–99)
HBA1C MFR BLD: 5.44 % (ref 4.8–5.6)
HCT VFR BLD AUTO: 32.6 % (ref 34–46.6)
HDLC SERPL-MCNC: 38 MG/DL (ref 40–60)
HGB BLD-MCNC: 10.3 G/DL (ref 12–15.9)
IMM GRANULOCYTES # BLD AUTO: 0.01 10*3/MM3 (ref 0–0.05)
IMM GRANULOCYTES NFR BLD AUTO: 0.3 % (ref 0–0.5)
IRON 24H UR-MRATE: 102 MCG/DL (ref 37–145)
IRON SATN MFR SERPL: 32 % (ref 20–50)
LDLC SERPL CALC-MCNC: 146 MG/DL (ref 0–100)
LDLC/HDLC SERPL: 3.79 {RATIO}
LYMPHOCYTES # BLD AUTO: 0.35 10*3/MM3 (ref 0.7–3.1)
LYMPHOCYTES NFR BLD AUTO: 10.4 % (ref 19.6–45.3)
MCH RBC QN AUTO: 29.7 PG (ref 26.6–33)
MCHC RBC AUTO-ENTMCNC: 31.6 G/DL (ref 31.5–35.7)
MCV RBC AUTO: 93.9 FL (ref 79–97)
MONOCYTES # BLD AUTO: 0.29 10*3/MM3 (ref 0.1–0.9)
MONOCYTES NFR BLD AUTO: 8.7 % (ref 5–12)
NEUTROPHILS NFR BLD AUTO: 2.45 10*3/MM3 (ref 1.7–7)
NEUTROPHILS NFR BLD AUTO: 73.1 % (ref 42.7–76)
NRBC BLD AUTO-RTO: 0 /100 WBC (ref 0–0.2)
PLATELET # BLD AUTO: 180 10*3/MM3 (ref 140–450)
PMV BLD AUTO: 10.3 FL (ref 6–12)
POTASSIUM SERPL-SCNC: 3.6 MMOL/L (ref 3.5–5.2)
QT INTERVAL: 393 MS
QTC INTERVAL: 448 MS
RBC # BLD AUTO: 3.47 10*6/MM3 (ref 3.77–5.28)
SODIUM SERPL-SCNC: 138 MMOL/L (ref 136–145)
TIBC SERPL-MCNC: 320 MCG/DL (ref 298–536)
TRANSFERRIN SERPL-MCNC: 215 MG/DL (ref 200–360)
TRIGL SERPL-MCNC: 125 MG/DL (ref 0–150)
VLDLC SERPL-MCNC: 23 MG/DL (ref 5–40)
WBC NRBC COR # BLD AUTO: 3.35 10*3/MM3 (ref 3.4–10.8)

## 2024-07-22 PROCEDURE — 83036 HEMOGLOBIN GLYCOSYLATED A1C: CPT | Performed by: PSYCHIATRY & NEUROLOGY

## 2024-07-22 PROCEDURE — 84466 ASSAY OF TRANSFERRIN: CPT | Performed by: HOSPITALIST

## 2024-07-22 PROCEDURE — 80061 LIPID PANEL: CPT | Performed by: PSYCHIATRY & NEUROLOGY

## 2024-07-22 PROCEDURE — 80048 BASIC METABOLIC PNL TOTAL CA: CPT | Performed by: HOSPITALIST

## 2024-07-22 PROCEDURE — 93010 ELECTROCARDIOGRAM REPORT: CPT | Performed by: INTERNAL MEDICINE

## 2024-07-22 PROCEDURE — 85025 COMPLETE CBC W/AUTO DIFF WBC: CPT | Performed by: HOSPITALIST

## 2024-07-22 PROCEDURE — 99233 SBSQ HOSP IP/OBS HIGH 50: CPT | Performed by: NURSE PRACTITIONER

## 2024-07-22 PROCEDURE — 93356 MYOCRD STRAIN IMG SPCKL TRCK: CPT

## 2024-07-22 PROCEDURE — 93306 TTE W/DOPPLER COMPLETE: CPT

## 2024-07-22 PROCEDURE — 93356 MYOCRD STRAIN IMG SPCKL TRCK: CPT | Performed by: INTERNAL MEDICINE

## 2024-07-22 PROCEDURE — 83540 ASSAY OF IRON: CPT | Performed by: HOSPITALIST

## 2024-07-22 PROCEDURE — 25010000002 CEFTRIAXONE PER 250 MG: Performed by: HOSPITALIST

## 2024-07-22 PROCEDURE — 93306 TTE W/DOPPLER COMPLETE: CPT | Performed by: INTERNAL MEDICINE

## 2024-07-22 PROCEDURE — 93005 ELECTROCARDIOGRAM TRACING: CPT | Performed by: NURSE PRACTITIONER

## 2024-07-22 RX ORDER — ATORVASTATIN CALCIUM 40 MG/1
80 TABLET, FILM COATED ORAL DAILY
Status: DISCONTINUED | OUTPATIENT
Start: 2024-07-23 | End: 2024-07-23 | Stop reason: HOSPADM

## 2024-07-22 RX ADMIN — MEMANTINE 5 MG: 5 TABLET ORAL at 20:18

## 2024-07-22 RX ADMIN — TICAGRELOR 90 MG: 90 TABLET ORAL at 20:18

## 2024-07-22 RX ADMIN — ATORVASTATIN CALCIUM 40 MG: 40 TABLET, FILM COATED ORAL at 08:04

## 2024-07-22 RX ADMIN — CEFTRIAXONE 2000 MG: 2 INJECTION, POWDER, FOR SOLUTION INTRAMUSCULAR; INTRAVENOUS at 13:14

## 2024-07-22 RX ADMIN — THERA TABS 1 TABLET: TAB at 08:04

## 2024-07-22 RX ADMIN — PANTOPRAZOLE SODIUM 40 MG: 40 TABLET, DELAYED RELEASE ORAL at 08:04

## 2024-07-22 RX ADMIN — ASPIRIN 81 MG: 81 TABLET, COATED ORAL at 08:04

## 2024-07-22 RX ADMIN — MEMANTINE 5 MG: 5 TABLET ORAL at 08:04

## 2024-07-22 RX ADMIN — TICAGRELOR 90 MG: 90 TABLET ORAL at 08:04

## 2024-07-22 NOTE — PROGRESS NOTES
LOS: 3 days     Chief Complaint:  Mental status changes        SUBJECTIVE:    History taken from: patient chart RN    Interval History: Miryam Fernández was admitted on 7/19/2024. Pt had been confused and declining for a few days. She has had some hallucinations. MRI brain showed a subacute stroke in the right basal ganglia and advanced white matter disease which has progressed since her last MRI in March. She has a UTI.   - Portions of the above HPI were copied from previous encounters and edited as appropriate.    Patient Complaints: Pt states she feels much better. Her  states he is impressed by her improvement, but she is not back to baseline. Pt denies any new complaints. No headache, no focal deficits.      Review of Systems   Constitutional:  Negative for chills, diaphoresis and fatigue.   Eyes:  Negative for photophobia and visual disturbance.   Neurological:  Negative for dizziness, tremors, seizures, syncope, facial asymmetry, speech difficulty, weakness, light-headedness, numbness and headaches.   Psychiatric/Behavioral:  Positive for confusion.           Pertinent PMH:  has a past medical history of Cancer, Carotid stenosis, COPD (chronic obstructive pulmonary disease), Coronary artery disease, Esophageal stricture, Hyperlipidemia, and Pulmonary embolism.   ________________________________________________     OBJECTIVE:  Pt evaluated at . She is awake and alert, resting in bed.  at .     Neurologic Exam    On exam:  GENERAL: NAD, awake and alert  HEENT: Normocephalic, Atraumatic. Oral mucosa clear and moist.   RESP: Breathing unlabored, breath sounds normal  CARDIO: RRR  SKIN: Warm, dry. No apparent rash.   PSYCH: Mood/affect normal    NEURO:  Oriented to person, place, date/year except stated it was June.   Minimal perseverating and confabulating   Disoriented to some questions regarding her anniversary, grandchildren's names/ages. Otherwise, conversing appropriately and following  commands.   EOMI, PERRL, no visual field deficits  No facial asymmetry  Speech clear without dysarthria  Sensations intact and equal bilaterally  Strength 5/5 and equal in all extremities  No ataxia    ________________________________________________   RESULTS REVIEW    VITAL SIGNS:  Temp:  [97.4 °F (36.3 °C)-98 °F (36.7 °C)] 97.4 °F (36.3 °C)  Heart Rate:  [67-93] 93  Resp:  [11-17] 17  BP: ()/(60-72) 94/60    LABS:       Lab 07/22/24 0528 07/19/24 2323 07/19/24 1922   WBC 3.35* 4.97 9.67   HEMOGLOBIN 10.3* 10.5* 11.8*   HEMATOCRIT 32.6* 32.7* 37.2   PLATELETS 180 172 199   NEUTROS ABS 2.45 4.12 9.17*   IMMATURE GRANS (ABS) 0.01 0.01 0.04   LYMPHS ABS 0.35* 0.31* 0.14*   MONOS ABS 0.29 0.30 0.25   EOS ABS 0.23 0.21 0.05   MCV 93.9 94.5 95.1         Lab 07/22/24 0528 07/19/24 2323 07/19/24 1922   SODIUM 138 138 138   POTASSIUM 3.6 4.1 4.2   CHLORIDE 104 104 102   CO2 22.8 25.1 23.5   ANION GAP 11.2 8.9 12.5   BUN 19 22 25*   CREATININE 0.96 0.91 0.96   EGFR 65.0 69.3 65.0   GLUCOSE 89 98 114*   CALCIUM 9.2 9.1 9.4   TSH  --  3.060  --          Lab 07/19/24 2323 07/19/24 1922   TOTAL PROTEIN 6.2 6.8   ALBUMIN 3.4* 3.6   GLOBULIN 2.8 3.2   ALT (SGPT) <5 <5   AST (SGOT) 11 13   BILIRUBIN 0.9 0.9   ALK PHOS 98 107         Lab 07/20/24  0614 07/19/24 2323 07/19/24 1922   HSTROP T 32* 35* 29*         Lab 07/22/24  0528   CHOLESTEROL 207*   LDL CHOL 146*   HDL CHOL 38*   TRIGLYCERIDES 125         Lab 07/22/24  0528 07/20/24  1522   IRON 102  --    IRON SATURATION (TSAT) 32  --    TIBC 320  --    TRANSFERRIN 215  --    FOLATE  --  6.43   VITAMIN B 12  --  353         UA          7/18/2024    09:55 7/19/2024    19:22 7/21/2024    05:13   Urinalysis   Squamous Epithelial Cells, UA  3-6  0-2    Specific Gravity, UA  1.025  1.013    Ketones, UA Negative  Negative  Negative    Blood, UA  Moderate (2+)  Negative    Leukocytes, UA Small (1+)  Trace  Trace    Nitrite, UA  Negative  Positive    RBC, UA  3-5  0-2     WBC, UA  11-20  6-10    Bacteria, UA  None Seen  2+        Lab Results   Component Value Date    TSH 3.060 07/19/2024     (H) 07/22/2024    HGBA1C 5.20 12/11/2023    [id number] 353 07/20/2024         IMAGING STUDIES:  CT Angiogram Head    Result Date: 7/21/2024  1.No intracranial large vessel occlusion is identified. 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 Reconstituted trace flow within the left vertebral artery at about the C3 level. This likely represents a chronic finding. Proximal left vertebral artery is likely occluded on the chest CTA from 10/31/2023. No corresponding acute ischemia noted on yesterday's brain MRI. 3.Estimated 40% stenosis within the left proximal ICA (series 7, image 60). 4.No significant stenosis of the right ICA. 5.Moderate to severe stenosis of the proximal left subclavian artery. There is a surgical bypass between the left common carotid and subclavian artery with moderate stenosis seen within this bypass. 6.Small right apical pneumothorax. 7.Emphysematous changes and masslike scarring within the bilateral upper lobes. The above findings were discussed with Lois Elder, house supervisor, via telephone on 7/21/2024 8:05 PM EDT. Electronically Signed: Josue Fuentes MD  7/21/2024 8:06 PM EDT  Workstation ID: [id number]    CT Angiogram Carotids    Result Date: 7/21/2024  1.No intracranial large vessel occlusion is identified. 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 Reconstituted trace flow within the left vertebral artery at about the C3 level. This likely represents a chronic finding. Proximal left vertebral artery is likely occluded on the chest CTA from 10/31/2023. No corresponding acute ischemia noted on yesterday's brain MRI. 3.Estimated 40% stenosis within the left proximal ICA (series 7, image 60). 4.No significant stenosis of the right ICA. 5.Moderate to severe stenosis of the proximal left subclavian artery. There is a surgical bypass  between the left common carotid and subclavian artery with moderate stenosis seen within this bypass. 6.Small right apical pneumothorax. 7.Emphysematous changes and masslike scarring within the bilateral upper lobes. The above findings were discussed with Lois Elder, house supervisor, via telephone on 7/21/2024 8:05 PM EDT. Electronically Signed: Josue Fuentes MD  7/21/2024 8:06 PM EDT  Workstation ID: [id number]    MRI Brain With & Without Contrast    Result Date: 7/20/2024  Impression: 1. Small less than 1 cm areas of increased signal on diffusion imaging with associated FLAIR and T2 signal abnormality within the right basal ganglia, corona radiata suggesting evolution of small subacute infarcts. No convincing evidence for acute ischemic changes otherwise noted. 2. Mild increased T1 signal and questionable enhancement in this region may represent sequela of evolving small infarct. Metastatic lesion is thought to be less likely although this will need to be followed per patient's oncologic protocol 3. No abnormal enhancement otherwise noted within the brain parenchyma. 4. Extensive confluent white matter changes again noted which can be seen with small vessel ischemic disease as well as represent sequela of prior therapy Electronically Signed: John Posada MD  7/20/2024 6:07 PM EDT  Workstation ID: [id number]     I reviewed the patient's new clinical results.    ________________________________________________      PROBLEM LIST:    Altered mental status        ASSESSMENT/PLAN:  1. Probable subacute ischemic stroke in the right basal ganglia. There is some degree of vague contrast enhancement in this area which can be seen with stroke, but a small metastatic lesion is in the differential. She also has severe, diffuse white matter disease that has progressed slightly from her prior MRI in March. Prior imaging studies mention similar advanced white matter changes suggesting this is more chronic with  progression over the past 2-3 years due to chemoradiation therapy. No clinical signs of acute CNS infection.   - CTA head and neck: No intracranial large vessel occlusion is identified. 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 40% stenosis within the left proximal ICA. No significant stenosis of the right ICA. Moderate to severe stenosis of the proximal left subclavian artery. There is a surgical bypass between the left common carotid and subclavian artery with moderate stenosis seen within this bypass. Small right apical pneumothorax. Emphysematous changes and masslike scarring within the bilateral upper lobes.   - MRI brain: Small less than 1 cm areas of increased signal on diffusion imaging with associated FLAIR and T2 signal abnormality within the right basal ganglia, corona radiata suggesting evolution of small subacute infarcts. No convincing evidence for acute ischemic changes otherwise noted. Mild increased T1 signal and questionable enhancement in this region may represent sequela of evolving small infarct. No abnormal enhancement otherwise noted within the brain parenchyma. Extensive confluent white matter changes again noted which can be seen with small vessel ischemic disease as well as represent sequela of prior therapy  - Echo: pending   - EKG: Sinus rhythm, Probable left atrial enlargementpending   - Labs: A1C: P, B12: 353, LDL: 146, TSH: 3.06, UDS neg  - Antithrombotics: Brilinta increased to 90mg BID and ASA 81mg daily added  - Statin: Lipitor 80mg qhs   - PT/OT/ST as appropriate, fall precautions as appropriate, Neuro checks per protocol, DVT prophylaxis, Stroke education  - Recommend  repeating MRI brain w/wo contrast in 4-6 weeks to monitor for typical stroke evolution vs persistent abnormal enhancement    2. Acute encephalopathy, possibly due to UTI---improving. She also has an abnormal MRI as detailed above, though these changes appear chronic with no clinical  "signs of acute CNS infection. The small basal ganglia stroke would not cause this degree of encephalopathy. We cannot view any neuroimaging studies prior to March 2024 for comparison, but here is a report from 6/2022 that mentions \"advanced white matter disease presumably from chemoradiation that was comparable to 12/1/2021.\"  - Supportive care     3. UTI  - Culture showing E.coli  - Antibiotics per primary    4. HLD  - Statin, recommend diet and lifestyle modifications    5. Small cell lung cancer  - Per outpt oncology     6. B12 Deficiency  - Replacement ordered  - Recommend cyanocobalamin 1000mcg PO daily or IM monthly    **Please refer to previous notes for further details and recommendations.     Will follow peripherally.     I discussed the patient's findings and my recommendations with patient, family, nursing staff, primary care team, and consulting provider    KENNY Barfield  07/22/24  10:24 EDT      "

## 2024-07-22 NOTE — CASE MANAGEMENT/SOCIAL WORK
Case Management Readmission Assessment Note    Case Management Readmission Assessment (all recorded)       Readmission Interview       Row Name 07/22/24 1254             Readmission Indications    Is the patient and/or family able to complete the readmission assessment questions? Yes      Is this hospitalization related to the prior hospital diagnosis? No        Row Name 07/22/24 1254             Recommendation for rehospitalization    Did you speak with your physician prior to coming to the hospital No        [facility] Name 07/22/24 1254             Follow-up Appointments    Do you have a PCP? Yes      Did you have an appointment with PCP after your hospitalization? Yes      When was your appointment scheduled? 07/17/24      Did you go to appointment? Yes      Did you have an appointment with a Specialist? No      Are you current with the Pulmonary Clinic? No      Are you current with the CHF Clinic? No        Row Name 07/22/24 1254             Medications    Did you have newly prescribed medications at discharge? Yes      Did you understand the reasons for your medications at discharge and how to take them? Yes      Did you understand the side effects of your medications? No      Are you taking all of you prescribed medications? Yes      What pharmacy was used to fill prescription(s)? [facility]      Were medications picked up? Yes        [facility] Name 07/22/24 1254             Discharge Instructions    Did you understand your discharge instructions? Yes      Did your family/caregiver hear your instructions? Yes      Were you told to eat a special diet? No      Did you adhere to the diet? Yes      Were you given a number of someone to call if you had questions or concerns? No        [facility] Name 07/22/24 1254             Index discharge location/services    Where did you go upon discharge? Home      Do you have supportive family or friends in the home? Yes        [facility] Name 07/22/24 1253             Discharge Readiness    On  a scale of 1-5 (5 being well prepared), how ready were you for discharge 2      Recommendation based on interview Education on diagnosis/self management        Row Name 07/22/24 1254             Palliative Care/Hospice    Are you current with Palliative Care? No      Are you current with Hospice Care? No        Row Name 07/22/24 1254 07/20/24 0022          Advance Directives (For Healthcare)    Pre-existing AND/MOST/POLST Order No No     Advance Directive Status Patient does not have advance directive Patient does not have advance directive     Have you reviewed your Advance Directive and is it valid for this stay? No No     Literature Provided on Advance Directives No No     Patient Requests Assistance on Advance Directives Patient Declined --       Row Name 07/22/24 1358             Readmission Assessment Final Comments    Final Comments [facility] of COPD, HLD, HTN, lung cancer. Admitted 6/28 for UTI/ metabolic encephalopathy- declined SNF at discharge. Treated with IV and PO antibiotics. 7/19/2024 with altered mental status/ RO stroke. Neuro consulted. CT head results: significant white matter changes. Agreeing to SNF placement at discharge.

## 2024-07-22 NOTE — PLAN OF CARE
Goal Outcome Evaluation:      Pt pleasant this shift, daughter staying at bedside,no complaints of pain or discomfort,VSS, cont plan of care

## 2024-07-22 NOTE — DISCHARGE PLACEMENT REQUEST
"Miryam Wood (69 y.o. Female)       Date of Birth   1955    Social Security Number       Address   [address] [place] [address] [place] [facility]    Home Phone   [phone]    MRN   [id number]       Pentecostal   None    Marital Status                               Admission Date   7/19/24    Admission Type   Emergency    Admitting Provider   Gabriela Tripathi MD    Attending Provider   Wilver Arechiga MD    Department, Room/Bed   [facility] 3A MEDICAL INPATIENT, 302/1       Discharge Date       Discharge Disposition       Discharge Destination                                 Attending Provider: Wilver Arechiga MD    Allergies: Albuterol, Hydrocodone, Sulfa Antibiotics, Bacitracin, Benzalkonium Chloride, Codeine, Neomycin, Polymyxin B, Nickel, Penicillins    Isolation: None   Infection: None   Code Status: CPR    Ht: 160 cm (63\")   Wt: 77.1 kg (170 lb)    Admission Cmt: None   Principal Problem: Altered mental status [R41.82]                   Active Insurance as of 7/19/2024       Primary Coverage       Payor Plan Insurance Group Employer/Plan Group    ANTHEM MEDICARE REPLACEMENT ANTHEM MEDICARE ADVANTAGE [id number]       Payor Plan Address Payor Plan Phone Number Payor Plan Fax Number Effective Dates    [address] [phone]  1/1/2020 - None Entered    [facility] [id number]         Subscriber Name Subscriber Birth Date Member ID       MIRYAM WOOD 1955 [id number]                     Emergency Contacts        (Rel.) Home Phone Work Phone Mobile Phone    TOMMY TOMLIN (Daughter) [phone] -- [phone]                "

## 2024-07-22 NOTE — PROGRESS NOTES
[facility] MEDICINE SERVICE  DAILY PROGRESS NOTE    NAME: Miryam Fernández  : 1955  MRN: [id number]      LOS: 3 days     PROVIDER OF SERVICE: Wilver Arechiga MD    Chief Complaint: Altered mental status    Subjective:     Interval History:  History taken from: patient/  Patient Complaints: Patient reportedly not at her baseline by her . Patient's overall mentation is improved.  Patient is awake, alert and oriented when questioned. But does not have deep insight.  Continues to have poor physical strength.  Discharge pending placement arrangement.     Review of Systems   All other systems reviewed and are negative.      Objective:     Vital Signs  Temp:  [96.9 °F (36.1 °C)-98 °F (36.7 °C)] 96.9 °F (36.1 °C)  Heart Rate:  [67-93] 93  Resp:  [12-17] 17  BP: ()/(60-72) 117/61   Body mass index is 30.11 kg/m².    Physical Exam  Physical Exam  Vitals reviewed.   Constitutional:       General: She is not in acute distress.  HENT:      Head: Normocephalic.   Cardiovascular:      Rate and Rhythm: Normal rate.   Pulmonary:      Effort: Pulmonary effort is normal.      Breath sounds: Normal breath sounds.   Abdominal:      General: Bowel sounds are normal.      Palpations: Abdomen is soft.      Tenderness: There is no abdominal tenderness.   Neurological:      Mental Status: She is alert. Mental status is at baseline.   Psychiatric:         Behavior: Behavior normal.         Scheduled Meds   aspirin, 81 mg, Oral, Daily  [START ON 2024] atorvastatin, 80 mg, Oral, Daily  cefTRIAXone, 2,000 mg, Intravenous, Q24H  memantine, 5 mg, Oral, BID  metoprolol succinate XL, 25 mg, Oral, Daily  multivitamin, 1 tablet, Oral, Daily  pantoprazole, 40 mg, Oral, Daily  ticagrelor, 90 mg, Oral, BID       PRN Meds     senna-docusate sodium **AND** polyethylene glycol **AND** bisacodyl **AND** bisacodyl    Calcium Replacement - Follow Nurse / BPA Driven Protocol    Magnesium Standard Dose Replacement - Follow  Nurse / BPA Driven Protocol    Phosphorus Replacement - Follow Nurse / BPA Driven Protocol    Potassium Replacement - Follow Nurse / BPA Driven Protocol   Infusions         Diagnostic Data    Results from last 7 days   Lab Units 07/22/24  0528 07/19/24  2323   WBC 10*3/mm3 3.35* 4.97   HEMOGLOBIN g/dL 10.3* 10.5*   HEMATOCRIT % 32.6* 32.7*   PLATELETS 10*3/mm3 180 172   GLUCOSE mg/dL 89 98   CREATININE mg/dL 0.96 0.91   BUN mg/dL 19 22   SODIUM mmol/L 138 138   POTASSIUM mmol/L 3.6 4.1   AST (SGOT) U/L  --  11   ALT (SGPT) U/L  --  <5   ALK PHOS U/L  --  98   BILIRUBIN mg/dL  --  0.9   ANION GAP mmol/L 11.2 8.9       CT Angiogram Head    Result Date: 7/21/2024  1.No intracranial large vessel occlusion is identified. 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 Reconstituted trace flow within the left vertebral artery at about the C3 level. This likely represents a chronic finding. Proximal left vertebral artery is likely occluded on the chest CTA from 10/31/2023. No corresponding acute ischemia noted on yesterday's brain MRI. 3.Estimated 40% stenosis within the left proximal ICA (series 7, image 60). 4.No significant stenosis of the right ICA. 5.Moderate to severe stenosis of the proximal left subclavian artery. There is a surgical bypass between the left common carotid and subclavian artery with moderate stenosis seen within this bypass. 6.Small right apical pneumothorax. 7.Emphysematous changes and masslike scarring within the bilateral upper lobes. The above findings were discussed with Lois Elder, house supervisor, via telephone on 7/21/2024 8:05 PM EDT. Electronically Signed: Josue Fuentes MD  7/21/2024 8:06 PM EDT  Workstation ID: [id number]    CT Angiogram Carotids    Result Date: 7/21/2024  1.No intracranial large vessel occlusion is identified. 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 Reconstituted trace flow within the left vertebral artery at about the C3 level. This likely  represents a chronic finding. Proximal left vertebral artery is likely occluded on the chest CTA from 10/31/2023. No corresponding acute ischemia noted on yesterday's brain MRI. 3.Estimated 40% stenosis within the left proximal ICA (series 7, image 60). 4.No significant stenosis of the right ICA. 5.Moderate to severe stenosis of the proximal left subclavian artery. There is a surgical bypass between the left common carotid and subclavian artery with moderate stenosis seen within this bypass. 6.Small right apical pneumothorax. 7.Emphysematous changes and masslike scarring within the bilateral upper lobes. The above findings were discussed with Lois Elder, house supervisor, via telephone on 7/21/2024 8:05 PM EDT. Electronically Signed: Josue Fuentes MD  7/21/2024 8:06 PM EDT  Workstation ID: [id number]    MRI Brain With & Without Contrast    Result Date: 7/20/2024  Impression: 1. Small less than 1 cm areas of increased signal on diffusion imaging with associated FLAIR and T2 signal abnormality within the right basal ganglia, corona radiata suggesting evolution of small subacute infarcts. No convincing evidence for acute ischemic changes otherwise noted. 2. Mild increased T1 signal and questionable enhancement in this region may represent sequela of evolving small infarct. Metastatic lesion is thought to be less likely although this will need to be followed per patient's oncologic protocol 3. No abnormal enhancement otherwise noted within the brain parenchyma. 4. Extensive confluent white matter changes again noted which can be seen with small vessel ischemic disease as well as represent sequela of prior therapy Electronically Signed: John Posada MD  7/20/2024 6:07 PM EDT  Workstation ID: [id number]           Assessment:    Confusion from baseline reported  History of lung carcinoma  History of coronary artery disease  COPD  Moderate protein malnutrition  Anemia  UTI        1.  Acute ischemic stroke :  -MRI  showed mall less than 1 cm areas of increased signal on diffusion imaging with associated FLAIR and T2 signal abnormality within the right basal ganglia, corona radiata suggesting evolution of small subacute infarcts.  - On aspirin, Brilinta twice daily.  Neurology following.  -PT/OT following.  Discharge pending placement arrangement.      2.  Acute encephalopathy:  2/2 combination of UTI and stroke.  Overall mentation has improved.      3.  UTI:  On ceftriaxone.  Active and Resolved Problems  Active Hospital Problems    Diagnosis  POA    **Altered mental status [R41.82]  Yes      Resolved Hospital Problems   No resolved problems to display.           VTE Prophylaxis:  Mechanical VTE prophylaxis orders are present.         Code status is   Code Status and Medical Interventions:   Ordered at: 07/19/24 1652     Code Status (Patient has no pulse and is not breathing):    CPR (Attempt to Resuscitate)     Medical Interventions (Patient has pulse or is breathing):    Full Support       Plan for disposition:rehab in 2 days    Time: 30 minutes    Signature: Electronically signed by Wilver Arechiga MD, 07/22/24, 14:21 EDT.  [facility] Hospitalist Team

## 2024-07-22 NOTE — CASE MANAGEMENT/SOCIAL WORK
Discharge Planning Assessment   Oh     Patient Name: Miryam Fernández  MRN: [id number]  Today's Date: 7/22/2024    Admit Date: 7/19/2024    Plan: SNF [facility] (pending acceptance.) PRIYANKA approved. Will need precert. Family to transport at ME   Discharge Needs Assessment       Row Name 07/22/24 1408       Living Environment    People in Home spouse    Name(s) of People in Home  Rafy    Current Living Arrangements home    Potentially Unsafe Housing Conditions none    In the past 12 months has the electric, gas, oil, or water company threatened to shut off services in your home? No    Primary Care Provided by self    Provides Primary Care For no one    Family Caregiver if Needed spouse    Family Caregiver Names  Rafy    Quality of Family Relationships helpful;involved;supportive    Able to Return to Prior Arrangements yes       Resource/Environmental Concerns    Resource/Environmental Concerns none    Transportation Concerns none       Transportation Needs    In the past 12 months, has lack of transportation kept you from medical appointments or from getting medications? no    In the past 12 months, has lack of transportation kept you from meetings, work, or from getting things needed for daily living? No       Food Insecurity    Within the past 12 months, you worried that your food would run out before you got the money to buy more. Never true    Within the past 12 months, the food you bought just didn't last and you didn't have money to get more. Never true       Transition Planning    Patient/Family Anticipates Transition to home with family    Patient/Family Anticipated Services at Transition none    Transportation Anticipated family or friend will provide       Discharge Needs Assessment    Readmission Within the Last 30 Days no previous admission in last 30 days    Equipment Currently Used at Home walker, rolling;cane, straight    Concerns to be Addressed discharge planning     Anticipated Changes Related to Illness none    Equipment Needed After Discharge none    Outpatient/Agency/Support Group Needs skilled nursing facility    Discharge Facility/Level of Care Needs nursing facility, skilled    Provided Post Acute Provider List? Yes    Post Acute Provider List Inpatient Rehab    Delivered To Support Person    Method of Delivery In person                   Discharge Plan       Row Name 07/22/24 1409       Plan    Plan [facility] (pending acceptance.) [facility] approved. Will need precert. Family to transport at DC    Patient/Family in Agreement with Plan yes    Plan Comments CM met with pt at bedside to discuss discharge needs. Pt lives with spouse Rafy, does not drive and is assist with ADLS. PCP and pharmacy verified- [facility] declined due to pending facility placement. No issues stated affording food or medications, and home environment is safe. Current DME: rolling walker and cane. SNF facility choices obtained. 1)[facility] referral placed and [facility] liaison contacted 2)[facility] 3)[facility]. Family will transport at discharge.                  Continued Care and Services - Admitted Since 7/19/2024       Destination       Service Provider Request Status Selected Services Address Phone Fax Patient Preferred    [facility] P[facility] [address] [facility] [facility] [place] [address] [phone] --                  Demographic Summary       Row Name 07/22/24 1406       General Information    Admission Type inpatient    Arrived From emergency department    Referral Source admission list    Reason for Consult discharge planning    Preferred Language English       Contact Information    Permission Granted to Share Info With                    Functional Status       Row Name 07/22/24 1408       Functional Status    Usual Activity Tolerance fair    Current Activity Tolerance poor       Functional Status, IADL    Medications  completely dependent    Meal Preparation completely dependent    Housekeeping completely dependent    Laundry completely dependent    Shopping completely dependent       Mental Status    General Appearance WDL WDL       Mental Status Summary    Recent Changes in Mental Status/Cognitive Functioning no changes       Employment/    Current or Previous Occupation not applicable               Yudelka Hilliard RN      Office phone: [phone]  Office fax: [phone]

## 2024-07-22 NOTE — PLAN OF CARE
Problem: Adult Inpatient Plan of Care  Goal: Absence of Hospital-Acquired Illness or Injury  Intervention: Identify and Manage Fall Risk  Recent Flowsheet Documentation  Taken 7/22/2024 1811 by Omega Cho RN  Safety Promotion/Fall Prevention: safety round/check completed  Taken 7/22/2024 1600 by Omega Cho RN  Safety Promotion/Fall Prevention: patient off unit  Taken 7/22/2024 1402 by Omega Cho RN  Safety Promotion/Fall Prevention: safety round/check completed  Taken 7/22/2024 1229 by Omega Cho RN  Safety Promotion/Fall Prevention: safety round/check completed  Taken 7/22/2024 1025 by Omega Cho RN  Safety Promotion/Fall Prevention: safety round/check completed  Taken 7/22/2024 0811 by Omega Cho RN  Safety Promotion/Fall Prevention: safety round/check completed  Intervention: Prevent Skin Injury  Recent Flowsheet Documentation  Taken 7/22/2024 0811 by Omega Cho RN  Skin Protection: adhesive use limited  Intervention: Prevent and Manage VTE (Venous Thromboembolism) Risk  Recent Flowsheet Documentation  Taken 7/22/2024 0811 by Omega Cho RN  VTE Prevention/Management: sequential compression devices off  Range of Motion: active ROM (range of motion) encouraged  Intervention: Prevent Infection  Recent Flowsheet Documentation  Taken 7/22/2024 1811 by Omega Cho RN  Infection Prevention:   hand hygiene promoted   personal protective equipment utilized   single patient room provided  Taken 7/22/2024 1402 by Omega Cho RN  Infection Prevention:   hand hygiene promoted   personal protective equipment utilized   single patient room provided  Taken 7/22/2024 1229 by Omega Cho RN  Infection Prevention:   hand hygiene promoted   personal protective equipment utilized   single patient room provided  Taken 7/22/2024 1025 by Omega Cho RN  Infection  Prevention:   hand hygiene promoted   personal protective equipment utilized   single patient room provided  Taken 7/22/2024 0811 by Omega Cho RN  Infection Prevention:   hand hygiene promoted   personal protective equipment utilized   single patient room provided  Goal: Optimal Comfort and Wellbeing  Intervention: Provide Person-Centered Care  Recent Flowsheet Documentation  Taken 7/22/2024 0811 by Omega Cho RN  Trust Relationship/Rapport: care explained     Problem: Skin Injury Risk Increased  Goal: Skin Health and Integrity  Intervention: Optimize Skin Protection  Recent Flowsheet Documentation  Taken 7/22/2024 0811 by Omega Cho RN  Pressure Reduction Techniques: frequent weight shift encouraged  Pressure Reduction Devices: pressure-redistributing mattress utilized  Skin Protection: adhesive use limited     Problem: Fall Injury Risk  Goal: Absence of Fall and Fall-Related Injury  Intervention: Identify and Manage Contributors  Recent Flowsheet Documentation  Taken 7/22/2024 1811 by Omega Cho RN  Medication Review/Management: medications reviewed  Taken 7/22/2024 1402 by Omega Cho RN  Medication Review/Management: medications reviewed  Taken 7/22/2024 1229 by Omega Cho RN  Medication Review/Management: medications reviewed  Taken 7/22/2024 1025 by Omega Cho RN  Medication Review/Management: medications reviewed  Taken 7/22/2024 0811 by Omega Cho RN  Medication Review/Management: medications reviewed  Intervention: Promote Injury-Free Environment  Recent Flowsheet Documentation  Taken 7/22/2024 1811 by Omega Cho RN  Safety Promotion/Fall Prevention: safety round/check completed  Taken 7/22/2024 1600 by Omega Cho RN  Safety Promotion/Fall Prevention: patient off unit  Taken 7/22/2024 1402 by Omega Cho RN  Safety Promotion/Fall Prevention: safety  round/check completed  Taken 7/22/2024 1229 by Omega Cho, RN  Safety Promotion/Fall Prevention: safety round/check completed  Taken 7/22/2024 1025 by Omega Cho, RN  Safety Promotion/Fall Prevention: safety round/check completed  Taken 7/22/2024 0811 by Omega Cho, RN  Safety Promotion/Fall Prevention: safety round/check completed   Goal Outcome Evaluation:   Patient is oriented to name, location, and month but disoriented to year, birthday, and situation. Family at bedside has been assisting with care. Call light in reach and safety protocols in place for fall precautions.

## 2024-07-23 ENCOUNTER — APPOINTMENT (OUTPATIENT)
Dept: CT IMAGING | Facility: HOSPITAL | Age: 69
DRG: 065 | End: 2024-07-23
Payer: MEDICARE

## 2024-07-23 VITALS
HEART RATE: 93 BPM | RESPIRATION RATE: 18 BRPM | SYSTOLIC BLOOD PRESSURE: 119 MMHG | DIASTOLIC BLOOD PRESSURE: 66 MMHG | TEMPERATURE: 97.7 F | BODY MASS INDEX: 30.12 KG/M2 | HEIGHT: 63 IN | OXYGEN SATURATION: 97 % | WEIGHT: 170 LBS

## 2024-07-23 LAB
AORTIC DIMENSIONLESS INDEX: 0.92 (DI)
BACTERIA SPEC AEROBE CULT: ABNORMAL
BH CV ECHO LEFT VENTRICLE GLOBAL LONGITUDINAL STRAIN: -17.4 %
BH CV ECHO MEAS - AO MAX PG: 4.3 MMHG
BH CV ECHO MEAS - AO MEAN PG: 2 MMHG
BH CV ECHO MEAS - AO V2 MAX: 104 CM/SEC
BH CV ECHO MEAS - AO V2 VTI: 19.6 CM
BH CV ECHO MEAS - AVA(I,D): 2.4 CM2
BH CV ECHO MEAS - EDV(CUBED): 68.9 ML
BH CV ECHO MEAS - EDV(MOD-SP2): 39.7 ML
BH CV ECHO MEAS - EDV(MOD-SP4): 42.7 ML
BH CV ECHO MEAS - EF(MOD-BP): 61.7 %
BH CV ECHO MEAS - EF(MOD-SP2): 59.9 %
BH CV ECHO MEAS - EF(MOD-SP4): 62.1 %
BH CV ECHO MEAS - ESV(CUBED): 13.8 ML
BH CV ECHO MEAS - ESV(MOD-SP2): 15.9 ML
BH CV ECHO MEAS - ESV(MOD-SP4): 16.2 ML
BH CV ECHO MEAS - FS: 41.5 %
BH CV ECHO MEAS - IVS/LVPW: 0.91 CM
BH CV ECHO MEAS - IVSD: 1 CM
BH CV ECHO MEAS - LA DIMENSION: 2.5 CM
BH CV ECHO MEAS - LAT PEAK E' VEL: 9.1 CM/SEC
BH CV ECHO MEAS - LV DIASTOLIC VOL/BSA (35-75): 28.8 CM2
BH CV ECHO MEAS - LV MASS(C)D: 141.5 GRAMS
BH CV ECHO MEAS - LV MAX PG: 3.6 MMHG
BH CV ECHO MEAS - LV MEAN PG: 2 MMHG
BH CV ECHO MEAS - LV SYSTOLIC VOL/BSA (12-30): 10.9 CM2
BH CV ECHO MEAS - LV V1 MAX: 95.4 CM/SEC
BH CV ECHO MEAS - LV V1 VTI: 15 CM
BH CV ECHO MEAS - LVIDD: 4.1 CM
BH CV ECHO MEAS - LVIDS: 2.4 CM
BH CV ECHO MEAS - LVOT AREA: 3.1 CM2
BH CV ECHO MEAS - LVOT DIAM: 2 CM
BH CV ECHO MEAS - LVPWD: 1.1 CM
BH CV ECHO MEAS - MED PEAK E' VEL: 8.3 CM/SEC
BH CV ECHO MEAS - MR MAX PG: 16.6 MMHG
BH CV ECHO MEAS - MR MAX VEL: 204 CM/SEC
BH CV ECHO MEAS - MV A DUR: 0.12 SEC
BH CV ECHO MEAS - MV A MAX VEL: 67.6 CM/SEC
BH CV ECHO MEAS - MV DEC SLOPE: 441 CM/SEC2
BH CV ECHO MEAS - MV DEC TIME: 0.19 SEC
BH CV ECHO MEAS - MV E MAX VEL: 52.9 CM/SEC
BH CV ECHO MEAS - MV E/A: 0.78
BH CV ECHO MEAS - MV MAX PG: 1.87 MMHG
BH CV ECHO MEAS - MV MEAN PG: 1 MMHG
BH CV ECHO MEAS - MV P1/2T: 48.3 MSEC
BH CV ECHO MEAS - MV V2 VTI: 15.7 CM
BH CV ECHO MEAS - MVA(P1/2T): 4.6 CM2
BH CV ECHO MEAS - MVA(VTI): 3 CM2
BH CV ECHO MEAS - PA ACC TIME: 0.14 SEC
BH CV ECHO MEAS - PA V2 MAX: 87.1 CM/SEC
BH CV ECHO MEAS - PULM A REVS DUR: 0.12 SEC
BH CV ECHO MEAS - PULM A REVS VEL: 44.5 CM/SEC
BH CV ECHO MEAS - PULM DIAS VEL: 49.8 CM/SEC
BH CV ECHO MEAS - PULM S/D: 0.95
BH CV ECHO MEAS - PULM SYS VEL: 47.2 CM/SEC
BH CV ECHO MEAS - RV MAX PG: 2.35 MMHG
BH CV ECHO MEAS - RV V1 MAX: 76.6 CM/SEC
BH CV ECHO MEAS - RV V1 VTI: 13.3 CM
BH CV ECHO MEAS - SV(LVOT): 47.1 ML
BH CV ECHO MEAS - SV(MOD-SP2): 23.8 ML
BH CV ECHO MEAS - SV(MOD-SP4): 26.5 ML
BH CV ECHO MEAS - SVI(LVOT): 31.8 ML/M2
BH CV ECHO MEAS - SVI(MOD-SP2): 16.1 ML/M2
BH CV ECHO MEAS - SVI(MOD-SP4): 17.9 ML/M2
BH CV ECHO MEAS - TAPSE (>1.6): 1.65 CM
BH CV ECHO MEAS - TR MAX PG: 27.2 MMHG
BH CV ECHO MEAS - TR MAX VEL: 261 CM/SEC
BH CV ECHO MEASUREMENTS AVERAGE E/E' RATIO: 6.08
BH CV XLRA - TDI S': 23 CM/SEC
SINUS: 3.6 CM
STJ: 2.3 CM

## 2024-07-23 PROCEDURE — 97530 THERAPEUTIC ACTIVITIES: CPT

## 2024-07-23 PROCEDURE — 74176 CT ABD & PELVIS W/O CONTRAST: CPT

## 2024-07-23 PROCEDURE — 97535 SELF CARE MNGMENT TRAINING: CPT

## 2024-07-23 PROCEDURE — 25010000002 CEFTRIAXONE PER 250 MG: Performed by: HOSPITALIST

## 2024-07-23 RX ORDER — ASPIRIN 81 MG/1
81 TABLET ORAL DAILY
Qty: 30 TABLET | Refills: 2 | Status: ON HOLD | OUTPATIENT
Start: 2024-07-24

## 2024-07-23 RX ADMIN — PANTOPRAZOLE SODIUM 40 MG: 40 TABLET, DELAYED RELEASE ORAL at 09:56

## 2024-07-23 RX ADMIN — THERA TABS 1 TABLET: TAB at 09:58

## 2024-07-23 RX ADMIN — MEMANTINE 5 MG: 5 TABLET ORAL at 09:58

## 2024-07-23 RX ADMIN — TICAGRELOR 90 MG: 90 TABLET ORAL at 09:58

## 2024-07-23 RX ADMIN — ATORVASTATIN CALCIUM 80 MG: 40 TABLET, FILM COATED ORAL at 09:58

## 2024-07-23 RX ADMIN — ASPIRIN 81 MG: 81 TABLET, COATED ORAL at 09:58

## 2024-07-23 RX ADMIN — CEFTRIAXONE 2000 MG: 2 INJECTION, POWDER, FOR SOLUTION INTRAMUSCULAR; INTRAVENOUS at 12:36

## 2024-07-23 RX ADMIN — METOPROLOL SUCCINATE 25 MG: 25 TABLET, EXTENDED RELEASE ORAL at 09:56

## 2024-07-23 NOTE — SIGNIFICANT NOTE
07/23/24 1146   OTHER   Discipline physical therapist   Rehab Time/Intention   Session Not Performed other (see comments)  (hospital d/c pending)   Recommendation   PT - Next Appointment 07/24/24

## 2024-07-23 NOTE — PLAN OF CARE
Problem: Adult Inpatient Plan of Care  Goal: Absence of Hospital-Acquired Illness or Injury  Intervention: Identify and Manage Fall Risk  Recent Flowsheet Documentation  Taken 7/23/2024 1009 by Omega Cho RN  Safety Promotion/Fall Prevention: safety round/check completed  Taken 7/23/2024 0810 by Omega Cho RN  Safety Promotion/Fall Prevention: safety round/check completed  Intervention: Prevent Skin Injury  Recent Flowsheet Documentation  Taken 7/23/2024 1009 by Omega Cho RN  Body Position: sitting up in bed  Skin Protection: adhesive use limited  Intervention: Prevent and Manage VTE (Venous Thromboembolism) Risk  Recent Flowsheet Documentation  Taken 7/23/2024 1009 by Omega Cho RN  Activity Management: activity encouraged  VTE Prevention/Management: sequential compression devices off  Range of Motion: active ROM (range of motion) encouraged  Intervention: Prevent Infection  Recent Flowsheet Documentation  Taken 7/23/2024 1009 by Omega Cho RN  Infection Prevention:   rest/sleep promoted   single patient room provided   hand hygiene promoted   personal protective equipment utilized  Taken 7/23/2024 0810 by Omega Cho RN  Infection Prevention:   hand hygiene promoted   personal protective equipment utilized   rest/sleep promoted   single patient room provided  Goal: Optimal Comfort and Wellbeing  Intervention: Provide Person-Centered Care  Recent Flowsheet Documentation  Taken 7/23/2024 1009 by Omega Cho RN  Trust Relationship/Rapport: care explained     Problem: Skin Injury Risk Increased  Goal: Skin Health and Integrity  Intervention: Optimize Skin Protection  Recent Flowsheet Documentation  Taken 7/23/2024 1009 by Omega Cho RN  Pressure Reduction Techniques: frequent weight shift encouraged  Head of Bed (HOB) Positioning:   HOB elevated   HOB at 30-45 degrees  Pressure Reduction Devices:  pressure-redistributing mattress utilized  Skin Protection: adhesive use limited     Problem: Fall Injury Risk  Goal: Absence of Fall and Fall-Related Injury  Intervention: Identify and Manage Contributors  Recent Flowsheet Documentation  Taken 7/23/2024 1009 by Omega Cho, RN  Medication Review/Management: medications reviewed  Taken 7/23/2024 0810 by Omega Cho, RN  Medication Review/Management: medications reviewed  Intervention: Promote Injury-Free Environment  Recent Flowsheet Documentation  Taken 7/23/2024 1009 by Omega Cho, RN  Safety Promotion/Fall Prevention: safety round/check completed  Taken 7/23/2024 0810 by Omega Cho RN  Safety Promotion/Fall Prevention: safety round/check completed   Goal Outcome Evaluation:   Pt disoriented to name, location, and date but oriented to family and birthday. Family at bedside and discharge cleared to [facility].

## 2024-07-23 NOTE — CASE MANAGEMENT/SOCIAL WORK
Case Management Discharge Note      Final Note: SNF [facility]        Selected Continued Care - Discharged on 7/23/2024 Admission date: 7/19/2024 - Discharge disposition: Skilled Nursing Facility (DC - External)      Destination Coordination complete.      Service Provider Selected Services Address Phone Fax Patient Preferred    [facility] [address] [place] [place] [address] [phone] --                    Transportation Services  Private: Car    Final Discharge Disposition Code: 03 - skilled nursing facility (SNF)

## 2024-07-23 NOTE — PLAN OF CARE
"Assessment: Miryam Fernández presents with ADL impairments affecting function including balance, coordination, motor planning, and strength. Pt with fair carryover for transfer education this date. Demonstrated functioning below baseline abilities indicate the need for continued skilled intervention while inpatient. Tolerating session today without incident. Will continue to follow and progress as tolerated.     Plan/Recommendations:   Moderate Intensity Therapy recommended post-acute care. This is recommended as therapy feels the patient would require 3-4 days per week and wouldn't tolerate \"3 hour daily\" rehab intensity. SNF would be the preferred choice. If the patient does not agree to SNF, arrange HH or OP depending on home bound status. If patient is medically complex, consider LTACH.. Pt requires no DME at discharge.   "

## 2024-07-23 NOTE — PLAN OF CARE
Goal Outcome Evaluation:       Pt resting in bed, daughter at bedside,no c/o pain or discomfort,VSS, cont plan of care

## 2024-07-23 NOTE — DISCHARGE PLACEMENT REQUEST
"Miryam Wood (69 y.o. Female)       Date of Birth   1955    Social Security Number       Address   [address] [place] [address] [place] [facility]    Home Phone   [phone]    MRN   [id number]       [place]   None    Marital Status                               Admission Date   24    Admission Type   Emergency    Admitting Provider   Gabriela Tripathi MD    Attending Provider   Wilver Arechiga MD    Department, Room/Bed   [facility]A MEDICAL INPATIENT, 302/       Discharge Date       Discharge Disposition       Discharge Destination                                 Attending Provider: Wilver Arechiga MD    Allergies: Albuterol, Hydrocodone, Sulfa Antibiotics, Bacitracin, Benzalkonium Chloride, Codeine, Neomycin, Polymyxin B, Nickel, Penicillins    Isolation: None   Infection: None   Code Status: CPR    Ht: 160 cm (63\")   Wt: 77.1 kg (170 lb)    Admission Cmt: None   Principal Problem: Altered mental status [R41.82]                   Active Insurance as of 2024       Primary Coverage       Payor Plan Insurance Group Employer/Plan Group    ANTHEM MEDICARE REPLACEMENT ANTHEM MEDICARE ADVANTAGE [id number]       Payor Plan Address Payor Plan Phone Number Payor Plan Fax Number Effective Dates    [address] [phone]  2020 - None Entered    [facility] [id number]         Subscriber Name Subscriber Birth Date Member ID       MIRYAM WOOD 1955 [id number]                     Emergency Contacts        (Rel.) Home Phone Work Phone Mobile Phone    TOMMY TOMLIN (Daughter) [phone] -- [phone]                 History & Physical        Yoon Adams APRN at 24 1653       Attestation signed by Gabriela Tripathi MD at 24 1749    I have reviewed this documentation and agree.                      [facility] Medicine Services  History & Physical    Patient Name: Miryam Wood  : 1955  MRN: [id number]  Primary Care " Physician:  Lashell De La Torre APRN  Date of admission: 7/19/2024  Date and Time of Service: 7/19/2024 at 1300    Subjective      Chief Complaint: confusion    History of Present Illness: Miryam Fernández is a 69 y.o. female with a CMH of COPD, HLD, HTN, lung cancer who presented to [facility] on 7/19/2024 with altered mental status. Over the past several days, she hasn't been eating or drinking much, using her walker or wanting to walk much at all. Her family reports she has been very confused at times. Patient is typically alert and oriented x4 and uses a walker at home. Her daughter and  are present at bedside and deny any prior history of dementia. However, Namenda is on patient's home medication list.     Patient recently admitted here and treated for metabolic encephalopathy due to UTI from 6-28 to 6-30.  SNF was recommended at discharge, but patient's daughter declined.     On ED evaluation her UA was negative for infection. CT of the head showed no acute abnormality. No leukocytosis. Neurology consulted for further workup. Hospitalist team to admit for further medical management.         Review of Systems   Unable to perform ROS: Mental status change       Personal History     Past Medical History:   Diagnosis Date    Cancer     lung cancer    Carotid stenosis     COPD (chronic obstructive pulmonary disease)     Coronary artery disease     Esophageal stricture     Dr Domingo    Hyperlipidemia     Pulmonary embolism     seen at [facility], fluid around her heart at the time-right lung       Past Surgical History:   Procedure Laterality Date    ANGIOPLASTY / STENTING FEMORAL      CARDIAC CATHETERIZATION      CAROTID STENT      CORONARY STENT PLACEMENT      ESOPHAGOSCOPY / EGD      HYSTERECTOMY         Family History: family history includes Diabetes in her paternal uncle; Heart disease in her mother; Hyperlipidemia in her father; Hypertension in her mother; Leukemia in her paternal grandmother;  Lung cancer in her father. Otherwise pertinent FHx was reviewed and not pertinent to current issue.    Social History:  reports that she has been smoking cigarettes. She has a 80 pack-year smoking history. She has been exposed to tobacco smoke. She does not have any smokeless tobacco history on file. She reports that she does not currently use alcohol. She reports that she does not use drugs.    Home Medications:  Prior to Admission Medications       Prescriptions Last Dose Informant Patient Reported? Taking?    atorvastatin (LIPITOR) 40 MG tablet   No No    TAKE 1 TABLET BY MOUTH EVERY DAY    furosemide (LASIX) 20 MG tablet   Yes No    Take 1 tablet by mouth As Needed. PRN for swelling    memantine (Namenda) 5 MG tablet   No No    Take 1 tablet by mouth 2 (Two) Times a Day.    metoprolol succinate XL (TOPROL-XL) 25 MG 24 hr tablet   Yes No    Take 1 tablet by mouth Daily.    mupirocin (BACTROBAN) 2 % ointment   No No    Apply 1 Application topically to the appropriate area as directed 3 (Three) Times a Day.    nitrofurantoin, macrocrystal-monohydrate, (Macrobid) 100 MG capsule   No No    Take 1 capsule by mouth 2 (Two) Times a Day.    oxyCODONE-acetaminophen (PERCOCET) 5-325 MG per tablet   Yes No    Take 1 tablet by mouth Every 6 (Six) Hours As Needed.    pantoprazole (PROTONIX) 40 MG EC tablet   Yes No    Take 1 tablet by mouth Daily.    ticagrelor (Brilinta) 60 MG tablet tablet   No No    Take 1 tablet by mouth 2 (Two) Times a Day.              Allergies:  Allergies   Allergen Reactions    Albuterol Shortness Of Breath     Pt states she has sensitivity to albuterol.  She states it causes her to be SOA.    Hydrocodone Palpitations    Sulfa Antibiotics Swelling    Bacitracin Other (See Comments)    Benzalkonium Chloride Hives    Codeine Other (See Comments)    Neomycin Other (See Comments)    Polymyxin B Other (See Comments)    Nickel Rash    Penicillins Rash       Objective      Vitals:   Temp:  [97.7 °F (36.5  °C)] 97.7 °F (36.5 °C)  Heart Rate:  [82] 82  Resp:  [16] 16  BP: (128)/(67) 128/67  There is no height or weight on file to calculate BMI.  Physical Exam  Vitals and nursing note reviewed.   Constitutional:       Appearance: Normal appearance.   HENT:      Head: Normocephalic and atraumatic.      Nose: Nose normal.      Mouth/Throat:      Mouth: Mucous membranes are moist.   Eyes:      Extraocular Movements: Extraocular movements intact.      Pupils: Pupils are equal, round, and reactive to light.   Cardiovascular:      Rate and Rhythm: Normal rate and regular rhythm.      Pulses: Normal pulses.   Pulmonary:      Effort: Pulmonary effort is normal.      Breath sounds: Normal breath sounds.   Abdominal:      General: Bowel sounds are normal.      Palpations: Abdomen is soft.   Musculoskeletal:         General: Normal range of motion.      Cervical back: Normal range of motion and neck supple.   Skin:     General: Skin is warm.      Capillary Refill: Capillary refill takes less than 2 seconds.   Neurological:      Mental Status: She is alert. She is disoriented.      GCS: GCS eye subscore is 4. GCS verbal subscore is 5. GCS motor subscore is 6.      Cranial Nerves: Cranial nerves 2-12 are intact.      Sensory: Sensation is intact.      Motor: Weakness present.      Comments: Patient only oriented to person and place         Diagnostic Data:  Lab Results (last 24 hours)       ** No results found for the last 24 hours. **             Imaging Results (Last 24 Hours)       ** No results found for the last 24 hours. **              Assessment & Plan        This is a 69 y.o. female with:    Active and Resolved Problems  Active Hospital Problems    Diagnosis  POA    **Altered mental status [R41.82]  Yes      Resolved Hospital Problems   No resolved problems to display.       Altered mental status  - consider delirium or progressive dementia  - CT head normal   - no focal deficits  - no s/sx of infection  - CXR WNL  - pt/ot  for eval   - neurology consult      Lung Cancer  - history of copd  - current smoker  - oncology following outpatient    GERD  - continue PPI once med rec complete    HLD  - continue atorvastatin once med rec complete      CAD  - history of stent placement  - takes Brilinta home, continue once med rec complete      Hypertension   - BP stable  - Resume home medications once reconciled         VTE Prophylaxis:  Mechanical VTE prophylaxis orders are present.        The patient desires to be as follows:    CODE STATUS:    Code Status (Patient has no pulse and is not breathing): CPR (Attempt to Resuscitate)  Medical Interventions (Patient has pulse or is breathing): Full Support      Admission Status:  I believe this patient meets inpatient status.    Expected Length of Stay: > 24 hours, will likely need rehab/SNF placement    PDMP and Medication Dispenses via Sidebar reviewed and consistent with patient reported medications.    I discussed the patient's findings and my recommendations with patient and family.      Signature:     This document has been electronically signed by KENNY Ac on 2024 17:39 EDT   [facility], [place], operated by [facility] Hospitalist Team      Electronically signed by Gabriela Tripathi MD at 24 1638       Operative/Procedure Notes (all)    No notes of this type exist for this encounter.          Physician Progress Notes (last 48 hours)        Wilver Arechiga MD at 24 1421            [facility] MEDICINE SERVICE  DAILY PROGRESS NOTE    NAME: Miryam Fernández  : 1955  MRN: [id number]      LOS: 3 days     PROVIDER OF SERVICE: Wilver Arechiga MD    Chief Complaint: Altered mental status    Subjective:     Interval History:  History taken from: patient/  Patient Complaints: Patient reportedly not at her baseline by her . Patient's overall mentation is improved.  Patient is awake, alert and oriented when questioned. But does not have deep insight.  Continues to have poor  physical strength.  Discharge pending placement arrangement.     Review of Systems   All other systems reviewed and are negative.      Objective:     Vital Signs  Temp:  [96.9 °F (36.1 °C)-98 °F (36.7 °C)] 96.9 °F (36.1 °C)  Heart Rate:  [67-93] 93  Resp:  [12-17] 17  BP: ()/(60-72) 117/61   Body mass index is 30.11 kg/m².    Physical Exam  Physical Exam  Vitals reviewed.   Constitutional:       General: She is not in acute distress.  HENT:      Head: Normocephalic.   Cardiovascular:      Rate and Rhythm: Normal rate.   Pulmonary:      Effort: Pulmonary effort is normal.      Breath sounds: Normal breath sounds.   Abdominal:      General: Bowel sounds are normal.      Palpations: Abdomen is soft.      Tenderness: There is no abdominal tenderness.   Neurological:      Mental Status: She is alert. Mental status is at baseline.   Psychiatric:         Behavior: Behavior normal.         Scheduled Meds   aspirin, 81 mg, Oral, Daily  [START ON 7/23/2024] atorvastatin, 80 mg, Oral, Daily  cefTRIAXone, 2,000 mg, Intravenous, Q24H  memantine, 5 mg, Oral, BID  metoprolol succinate XL, 25 mg, Oral, Daily  multivitamin, 1 tablet, Oral, Daily  pantoprazole, 40 mg, Oral, Daily  ticagrelor, 90 mg, Oral, BID       PRN Meds     senna-docusate sodium **AND** polyethylene glycol **AND** bisacodyl **AND** bisacodyl    Calcium Replacement - Follow Nurse / BPA Driven Protocol    Magnesium Standard Dose Replacement - Follow Nurse / BPA Driven Protocol    Phosphorus Replacement - Follow Nurse / BPA Driven Protocol    Potassium Replacement - Follow Nurse / BPA Driven Protocol   Infusions         Diagnostic Data    Results from last 7 days   Lab Units 07/22/24  0528 07/19/24  2323   WBC 10*3/mm3 3.35* 4.97   HEMOGLOBIN g/dL 10.3* 10.5*   HEMATOCRIT % 32.6* 32.7*   PLATELETS 10*3/mm3 180 172   GLUCOSE mg/dL 89 98   CREATININE mg/dL 0.96 0.91   BUN mg/dL 19 22   SODIUM mmol/L 138 138   POTASSIUM mmol/L 3.6 4.1   AST (SGOT) U/L  --  11    ALT (SGPT) U/L  --  <5   ALK PHOS U/L  --  98   BILIRUBIN mg/dL  --  0.9   ANION GAP mmol/L 11.2 8.9       CT Angiogram Head    Result Date: 7/21/2024  1.No intracranial large vessel occlusion is identified. 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 Reconstituted trace flow within the left vertebral artery at about the C3 level. This likely represents a chronic finding. Proximal left vertebral artery is likely occluded on the chest CTA from 10/31/2023. No corresponding acute ischemia noted on yesterday's brain MRI. 3.Estimated 40% stenosis within the left proximal ICA (series 7, image 60). 4.No significant stenosis of the right ICA. 5.Moderate to severe stenosis of the proximal left subclavian artery. There is a surgical bypass between the left common carotid and subclavian artery with moderate stenosis seen within this bypass. 6.Small right apical pneumothorax. 7.Emphysematous changes and masslike scarring within the bilateral upper lobes. The above findings were discussed with Lois Elder, house supervisor, via telephone on 7/21/2024 8:05 PM EDT. Electronically Signed: Josue Fuentes MD  7/21/2024 8:06 PM EDT  Workstation ID: [id number]    CT Angiogram Carotids    Result Date: 7/21/2024  1.No intracranial large vessel occlusion is identified. 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 Reconstituted trace flow within the left vertebral artery at about the C3 level. This likely represents a chronic finding. Proximal left vertebral artery is likely occluded on the chest CTA from 10/31/2023. No corresponding acute ischemia noted on yesterday's brain MRI. 3.Estimated 40% stenosis within the left proximal ICA (series 7, image 60). 4.No significant stenosis of the right ICA. 5.Moderate to severe stenosis of the proximal left subclavian artery. There is a surgical bypass between the left common carotid and subclavian artery with moderate stenosis seen within this bypass. 6.Small right apical  pneumothorax. 7.Emphysematous changes and masslike scarring within the bilateral upper lobes. The above findings were discussed with Lois Elder, house supervisor, via telephone on 7/21/2024 8:05 PM EDT. Electronically Signed: Josue Fuentes MD  7/21/2024 8:06 PM EDT  Workstation ID: [id number]    MRI Brain With & Without Contrast    Result Date: 7/20/2024  Impression: 1. Small less than 1 cm areas of increased signal on diffusion imaging with associated FLAIR and T2 signal abnormality within the right basal ganglia, corona radiata suggesting evolution of small subacute infarcts. No convincing evidence for acute ischemic changes otherwise noted. 2. Mild increased T1 signal and questionable enhancement in this region may represent sequela of evolving small infarct. Metastatic lesion is thought to be less likely although this will need to be followed per patient's oncologic protocol 3. No abnormal enhancement otherwise noted within the brain parenchyma. 4. Extensive confluent white matter changes again noted which can be seen with small vessel ischemic disease as well as represent sequela of prior therapy Electronically Signed: John Posada MD  7/20/2024 6:07 PM EDT  Workstation ID: [id number]           Assessment:    Confusion from baseline reported  History of lung carcinoma  History of coronary artery disease  COPD  Moderate protein malnutrition  Anemia  UTI        1.  Acute ischemic stroke :  -MRI showed mall less than 1 cm areas of increased signal on diffusion imaging with associated FLAIR and T2 signal abnormality within the right basal ganglia, corona radiata suggesting evolution of small subacute infarcts.  - On aspirin, Brilinta twice daily.  Neurology following.  -PT/OT following.  Discharge pending placement arrangement.      2.  Acute encephalopathy:  2/2 combination of UTI and stroke.  Overall mentation has improved.      3.  UTI:  On ceftriaxone.  Active and Resolved Problems  Active Hospital  Problems    Diagnosis  POA    **Altered mental status [R41.82]  Yes      Resolved Hospital Problems   No resolved problems to display.           VTE Prophylaxis:  Mechanical VTE prophylaxis orders are present.         Code status is   Code Status and Medical Interventions:   Ordered at: 07/19/24 1652     Code Status (Patient has no pulse and is not breathing):    CPR (Attempt to Resuscitate)     Medical Interventions (Patient has pulse or is breathing):    Full Support       Plan for disposition:rehab in 2 days    Time: 30 minutes    Signature: Electronically signed by Wilver Arechiga MD, 07/22/24, 14:21 EDT.  [facility], [place], operated by [facility] Hospitalist Team     Electronically signed by Wilver Arechiga MD at 07/22/24 1426       Alexandra Mckeon APRN at 07/22/24 1022       Attestation signed by Kosta Hebert MD at 07/22/24 1341    I have reviewed this documentation and agree.  Improving                      LOS: 3 days     Chief Complaint:  Mental status changes     Subjective   SUBJECTIVE:    History taken from: patient chart RN    Interval History: Miryam Fernández was admitted on 7/19/2024. Pt had been confused and declining for a few days. She has had some hallucinations. MRI brain showed a subacute stroke in the right basal ganglia and advanced white matter disease which has progressed since her last MRI in March. She has a UTI.   - Portions of the above HPI were copied from previous encounters and edited as appropriate.    Patient Complaints: Pt states she feels much better. Her  states he is impressed by her improvement, but she is not back to baseline. Pt denies any new complaints. No headache, no focal deficits.      Review of Systems   Constitutional:  Negative for chills, diaphoresis and fatigue.   Eyes:  Negative for photophobia and visual disturbance.   Neurological:  Negative for dizziness, tremors, seizures, syncope, facial asymmetry, speech difficulty, weakness, light-headedness, numbness and headaches.    Psychiatric/Behavioral:  Positive for confusion.           Pertinent PMH:  has a past medical history of Cancer, Carotid stenosis, COPD (chronic obstructive pulmonary disease), Coronary artery disease, Esophageal stricture, Hyperlipidemia, and Pulmonary embolism.   ________________________________________________  Objective   OBJECTIVE:  Pt evaluated at . She is awake and alert, resting in bed.  at Bs.     Neurologic Exam    On exam:  GENERAL: NAD, awake and alert  HEENT: Normocephalic, Atraumatic. Oral mucosa clear and moist.   RESP: Breathing unlabored, breath sounds normal  CARDIO: RRR  SKIN: Warm, dry. No apparent rash.   PSYCH: Mood/affect normal    NEURO:  Oriented to person, place, date/year except stated it was June.   Minimal perseverating and confabulating   Disoriented to some questions regarding her anniversary, grandchildren's names/ages. Otherwise, conversing appropriately and following commands.   EOMI, PERRL, no visual field deficits  No facial asymmetry  Speech clear without dysarthria  Sensations intact and equal bilaterally  Strength 5/5 and equal in all extremities  No ataxia    ________________________________________________   RESULTS REVIEW    VITAL SIGNS:  Temp:  [97.4 °F (36.3 °C)-98 °F (36.7 °C)] 97.4 °F (36.3 °C)  Heart Rate:  [67-93] 93  Resp:  [11-17] 17  BP: ()/(60-72) 94/60    LABS:       Lab 07/22/24  0528 07/19/24  2323 07/19/24  1922   WBC 3.35* 4.97 9.67   HEMOGLOBIN 10.3* 10.5* 11.8*   HEMATOCRIT 32.6* 32.7* 37.2   PLATELETS 180 172 199   NEUTROS ABS 2.45 4.12 9.17*   IMMATURE GRANS (ABS) 0.01 0.01 0.04   LYMPHS ABS 0.35* 0.31* 0.14*   MONOS ABS 0.29 0.30 0.25   EOS ABS 0.23 0.21 0.05   MCV 93.9 94.5 95.1         Lab 07/22/24  0528 07/19/24  2323 07/19/24  1922   SODIUM 138 138 138   POTASSIUM 3.6 4.1 4.2   CHLORIDE 104 104 102   CO2 22.8 25.1 23.5   ANION GAP 11.2 8.9 12.5   BUN 19 22 25*   CREATININE 0.96 0.91 0.96   EGFR 65.0 69.3 65.0   GLUCOSE 89 98 114*    CALCIUM 9.2 9.1 9.4   TSH  --  3.060  --          Lab 07/19/24  2323 07/19/24 1922   TOTAL PROTEIN 6.2 6.8   ALBUMIN 3.4* 3.6   GLOBULIN 2.8 3.2   ALT (SGPT) <5 <5   AST (SGOT) 11 13   BILIRUBIN 0.9 0.9   ALK PHOS 98 107         Lab 07/20/24  0614 07/19/24  2323 07/19/24 1922   HSTROP T 32* 35* 29*         Lab 07/22/24  0528   CHOLESTEROL 207*   LDL CHOL 146*   HDL CHOL 38*   TRIGLYCERIDES 125         Lab 07/22/24  0528 07/20/24  1522   IRON 102  --    IRON SATURATION (TSAT) 32  --    TIBC 320  --    TRANSFERRIN 215  --    FOLATE  --  6.43   VITAMIN B 12  --  353         UA          7/18/2024    09:55 7/19/2024    19:22 7/21/2024    05:13   Urinalysis   Squamous Epithelial Cells, UA  3-6  0-2    Specific Gravity, UA  1.025  1.013    Ketones, UA Negative  Negative  Negative    Blood, UA  Moderate (2+)  Negative    Leukocytes, UA Small (1+)  Trace  Trace    Nitrite, UA  Negative  Positive    RBC, UA  3-5  0-2    WBC, UA  11-20  6-10    Bacteria, UA  None Seen  2+        Lab Results   Component Value Date    TSH 3.060 07/19/2024     (H) 07/22/2024    HGBA1C 5.20 12/11/2023    [id number] 353 07/20/2024         IMAGING STUDIES:  CT Angiogram Head    Result Date: 7/21/2024  1.No intracranial large vessel occlusion is identified. 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 Reconstituted trace flow within the left vertebral artery at about the C3 level. This likely represents a chronic finding. Proximal left vertebral artery is likely occluded on the chest CTA from 10/31/2023. No corresponding acute ischemia noted on yesterday's brain MRI. 3.Estimated 40% stenosis within the left proximal ICA (series 7, image 60). 4.No significant stenosis of the right ICA. 5.Moderate to severe stenosis of the proximal left subclavian artery. There is a surgical bypass between the left common carotid and subclavian artery with moderate stenosis seen within this bypass. 6.Small right apical pneumothorax. 7.Emphysematous  changes and masslike scarring within the bilateral upper lobes. The above findings were discussed with itzel Motley supervisor, via telephone on 7/21/2024 8:05 PM EDT. Electronically Signed: Josue Fuentes MD  7/21/2024 8:06 PM EDT  Workstation ID: [id number]    CT Angiogram Carotids    Result Date: 7/21/2024  1.No intracranial large vessel occlusion is identified. 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 Reconstituted trace flow within the left vertebral artery at about the C3 level. This likely represents a chronic finding. Proximal left vertebral artery is likely occluded on the chest CTA from 10/31/2023. No corresponding acute ischemia noted on yesterday's brain MRI. 3.Estimated 40% stenosis within the left proximal ICA (series 7, image 60). 4.No significant stenosis of the right ICA. 5.Moderate to severe stenosis of the proximal left subclavian artery. There is a surgical bypass between the left common carotid and subclavian artery with moderate stenosis seen within this bypass. 6.Small right apical pneumothorax. 7.Emphysematous changes and masslike scarring within the bilateral upper lobes. The above findings were discussed with itzel Motley supervisor, via telephone on 7/21/2024 8:05 PM EDT. Electronically Signed: Josue Fuentes MD  7/21/2024 8:06 PM EDT  Workstation ID: [id number]    MRI Brain With & Without Contrast    Result Date: 7/20/2024  Impression: 1. Small less than 1 cm areas of increased signal on diffusion imaging with associated FLAIR and T2 signal abnormality within the right basal ganglia, corona radiata suggesting evolution of small subacute infarcts. No convincing evidence for acute ischemic changes otherwise noted. 2. Mild increased T1 signal and questionable enhancement in this region may represent sequela of evolving small infarct. Metastatic lesion is thought to be less likely although this will need to be followed per patient's oncologic protocol 3. No  abnormal enhancement otherwise noted within the brain parenchyma. 4. Extensive confluent white matter changes again noted which can be seen with small vessel ischemic disease as well as represent sequela of prior therapy Electronically Signed: John Posada MD  7/20/2024 6:07 PM EDT  Workstation ID: [id number]     I reviewed the patient's new clinical results.    ________________________________________________      PROBLEM LIST:    Altered mental status        ASSESSMENT/PLAN:  1. Probable subacute ischemic stroke in the right basal ganglia. There is some degree of vague contrast enhancement in this area which can be seen with stroke, but a small metastatic lesion is in the differential. She also has severe, diffuse white matter disease that has progressed slightly from her prior MRI in March. Prior imaging studies mention similar advanced white matter changes suggesting this is more chronic with progression over the past 2-3 years due to chemoradiation therapy. No clinical signs of acute CNS infection.   - CTA head and neck: No intracranial large vessel occlusion is identified. 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 40% stenosis within the left proximal ICA. No significant stenosis of the right ICA. Moderate to severe stenosis of the proximal left subclavian artery. There is a surgical bypass between the left common carotid and subclavian artery with moderate stenosis seen within this bypass. Small right apical pneumothorax. Emphysematous changes and masslike scarring within the bilateral upper lobes.   - MRI brain: Small less than 1 cm areas of increased signal on diffusion imaging with associated FLAIR and T2 signal abnormality within the right basal ganglia, corona radiata suggesting evolution of small subacute infarcts. No convincing evidence for acute ischemic changes otherwise noted. Mild increased T1 signal and questionable enhancement in this region may represent  "sequela of evolving small infarct. No abnormal enhancement otherwise noted within the brain parenchyma. Extensive confluent white matter changes again noted which can be seen with small vessel ischemic disease as well as represent sequela of prior therapy  - Echo: pending   - EKG: Sinus rhythm, Probable left atrial enlargementpending   - Labs: A1C: P, B12: 353, LDL: 146, TSH: 3.06, UDS neg  - Antithrombotics: Brilinta increased to 90mg BID and ASA 81mg daily added  - Statin: Lipitor 80mg qhs   - PT/OT/ST as appropriate, fall precautions as appropriate, Neuro checks per protocol, DVT prophylaxis, Stroke education  - Recommend  repeating MRI brain w/wo contrast in 4-6 weeks to monitor for typical stroke evolution vs persistent abnormal enhancement    2. Acute encephalopathy, possibly due to UTI---improving. She also has an abnormal MRI as detailed above, though these changes appear chronic with no clinical signs of acute CNS infection. The small basal ganglia stroke would not cause this degree of encephalopathy. We cannot view any neuroimaging studies prior to March 2024 for comparison, but here is a report from 6/2022 that mentions \"advanced white matter disease presumably from chemoradiation that was comparable to 12/1/2021.\"  - Supportive care     3. UTI  - Culture showing E.coli  - Antibiotics per primary    4. HLD  - Statin, recommend diet and lifestyle modifications    5. Small cell lung cancer  - Per outpt oncology     6. B12 Deficiency  - Replacement ordered  - Recommend cyanocobalamin 1000mcg PO daily or IM monthly    **Please refer to previous notes for further details and recommendations.     Will follow peripherally.     I discussed the patient's findings and my recommendations with patient, family, nursing staff, primary care team, and consulting provider    KENNY Barfield  07/22/24  10:24 EDT        Electronically signed by Kosta Hebert MD at 07/22/24 1341       Hans Montoya MD at " 07/21/24 1437               LOS: 2 days     Chief Complaint: Mental status changes    Subjective   SUBJECTIVE:    History taken from: chart family RN    Interval History: Miryam Fernández was admitted on 7/19/2024   I saw her yesterday and examined her and talk to the family.  Today her  is here    She was asleep but I woke her up    She is more awake today and reacted very well    Still disoriented regarding time and space but recognizes her     MRI brain which demonstrated,  Impression:     1. Small less than 1 cm areas of increased signal on diffusion imaging with associated FLAIR and T2 signal abnormality within the right basal ganglia, corona radiata suggesting evolution of small subacute infarcts. No convincing evidence for acute   ischemic changes otherwise noted.     2. Mild increased T1 signal and questionable enhancement in this region may represent sequela of evolving small infarct. Metastatic lesion is thought to be less likely although this will need to be followed per patient's oncologic protocol     3. No abnormal enhancement otherwise noted within the brain parenchyma.     4. Extensive confluent white matter changes again noted which can be seen with small vessel ischemic disease as well as represent sequela of prior therapy       That is nothing to do with her symptoms also repeat UA    Demonstrates she may have UTI    We may have to do stroke workup anyway    Repeat MRI is recommended in few weeks with contrast as advised and the report    I have to increase Brilinta to 90 mg twice daily  And baby aspirin    She may need LP for their typical MRI, PML?    - Portions of the above HPI were copied from previous encounters and edited as appropriate.    Patient Complaints: None essentially      Review of Systems   Review of system otherwise very questionable, she denies any problem    Pertinent PMH:  has a past medical history of Cancer, Carotid stenosis, COPD (chronic obstructive pulmonary  disease), Coronary artery disease, Esophageal stricture, Hyperlipidemia, and Pulmonary embolism.   ________________________________________________  Objective   OBJECTIVE:  Patient mostly lethargic    Neurologic Exam    Arousable but oriented to herself and family, not oriented to time or place    Mild weakness but otherwise nonfocal  ________________________________________________   RESULTS REVIEW    VITAL SIGNS:  Temp:  [97.4 °F (36.3 °C)-98.2 °F (36.8 °C)] 97.8 °F (36.6 °C)  Heart Rate:  [66-77] 69  Resp:  [11-18] 11  BP: (108-146)/(60-79) 108/61    LABS:       Lab 07/19/24 2323 07/19/24 1922   WBC 4.97 9.67   HEMOGLOBIN 10.5* 11.8*   HEMATOCRIT 32.7* 37.2   PLATELETS 172 199   NEUTROS ABS 4.12 9.17*   IMMATURE GRANS (ABS) 0.01 0.04   LYMPHS ABS 0.31* 0.14*   MONOS ABS 0.30 0.25   EOS ABS 0.21 0.05   MCV 94.5 95.1         Lab 07/19/24 2323 07/19/24 1922   SODIUM 138 138   POTASSIUM 4.1 4.2   CHLORIDE 104 102   CO2 25.1 23.5   ANION GAP 8.9 12.5   BUN 22 25*   CREATININE 0.91 0.96   EGFR 69.3 65.0   GLUCOSE 98 114*   CALCIUM 9.1 9.4   TSH 3.060  --          Lab 07/19/24 2323 07/19/24 1922   TOTAL PROTEIN 6.2 6.8   ALBUMIN 3.4* 3.6   GLOBULIN 2.8 3.2   ALT (SGPT) <5 <5   AST (SGOT) 11 13   BILIRUBIN 0.9 0.9   ALK PHOS 98 107         Lab 07/20/24  0614 07/19/24 2323 07/19/24 1922   HSTROP T 32* 35* 29*                 UA          7/18/2024    09:55 7/19/2024    19:22 7/21/2024    05:13   Urinalysis   Squamous Epithelial Cells, UA  3-6  0-2    Specific Gravity, UA  1.025  1.013    Ketones, UA Negative  Negative  Negative    Blood, UA  Moderate (2+)  Negative    Leukocytes, UA Small (1+)  Trace  Trace    Nitrite, UA  Negative  Positive    RBC, UA  3-5  0-2    WBC, UA  11-20  6-10    Bacteria, UA  None Seen  2+        Lab Results   Component Value Date    TSH 3.060 07/19/2024     (H) 02/09/2024    HGBA1C 5.20 12/11/2023         IMAGING STUDIES:  MRI Brain With & Without Contrast    Result Date:  2024  Impression: 1. Small less than 1 cm areas of increased signal on diffusion imaging with associated FLAIR and T2 signal abnormality within the right basal ganglia, corona radiata suggesting evolution of small subacute infarcts. No convincing evidence for acute ischemic changes otherwise noted. 2. Mild increased T1 signal and questionable enhancement in this region may represent sequela of evolving small infarct. Metastatic lesion is thought to be less likely although this will need to be followed per patient's oncologic protocol 3. No abnormal enhancement otherwise noted within the brain parenchyma. 4. Extensive confluent white matter changes again noted which can be seen with small vessel ischemic disease as well as represent sequela of prior therapy Electronically Signed: John Posada MD  2024 6:07 PM EDT  Workstation ID: [id number]     I reviewed the patient's new clinical results.    ________________________________________________      PROBLEM LIST:    Altered mental status        ASSESSMENT/PLAN:  Mental status changes  Abnormal MRI, could be stroke or mets    We will do stroke stroke workup anyway    Change medication as advised    Repeat MRI in a few weeks    May need LP for something like atypical findings like PML because she has had chemo and MRI is totally abnormal    Neurology team will follow    **Please refer to previous notes for further details and recommendations.     I discussed the patient's findings and my recommendations with family and nursing staff    Hans Montoya MD  24  14:37 EDT        Electronically signed by Hans Montoya MD at 24 1444       Brammell, Timothy Duane, MD at 24 1104              [facility] MEDICINE SERVICE  DAILY PROGRESS NOTE    NAME: Miryam Fernández  : 1955  MRN: [id number]      LOS: 2 days     PROVIDER OF SERVICE: Timothy Duane Brammell, MD    Chief Complaint: Altered mental status    Subjective:     Interval  History:  History taken from: patient/  Patient Complaints: Patient reportedly not at her baseline by her .  Nurses feel that she is confused with details at times.  Oriented when questioned.  Eating poorly.  Physical therapy with noted unsteady gait with need for discharge to rehab.  Son and patient agreeable to discharge to rehab plans.    Review of Systems:   Review of Systems   All other systems reviewed and are negative.      Objective:     Vital Signs  Temp:  [97.4 °F (36.3 °C)-98.2 °F (36.8 °C)] 97.4 °F (36.3 °C)  Heart Rate:  [66-77] 66  Resp:  [14-18] 14  BP: (125-146)/(60-79) 146/72   Body mass index is 30.11 kg/m².    Physical Exam  Physical Exam  Vitals reviewed.   Constitutional:       General: She is not in acute distress.  HENT:      Head: Normocephalic.   Cardiovascular:      Rate and Rhythm: Normal rate.   Pulmonary:      Effort: Pulmonary effort is normal.      Breath sounds: Normal breath sounds.   Abdominal:      General: Bowel sounds are normal.      Palpations: Abdomen is soft.      Tenderness: There is no abdominal tenderness.   Neurological:      Mental Status: She is alert. Mental status is at baseline.   Psychiatric:         Behavior: Behavior normal.         Scheduled Meds   atorvastatin, 40 mg, Oral, Daily  memantine, 5 mg, Oral, BID  metoprolol succinate XL, 25 mg, Oral, Daily  pantoprazole, 40 mg, Oral, Daily  ticagrelor, 60 mg, Oral, BID       PRN Meds     senna-docusate sodium **AND** polyethylene glycol **AND** bisacodyl **AND** bisacodyl    Calcium Replacement - Follow Nurse / BPA Driven Protocol    Magnesium Standard Dose Replacement - Follow Nurse / BPA Driven Protocol    Phosphorus Replacement - Follow Nurse / BPA Driven Protocol    Potassium Replacement - Follow Nurse / BPA Driven Protocol   Infusions         Diagnostic Data    Results from last 7 days   Lab Units 07/19/24  2323   WBC 10*3/mm3 4.97   HEMOGLOBIN g/dL 10.5*   HEMATOCRIT % 32.7*   PLATELETS 10*3/mm3  172   GLUCOSE mg/dL 98   CREATININE mg/dL 0.91   BUN mg/dL 22   SODIUM mmol/L 138   POTASSIUM mmol/L 4.1   AST (SGOT) U/L 11   ALT (SGPT) U/L <5   ALK PHOS U/L 98   BILIRUBIN mg/dL 0.9   ANION GAP mmol/L 8.9       MRI Brain With & Without Contrast    Result Date: 7/20/2024  Impression: 1. Small less than 1 cm areas of increased signal on diffusion imaging with associated FLAIR and T2 signal abnormality within the right basal ganglia, corona radiata suggesting evolution of small subacute infarcts. No convincing evidence for acute ischemic changes otherwise noted. 2. Mild increased T1 signal and questionable enhancement in this region may represent sequela of evolving small infarct. Metastatic lesion is thought to be less likely although this will need to be followed per patient's oncologic protocol 3. No abnormal enhancement otherwise noted within the brain parenchyma. 4. Extensive confluent white matter changes again noted which can be seen with small vessel ischemic disease as well as represent sequela of prior therapy Electronically Signed: John Posada MD  7/20/2024 6:07 PM EDT  Workstation ID: [id number]           Assessment:    Confusion from baseline reported  History of lung carcinoma  History of coronary artery disease  COPD  Moderate protein malnutrition  Anemia  UTI        Plan: Appreciate neurology input. Ongoing attempts at physical and Occupational Therapy.  Case management to assess patient's ability to be placed for rehab.      Active and Resolved Problems  Active Hospital Problems    Diagnosis  POA    **Altered mental status [R41.82]  Yes      Resolved Hospital Problems   No resolved problems to display.           VTE Prophylaxis:  Mechanical VTE prophylaxis orders are present.         Code status is   Code Status and Medical Interventions:   Ordered at: 07/19/24 6444     Code Status (Patient has no pulse and is not breathing):    CPR (Attempt to Resuscitate)     Medical Interventions (Patient has  pulse or is breathing):    Full Support       Plan for disposition:rehab in 2 days    Time: 30 minutes    Signature: Electronically signed by Timothy Duane Brammell, MD, 24, 11:04 EDT.  [facility], [place], operated by [facility] Hospitalist Team     Electronically signed by Brammell, Timothy Duane, MD at 24 1115       Consult Notes (last 48 hours)  Notes from 24 1040 through 24 1040   No notes of this type exist for this encounter.       Nutrition Notes (most recent note)    No notes exist for this encounter.       Speech Language Pathology Notes (most recent note)    No notes exist for this encounter.       Dayanna Rivera, PT   Physical Therapist  Physical Therapy  Therapy Evaluation     Signed  Date of Service:  24  Creation Time:  24     Signed        Expand All Collapse All  Patient Name: Miryam Fernández                 : 1955                        MRN: [id number]                              Today's Date: 2024                                   Admit Date: 2024                        Visit Dx:   Visit Diagnosis   No diagnosis found.     Problem List       Patient Active Problem List   Diagnosis    Obstructive sleep apnea, adult    Tobacco use    Normocytic anemia    Meniere's disease    Lung nodule    Hilar lymphadenopathy    Hyperlipidemia    Heartburn    Gastroesophageal reflux disease    Dysphagia    Chronic obstructive pulmonary disease    CAD (coronary artery disease)    Bruises easily    Chronic pain    Small cell lung cancer    Carotid stenosis    Coronary artery disease    Primary hypertension    Long term (current) use of opiate analgesic    DDD (degenerative disc disease), lumbar    Screening mammogram for breast cancer    Preventative health care    Vitamin D deficiency    Need for influenza vaccination    Moderate episode of recurrent major depressive disorder    Need for vaccination against Streptococcus pneumoniae    Frequent falls    Weakness    Nasal  congestion    Memory loss    Localized swelling of both lower legs    Meniere disease, bilateral    Abdominal aortic aneurysm (AAA) without rupture    Thoracic aortic aneurysm without rupture    Plantar wart    Screening for osteoporosis    Asymptomatic menopause    Screening for malignant neoplasm of colon    Skin tear of left forearm without complication    Encounter for subsequent annual wellness visit (AWV) in Medicare patient    Acute cystitis    Altered mental status         Medical History        Past Medical History:   Diagnosis Date    Cancer       lung cancer    Carotid stenosis      COPD (chronic obstructive pulmonary disease)      Coronary artery disease      Esophageal stricture       Dr Domingo    Hyperlipidemia      Pulmonary embolism       seen at [facility], fluid around her heart at the time-right lung         Surgical History         Past Surgical History:   Procedure Laterality Date    ANGIOPLASTY / STENTING FEMORAL        CARDIAC CATHETERIZATION        CAROTID STENT        CORONARY STENT PLACEMENT        ESOPHAGOSCOPY / EGD        HYSTERECTOMY               General Information         Row Name 07/21/24 1307                 Physical Therapy Time and Intention     Document Type evaluation  -CM       Mode of Treatment physical therapy  -CM          Row Name 07/21/24 1300                 General Information     Patient Profile Reviewed yes  -CM       Prior Level of Function independent:;all household mobility;gait  does not drive;  reports normally oriented x 4; able to amb w/ rollator wx  -CM       Existing Precautions/Restrictions fall  -CM       Barriers to Rehab medically complex;previous functional deficit;cognitive status  -CM          Row Name 07/21/24 1304                 Living Environment     People in Home spouse  spouse has been off work for several weeks due to broken wrist but is to get cast off tomorrow and plan is that he will return to work then; this would leave pt alone during  day at home  -CM          Row Name 07/21/24 1309                 Home Main Entrance     Number of Stairs, Main Entrance other (see comments)  ramp access  -CM          Row Name 07/21/24 1309                 Stairs Within Home, Primary     Number of Stairs, Within Home, Primary none  -CM          Row Name 07/21/24 1309                 Cognition     Orientation Status (Cognition) oriented to;person;place;other (see comments)  knows  by name; did not know [place] but able to state correctly w/ cues  -CM          Row Name 07/21/24 1309                 Safety Issues, Functional Mobility     Impairments Affecting Function (Mobility) balance;cognition;postural/trunk control;motor control;motor planning;strength  -CM                       User Key  (r) = Recorded By, (t) = Taken By, (c) = Cosigned By        Initials Name Provider Type     Dayanna Perez, PT Physical Therapist                            Mobility         Row Name 07/21/24 1312                 Bed Mobility     Bed Mobility supine-sit  -CM       Supine-Sit [place] (Bed Mobility) contact guard;minimum assist (75% patient effort);nonverbal cues (demo/gesture);verbal cues  -CM       Assistive Device (Bed Mobility) head of bed elevated  -CM          Row Name 07/21/24 1312                 Sit-Stand Transfer     Sit-Stand [place] (Transfers) contact guard;minimum assist (75% patient effort)  -CM       Assistive Device (Sit-Stand Transfers) walker, front-wheeled  -CM          Row Name 07/21/24 1312                 Gait/Stairs (Locomotion)     [place] Level (Gait) minimum assist (75% patient effort)  -CM       Assistive Device (Gait) walker, front-wheeled  -CM       Patient was able to Ambulate yes  -CM       Distance in Feet (Gait) 40  slowed tatyana; shuffled steps; mild lateral sway; posterior loss of balance while amb  -CM                       User Key  (r) = Recorded By, (t) = Taken By, (c) = Cosigned By        Initials Name Provider  Type     CM Dayanna Rivera, PT Physical Therapist                            Obj/Interventions         Row Name 07/21/24 1313                 Range of Motion Comprehensive     General Range of Motion bilateral upper extremity ROM WFL;bilateral lower extremity ROM WFL  -CM          Row Name 07/21/24 1313                 Strength Comprehensive (MMT)     General Manual Muscle Testing (MMT) Assessment upper extremity strength deficits identified;lower extremity strength deficits identified  -CM       Comment, General Manual Muscle Testing (MMT) Assessment BUEs 4/5, BLEs 4-/5  -CM          Row Name 07/21/24 1313                 Balance     Balance Assessment sitting static balance;sitting dynamic balance;standing static balance;standing dynamic balance  -CM       Static Sitting Balance supervision  -CM       Dynamic Sitting Balance supervision;contact guard  -CM       Static Standing Balance contact guard;minimal assist  -CM       Dynamic Standing Balance minimal assist  -CM       Position/Device Used, Standing Balance supported;walker, rolling  -CM          Row Name 07/21/24 1313                 Sensory Assessment (Somatosensory)     Sensory Assessment (Somatosensory) unable/difficult to assess  -CM                       User Key  (r) = Recorded By, (t) = Taken By, (c) = Cosigned By        Initials Name Provider Type     CM Dayanna Rivera, PT Physical Therapist                            Goals/Plan         Row Name 07/21/24 1320                 Bed Mobility Goal 1 (PT)     Activity/Assistive Device (Bed Mobility Goal 1, PT) bed mobility activities, all  -CM       [place] Level/Cues Needed (Bed Mobility Goal 1, PT) independent  -CM       Time Frame (Bed Mobility Goal 1, PT) 2 weeks  -CM          Row Name 07/21/24 1320                 Transfer Goal 1 (PT)     Activity/Assistive Device (Transfer Goal 1, PT) transfers, all;walker, rolling  -CM       [place] Level/Cues Needed (Transfer Goal 1, PT) modified  independence  -CM       Time Frame (Transfer Goal 1, PT) 2 weeks  -CM          Row Name 07/21/24 1320                 Gait Training Goal 1 (PT)     Activity/Assistive Device (Gait Training Goal 1, PT) gait (walking locomotion);walker, rolling  -CM       [place] Level (Gait Training Goal 1, PT) modified independence  -CM       Distance (Gait Training Goal 1, PT) 100 ft w/o loss of balance or soa  -CM       Time Frame (Gait Training Goal 1, PT) 2 weeks  -CM          Row Name 07/21/24 1320                 Therapy Assessment/Plan (PT)     Planned Therapy Interventions (PT) balance training;bed mobility training;gait training;motor coordination training;strengthening;ROM (range of motion);postural re-education;patient/family education;neuromuscular re-education;transfer training  -CM                    User Key  (r) = Recorded By, (t) = Taken By, (c) = Cosigned By        Initials Name Provider Type     CM Dayanna Rivera, PT Physical Therapist                         Clinical Impression         Row Name 07/21/24 1313                 Pain     Pretreatment Pain Rating 0/10 - no pain  -CM       Posttreatment Pain Rating 0/10 - no pain  -CM       Pain Intervention(s) Ambulation/increased activity;Emotional support;Repositioned  -CM          Row Name 07/21/24 1313                 Plan of Care Review     Plan of Care Reviewed With patient;spouse  -CM       Outcome Evaluation 68 yo female adm 7/19/24 for altered mental status w/ increased confusion and falls. Pt has also had poor oral intake over past several days per . Had recent admit for uti. PMH: major depression, memory loss, menieres disease, copd, cad, lung ca. At baseline, pt lives w/ her  in single level functioning home. Pt normally has mild memory loss but  reports she can remember most things. She is normally oriented x 4 per . She ambulates short community distances w/ rollator wx. Does not drive.  has been home w/ pt full  time for past several weeks, as he has been recovering from a wrist fx. However, he is scheduled to get his cast off tomorrow and is expected to return to full time work after that. Today, pt is alert and confused. She is not oriented to time at all, including her . She is able to come to sitting at eob w/ verbal cues and cga. Comes to stand at rw w/ cga/min assist, and requires min assist to amb 40 ft w/ rw. Pt is unsteady, despite use of rw for support. Has lateral sway as well as posterior loss of balance. High risk for falls if she were to d/c home. Will need SNF at d/c. Will follow.  -CM          Row Name 24 1320                 Therapy Assessment/Plan (PT)     Rehab Potential (PT) good, to achieve stated therapy goals  -CM       Criteria for Skilled Interventions Met (PT) yes;meets criteria;skilled treatment is necessary  -CM       Therapy Frequency (PT) 5 times/wk  -CM       Predicted Duration of Therapy Intervention (PT) until d/c  -CM          Row Name 24 1320                 Vital Signs     O2 Delivery Pre Treatment room air  -CM       O2 Delivery Intra Treatment room air  -CM       O2 Delivery Post Treatment room air  -CM       Recovery Time VSS  -CM          Row Name 24 1320                 Positioning and Restraints     Pre-Treatment Position in bed  -CM       Post Treatment Position chair  -CM       In Chair notified nsg;sitting;call light within reach;encouraged to call for assist;exit alarm on;with family/caregiver;legs elevated  -CM                       User Key  (r) = Recorded By, (t) = Taken By, (c) = Cosigned By        Initials Name Provider Type     Dayanna Perez, PT Physical Therapist                            Outcome Measures         Row Name 24 1321 24 0809            How much help from another person do you currently need...     Turning from your back to your side while in flat bed without using bedrails? 4  -CM 4  -RJ     Moving from lying on back  to sitting on the side of a flat bed without bedrails? 4  -CM 4  -RJ     Moving to and from a bed to a chair (including a wheelchair)? 3  -CM 3  -RJ     Standing up from a chair using your arms (e.g., wheelchair, bedside chair)? 3  -CM 3  -RJ     Climbing 3-5 steps with a railing? 2  -CM 2  -RJ     To walk in hospital room? 3  -CM 2  -RJ     AM-PAC 6 Clicks Score (PT) 19  -CM 18  -RJ     Highest Level of Mobility Goal 6 --> Walk 10 steps or more  -CM 6 --> Walk 10 steps or more  -RJ        Row Name 07/21/24 0400                 How much help from another person do you currently need...     Turning from your back to your side while in flat bed without using bedrails? 4  -KB       Moving from lying on back to sitting on the side of a flat bed without bedrails? 4  -KB       Moving to and from a bed to a chair (including a wheelchair)? 3  -KB       Standing up from a chair using your arms (e.g., wheelchair, bedside chair)? 3  -KB       Climbing 3-5 steps with a railing? 2  -KB       To walk in hospital room? 2  -KB       AM-PAC 6 Clicks Score (PT) 18  -KB       Highest Level of Mobility Goal 6 --> Walk 10 steps or more  -KB          Row Name 07/21/24 1321                 Modified [place] Scale     Pre-Stroke Modified [place] Scale 6 - Unable to determine (UTD) from the medical record documentation  -CM       Modified [place] Scale 4 - Moderately severe disability.  Unable to walk without assistance, and unable to attend to own bodily needs without assistance.  -CM          Row Name 07/21/24 1321                 Functional Assessment     Outcome Measure Options Modified [place]  -CM                       User Key  (r) = Recorded By, (t) = Taken By, (c) = Cosigned By        Initials Name Provider Type     CM Dayanna Rivera, PT Physical Therapist     Joe Barnhart, RN Registered Nurse     Pricila Correia RNA Registered Nurse                          Physical Therapy Education            Title: PT OT SLP Therapies  (Done)         Topic: Physical Therapy (Done)         Point: Mobility training (Done)         Learning Progress Summary               Patient Acceptance, E,TB, VU by CM at 2024 1321   Significant Other Acceptance, E,TB, VU by CM at 2024 1321                                                   User Key         Initials Effective Dates Name Provider Type Discipline     ASTER 21 -  Dayanna Rivera, PT Physical Therapist PT                          PT Recommendation and Plan  Planned Therapy Interventions (PT): balance training, bed mobility training, gait training, motor coordination training, strengthening, ROM (range of motion), postural re-education, patient/family education, neuromuscular re-education, transfer training  Plan of Care Reviewed With: patient, spouse  Outcome Evaluation: 70 yo female adm 24 for altered mental status w/ increased confusion and falls. Pt has also had poor oral intake over past several days per . Had recent admit for uti. PMH: major depression, memory loss, menieres disease, copd, cad, lung ca. At baseline, pt lives w/ her  in single level functioning home. Pt normally has mild memory loss but  reports she can remember most things. She is normally oriented x 4 per . She ambulates short community distances w/ rollator wx. Does not drive.  has been home w/ pt full time for past several weeks, as he has been recovering from a wrist fx. However, he is scheduled to get his cast off tomorrow and is expected to return to full time work after that. Today, pt is alert and confused. She is not oriented to time at all, including her . She is able to come to sitting at eob w/ verbal cues and cga. Comes to stand at rw w/ cga/min assist, and requires min assist to amb 40 ft w/ rw. Pt is unsteady, despite use of rw for support. Has lateral sway as well as posterior loss of balance. High risk for falls if she were to d/c home. Will need SNF at d/c.  Will follow.      Time Calculation:   PT Evaluation Complexity  History, PT Evaluation Complexity: 3 or more personal factors and/or comorbidities  Examination of Body Systems (PT Eval Complexity): total of 4 or more elements  Clinical Presentation (PT Evaluation Complexity): evolving  Clinical Decision Making (PT Evaluation Complexity): moderate complexity  Overall Complexity (PT Evaluation Complexity): moderate complexity       PT Charges         Row Name 24 1322                       Time Calculation     Start Time 1019  -CM         Stop Time 1034  -CM         Time Calculation (min) 15 min  -CM         PT Received On 24  -CM         PT - Next Appointment 24  -CM         PT Goal Re-Cert Due Date 24  -CM                 Time Calculation- PT     Total Timed Code Minutes- PT 0 minute(s)  -CM                      User Key  (r) = Recorded By, (t) = Taken By, (c) = Cosigned By        Initials Name Provider Type     Dayanna Perez, PT Physical Therapist                       Therapy Charges for Today         Code Description Service Date Service Provider Modifiers Qty     14399745677 HC PT EVAL MOD COMPLEXITY 3 2024 Dayanna Rivera, PT GP 1                PT G-Codes  Outcome Measure Options: Modified [place]  AM-PAC 6 Clicks Score (PT): 19  Modified [place] Scale: 4 - Moderately severe disability.  Unable to walk without assistance, and unable to attend to own bodily needs without assistance.  PT Discharge Summary  Anticipated Discharge Disposition (PT): skilled nursing facility     Dayanna Rivera, PT              2024                                    Veronica Rush, OT   Occupational Therapist  Specialty:  Occupational Therapy  Therapy Evaluation     Signed  Date of Service:  24 1600  Creation Time:  24 1600     Signed        Expand All Collapse All  Patient Name: Miryam Fernández                 : 1955                        MRN: [id number]                               Today's Date: 7/21/2024                                   Admit Date: 7/19/2024                        Visit Dx:   Visit Diagnosis   No diagnosis found.     Problem List       Patient Active Problem List   Diagnosis    Obstructive sleep apnea, adult    Tobacco use    Normocytic anemia    Meniere's disease    Lung nodule    Hilar lymphadenopathy    Hyperlipidemia    Heartburn    Gastroesophageal reflux disease    Dysphagia    Chronic obstructive pulmonary disease    CAD (coronary artery disease)    Bruises easily    Chronic pain    Small cell lung cancer    Carotid stenosis    Coronary artery disease    Primary hypertension    Long term (current) use of opiate analgesic    DDD (degenerative disc disease), lumbar    Screening mammogram for breast cancer    Preventative health care    Vitamin D deficiency    Need for influenza vaccination    Moderate episode of recurrent major depressive disorder    Need for vaccination against Streptococcus pneumoniae    Frequent falls    Weakness    Nasal congestion    Memory loss    Localized swelling of both lower legs    Meniere disease, bilateral    Abdominal aortic aneurysm (AAA) without rupture    Thoracic aortic aneurysm without rupture    Plantar wart    Screening for osteoporosis    Asymptomatic menopause    Screening for malignant neoplasm of colon    Skin tear of left forearm without complication    Encounter for subsequent annual wellness visit (AWV) in Medicare patient    Acute cystitis    Altered mental status         Medical History        Past Medical History:   Diagnosis Date    Cancer       lung cancer    Carotid stenosis      COPD (chronic obstructive pulmonary disease)      Coronary artery disease      Esophageal stricture       Dr Domingo    Hyperlipidemia      Pulmonary embolism       seen at [facility], fluid around her heart at the time-right lung         Surgical History         Past Surgical History:   Procedure Laterality Date    ANGIOPLASTY  / STENTING FEMORAL        CARDIAC CATHETERIZATION        CAROTID STENT        CORONARY STENT PLACEMENT        ESOPHAGOSCOPY / EGD        HYSTERECTOMY               General Information         Row Name 07/21/24 1540                 OT Time and Intention     Document Type evaluation  -DT       Mode of Treatment occupational therapy  -DT          Row Name 07/21/24 1547                 General Information     Patient Profile Reviewed yes  -DT       Prior Level of Function independent:;all household mobility;ADL's  Pt was ind with BADLs & cognition prior to 3 weeks ago before experiencing a UTI & she has had difficulty since that time. She uses a RW for mobility.  -DT       Existing Precautions/Restrictions fall  -DT       Barriers to Rehab medically complex  -DT          Row Name 07/21/24 1542                 Living Environment     People in Home spouse  Pt is alone during the day when spouse is at work. He has been off work for a broken wrist, but anticipates returning soon. Dtr has been assisting, but is planning to have back sx in the beginning of August.  -DT          Row Name 07/21/24 1540                 Home Main Entrance     Number of Stairs, Main Entrance other (see comments)  ramp  -DT          Row Name 07/21/24 1540                 Stairs Within Home, Primary     Number of Stairs, Within Home, Primary none  -DT          Row Name 07/21/24 1540                 Cognition     Orientation Status (Cognition) oriented to;person;place;disoriented to;situation;time  -DT          Row Name 07/21/24 1540                 Safety Issues, Functional Mobility     Impairments Affecting Function (Mobility) balance;cognition;postural/trunk control;motor control;strength;endurance/activity tolerance;motor planning  -DT       Cognitive Impairments, Mobility Safety/Performance sequencing abilities;problem-solving/reasoning;insight into deficits/self-awareness;judgment  -DT                       User Key  (r) = Recorded By, (t) =  Taken By, (c) = Cosigned By        Initials Name Provider Type     DT Veronica Rush, OT Occupational Therapist                                     Mobility/ADL's         Row Name 07/21/24 1545                 Bed Mobility     Bed Mobility sit-supine  -DT       Supine-Sit [place] (Bed Mobility) contact guard;verbal cues  -DT          Row Name 07/21/24 1545                 Transfers     Transfers sit-stand transfer;stand-sit transfer;toilet transfer  -DT          Row Name 07/21/24 1545                 Sit-Stand Transfer     Sit-Stand [place] (Transfers) minimum assist (75% patient effort)  -DT       Comment, (Sit-Stand Transfer) HHA  -DT          Row Name 07/21/24 1545                 Stand-Sit Transfer     Stand-Sit [place] (Transfers) minimum assist (75% patient effort)  -DT       Comment, (Stand-Sit Transfer) HHA  -DT          Row Name 07/21/24 1545                 Toilet Transfer     [place] Level (Toilet Transfer) minimum assist (75% patient effort)  -DT       Assistive Device (Toilet Transfer) commode, bedside without drop arms  -DT          Row Name 07/21/24 1545                 Functional Mobility     Functional Mobility- Ind. Level minimum assist (75% patient effort);moderate assist (50% patient effort)  -DT       Functional Mobility-Distance (Feet) 30  -DT       Functional Mobility- Safety Issues balance decreased during turns;weight-shifting ability decreased  -DT       Patient was able to Ambulate yes  -DT          Row Name 07/21/24 1545                 Activities of Daily Living     BADL Assessment/Intervention grooming;toileting;lower body dressing  -DT          Row Name 07/21/24 1545                 Grooming Assessment/Training     [place] Level (Grooming) grooming skills;minimum assist (75% patient effort);verbal cues  v.c. for initiation, follow through, seq to change brush into other hand to access right side of head.  -DT       Position (Grooming) sink  side;supported standing  -DT          Row Name 07/21/24 1545                 Toileting Assessment/Training     [place] Level (Toileting) toileting skills;moderate assist (50% patient effort)  -DT       Assistive Devices (Toileting) commode, bedside without drop arms  -DT          Row Name 07/21/24 1545                 Lower Body Dressing Assessment/Training     [place] Level (Lower Body Dressing) lower body dressing skills;minimum assist (75% patient effort)  -DT       Position (Lower Body Dressing) edge of bed sitting;supported standing  -DT                       User Key  (r) = Recorded By, (t) = Taken By, (c) = Cosigned By        Initials Name Provider Type     DT Veronica Rush, NIDIA Occupational Therapist                            Obj/Interventions         Row Name 07/21/24 1548                 Sensory Assessment (Somatosensory)     Sensory Assessment (Somatosensory) sensation intact  -DT          Row Name 07/21/24 1548                 Range of Motion Comprehensive     General Range of Motion bilateral upper extremity ROM WFL  -DT          [facility] [place] Name 07/21/24 1548                 Strength Comprehensive (MMT)     General Manual Muscle Testing (MMT) Assessment upper extremity strength deficits identified  -DT       Comment, General Manual Muscle Testing (MMT) Assessment BUE = 4/5  -DT          Row Name 07/21/24 1548                 Balance     Balance Assessment sitting static balance;sitting dynamic balance;standing static balance;standing dynamic balance  -DT       Static Sitting Balance supervision  -DT       Dynamic Sitting Balance standby assist  -DT       Position, Sitting Balance sitting edge of bed  -DT       Static Standing Balance contact guard  -DT       Dynamic Standing Balance minimal assist;moderate assist  -DT                       User Key  (r) = Recorded By, (t) = Taken By, (c) = Cosigned By        Initials Name Provider Type     DT Veronica Rush OT Occupational  Therapist                            Goals/Plan         [facility] Name 07/21/24 1557                 Transfer Goal 1 (OT)     Activity/Assistive Device (Transfer Goal 1, OT) transfers, all  -DT       [place] Level/Cues Needed (Transfer Goal 1, OT) contact guard required  -DT       Time Frame (Transfer Goal 1, OT) 2 weeks  -DT          [facility] Name 07/21/24 1557                 Bathing Goal 1 (OT)     Activity/Device (Bathing Goal 1, OT) bathing skills, all  -DT       [place] Level/Cues Needed (Bathing Goal 1, OT) contact guard required  -DT       Time Frame (Bathing Goal 1, OT) 2 weeks  -DT          [facility] Name 07/21/24 1557                 Dressing Goal 1 (OT)     [place]/Cues Needed (Dressing Goal 1, OT) contact guard required  -DT       Time Frame (Dressing Goal 1, OT) 2 weeks  -DT          Row Name 07/21/24 1557                 Toileting Goal 1 (OT)     Activity/Device (Toileting Goal 1, OT) toileting skills, all  -DT       [place] Level/Cues Needed (Toileting Goal 1, OT) contact guard required  -DT       Time Frame (Toileting Goal 1, OT) 2 weeks  -DT          Row Name 07/21/24 1559                 Therapy Assessment/Plan (OT)     Planned Therapy Interventions (OT) activity tolerance training;BADL retraining;cognitive/visual perception retraining;functional balance retraining;neuromuscular control/coordination retraining;occupation/activity based interventions;patient/caregiver education/training;ROM/therapeutic exercise;strengthening exercise;transfer/mobility retraining  -DT                    User Key  (r) = Recorded By, (t) = Taken By, (c) = Cosigned By        Initials Name Provider Type     DT Veronica Rush, OT Occupational Therapist                         Clinical Impression         Row Name 07/21/24 1549                 Pain Assessment     Pretreatment Pain Rating 0/10 - no pain  -DT       Posttreatment Pain Rating 0/10 - no pain  -DT          [facility] Name 07/21/24 1549                 Plan  of Care Review     Plan of Care Reviewed With patient;daughter  -DT       Outcome Evaluation Pt is a 69 y.o. F adm to [facility] on 07[facility]24 with AMS, inc confusion & hx of falls. Pt also had a recent admission for UTI. It was recommended she discharge to a SNF at that time, but pt returned home with spouse. PMH: major depression, meniere's disease, COPD, CAD, lung CA. At baseline, pt lives at home with spouse in a H. Prior to 3 weeks ago (before recent UTI), she was ambulating with a RW & ind with BADLs. Her spouse works and pt was at home alone during the day. Her spouse has been off work with a broken wrist, but anticipates returning to work soon. Her daughter assists at times, but is scheduled to get back sx in the beginning of August. Upon eval, pt is A&O to person & place. She follows 1 step commands, but requires v.c. for sequencing, initiation & follow through of tasks & safety awareness. She requires assistance for grooming, toileting & LB ADLS & for ambulation. She has generalized weakness & impaired balance & is at high risk for falls. OT will continue to follow for tx & recommends SNF upon discharge.  -DT          Row Name 07/21/24 1568                 Therapy Assessment/Plan (OT)     Rehab Potential (OT) good, to achieve stated therapy goals  -DT       Criteria for Skilled Therapeutic Interventions Met (OT) yes;meets criteria;skilled treatment is necessary  -DT       Therapy Frequency (OT) 5 times/wk  -DT       Predicted Duration of Therapy Intervention (OT) until d/c  -DT          Row Name 07/21/24 8241                 Therapy Plan Review/Discharge Plan (OT)     Anticipated Discharge Disposition (OT) skilled nursing facility  -DT          Row Name 07/21/24 9356                 Positioning and Restraints     Pre-Treatment Position bedside commode  -DT       Post Treatment Position bed  -DT       In Bed supine;with nsg;with family/caregiver;call light within reach;encouraged to call for assist;exit alarm on   -DT                       User Key  (r) = Recorded By, (t) = Taken By, (c) = Cosigned By        Initials Name Provider Type     DT Veronica Rush, OT Occupational Therapist                            Outcome Measures         Row Name 07/21/24 1321 07/21/24 0809            How much help from another person do you currently need...     Turning from your back to your side while in flat bed without using bedrails? 4  -CM 4  -EJ (r) RJ (t) EJ (c)     Moving from lying on back to sitting on the side of a flat bed without bedrails? 4  -CM 4  -EJ (r) RJ (t) EJ (c)     Moving to and from a bed to a chair (including a wheelchair)? 3  -CM 3  -EJ (r) RJ (t) EJ (c)     Standing up from a chair using your arms (e.g., wheelchair, bedside chair)? 3  -CM 3  -EJ (r) RJ (t) EJ (c)     Climbing 3-5 steps with a railing? 2  -CM 2  -EJ (r) RJ (t) EJ (c)     To walk in hospital room? 3  -CM 2  -EJ (r) RJ (t) EJ (c)     AM-PAC 6 Clicks Score (PT) 19  -CM 18  -RJ     Highest Level of Mobility Goal 6 --> Walk 10 steps or more  -CM 6 --> Walk 10 steps or more  -RJ        Row Name 07/21/24 1321                 Modified [place] Scale     Pre-Stroke Modified [place] Scale 6 - Unable to determine (UTD) from the medical record documentation  -CM       Modified [place] Scale 4 - Moderately severe disability.  Unable to walk without assistance, and unable to attend to own bodily needs without assistance.  -CM          Row Name 07/21/24 1321                 Functional Assessment     Outcome Measure Options Modified [place]  -CM                       User Key  (r) = Recorded By, (t) = Taken By, (c) = Cosigned By        Initials Name Provider Type     CM Dayanna Rivera, PT Physical Therapist     Pricila Correia RNA Registered Nurse     Zari Geiger RN Registered Nurse                             Occupational Therapy Education            Title: PT OT SLP Therapies (In Progress)         Topic: Occupational Therapy (In Progress)          Point: ADL training (Done)         Description:   Instruct learner(s) on proper safety adaptation and remediation techniques during self care or transfers.   Instruct in proper use of assistive devices.                       Learning Progress Summary               Patient Acceptance, E,TB, VU by DT at 7/21/2024 1559     Comment: role of OT, goals & POC, safety prec.   Family Acceptance, E,TB, VU by DT at 7/21/2024 1559     Comment: role of OT, goals & POC, safety prec.                               Point: Home exercise program (Not Started)         Description:   Instruct learner(s) on appropriate technique for monitoring, assisting and/or progressing therapeutic exercises/activities.                       Learner Progress:  Not documented in this visit.                  Point: Precautions (Done)         Description:   Instruct learner(s) on prescribed precautions during self-care and functional transfers.                       Learning Progress Summary               Patient Acceptance, E,TB, VU by DT at 7/21/2024 1559     Comment: role of OT, goals & POC, safety prec.   Family Acceptance, E,TB, VU by DT at 7/21/2024 1559     Comment: role of OT, goals & POC, safety prec.                               Point: Body mechanics (Done)         Description:   Instruct learner(s) on proper positioning and spine alignment during self-care, functional mobility activities and/or exercises.                       Learning Progress Summary               Patient Acceptance, E,TB, VU by DT at 7/21/2024 1559     Comment: role of OT, goals & POC, safety prec.   Family Acceptance, E,TB, VU by DT at 7/21/2024 1559     Comment: role of OT, goals & POC, safety prec.                                                   User Key         Initials Effective Dates Name Provider Type Discipline     DT 07/11/23 -  Veronica Rush, OT Occupational Therapist OT                          OT Recommendation and Plan  Planned Therapy  Interventions (OT): activity tolerance training, BADL retraining, cognitive/visual perception retraining, functional balance retraining, neuromuscular control/coordination retraining, occupation/activity based interventions, patient/caregiver education/training, ROM/therapeutic exercise, strengthening exercise, transfer/mobility retraining  Therapy Frequency (OT): 5 times/wk  Plan of Care Review  Plan of Care Reviewed With: patient, daughter  Outcome Evaluation: Pt is a 69 y.o. F adm to [facility] on 07/19/24 with AMS, inc confusion & hx of falls. Pt also had a recent admission for UTI. It was recommended she discharge to a SNF at that time, but pt returned home with spouse. PMH: major depression, meniere's disease, COPD, CAD, lung CA. At baseline, pt lives at home with spouse in a H. Prior to 3 weeks ago (before recent UTI), she was ambulating with a RW & ind with BADLs. Her spouse works and pt was at home alone during the day. Her spouse has been off work with a broken wrist, but anticipates returning to work soon. Her daughter assists at times, but is scheduled to get back sx in the beginning of August. Upon eval, pt is A&O to person & place. She follows 1 step commands, but requires v.c. for sequencing, initiation & follow through of tasks & safety awareness. She requires assistance for grooming, toileting & LB ADLS & for ambulation. She has generalized weakness & impaired balance & is at high risk for falls. OT will continue to follow for tx & recommends SNF upon discharge.      Time Calculation:        Time Calculation- OT         Row Name 07/21/24 1559                       Time Calculation- OT     OT Start Time 1436  -DT         OT Stop Time 1501  -DT         OT Time Calculation (min) 25 min  -DT         OT Received On 07/21/24  -DT         OT - Next Appointment 07/23/24  -DT         OT Goal Re-Cert Due Date 08/04/24  -DT                      User Key  (r) = Recorded By, (t) = Taken By, (c) = Cosigned By         Initials Name Provider Type     Veronica Pineda, OT Occupational Therapist                                Veronica Rush, OT                     7/21/2024

## 2024-07-23 NOTE — THERAPY TREATMENT NOTE
"Subjective: Pt agreeable to therapeutic plan of care.  Cognition: oriented to Person and Time    Objective:     Bed Mobility: Min-A   Functional Transfers: CGA     Balance: supported CGA and Min-A  Functional Ambulation: N/A or Not attempted.    Lower Body Dressing: Max-A  ADL Position: edge of bed sitting  ADL Comments: to don socks     Vitals: WNL    Pain: 0 VAS  Location:   Interventions for pain: N/A  Education: Provided education on the importance of mobility in the acute care setting, Verbal/Tactile Cues, ADL training, and Transfer Training      Assessment: Miryam Fernández presents with ADL impairments affecting function including balance, coordination, motor planning, and strength. Pt with fair carryover for transfer education this date. Demonstrated functioning below baseline abilities indicate the need for continued skilled intervention while inpatient. Tolerating session today without incident. Will continue to follow and progress as tolerated.     Plan/Recommendations:   Moderate Intensity Therapy recommended post-acute care. This is recommended as therapy feels the patient would require 3-4 days per week and wouldn't tolerate \"3 hour daily\" rehab intensity. SNF would be the preferred choice. If the patient does not agree to SNF, arrange HH or OP depending on home bound status. If patient is medically complex, consider LTACH.. Pt requires no DME at discharge.     Pt desires Skilled Rehab placement at discharge. Pt cooperative; agreeable to therapeutic recommendations and plan of care.     Modified [place]: N/A = No pre-op stroke/TIA    Post-Tx Position: Up in Chair, Alarms activated, and Call light and personal items within reach  PPE: gloves   "

## 2024-07-23 NOTE — SIGNIFICANT NOTE
Case Management/Social Work    Patient Name:  Miryam Fernández  YOB: 1955  MRN: [id number]  Admit Date:  7/19/2024 07/23/24 1044   Post Acute Pre-Cert Documentation   Request Submitted by Facility - Type: Hospital   Post-Acute Authorization Type Submitted: SNF   Date Post Acute Pre-Cert Inititated per Facility 07/23/24   Verification from Payer Yes   Date Post Acute Pre-Cert Completed 07/23/24   Accepting Facility [facility] Discharge Date Requested 07/23/24   All Clinicals Submitted? Yes   Had Accepting Facility at Time of Submission Yes   Response Received from Insurance? Approval   Response Communicated to:    Authorization Number: [id number]   Post Acute Pre-Cert Initiated Comment MARIANNE submitted SNF precert for [facility] via [employer] portal. Authorization ID [id number]. SNF precert auto approved. Valid 7/23-7/29. Approval letter indexed to media. CM made aware.           Electronically signed by:  Jules Thakkar CMA  07/23/24 10:47 EDT    Jules Thakkar  Case Management Associate  [address] [address]  P: [phone]  F: [phone]

## 2024-07-23 NOTE — CASE MANAGEMENT/SOCIAL WORK
Continued Stay Note   [facility]     Patient Name: Miryam Fernández  MRN: [id number]  Today's Date: 7/23/2024    Admit Date: 7/19/2024    Plan: [facility] (accepted.) PASRR approved. Precert auth 7/23-7/29.   Discharge Plan       Row Name 07/23/24 1054       Plan    Plan [facility] (accepted.) PASRR approved. Precert auth 7/23-7/29.                 Yudelka Hilliard RN      Office phone: [phone]  Office fax: [phone]

## 2024-07-23 NOTE — DISCHARGE SUMMARY
[facility] Medicine Services  Discharge Summary    Date of Service: 24  Patient Name: Miryam Fernández  : 1955  MRN: [id number]    Date of Admission: 2024  Discharge Diagnosis:   Acute ischemic stroke  UTI  Bilateral lung nodules  Nephrolithiasis  Acute encephalopathy-resolved.  Date of Discharge:  24  Primary Care Physician: Lashell De La Torre APRN      Presenting Problem:   Altered mental status [R41.82]    Active and Resolved Hospital Problems:  Active Hospital Problems    Diagnosis POA    **Altered mental status [R41.82] Yes      Resolved Hospital Problems   No resolved problems to display.         Hospital Course     HPI:  a 69 y.o. female with a CMH of COPD, HLD, HTN, lung cancer who presented to [facility] on 2024 with altered mental status. Over the past several days, she hasn't been eating or drinking much, using her walker or wanting to walk much at all. Her family reports she has been very confused at times. Patient is typically alert and oriented x4 and uses a walker at home. Her daughter and  are present at bedside and deny any prior history of dementia. However, Anitha is on patient's home medication list.      Patient recently admitted here and treated for metabolic encephalopathy due to UTI from  to .  SNF was recommended at discharge, but patient's daughter declined.      On ED evaluation her UA was negative for infection. CT of the head showed no acute abnormality. No leukocytosis. Neurology consulted for further workup. Hospitalist team to admit for further medical management.     Hospital Course:  MRI of the head showed subacute ischemic stroke of the right basal ganglia, prerenal radiata.  Neurology was consulted.  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  Bypass between the left subclavian vein and left carotid vein noted to be working.Echocardiogram showed preserved ejection  fraction of 60 to 65% with no wall motion abnormality and no PFO.  Patient is being discharged on aspirin, Brilinta dose increased from 60 twice daily to 90 twice daily, statin dose increased to high intensity.  Was also noted to have UTI 2/2 E. coli is status post 3 days of IV antibiotics.  Currently asymptomatic.  Currently she is going to acute rehab and will follow-up with PCP in 1 week, neurology in 2 to 4 weeks.      DISCHARGE Follow Up Recommendations for labs and diagnostics:    PCP in 1 week, neurology in 2 to 4 weeks.      Reasons For Change In Medications and Indications for New Medications:      Day of Discharge     Vital Signs:  Temp:  [97.4 °F (36.3 °C)-97.8 °F (36.6 °C)] 97.7 °F (36.5 °C)  Heart Rate:  [79-93] 93  Resp:  [14-18] 18  BP: (118-120)/(62-66) 119/66    Physical Exam:  Physical Exam  Constitutional:       Appearance: Normal appearance.   HENT:      Head: Normocephalic and atraumatic.      Mouth/Throat:      Mouth: Mucous membranes are moist.   Eyes:      Pupils: Pupils are equal, round, and reactive to light.   Cardiovascular:      Rate and Rhythm: Normal rate and regular rhythm.   Pulmonary:      Breath sounds: Normal breath sounds.   Abdominal:      General: Bowel sounds are normal.      Palpations: Abdomen is soft.      Tenderness: There is no abdominal tenderness.   Musculoskeletal:         General: Normal range of motion.      Cervical back: Normal range of motion.   Skin:     General: Skin is warm and dry.   Neurological:      General: No focal deficit present.      Mental Status: She is alert and oriented to person, place, and time.   Psychiatric:         Behavior: Behavior normal.            Pertinent  and/or Most Recent Results     LAB RESULTS:      Lab 07/22/24  0528 07/19/24  2323 07/19/24  1922   WBC 3.35* 4.97 9.67   HEMOGLOBIN 10.3* 10.5* 11.8*   HEMATOCRIT 32.6* 32.7* 37.2   PLATELETS 180 172 199   NEUTROS ABS 2.45 4.12 9.17*   IMMATURE GRANS (ABS) 0.01 0.01 0.04   LYMPHS ABS  0.35* 0.31* 0.14*   MONOS ABS 0.29 0.30 0.25   EOS ABS 0.23 0.21 0.05   MCV 93.9 94.5 95.1         Lab 07/22/24  1209 07/22/24  0528 07/19/24 2323 07/19/24 1922   SODIUM  --  138 138 138   POTASSIUM  --  3.6 4.1 4.2   CHLORIDE  --  104 104 102   CO2  --  22.8 25.1 23.5   ANION GAP  --  11.2 8.9 12.5   BUN  --  19 22 25*   CREATININE  --  0.96 0.91 0.96   EGFR  --  65.0 69.3 65.0   GLUCOSE  --  89 98 114*   CALCIUM  --  9.2 9.1 9.4   HEMOGLOBIN A1C 5.44  --   --   --    TSH  --   --  3.060  --          Lab 07/19/24 2323 07/19/24 1922   TOTAL PROTEIN 6.2 6.8   ALBUMIN 3.4* 3.6   GLOBULIN 2.8 3.2   ALT (SGPT) <5 <5   AST (SGOT) 11 13   BILIRUBIN 0.9 0.9   ALK PHOS 98 107         Lab 07/20/24  0614 07/19/24 2323 07/19/24 1922   HSTROP T 32* 35* 29*         Lab 07/22/24  0528   CHOLESTEROL 207*   LDL CHOL 146*   HDL CHOL 38*   TRIGLYCERIDES 125         Lab 07/22/24  0528 07/20/24  1522   IRON 102  --    IRON SATURATION (TSAT) 32  --    TIBC 320  --    TRANSFERRIN 215  --    FOLATE  --  6.43   VITAMIN B 12  --  353         Brief Urine Lab Results  (Last result in the past 365 days)        Color   Clarity   Blood   Leuk Est   Nitrite   Protein   CREAT   Urine HCG        07/21/24 0513 Yellow   Clear   Negative   Trace   Positive   Negative                 Microbiology Results (last 10 days)       Procedure Component Value - Date/Time    Urine Culture - Urine, Urine, Clean Catch [[id number]]  (Abnormal)  (Susceptibility) Collected: 07/21/24 0513    Lab Status: Final result Specimen: Urine, Clean Catch Updated: 07/23/24 0954     Urine Culture >100,000 CFU/mL Escherichia coli    Narrative:      Colonization of the urinary tract without infection is common. Treatment is discouraged unless the patient is symptomatic, pregnant, or undergoing an invasive urologic procedure.    Susceptibility        Escherichia coli      AARON      Amoxicillin + Clavulanate Susceptible      Ampicillin Susceptible      Ampicillin + Sulbactam  Susceptible      Cefazolin Susceptible      Cefepime Susceptible      Ceftazidime Susceptible      Ceftriaxone Susceptible      Gentamicin Susceptible      Levofloxacin Susceptible      Nitrofurantoin Susceptible      Piperacillin + Tazobactam Susceptible      Trimethoprim + Sulfamethoxazole Susceptible                           Blood Culture - Blood, Arm, Right [[id number]]  (Normal) Collected: 07/19/24 1922    Lab Status: Preliminary result Specimen: Blood from Arm, Right Updated: 07/23/24 0715     Blood Culture No growth at 3 days    Urine Culture - Urine, Straight Cath [[id number]]  (Normal) Collected: 07/19/24 1922    Lab Status: Final result Specimen: Urine from Straight Cath Updated: 07/21/24 1046     Urine Culture No growth    Urine Culture - Urine, Urine, Clean Catch [[id number]]  (Abnormal)  (Susceptibility) Collected: 07/18/24 1003    Lab Status: Final result Specimen: Urine, Clean Catch Updated: 07/20/24 0937     Urine Culture >100,000 CFU/mL Escherichia coli    Narrative:      Colonization of the urinary tract without infection is common. Treatment is discouraged unless the patient is symptomatic, pregnant, or undergoing an invasive urologic procedure.    Susceptibility        Escherichia coli      AARON      Amoxicillin + Clavulanate Susceptible      Ampicillin Susceptible      Ampicillin + Sulbactam Susceptible      Cefazolin Susceptible      Cefepime Susceptible      Ceftazidime Susceptible      Ceftriaxone Susceptible      Gentamicin Susceptible      Levofloxacin Susceptible      Nitrofurantoin Susceptible      Piperacillin + Tazobactam Susceptible      Trimethoprim + Sulfamethoxazole Susceptible                                   CT Abdomen Pelvis Stone Protocol    Result Date: 7/23/2024  Impression: Impression: 1.Questionable indistinctness of the right colon versus artifact from respiratory motion. Right-sided colitis may be considered in the appropriate clinical setting. 2.Right punctate  nonobstructive nephrolithiasis. No hydronephrosis is seen. There is excreted contrast throughout the urinary tract. 3.Ectasia of the distal abdominal aorta measuring 2.7 cm in caliber. 4.Several subcentimeter bilateral lung base nodules measuring up to approximately 5 mm. Small right pneumothorax is partially imaged. This was also seen on the neck CTA from 7/21/2024. 5.Additional findings as detailed above. Electronically Signed: Josue Fuentes MD  7/23/2024 12:47 PM EDT  Workstation ID: [id number]    CT Angiogram Head    Result Date: 7/21/2024  Impression: 1.No intracranial large vessel occlusion is identified. 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 Reconstituted trace flow within the left vertebral artery at about the C3 level. This likely represents a chronic finding. Proximal left vertebral artery is likely occluded on the chest CTA from 10/31/2023. No corresponding acute ischemia noted on yesterday's brain MRI. 3.Estimated 40% stenosis within the left proximal ICA (series 7, image 60). 4.No significant stenosis of the right ICA. 5.Moderate to severe stenosis of the proximal left subclavian artery. There is a surgical bypass between the left common carotid and subclavian artery with moderate stenosis seen within this bypass. 6.Small right apical pneumothorax. 7.Emphysematous changes and masslike scarring within the bilateral upper lobes. The above findings were discussed with Lois Elder, house supervisor, via telephone on 7/21/2024 8:05 PM EDT. Electronically Signed: Josue Fuentes MD  7/21/2024 8:06 PM EDT  Workstation ID: [id number]    CT Angiogram Carotids    Result Date: 7/21/2024  Impression: 1.No intracranial large vessel occlusion is identified. 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 Reconstituted trace flow within the left vertebral artery at about the C3 level. This likely represents a chronic finding. Proximal left vertebral artery is likely occluded on the chest CTA  from 10/31/2023. No corresponding acute ischemia noted on yesterday's brain MRI. 3.Estimated 40% stenosis within the left proximal ICA (series 7, image 60). 4.No significant stenosis of the right ICA. 5.Moderate to severe stenosis of the proximal left subclavian artery. There is a surgical bypass between the left common carotid and subclavian artery with moderate stenosis seen within this bypass. 6.Small right apical pneumothorax. 7.Emphysematous changes and masslike scarring within the bilateral upper lobes. The above findings were discussed with Lois Elder, house supervisor, via telephone on 7/21/2024 8:05 PM EDT. Electronically Signed: Josue Fuentes MD  7/21/2024 8:06 PM EDT  Workstation ID: [id number]    CT Head Without Contrast    Result Date: 7/20/2024  Impression: Impression: 1. Chronic changes, but no acute intracranial pathology. Electronically Signed: Mo Tejeda MD  7/20/2024 6:35 PM EDT  Workstation ID: [id number]    MRI Brain With & Without Contrast    Result Date: 7/20/2024  Impression: Impression: 1. Small less than 1 cm areas of increased signal on diffusion imaging with associated FLAIR and T2 signal abnormality within the right basal ganglia, corona radiata suggesting evolution of small subacute infarcts. No convincing evidence for acute ischemic changes otherwise noted. 2. Mild increased T1 signal and questionable enhancement in this region may represent sequela of evolving small infarct. Metastatic lesion is thought to be less likely although this will need to be followed per patient's oncologic protocol 3. No abnormal enhancement otherwise noted within the brain parenchyma. 4. Extensive confluent white matter changes again noted which can be seen with small vessel ischemic disease as well as represent sequela of prior therapy Electronically Signed: John Posada MD  7/20/2024 6:07 PM EDT  Workstation ID: [id number]    XR Chest 1 View    Result Date: 7/19/2024  Impression: Impression:  Stable findings without definite acute cardiopulmonary abnormality. This exam was completed during a system downtime. Preliminary report was provided earlier in written form. Electronically Signed: Samina Stevensonley  7/19/2024 11:16 PM EDT  Workstation ID: [id number]    DEXA Bone Density Axial    Result Date: 7/3/2024  Impression: Normal Bone Mineral Density.   Copies of the computerized summary reports can be obtained from [employer] via the health information department of [facility].   Electronically Signed By-Evangelista Obrien MD On:7/3/2024 8:42 AM      CT Abdomen Pelvis With Contrast    Result Date: 6/28/2024  Impression: Impression: 1. Heterogeneous diminished enhancement in the right kidney. Correlate for symptoms of nephritis. 2. 2 mm nonobstructing stone in the right upper renal pole. 3. Stable 5 mm right lower lobe and 3 mm left lower lobe noncalcified nodules. According to previous report, these nodules have been stable since 2021, suggesting benign etiology. 4. 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 Suspected stenosis of the right renal artery ostium again noted. 5. 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 Electronically Signed: Demetrice Crowell MD  6/28/2024 11:45 AM EDT  Workstation ID: [id number]             Results for orders placed during the hospital encounter of 07/19/24    Adult Transthoracic Echo Complete W/ Cont if Necessary Per Protocol    Interpretation Summary    Left ventricular systolic function is normal. Calculated left ventricular EF = 61.7% Left ventricular ejection fraction appears to be 61 - 65%.    Left ventricular diastolic function is consistent with (grade I) impaired relaxation.    Saline test results are  negative for right to left atrial level shunt.    Estimated right ventricular systolic pressure from tricuspid regurgitation is normal (<35 mmHg).      Labs Pending at Discharge:  Pending Labs       Order Current Status    Vitamin B1, Whole Blood In process    Blood Culture - Blood, Arm, Right Preliminary result            Procedures Performed           Consults:   Consults       Date and Time Order Name Status Description    7/23/2024  9:33 AM Inpatient Urology Consult Completed     7/19/2024  5:09 PM Inpatient Neurology Consult General Completed               Discharge Details        Discharge Medications        New Medications        Instructions Start Date   aspirin 81 MG EC tablet   81 mg, Oral, Daily   Start Date: July 24, 2024            Changes to Medications        Instructions Start Date   ticagrelor 90 MG tablet tablet  Commonly known as: BRILINTA  What changed:   medication strength  how much to take   90 mg, Oral, 2 Times Daily             Continue These Medications        Instructions Start Date   atorvastatin 40 MG tablet  Commonly known as: LIPITOR   40 mg, Oral, Daily      furosemide 20 MG tablet  Commonly known as: LASIX   20 mg, Oral, As Needed, PRN for swelling       memantine 5 MG tablet  Commonly known as: Namenda   5 mg, Oral, 2 Times Daily      metoprolol succinate XL 25 MG 24 hr tablet  Commonly known as: TOPROL-XL   25 mg, Oral, Daily      mupirocin 2 % ointment  Commonly known as: BACTROBAN   1 Application, Topical, 3 Times Daily      pantoprazole 40 MG EC tablet  Commonly known as: PROTONIX   1 tablet, Oral, Daily             Stop These Medications      nitrofurantoin (macrocrystal-monohydrate) 100 MG capsule  Commonly known as: Macrobid              Allergies   Allergen Reactions    Albuterol Shortness Of Breath     Pt states she has sensitivity to albuterol.  She states it causes her to be SOA.    Hydrocodone Palpitations    Sulfa Antibiotics Swelling    Bacitracin Other (See Comments)     Benzalkonium Chloride Hives    Codeine Other (See Comments)    Neomycin Other (See Comments)    Polymyxin B Other (See Comments)    Nickel Rash    Penicillins Rash         Discharge Disposition:   Skilled Nursing Facility (DC - External)    Diet:  Hospital:  Diet Order   Procedures    Diet: Regular/House; Fluid Consistency: Thin (IDDSI 0)         Discharge Activity:         CODE STATUS:  Code Status and Medical Interventions:   Ordered at: 07/19/24 1652     Code Status (Patient has no pulse and is not breathing):    CPR (Attempt to Resuscitate)     Medical Interventions (Patient has pulse or is breathing):    Full Support         Future Appointments   Date Time Provider Department Center   8/16/2024  8:30 AM Sonia Colvin APRN [facility]   9/10/2024 10:30 AM LAB MD  [facility] LAB NA  [facility]   9/10/2024 10:30 AM Gunnar Moon MD [facility] ONC NA [facility]   9/24/2024 10:15 AM Lashell De La Torre APRN [facility] PC SB [facility]   9/30/2024 10:15 AM Nai Lorenzana MD [facility]   10/23/2024  8:00 AM Kandice Tomlinson APRN [facility] NEURO NA [facility]   11/8/2024 10:00 AM [facility] VASC MACHINE 1  [facility]   11/8/2024 10:45 AM [facility] VASC MACHINE 4 [facility]   11/13/2024 10:45 AM [facility] VASC MACHINE 1  [facility]   11/15/2024  8:30 AM [facility] VASC MACHINE 4  [facility]   11/18/2024 12:15 PM Dev Landrum MD [facility] VS [facility]       Additional Instructions for the Follow-ups that You Need to Schedule       Discharge Follow-up with PCP   As directed       Currently Documented PCP:    Lashell De La Torre APRN    PCP Phone Number:    [phone]     Follow Up Details: / YOSVANY MARIA in one week with melissa borjas        Discharge Follow-up with Specialty: Neurology in; 2 Weeks   As directed      Specialty: Neurology in   Follow Up: 2 Weeks                Time spent on Discharge including face to face service:  >30 minutes    Signature: Electronically signed by Wilver Arechiga MD, 07/23/24, 16:05 EDT.  [facility] Hospitalist Team

## 2024-07-23 NOTE — CONSULTS
Urology Consult Note    Patient:Miryam Fernández :1955  Room:[facility]  Admit Date2024  Age:69 y.o.     SEX:female     DOS:2024     MR:[id number]     Visit:[id number]       Attending: Wilver Arechiga MD  Referring Provider: Dr Arechiga  Reason for Consultation: Recurrent urinary tract infections    Patient Care Team:  Lashell De La Torre APRN as PCP - General (Nurse Practitioner)  Juan Luis Mattson MD as Consulting Physician (Cardiothoracic Surgery)  Shola Hatch MD as Consulting Physician (Urology)    Chief complaint recurrent urinary tract infections    Subjective .     History of present illness: 69-year-old woman who is admitted with mental status changes.  She was found to have a urinary tract infection which is E. coli.  She is currently on Rocephin for this.  Patient states she is getting infections approximately every 3 months or so.  They come on very quickly and often resulted mental status changes.  Patient denies any significant voiding difficulties otherwise.    Review of Systems  10 point review of systems were reviewed and are negative except for:  Constitution:  positive for See HPI    History  Past Medical History:   Diagnosis Date    Cancer     lung cancer    Carotid stenosis     COPD (chronic obstructive pulmonary disease)     Coronary artery disease     Esophageal stricture     Dr Domingo    Hyperlipidemia     Pulmonary embolism     seen at [facility], fluid around her heart at the time-right lung     Past Surgical History:   Procedure Laterality Date    ANGIOPLASTY / STENTING FEMORAL      CARDIAC CATHETERIZATION      CAROTID STENT      CORONARY STENT PLACEMENT      ESOPHAGOSCOPY / EGD      HYSTERECTOMY       Social History     Socioeconomic History    Marital status:    Tobacco Use    Smoking status: Every Day     Current packs/day: 2.00     Average packs/day: 2.0 packs/day for 40.0 years (80.0 ttl pk-yrs)     Types: Cigarettes     Passive exposure: Current   Vaping Use     Vaping status: Never Used   Substance and Sexual Activity    Alcohol use: Not Currently    Drug use: Never    Sexual activity: Defer     Family History   Problem Relation Age of Onset    Heart disease Mother     Hypertension Mother     Hyperlipidemia Father     Lung cancer Father     Diabetes Paternal Uncle     Leukemia Paternal Grandmother      Allergy  Allergies   Allergen Reactions    Albuterol Shortness Of Breath     Pt states she has sensitivity to albuterol.  She states it causes her to be SOA.    Hydrocodone Palpitations    Sulfa Antibiotics Swelling    Bacitracin Other (See Comments)    Benzalkonium Chloride Hives    Codeine Other (See Comments)    Neomycin Other (See Comments)    Polymyxin B Other (See Comments)    Nickel Rash    Penicillins Rash     Prior to Admission medications    Medication Sig Start Date End Date Taking? Authorizing Provider   aspirin 81 MG EC tablet Take 1 tablet by mouth Daily. 7/24/24  Yes Wilver Arechiga MD   nitrofurantoin, macrocrystal-monohydrate, (Macrobid) 100 MG capsule Take 1 capsule by mouth 2 (Two) Times a Day. 7/18/24  Yes Lashell De La Torre APRN   ticagrelor (BRILINTA) 90 MG tablet tablet Take 1 tablet by mouth 2 (Two) Times a Day. 7/23/24  Yes Wilver Arechiga MD   atorvastatin (LIPITOR) 40 MG tablet TAKE 1 TABLET BY MOUTH EVERY DAY 5/13/24   Lashell De La Torre APRN   furosemide (LASIX) 20 MG tablet Take 1 tablet by mouth As Needed. PRN for swelling    Provider, MD Miguel   memantine (Namenda) 5 MG tablet Take 1 tablet by mouth 2 (Two) Times a Day. 7/16/24   Lashell De La Torre APRN   metoprolol succinate XL (TOPROL-XL) 25 MG 24 hr tablet Take 1 tablet by mouth Daily. 5/3/22   Ted Langford MD   mupirocin (BACTROBAN) 2 % ointment Apply 1 Application topically to the appropriate area as directed 3 (Three) Times a Day. 6/21/24   Lashell De La Torre APRN   pantoprazole (PROTONIX) 40 MG EC tablet Take 1 tablet by mouth Daily. 10/13/23   Provider,  MD Miguel   ticagrelor (Brilinta) 60 MG tablet tablet Take 1 tablet by mouth 2 (Two) Times a Day. 24   Lashell De La Torre APRN         Objective     tMax 24 hours:  Temp (24hrs), Av.5 °F (36.4 °C), Min:96.9 °F (36.1 °C), Max:97.8 °F (36.6 °C)    Vital Sign Ranges:  Temp:  [96.9 °F (36.1 °C)-97.8 °F (36.6 °C)] 97.4 °F (36.3 °C)  Heart Rate:  [79-93] 79  Resp:  [14-18] 17  BP: (117-120)/(61-66) 119/66  Intake and Output Last 3 Shifts:  I/O last 3 completed shifts:  In: 120 [P.O.:120]  Out: 275 [Urine:275]      Physical Exam:   General Appearance: alert, appears stated age, and cooperative  Head: normocephalic, without obvious abnormality and atraumatic  Abdomen: soft non-tender, no guarding, and no rebound tenderness  Skin: no bleeding, bruising or rash  Neurologic: Mental Status orientated to person, place, time and situation    Results Review:     Lab Results (last 24 hours)       Procedure Component Value Units Date/Time    Urine Culture - Urine, Urine, Clean Catch [[id number]]  (Abnormal)  (Susceptibility) Collected: 24    Specimen: Urine, Clean Catch Updated: 24 09     Urine Culture >100,000 CFU/mL Escherichia coli    Narrative:      Colonization of the urinary tract without infection is common. Treatment is discouraged unless the patient is symptomatic, pregnant, or undergoing an invasive urologic procedure.    Susceptibility        Escherichia coli      AARON      Amoxicillin + Clavulanate Susceptible      Ampicillin Susceptible      Ampicillin + Sulbactam Susceptible      Cefazolin Susceptible      Cefepime Susceptible      Ceftazidime Susceptible      Ceftriaxone Susceptible      Gentamicin Susceptible      Levofloxacin Susceptible      Nitrofurantoin Susceptible      Piperacillin + Tazobactam Susceptible      Trimethoprim + Sulfamethoxazole Susceptible                           Blood Culture - Blood, Arm, Right [[id number]]  (Normal) Collected: 24    Specimen: Blood  from Arm, Right Updated: 07/23/24 0715     Blood Culture No growth at 3 days    LABS SCANNED [[id number]] Resulted: 07/19/24     Updated: 07/23/24 0713    LABS SCANNED [[id number]] Resulted: 07/19/24     Updated: 07/23/24 0713    LABS SCANNED [[id number]] Resulted: 07/19/24     Updated: 07/23/24 0642    LABS SCANNED [[id number]] Resulted: 07/19/24     Updated: 07/23/24 0642    Hemoglobin A1c [[id number]]  (Normal) Collected: 07/22/24 1209    Specimen: Blood from Arm, Left Updated: 07/22/24 1322     Hemoglobin A1C 5.44 %              Urine Culture   Date Value Ref Range Status   07/21/2024 >100,000 CFU/mL Escherichia coli (A)  Final        Imaging Results (Last 7 Days)       Procedure Component Value Units Date/Time    CT Angiogram Head [[id number]] Collected: 07/21/24 1945     Updated: 07/21/24 2009    Narrative:      CT ANGIOGRAM HEAD, CT ANGIOGRAM CAROTIDS    Date of Exam: 7/21/2024 7:00 PM EDT    Indication: CVA.    Comparison: Brain MRI dated 7/20/2024, head CT from 7/19/2024    Technique: CTA of the head was performed after the uneventful intravenous administration of iodinated contrast. Reconstructed coronal and sagittal images were also obtained. In addition, a 3-D volume rendered image was created for interpretation.   Automated exposure control and iterative reconstruction methods were used.    FINDINGS:    Vascular Findings:    The right common carotid, internal carotid, middle cerebral, anterior cerebral, vertebral, and posterior cerebral arteries are patent without abrupt cut off or aneurysmal dilation. There is mild focal ectasia of the right cavernous ICA without discrete   aneurysm seen. There is fetal origin of the right posterior cerebral artery.    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 Reconstituted trace flow within the left vertebral artery at about the C3 level. There is irregular plaque within the left proximal ICA with approximately 40% luminal narrowing (series 7, image 60). The   left  common carotid, remainder of the left ICA, middle cerebral, anterior cerebral, vertebral, and posterior cerebral arteries are patent without abrupt cut off or aneurysmal dilation. There is a surgical bypass between the left common carotid and   subclavian artery with moderate stenosis is seen within this bypass. There is moderate to severe stenosis of the proximal left subclavian artery.    Basilar artery appears patent and appears unremarkable.    Non-vascular Findings:    For description of nonvascular intracranial findings, please refer to the noncontrast head CT performed the same date.    No acute abnormality is identified within the visualized soft tissue or bony structures of the neck.    There appears to be masslike scarring within the bilateral medial upper lobes. Lungs are emphysematous. There is a small right apical pneumothorax      Impression:      1.No intracranial large vessel occlusion is identified.  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 Reconstituted trace flow within the left vertebral artery at about the C3 level. This likely represents a chronic finding. Proximal left vertebral artery is likely occluded on the chest CTA from   10/31/2023. No corresponding acute ischemia noted on yesterday's brain MRI.  3.Estimated 40% stenosis within the left proximal ICA (series 7, image 60).  4.No significant stenosis of the right ICA.  5.Moderate to severe stenosis of the proximal left subclavian artery. There is a surgical bypass between the left common carotid and subclavian artery with moderate stenosis seen within this bypass.  6.Small right apical pneumothorax.   7.Emphysematous changes and masslike scarring within the bilateral upper lobes.    The above findings were discussed with Lois Elder, house supervisor, via telephone on 7/21/2024 8:05 PM EDT.          Electronically Signed: Josue Fuentes MD    7/21/2024 8:06 PM EDT    Workstation ID: [id number]    CT Angiogram Carotids  [[id number]] Collected: 07/21/24 1945     Updated: 07/21/24 2009    Narrative:      CT ANGIOGRAM HEAD, CT ANGIOGRAM CAROTIDS    Date of Exam: 7/21/2024 7:00 PM EDT    Indication: CVA.    Comparison: Brain MRI dated 7/20/2024, head CT from 7/19/2024    Technique: CTA of the head was performed after the uneventful intravenous administration of iodinated contrast. Reconstructed coronal and sagittal images were also obtained. In addition, a 3-D volume rendered image was created for interpretation.   Automated exposure control and iterative reconstruction methods were used.    FINDINGS:    Vascular Findings:    The right common carotid, internal carotid, middle cerebral, anterior cerebral, vertebral, and posterior cerebral arteries are patent without abrupt cut off or aneurysmal dilation. There is mild focal ectasia of the right cavernous ICA without discrete   aneurysm seen. There is fetal origin of the right posterior cerebral artery.    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 Reconstituted trace flow within the left vertebral artery at about the C3 level. There is irregular plaque within the left proximal ICA with approximately 40% luminal narrowing (series 7, image 60). The   left common carotid, remainder of the left ICA, middle cerebral, anterior cerebral, vertebral, and posterior cerebral arteries are patent without abrupt cut off or aneurysmal dilation. There is a surgical bypass between the left common carotid and   subclavian artery with moderate stenosis is seen within this bypass. There is moderate to severe stenosis of the proximal left subclavian artery.    Basilar artery appears patent and appears unremarkable.    Non-vascular Findings:    For description of nonvascular intracranial findings, please refer to the noncontrast head CT performed the same date.    No acute abnormality is identified within the visualized soft tissue or bony structures of the neck.    There appears to be masslike  scarring within the bilateral medial upper lobes. Lungs are emphysematous. There is a small right apical pneumothorax      Impression:      1.No intracranial large vessel occlusion is identified.  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 Reconstituted trace flow within the left vertebral artery at about the C3 level. This likely represents a chronic finding. Proximal left vertebral artery is likely occluded on the chest CTA from   10/31/2023. No corresponding acute ischemia noted on yesterday's brain MRI.  3.Estimated 40% stenosis within the left proximal ICA (series 7, image 60).  4.No significant stenosis of the right ICA.  5.Moderate to severe stenosis of the proximal left subclavian artery. There is a surgical bypass between the left common carotid and subclavian artery with moderate stenosis seen within this bypass.  6.Small right apical pneumothorax.   7.Emphysematous changes and masslike scarring within the bilateral upper lobes.    The above findings were discussed with Lois Elder, house supervisor, via telephone on 7/21/2024 8:05 PM EDT.          Electronically Signed: Josue Fuentes MD    7/21/2024 8:06 PM EDT    Workstation ID: [id number]    CT Head Without Contrast [[id number]] Collected: 07/20/24 1834     Updated: 07/20/24 1837    Narrative:      CT HEAD WO CONTRAST    Date of Exam: 7/19/2024 9:35 AM EDT    Indication: Confusion and recurrent UTIs.    Comparison: None available.    Technique: Axial CT images were obtained of the head without contrast administration.  Coronal reconstructions were performed.  Automated exposure control and iterative reconstruction methods were used.      Findings:  No intra or extra-axial fluid collections, masses, or areas of hemorrhage. No midline shift or mass effect. There is prominence of the ventricles and sulci consistent with atrophy. There is periventricular and corona radiata hypodensity consistent with   changes of chronic microvascular  ischemic disease. Orbits paranasal sinuses and mastoid air cells are unremarkable.      Impression:      Impression:    1. Chronic changes, but no acute intracranial pathology.      Electronically Signed: Mo Tejeda MD    7/20/2024 6:35 PM EDT    Workstation ID: [id number]    MRI Brain With & Without Contrast [[id number]] Collected: 07/20/24 1803     Updated: 07/20/24 1809    Narrative:      MRI BRAIN W WO CONTRAST    Date of Exam: 7/20/2024 5:00 PM EDT    Indication: And status changes, history of lung cancer.     Comparison: 7/19/2024 and prior    Technique:  Routine multiplanar/multisequence sequence images of the brain were obtained before and after the uneventful administration of 16 mL Prohance.      Findings:   There are some small punctate areas of increased signal on diffusion-weighted imaging within the right basal ganglia, corona radiata with indeterminate dropout on the ADC map. There is focal rounded areas of FLAIR and T2 signal hyperintensity in this   region. This area is new compared to the prior MRI from 3/26/2024.. This appears to correspond with areas of abnormal signal on T2 weighted imaging which is increased in the comparison suggesting evolution of subacute infarcts. Trace amount of increased   T1 signal is noted suggesting probable hemorrhagic staining. Faint blush of contrast in this area cannot be excluded. No definite additional abnormal enhancement noted on postcontrast imaging.    No additional areas of suspicious increased signal on diffusion weighted imaging noted given limitations of exam secondary to motion.    There is extensive patchy and confluent FLAIR and T2 signal hyperintensity throughout the supratentorial white matter. Changes are also noted within the basal ganglia and within the posterior fossa structures to a lesser degree.    The major intracranial flow voids are grossly patent on limited imaging    Minimal effusion left mastoid air cell. Paranasal sinuses are grossly  clear        Impression:      Impression:    1. Small less than 1 cm areas of increased signal on diffusion imaging with associated FLAIR and T2 signal abnormality within the right basal ganglia, corona radiata suggesting evolution of small subacute infarcts. No convincing evidence for acute   ischemic changes otherwise noted.    2. Mild increased T1 signal and questionable enhancement in this region may represent sequela of evolving small infarct. Metastatic lesion is thought to be less likely although this will need to be followed per patient's oncologic protocol    3. No abnormal enhancement otherwise noted within the brain parenchyma.    4. Extensive confluent white matter changes again noted which can be seen with small vessel ischemic disease as well as represent sequela of prior therapy        Electronically Signed: John Posada MD    7/20/2024 6:07 PM EDT    Workstation ID: [id number]    XR Chest 1 View [[id number]] Collected: 07/19/24 2314     Updated: 07/19/24 2318    Narrative:      XR CHEST 1 VW    Date of Exam: 7/19/2024 10:20 AM EDT    Indication: Cough, hypertension, chest pain    Comparison: AP chest x-ray 6/20/2024, CT chest 3/26/2024    Findings:  There is stable left suprahilar opacity, compatible with post treatment change when correlated with CT. There is stable right midlung opacity, likely due to scarring. No focal consolidation, pneumothorax, or large pleural effusion is seen. Heart size is   normal.      Impression:      Impression:  Stable findings without definite acute cardiopulmonary abnormality.    This exam was completed during a system downtime. Preliminary report was provided earlier in written form.    Electronically Signed: Samina Escobedo    7/19/2024 11:16 PM EDT    Workstation ID: [id number]            Inpatient Meds:   Scheduled Meds:aspirin, 81 mg, Oral, Daily  atorvastatin, 80 mg, Oral, Daily  cefTRIAXone, 2,000 mg, Intravenous, Q24H  memantine, 5 mg, Oral, BID  metoprolol  succinate XL, 25 mg, Oral, Daily  multivitamin, 1 tablet, Oral, Daily  pantoprazole, 40 mg, Oral, Daily  ticagrelor, 90 mg, Oral, BID       Continuous Infusions:    PRN Meds:.  senna-docusate sodium **AND** polyethylene glycol **AND** bisacodyl **AND** bisacodyl    Calcium Replacement - Follow Nurse / BPA Driven Protocol    Magnesium Standard Dose Replacement - Follow Nurse / BPA Driven Protocol    Phosphorus Replacement - Follow Nurse / BPA Driven Protocol    Potassium Replacement - Follow Nurse / BPA Driven Protocol      Assessment & Plan     Principal Problem:    Altered mental status    Recurrent urinary tract infections    Plan  Check postvoid residual  CT stone protocol to rule out any obstruction or stones as source of infection  Antibiotics per primary  Follow-up with Dr. Hatch in 1 week to consider prophylactic antibiotics      I discussed the patient's findings and my recommendations with patient and family    Thank you for this  consult    Ian Nguyen MD  07/23/24  11:34 EDT

## 2024-07-24 LAB — VIT B1 BLD-SCNC: 95.2 NMOL/L (ref 66.5–200)

## 2024-07-25 LAB — BACTERIA SPEC AEROBE CULT: NORMAL
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
## 2024-07-31 ENCOUNTER — APPOINTMENT (OUTPATIENT)
Dept: CT IMAGING | Facility: HOSPITAL | Age: 69
DRG: 522 | End: 2024-07-31
Payer: MEDICARE

## 2024-07-31 ENCOUNTER — HOSPITAL ENCOUNTER (INPATIENT)
Facility: HOSPITAL | Age: 69
LOS: 4 days | Discharge: NURSING, REHAB OR LONG-TERM CARE | DRG: 522 | End: 2024-08-06
Attending: EMERGENCY MEDICINE | Admitting: INTERNAL MEDICINE
Payer: MEDICARE

## 2024-07-31 DIAGNOSIS — S72.002D CLOSED FRACTURE OF LEFT HIP WITH ROUTINE HEALING, SUBSEQUENT ENCOUNTER: ICD-10-CM

## 2024-07-31 DIAGNOSIS — S72.002A LEFT DISPLACED FEMORAL NECK FRACTURE: Primary | ICD-10-CM

## 2024-07-31 LAB
ALBUMIN SERPL-MCNC: 4.1 G/DL (ref 3.5–5.2)
ALBUMIN/GLOB SERPL: 1.3 G/DL
ALP SERPL-CCNC: 127 U/L (ref 39–117)
ALT SERPL W P-5'-P-CCNC: 14 U/L (ref 1–33)
ANION GAP SERPL CALCULATED.3IONS-SCNC: 10.3 MMOL/L (ref 5–15)
APTT PPP: 23.1 SECONDS (ref 61–76.5)
AST SERPL-CCNC: 18 U/L (ref 1–32)
BASOPHILS # BLD AUTO: 0.04 10*3/MM3 (ref 0–0.2)
BASOPHILS NFR BLD AUTO: 0.5 % (ref 0–1.5)
BILIRUB SERPL-MCNC: 0.7 MG/DL (ref 0–1.2)
BUN SERPL-MCNC: 14 MG/DL (ref 8–23)
BUN/CREAT SERPL: 15.2 (ref 7–25)
CALCIUM SPEC-SCNC: 9.7 MG/DL (ref 8.6–10.5)
CHLORIDE SERPL-SCNC: 101 MMOL/L (ref 98–107)
CK SERPL-CCNC: 36 U/L (ref 20–180)
CO2 SERPL-SCNC: 28.7 MMOL/L (ref 22–29)
CREAT SERPL-MCNC: 0.92 MG/DL (ref 0.57–1)
DEPRECATED RDW RBC AUTO: 50.3 FL (ref 37–54)
EGFRCR SERPLBLD CKD-EPI 2021: 68.4 ML/MIN/1.73
EOSINOPHIL # BLD AUTO: 0.11 10*3/MM3 (ref 0–0.4)
EOSINOPHIL NFR BLD AUTO: 1.4 % (ref 0.3–6.2)
ERYTHROCYTE [DISTWIDTH] IN BLOOD BY AUTOMATED COUNT: 14.5 % (ref 12.3–15.4)
GLOBULIN UR ELPH-MCNC: 3.1 GM/DL
GLUCOSE SERPL-MCNC: 94 MG/DL (ref 65–99)
HCT VFR BLD AUTO: 39.2 % (ref 34–46.6)
HGB BLD-MCNC: 12.4 G/DL (ref 12–15.9)
IMM GRANULOCYTES # BLD AUTO: 0.04 10*3/MM3 (ref 0–0.05)
IMM GRANULOCYTES NFR BLD AUTO: 0.5 % (ref 0–0.5)
INR PPP: <0.93 (ref 0.93–1.1)
LYMPHOCYTES # BLD AUTO: 0.62 10*3/MM3 (ref 0.7–3.1)
LYMPHOCYTES NFR BLD AUTO: 8 % (ref 19.6–45.3)
MCH RBC QN AUTO: 30 PG (ref 26.6–33)
MCHC RBC AUTO-ENTMCNC: 31.6 G/DL (ref 31.5–35.7)
MCV RBC AUTO: 94.9 FL (ref 79–97)
MONOCYTES # BLD AUTO: 0.44 10*3/MM3 (ref 0.1–0.9)
MONOCYTES NFR BLD AUTO: 5.7 % (ref 5–12)
NEUTROPHILS NFR BLD AUTO: 6.52 10*3/MM3 (ref 1.7–7)
NEUTROPHILS NFR BLD AUTO: 83.9 % (ref 42.7–76)
NRBC BLD AUTO-RTO: 0 /100 WBC (ref 0–0.2)
PLATELET # BLD AUTO: 241 10*3/MM3 (ref 140–450)
PMV BLD AUTO: 9.5 FL (ref 6–12)
POTASSIUM SERPL-SCNC: 4.3 MMOL/L (ref 3.5–5.2)
PROT SERPL-MCNC: 7.2 G/DL (ref 6–8.5)
PROTHROMBIN TIME: 10.1 SECONDS (ref 9.6–11.7)
RBC # BLD AUTO: 4.13 10*6/MM3 (ref 3.77–5.28)
SODIUM SERPL-SCNC: 140 MMOL/L (ref 136–145)
WBC NRBC COR # BLD AUTO: 7.77 10*3/MM3 (ref 3.4–10.8)

## 2024-07-31 PROCEDURE — 25810000003 SODIUM CHLORIDE 0.9 % SOLUTION: Performed by: INTERNAL MEDICINE

## 2024-07-31 PROCEDURE — 85025 COMPLETE CBC W/AUTO DIFF WBC: CPT | Performed by: EMERGENCY MEDICINE

## 2024-07-31 PROCEDURE — G0378 HOSPITAL OBSERVATION PER HR: HCPCS

## 2024-07-31 PROCEDURE — 99285 EMERGENCY DEPT VISIT HI MDM: CPT

## 2024-07-31 PROCEDURE — 25010000002 HYDROMORPHONE 1 MG/ML SOLUTION: Performed by: INTERNAL MEDICINE

## 2024-07-31 PROCEDURE — 93005 ELECTROCARDIOGRAM TRACING: CPT | Performed by: EMERGENCY MEDICINE

## 2024-07-31 PROCEDURE — 72192 CT PELVIS W/O DYE: CPT

## 2024-07-31 PROCEDURE — 82550 ASSAY OF CK (CPK): CPT | Performed by: EMERGENCY MEDICINE

## 2024-07-31 PROCEDURE — 25010000002 HYDROMORPHONE 1 MG/ML SOLUTION: Performed by: EMERGENCY MEDICINE

## 2024-07-31 PROCEDURE — 25010000002 ONDANSETRON PER 1 MG: Performed by: EMERGENCY MEDICINE

## 2024-07-31 PROCEDURE — 25810000003 SODIUM CHLORIDE 0.9 % SOLUTION: Performed by: EMERGENCY MEDICINE

## 2024-07-31 PROCEDURE — 85610 PROTHROMBIN TIME: CPT | Performed by: EMERGENCY MEDICINE

## 2024-07-31 PROCEDURE — 85730 THROMBOPLASTIN TIME PARTIAL: CPT | Performed by: EMERGENCY MEDICINE

## 2024-07-31 PROCEDURE — 80053 COMPREHEN METABOLIC PANEL: CPT | Performed by: EMERGENCY MEDICINE

## 2024-07-31 RX ORDER — ONDANSETRON 2 MG/ML
4 INJECTION INTRAMUSCULAR; INTRAVENOUS ONCE
Status: COMPLETED | OUTPATIENT
Start: 2024-07-31 | End: 2024-07-31

## 2024-07-31 RX ORDER — SODIUM CHLORIDE 0.9 % (FLUSH) 0.9 %
10 SYRINGE (ML) INJECTION EVERY 12 HOURS SCHEDULED
Status: DISCONTINUED | OUTPATIENT
Start: 2024-07-31 | End: 2024-08-06 | Stop reason: HOSPADM

## 2024-07-31 RX ORDER — SODIUM CHLORIDE 9 MG/ML
75 INJECTION, SOLUTION INTRAVENOUS CONTINUOUS
Status: DISCONTINUED | OUTPATIENT
Start: 2024-07-31 | End: 2024-08-06 | Stop reason: HOSPADM

## 2024-07-31 RX ORDER — MEMANTINE HYDROCHLORIDE 5 MG/1
5 TABLET ORAL 2 TIMES DAILY
Status: DISCONTINUED | OUTPATIENT
Start: 2024-07-31 | End: 2024-08-06 | Stop reason: HOSPADM

## 2024-07-31 RX ORDER — AMOXICILLIN 250 MG
2 CAPSULE ORAL 2 TIMES DAILY PRN
Status: DISCONTINUED | OUTPATIENT
Start: 2024-07-31 | End: 2024-08-06 | Stop reason: HOSPADM

## 2024-07-31 RX ORDER — PANTOPRAZOLE SODIUM 40 MG/1
40 TABLET, DELAYED RELEASE ORAL DAILY
Status: DISCONTINUED | OUTPATIENT
Start: 2024-08-01 | End: 2024-08-06 | Stop reason: HOSPADM

## 2024-07-31 RX ORDER — NALOXONE HCL 0.4 MG/ML
0.4 VIAL (ML) INJECTION
Status: DISCONTINUED | OUTPATIENT
Start: 2024-07-31 | End: 2024-08-06 | Stop reason: HOSPADM

## 2024-07-31 RX ORDER — SODIUM CHLORIDE 9 MG/ML
40 INJECTION, SOLUTION INTRAVENOUS AS NEEDED
Status: DISCONTINUED | OUTPATIENT
Start: 2024-07-31 | End: 2024-08-06 | Stop reason: HOSPADM

## 2024-07-31 RX ORDER — POLYETHYLENE GLYCOL 3350 17 G/17G
17 POWDER, FOR SOLUTION ORAL DAILY PRN
Status: DISCONTINUED | OUTPATIENT
Start: 2024-07-31 | End: 2024-08-06 | Stop reason: HOSPADM

## 2024-07-31 RX ORDER — BISACODYL 5 MG/1
5 TABLET, DELAYED RELEASE ORAL DAILY PRN
Status: DISCONTINUED | OUTPATIENT
Start: 2024-07-31 | End: 2024-08-06 | Stop reason: HOSPADM

## 2024-07-31 RX ORDER — METOPROLOL SUCCINATE 25 MG/1
25 TABLET, EXTENDED RELEASE ORAL DAILY
Status: DISCONTINUED | OUTPATIENT
Start: 2024-08-01 | End: 2024-08-06 | Stop reason: HOSPADM

## 2024-07-31 RX ORDER — ASPIRIN 81 MG/1
81 TABLET ORAL DAILY
Status: DISCONTINUED | OUTPATIENT
Start: 2024-08-01 | End: 2024-08-06 | Stop reason: HOSPADM

## 2024-07-31 RX ORDER — BISACODYL 10 MG
10 SUPPOSITORY, RECTAL RECTAL DAILY PRN
Status: DISCONTINUED | OUTPATIENT
Start: 2024-07-31 | End: 2024-08-06 | Stop reason: HOSPADM

## 2024-07-31 RX ORDER — ATORVASTATIN CALCIUM 40 MG/1
40 TABLET, FILM COATED ORAL DAILY
Status: DISCONTINUED | OUTPATIENT
Start: 2024-08-01 | End: 2024-08-06 | Stop reason: HOSPADM

## 2024-07-31 RX ORDER — ONDANSETRON 2 MG/ML
4 INJECTION INTRAMUSCULAR; INTRAVENOUS EVERY 6 HOURS PRN
Status: DISCONTINUED | OUTPATIENT
Start: 2024-07-31 | End: 2024-08-06 | Stop reason: HOSPADM

## 2024-07-31 RX ORDER — SODIUM CHLORIDE 0.9 % (FLUSH) 0.9 %
10 SYRINGE (ML) INJECTION AS NEEDED
Status: DISCONTINUED | OUTPATIENT
Start: 2024-07-31 | End: 2024-08-06 | Stop reason: HOSPADM

## 2024-07-31 RX ADMIN — SODIUM CHLORIDE 75 ML/HR: 9 INJECTION, SOLUTION INTRAVENOUS at 22:35

## 2024-07-31 RX ADMIN — ONDANSETRON 4 MG: 2 INJECTION INTRAMUSCULAR; INTRAVENOUS at 18:45

## 2024-07-31 RX ADMIN — SODIUM CHLORIDE 500 ML: 9 INJECTION, SOLUTION INTRAVENOUS at 15:56

## 2024-07-31 RX ADMIN — TICAGRELOR 90 MG: 90 TABLET ORAL at 22:35

## 2024-07-31 RX ADMIN — HYDROMORPHONE HYDROCHLORIDE 0.25 MG: 1 INJECTION, SOLUTION INTRAMUSCULAR; INTRAVENOUS; SUBCUTANEOUS at 22:34

## 2024-07-31 RX ADMIN — HYDROMORPHONE HYDROCHLORIDE 0.25 MG: 1 INJECTION, SOLUTION INTRAMUSCULAR; INTRAVENOUS; SUBCUTANEOUS at 18:46

## 2024-07-31 RX ADMIN — Medication 10 ML: at 22:36

## 2024-07-31 RX ADMIN — SODIUM CHLORIDE 75 ML/HR: 9 INJECTION, SOLUTION INTRAVENOUS at 22:00

## 2024-07-31 NOTE — H&P
[facility]  History & Physical    Patient Name: Miryam Fernández  : 1955  MRN: [id number]  Primary Care Physician:  Lashell De La Torre APRN  Date of admission: 2024  Date and Time of Service: 2024     Subjective      Chief Complaint: Fall    History of Present Illness:   This is a 69 years old history of hyperlipidemia, hypertension, COPD, female with a past medical and lung cancer who presented to the ED after a fall.  She was in the bathroom today when she fell and given significant pain they had an x-ray on admission home with concerns of femoral neck fracture she was then sent to the ED for further workup and management.      Vital signs on presentation showed a blood pressure 141/70, heart rate 73, afebrile 36.6 neurosurgeons, respiratory rate 16 saturation 97% on room air.    Labs showed BMP and LFT within normal limits, CBC within normal limits.  CT of the pelvis showed minimal nondisplaced fracture of the left lateral femoral head neck junction.        Review of Systems    Personal History     Past Medical History:   Diagnosis Date    Cancer     lung cancer    Carotid stenosis     COPD (chronic obstructive pulmonary disease)     Coronary artery disease     Esophageal stricture     Dr Domingo    Hyperlipidemia     Pulmonary embolism     seen at [facility], fluid around her heart at the time-right lung       Past Surgical History:   Procedure Laterality Date    ANGIOPLASTY / STENTING FEMORAL      CARDIAC CATHETERIZATION      CAROTID STENT      CORONARY STENT PLACEMENT      ESOPHAGOSCOPY / EGD      HYSTERECTOMY         Family History: family history includes Diabetes in her paternal uncle; Heart disease in her mother; Hyperlipidemia in her father; Hypertension in her mother; Leukemia in her paternal grandmother; Lung cancer in her father. Otherwise pertinent FHx was reviewed and not pertinent to current issue.    Social History:  reports that she has been smoking  cigarettes. She has a 80 pack-year smoking history. She has been exposed to tobacco smoke. She does not have any smokeless tobacco history on file. She reports that she does not currently use alcohol. She reports that she does not use drugs.    Home Medications:  Prior to Admission Medications       Prescriptions Last Dose Informant Patient Reported? Taking?    aspirin 81 MG EC tablet   No No    Take 1 tablet by mouth Daily.    atorvastatin (LIPITOR) 40 MG tablet   No No    TAKE 1 TABLET BY MOUTH EVERY DAY    furosemide (LASIX) 20 MG tablet   Yes No    Take 1 tablet by mouth As Needed. PRN for swelling    memantine (Namenda) 5 MG tablet   No No    Take 1 tablet by mouth 2 (Two) Times a Day.    metoprolol succinate XL (TOPROL-XL) 25 MG 24 hr tablet   Yes No    Take 1 tablet by mouth Daily.    mupirocin (BACTROBAN) 2 % ointment   No No    Apply 1 Application topically to the appropriate area as directed 3 (Three) Times a Day.    pantoprazole (PROTONIX) 40 MG EC tablet   Yes No    Take 1 tablet by mouth Daily.    ticagrelor (BRILINTA) 90 MG tablet tablet   No No    Take 1 tablet by mouth 2 (Two) Times a Day.              Allergies:  Allergies   Allergen Reactions    Albuterol Shortness Of Breath     Pt states she has sensitivity to albuterol.  She states it causes her to be SOA.    Hydrocodone Palpitations    Sulfa Antibiotics Swelling    Bacitracin Other (See Comments)    Benzalkonium Chloride Hives    Codeine Other (See Comments)    Neomycin Other (See Comments)    Polymyxin B Other (See Comments)    Nickel Rash    Penicillins Rash       Objective      Vitals:   Temp:  [97.9 °F (36.6 °C)] 97.9 °F (36.6 °C)  Heart Rate:  [73] 73  Resp:  [16] 16  BP: (141)/(70) 141/70  Body mass index is 30.11 kg/m².  Physical Exam    Appearance: No apparent distress, nontoxic-appearing.   HEENT/Neck: Neck is supple, Extraocular movements intact, Moist mucous membranes  Cardiovascular: Regular Rate and Rhythm, No murmurs  Pulmonary:  Clear to auscultation Bilaterally. No wheeze,  Abdomen: BS+, Soft, nontender, nondistended.   Ext: No Cyanosis, Clubbing, Edema, left lower extremity examination limited by pain.    Neuro: Nonfocal, Alert & Oriented x 3        Diagnostic Data:  Lab Results (last 24 hours)       Procedure Component Value Units Date/Time    Comprehensive Metabolic Panel [[id number]]  (Abnormal) Collected: 07/31/24 1551    Specimen: Blood Updated: 07/31/24 1629     Glucose 94 mg/dL      BUN 14 mg/dL      Creatinine 0.92 mg/dL      Sodium 140 mmol/L      Potassium 4.3 mmol/L      Comment: Slight hemolysis detected by analyzer. Result may be falsely elevated.        Chloride 101 mmol/L      CO2 28.7 mmol/L      Calcium 9.7 mg/dL      Total Protein 7.2 g/dL      Albumin 4.1 g/dL      ALT (SGPT) 14 U/L      AST (SGOT) 18 U/L      Alkaline Phosphatase 127 U/L      Total Bilirubin 0.7 mg/dL      Globulin 3.1 gm/dL      A/G Ratio 1.3 g/dL      BUN/Creatinine Ratio 15.2     Anion Gap 10.3 mmol/L      eGFR 68.4 mL/min/1.73     Narrative:      GFR Normal >60  Chronic Kidney Disease <60  Kidney Failure <15      CK [[id number]]  (Normal) Collected: 07/31/24 1551    Specimen: Blood Updated: 07/31/24 1625     Creatine Kinase 36 U/L     Protime-INR [[id number]]  (Abnormal) Collected: 07/31/24 1551    Specimen: Blood Updated: 07/31/24 1620     Protime 10.1 Seconds      INR <0.93    aPTT [[id number]]  (Abnormal) Collected: 07/31/24 1551    Specimen: Blood Updated: 07/31/24 1620     PTT 23.1 seconds     CBC & Differential [[id number]]  (Abnormal) Collected: 07/31/24 1551    Specimen: Blood Updated: 07/31/24 1557    Narrative:      The following orders were created for panel order CBC & Differential.  Procedure                               Abnormality         Status                     ---------                               -----------         ------                     CBC Auto Differential[[id number]]        Abnormal            Final result                  Please view results for these tests on the individual orders.    CBC Auto Differential [[id number]]  (Abnormal) Collected: 07/31/24 1551    Specimen: Blood Updated: 07/31/24 1557     WBC 7.77 10*3/mm3      RBC 4.13 10*6/mm3      Hemoglobin 12.4 g/dL      Hematocrit 39.2 %      MCV 94.9 fL      MCH 30.0 pg      MCHC 31.6 g/dL      RDW 14.5 %      RDW-SD 50.3 fl      MPV 9.5 fL      Platelets 241 10*3/mm3      Neutrophil % 83.9 %      Lymphocyte % 8.0 %      Monocyte % 5.7 %      Eosinophil % 1.4 %      Basophil % 0.5 %      Immature Grans % 0.5 %      Neutrophils, Absolute 6.52 10*3/mm3      Lymphocytes, Absolute 0.62 10*3/mm3      Monocytes, Absolute 0.44 10*3/mm3      Eosinophils, Absolute 0.11 10*3/mm3      Basophils, Absolute 0.04 10*3/mm3      Immature Grans, Absolute 0.04 10*3/mm3      nRBC 0.0 /100 WBC              Imaging Results (Last 24 Hours)       Procedure Component Value Units Date/Time    CT Pelvis Without Contrast [[id number]] Collected: 07/31/24 1537     Updated: 07/31/24 1545    Narrative:      CT PELVIS WO CONTRAST    Date of Exam: 7/31/2024 3:34 PM EDT    Indication: Possible left subcapital hip fracture, also possibly subacute.    Comparison: None available.    Technique: Axial CT images were obtained of the pelvis without contrast administration.  Sagittal and coronal reconstructions were performed.  Automated exposure control and iterative reconstruction methods were used.      Findings:  There is a nondisplaced fracture involving the lateral aspect of the left femoral head neck junction with slight cortical offset and no extension to the opposite cortex. There is no joint involvement. The hip joint appears well-maintained. No additional   fractures are identified. The right hip joint also is well-maintained. The bony pelvic ring is intact.      Impression:      Impression:  Minimal nondisplaced fracture of the left lateral femoral head neck junction.        Electronically Signed: Ruthy  MD Aydin    7/31/2024 3:43 PM EDT    Workstation ID: [id number]              Assessment & Plan    Fall  Nondisplaced left femur head neck fracture.  Hypertension  Hyperlipidemia  COPD  -Patient presented after a fall, found on CT of the pelvis to have a minimal nondisplaced fracture of the left lateral femoral head and neck junction.    -Orthopedics consulted, pain control as needed, gentle volume resuscitation.  -Keep n.p.o. at midnight, orthopedics to see in the morning.  -Continue home aspirin and Brilinta for now.  -PT/OT when appropriate  -Continue to monitor.            VTE Prophylaxis:SCD  Full code  Inpatient level of care      Signature:     This document has been electronically signed by Peterson Cobian MD on July 31, 2024 19:54 EDT   [facility] Hospitalist Team

## 2024-07-31 NOTE — ED PROVIDER NOTES
Subjective   History of Present Illness  69-year-old female apparently fell this morning in the bathroom.  The patient complained of pain throughout the day and had x-rays done at the nursing facility that showed a questionable femoral neck fracture.  The patient was transported to the emergency department for additional evaluation.  The patient reports that it hurts when she moves her leg and hurts when she tries to stand on it.  She denies abdominal pain.  She denies syncope or diaphoresis.  She denies headache or chest discomfort.  Ports no decrease in sensation or circulation      Review of Systems   Cardiovascular:  Negative for chest pain.   Gastrointestinal:  Negative for abdominal pain.   Musculoskeletal:  Positive for joint swelling.   Neurological:  Negative for syncope and numbness.   All other systems reviewed and are negative.      Past Medical History:   Diagnosis Date    Cancer     lung cancer    Carotid stenosis     COPD (chronic obstructive pulmonary disease)     Coronary artery disease     Esophageal stricture     Dr Domingo    Hyperlipidemia     Pulmonary embolism     seen at [facility], fluid around her heart at the time-right lung       Allergies   Allergen Reactions    Albuterol Shortness Of Breath     Pt states she has sensitivity to albuterol.  She states it causes her to be SOA.    Hydrocodone Palpitations    Sulfa Antibiotics Swelling    Bacitracin Other (See Comments)    Benzalkonium Chloride Hives    Codeine Other (See Comments)    Neomycin Other (See Comments)    Polymyxin B Other (See Comments)    Nickel Rash    Penicillins Rash       Past Surgical History:   Procedure Laterality Date    ANGIOPLASTY / STENTING FEMORAL      CARDIAC CATHETERIZATION      CAROTID STENT      CORONARY STENT PLACEMENT      ESOPHAGOSCOPY / EGD      HYSTERECTOMY         Family History   Problem Relation Age of Onset    Heart disease Mother     Hypertension Mother     Hyperlipidemia Father     Lung cancer Father      Diabetes Paternal Uncle     Leukemia Paternal Grandmother        Social History     Socioeconomic History    Marital status:    Tobacco Use    Smoking status: Every Day     Current packs/day: 2.00     Average packs/day: 2.0 packs/day for 40.0 years (80.0 ttl pk-yrs)     Types: Cigarettes     Passive exposure: Current   Vaping Use    Vaping status: Never Used   Substance and Sexual Activity    Alcohol use: Not Currently    Drug use: Never    Sexual activity: Defer           Objective   Physical Exam  Alert [place] Coma Scale 15 oriented to person and possibly place   HEENT: Pupils equal and reactive to light. Conjunctivae are not injected. Normal tympanic membranes. Oropharynx and nares are normal.   Neck: Supple. Midline trachea. No JVD. No goiter.   Chest: Clear and equal breath sounds bilaterally, regular rate and rhythm without murmur or rub.   Abdomen: Positive bowel sounds, nontender, nondistended. No rebound or peritoneal signs. No CVA tenderness.   Extremities no clubbing. cyanosis or edema. Motor sensory exam is normal. The full range of motion is intact there is mild tenderness noted with pressure over the left greater trochanter there is no external rotation or shortening identified no distal femoral discomfort is identified   Skin: Warm and dry, no rashes or petechia.   Lymphatic: No regional lymphadenopathy. No calf pain, swelling or Homans sign    Procedures           ED Course                        Labs Reviewed   COMPREHENSIVE METABOLIC PANEL - Abnormal; Notable for the following components:       Result Value    Alkaline Phosphatase 127 (*)     All other components within normal limits    Narrative:     GFR Normal >60  Chronic Kidney Disease <60  Kidney Failure <15     PROTIME-INR - Abnormal; Notable for the following components:    INR <0.93 (*)     All other components within normal limits   APTT - Abnormal; Notable for the following components:    PTT 23.1 (*)     All other components  within normal limits   CBC WITH AUTO DIFFERENTIAL - Abnormal; Notable for the following components:    Neutrophil % 83.9 (*)     Lymphocyte % 8.0 (*)     Lymphocytes, Absolute 0.62 (*)     All other components within normal limits   CK - Normal   CBC AND DIFFERENTIAL    Narrative:     The following orders were created for panel order CBC & Differential.  Procedure                               Abnormality         Status                     ---------                               -----------         ------                     CBC Auto Differential[[id number]]        Abnormal            Final result                 Please view results for these tests on the individual orders.     Medications   ondansetron (ZOFRAN) injection 4 mg (0 mg Intravenous Hold 7/31/24 1552)   HYDROmorphone (DILAUDID) injection 0.25 mg (0 mg Intravenous Hold 7/31/24 1552)   sodium chloride 0.9 % bolus 500 mL (500 mL Intravenous New Bag 7/31/24 1556)     CT Pelvis Without Contrast    Result Date: 7/31/2024  Impression: Minimal nondisplaced fracture of the left lateral femoral head neck junction. Electronically Signed: Ruthy Perrin MD  7/31/2024 3:43 PM EDT  Workstation ID: [id number]                          Medical Decision Making  Patient will be admitted to the hospital the case discussed the hospitalist service pain was treated in the emergency department CT scan showed confirm nondisplaced fracture of the left lateral femoral head and neck    Amount and/or Complexity of Data Reviewed  Labs: ordered. Decision-making details documented in ED Course.  Radiology: ordered and independent interpretation performed.  ECG/medicine tests: ordered.    Risk  OTC drugs.  Prescription drug management.  Parenteral controlled substances.  Decision regarding hospitalization.        Final diagnoses:   Left displaced femoral neck fracture       ED Disposition  ED Disposition       ED Disposition   Decision to Admit    Condition   --    Comment   --                No follow-up provider specified.       Medication List      No changes were made to your prescriptions during this visit.            Evgeny Tomlinson MD  07/31/24 8292

## 2024-08-01 ENCOUNTER — ANESTHESIA (OUTPATIENT)
Dept: PERIOP | Facility: HOSPITAL | Age: 69
End: 2024-08-01
Payer: MEDICARE

## 2024-08-01 ENCOUNTER — APPOINTMENT (OUTPATIENT)
Dept: GENERAL RADIOLOGY | Facility: HOSPITAL | Age: 69
DRG: 522 | End: 2024-08-01
Payer: MEDICARE

## 2024-08-01 ENCOUNTER — ANESTHESIA EVENT (OUTPATIENT)
Dept: PERIOP | Facility: HOSPITAL | Age: 69
End: 2024-08-01
Payer: MEDICARE

## 2024-08-01 LAB
ABO GROUP BLD: NORMAL
ANION GAP SERPL CALCULATED.3IONS-SCNC: 9.5 MMOL/L (ref 5–15)
BASOPHILS # BLD AUTO: 0.04 10*3/MM3 (ref 0–0.2)
BASOPHILS NFR BLD AUTO: 0.6 % (ref 0–1.5)
BLD GP AB SCN SERPL QL: NEGATIVE
BUN SERPL-MCNC: 11 MG/DL (ref 8–23)
BUN/CREAT SERPL: 13.4 (ref 7–25)
CALCIUM SPEC-SCNC: 9.4 MG/DL (ref 8.6–10.5)
CHLORIDE SERPL-SCNC: 103 MMOL/L (ref 98–107)
CO2 SERPL-SCNC: 27.5 MMOL/L (ref 22–29)
CREAT SERPL-MCNC: 0.82 MG/DL (ref 0.57–1)
DEPRECATED RDW RBC AUTO: 50.2 FL (ref 37–54)
EGFRCR SERPLBLD CKD-EPI 2021: 78.5 ML/MIN/1.73
EOSINOPHIL # BLD AUTO: 0.27 10*3/MM3 (ref 0–0.4)
EOSINOPHIL NFR BLD AUTO: 3.8 % (ref 0.3–6.2)
ERYTHROCYTE [DISTWIDTH] IN BLOOD BY AUTOMATED COUNT: 14.4 % (ref 12.3–15.4)
GLUCOSE SERPL-MCNC: 90 MG/DL (ref 65–99)
HCT VFR BLD AUTO: 33.8 % (ref 34–46.6)
HGB BLD-MCNC: 10.8 G/DL (ref 12–15.9)
IMM GRANULOCYTES # BLD AUTO: 0.04 10*3/MM3 (ref 0–0.05)
IMM GRANULOCYTES NFR BLD AUTO: 0.6 % (ref 0–0.5)
INR PPP: 0.95 (ref 0.93–1.1)
LYMPHOCYTES # BLD AUTO: 0.66 10*3/MM3 (ref 0.7–3.1)
LYMPHOCYTES NFR BLD AUTO: 9.3 % (ref 19.6–45.3)
MAGNESIUM SERPL-MCNC: 1.9 MG/DL (ref 1.6–2.4)
MCH RBC QN AUTO: 30.4 PG (ref 26.6–33)
MCHC RBC AUTO-ENTMCNC: 32 G/DL (ref 31.5–35.7)
MCV RBC AUTO: 95.2 FL (ref 79–97)
MONOCYTES # BLD AUTO: 0.5 10*3/MM3 (ref 0.1–0.9)
MONOCYTES NFR BLD AUTO: 7 % (ref 5–12)
NEUTROPHILS NFR BLD AUTO: 5.6 10*3/MM3 (ref 1.7–7)
NEUTROPHILS NFR BLD AUTO: 78.7 % (ref 42.7–76)
NRBC BLD AUTO-RTO: 0.3 /100 WBC (ref 0–0.2)
PHOSPHATE SERPL-MCNC: 3.4 MG/DL (ref 2.5–4.5)
PLATELET # BLD AUTO: 219 10*3/MM3 (ref 140–450)
PMV BLD AUTO: 9.8 FL (ref 6–12)
POTASSIUM SERPL-SCNC: 4.1 MMOL/L (ref 3.5–5.2)
PROTHROMBIN TIME: 10.4 SECONDS (ref 9.6–11.7)
QT INTERVAL: 400 MS
QTC INTERVAL: 424 MS
RBC # BLD AUTO: 3.55 10*6/MM3 (ref 3.77–5.28)
RH BLD: POSITIVE
SODIUM SERPL-SCNC: 140 MMOL/L (ref 136–145)
T&S EXPIRATION DATE: NORMAL
WBC NRBC COR # BLD AUTO: 7.11 10*3/MM3 (ref 3.4–10.8)

## 2024-08-01 PROCEDURE — 86900 BLOOD TYPING SEROLOGIC ABO: CPT | Performed by: ORTHOPAEDIC SURGERY

## 2024-08-01 PROCEDURE — 83735 ASSAY OF MAGNESIUM: CPT | Performed by: INTERNAL MEDICINE

## 2024-08-01 PROCEDURE — C1776 JOINT DEVICE (IMPLANTABLE): HCPCS | Performed by: ORTHOPAEDIC SURGERY

## 2024-08-01 PROCEDURE — 25010000002 KETOROLAC TROMETHAMINE PER 15 MG: Performed by: ORTHOPAEDIC SURGERY

## 2024-08-01 PROCEDURE — 25010000002 HYDRALAZINE PER 20 MG

## 2024-08-01 PROCEDURE — 86901 BLOOD TYPING SEROLOGIC RH(D): CPT

## 2024-08-01 PROCEDURE — 76000 FLUOROSCOPY <1 HR PHYS/QHP: CPT

## 2024-08-01 PROCEDURE — 25010000002 SUGAMMADEX 200 MG/2ML SOLUTION

## 2024-08-01 PROCEDURE — 25810000003 SODIUM CHLORIDE 0.9 % SOLUTION

## 2024-08-01 PROCEDURE — 25010000002 FENTANYL CITRATE (PF) 100 MCG/2ML SOLUTION

## 2024-08-01 PROCEDURE — 86900 BLOOD TYPING SEROLOGIC ABO: CPT

## 2024-08-01 PROCEDURE — 73501 X-RAY EXAM HIP UNI 1 VIEW: CPT

## 2024-08-01 PROCEDURE — 25010000002 ROPIVACAINE PER 1 MG: Performed by: ORTHOPAEDIC SURGERY

## 2024-08-01 PROCEDURE — 25010000002 ONDANSETRON PER 1 MG

## 2024-08-01 PROCEDURE — 25010000002 EPINEPHRINE 1 MG/ML SOLUTION: Performed by: ORTHOPAEDIC SURGERY

## 2024-08-01 PROCEDURE — 0SRB06A REPLACEMENT OF LEFT HIP JOINT WITH OXIDIZED ZIRCONIUM ON POLYETHYLENE SYNTHETIC SUBSTITUTE, UNCEMENTED, OPEN APPROACH: ICD-10-PCS | Performed by: ORTHOPAEDIC SURGERY

## 2024-08-01 PROCEDURE — 25010000002 CLONIDINE PER 1 MG: Performed by: ORTHOPAEDIC SURGERY

## 2024-08-01 PROCEDURE — G0378 HOSPITAL OBSERVATION PER HR: HCPCS

## 2024-08-01 PROCEDURE — 25010000002 ACETAMINOPHEN 10 MG/ML SOLUTION

## 2024-08-01 PROCEDURE — 80048 BASIC METABOLIC PNL TOTAL CA: CPT | Performed by: INTERNAL MEDICINE

## 2024-08-01 PROCEDURE — 25010000002 PROPOFOL 10 MG/ML EMULSION

## 2024-08-01 PROCEDURE — 85610 PROTHROMBIN TIME: CPT | Performed by: INTERNAL MEDICINE

## 2024-08-01 PROCEDURE — 25010000002 ONDANSETRON PER 1 MG: Performed by: INTERNAL MEDICINE

## 2024-08-01 PROCEDURE — 25010000002 VANCOMYCIN 1 G RECONSTITUTED SOLUTION: Performed by: ORTHOPAEDIC SURGERY

## 2024-08-01 PROCEDURE — 25010000002 CEFAZOLIN PER 500 MG: Performed by: ORTHOPAEDIC SURGERY

## 2024-08-01 PROCEDURE — 25010000002 DEXAMETHASONE PER 1 MG

## 2024-08-01 PROCEDURE — 25810000003 SODIUM CHLORIDE 0.9 % SOLUTION: Performed by: INTERNAL MEDICINE

## 2024-08-01 PROCEDURE — 25010000002 PHENYLEPHRINE 10 MG/ML SOLUTION

## 2024-08-01 PROCEDURE — 84100 ASSAY OF PHOSPHORUS: CPT | Performed by: INTERNAL MEDICINE

## 2024-08-01 PROCEDURE — 27130 TOTAL HIP ARTHROPLASTY: CPT | Performed by: SPECIALIST/TECHNOLOGIST, OTHER

## 2024-08-01 PROCEDURE — 25010000002 HYDROMORPHONE 1 MG/ML SOLUTION: Performed by: INTERNAL MEDICINE

## 2024-08-01 PROCEDURE — 86850 RBC ANTIBODY SCREEN: CPT | Performed by: ORTHOPAEDIC SURGERY

## 2024-08-01 PROCEDURE — 86901 BLOOD TYPING SEROLOGIC RH(D): CPT | Performed by: ORTHOPAEDIC SURGERY

## 2024-08-01 PROCEDURE — 25010000002 PROPOFOL 200 MG/20ML EMULSION

## 2024-08-01 PROCEDURE — 85025 COMPLETE CBC W/AUTO DIFF WBC: CPT | Performed by: INTERNAL MEDICINE

## 2024-08-01 DEVICE — IMPLANTABLE DEVICE: Type: IMPLANTABLE DEVICE | Status: FUNCTIONAL

## 2024-08-01 DEVICE — R3 3 HOLE ACETABULAR SHELL 50MM
Type: IMPLANTABLE DEVICE | Site: HIP | Status: FUNCTIONAL
Brand: R3 ACETABULAR

## 2024-08-01 DEVICE — OR3O DUAL MOBILITY LINER 38/50
Type: IMPLANTABLE DEVICE | Site: HIP | Status: FUNCTIONAL
Brand: OR3O DUAL MOBILITY

## 2024-08-01 DEVICE — OXINIUM FEMORAL HEAD 12/14 TAPER                                    28 MM +0
Type: IMPLANTABLE DEVICE | Site: HIP | Status: FUNCTIONAL
Brand: OXINIUM

## 2024-08-01 DEVICE — OR3O DUAL MOBILITY XLPE INSERT 28/38
Type: IMPLANTABLE DEVICE | Site: HIP | Status: FUNCTIONAL
Brand: OR3O DUAL MOBILITY

## 2024-08-01 DEVICE — DEV CONTRL TISS STRATAFIX SPIRAL MNCRYL UD 3/0 PLS 30CM: Type: IMPLANTABLE DEVICE | Site: HIP | Status: FUNCTIONAL

## 2024-08-01 DEVICE — POLARSTEM STANDARD NON-CEMENTED                                    WITH TI/HA 2
Type: IMPLANTABLE DEVICE | Site: HIP | Status: FUNCTIONAL
Brand: POLARSTEM

## 2024-08-01 DEVICE — DEV WND/CLS CONTRL TISS STRATAFIX SYMM PDS PLS CTX 60CM VIL: Type: IMPLANTABLE DEVICE | Site: HIP | Status: FUNCTIONAL

## 2024-08-01 RX ORDER — LABETALOL HYDROCHLORIDE 5 MG/ML
5 INJECTION, SOLUTION INTRAVENOUS
Status: DISCONTINUED | OUTPATIENT
Start: 2024-08-01 | End: 2024-08-01 | Stop reason: HOSPADM

## 2024-08-01 RX ORDER — HYDRALAZINE HYDROCHLORIDE 20 MG/ML
10 INJECTION INTRAMUSCULAR; INTRAVENOUS EVERY 6 HOURS PRN
Status: DISCONTINUED | OUTPATIENT
Start: 2024-08-01 | End: 2024-08-01

## 2024-08-01 RX ORDER — ACETAMINOPHEN 10 MG/ML
INJECTION, SOLUTION INTRAVENOUS AS NEEDED
Status: DISCONTINUED | OUTPATIENT
Start: 2024-08-01 | End: 2024-08-01 | Stop reason: SURG

## 2024-08-01 RX ORDER — HYDRALAZINE HYDROCHLORIDE 20 MG/ML
5 INJECTION INTRAMUSCULAR; INTRAVENOUS
Status: DISCONTINUED | OUTPATIENT
Start: 2024-08-01 | End: 2024-08-01 | Stop reason: HOSPADM

## 2024-08-01 RX ORDER — EPHEDRINE SULFATE 5 MG/ML
5 INJECTION INTRAVENOUS ONCE AS NEEDED
Status: DISCONTINUED | OUTPATIENT
Start: 2024-08-01 | End: 2024-08-01 | Stop reason: HOSPADM

## 2024-08-01 RX ORDER — PHENYLEPHRINE HYDROCHLORIDE 10 MG/ML
INJECTION INTRAVENOUS AS NEEDED
Status: DISCONTINUED | OUTPATIENT
Start: 2024-08-01 | End: 2024-08-01 | Stop reason: SURG

## 2024-08-01 RX ORDER — HYDROCODONE BITARTRATE AND ACETAMINOPHEN 5; 325 MG/1; MG/1
1 TABLET ORAL EVERY 6 HOURS PRN
Status: DISPENSED | OUTPATIENT
Start: 2024-08-01 | End: 2024-08-06

## 2024-08-01 RX ORDER — TRANEXAMIC ACID 100 MG/ML
INJECTION, SOLUTION INTRAVENOUS AS NEEDED
Status: DISCONTINUED | OUTPATIENT
Start: 2024-08-01 | End: 2024-08-01 | Stop reason: HOSPADM

## 2024-08-01 RX ORDER — ONDANSETRON 2 MG/ML
4 INJECTION INTRAMUSCULAR; INTRAVENOUS ONCE AS NEEDED
Status: DISCONTINUED | OUTPATIENT
Start: 2024-08-01 | End: 2024-08-01 | Stop reason: HOSPADM

## 2024-08-01 RX ORDER — DIPHENHYDRAMINE HYDROCHLORIDE 50 MG/ML
12.5 INJECTION INTRAMUSCULAR; INTRAVENOUS ONCE AS NEEDED
Status: DISCONTINUED | OUTPATIENT
Start: 2024-08-01 | End: 2024-08-01 | Stop reason: HOSPADM

## 2024-08-01 RX ORDER — CEFAZOLIN SODIUM 1 G/3ML
INJECTION, POWDER, FOR SOLUTION INTRAMUSCULAR; INTRAVENOUS AS NEEDED
Status: DISCONTINUED | OUTPATIENT
Start: 2024-08-01 | End: 2024-08-01 | Stop reason: HOSPADM

## 2024-08-01 RX ORDER — OXYCODONE HYDROCHLORIDE 5 MG/1
10 TABLET ORAL EVERY 4 HOURS PRN
Status: DISCONTINUED | OUTPATIENT
Start: 2024-08-01 | End: 2024-08-01 | Stop reason: HOSPADM

## 2024-08-01 RX ORDER — ROCURONIUM BROMIDE 10 MG/ML
INJECTION, SOLUTION INTRAVENOUS AS NEEDED
Status: DISCONTINUED | OUTPATIENT
Start: 2024-08-01 | End: 2024-08-01 | Stop reason: SURG

## 2024-08-01 RX ORDER — LIDOCAINE HYDROCHLORIDE 20 MG/ML
INJECTION, SOLUTION EPIDURAL; INFILTRATION; INTRACAUDAL; PERINEURAL AS NEEDED
Status: DISCONTINUED | OUTPATIENT
Start: 2024-08-01 | End: 2024-08-01 | Stop reason: SURG

## 2024-08-01 RX ORDER — ONDANSETRON 2 MG/ML
INJECTION INTRAMUSCULAR; INTRAVENOUS AS NEEDED
Status: DISCONTINUED | OUTPATIENT
Start: 2024-08-01 | End: 2024-08-01 | Stop reason: SURG

## 2024-08-01 RX ORDER — NALOXONE HCL 0.4 MG/ML
0.4 VIAL (ML) INJECTION AS NEEDED
Status: DISCONTINUED | OUTPATIENT
Start: 2024-08-01 | End: 2024-08-01 | Stop reason: HOSPADM

## 2024-08-01 RX ORDER — PROPOFOL 10 MG/ML
INJECTION, EMULSION INTRAVENOUS AS NEEDED
Status: DISCONTINUED | OUTPATIENT
Start: 2024-08-01 | End: 2024-08-01 | Stop reason: SURG

## 2024-08-01 RX ORDER — OXYCODONE HYDROCHLORIDE 5 MG/1
5 TABLET ORAL ONCE AS NEEDED
Status: DISCONTINUED | OUTPATIENT
Start: 2024-08-01 | End: 2024-08-01 | Stop reason: HOSPADM

## 2024-08-01 RX ORDER — FENTANYL CITRATE 50 UG/ML
INJECTION, SOLUTION INTRAMUSCULAR; INTRAVENOUS AS NEEDED
Status: DISCONTINUED | OUTPATIENT
Start: 2024-08-01 | End: 2024-08-01 | Stop reason: SURG

## 2024-08-01 RX ORDER — DIPHENHYDRAMINE HYDROCHLORIDE 50 MG/ML
12.5 INJECTION INTRAMUSCULAR; INTRAVENOUS
Status: DISCONTINUED | OUTPATIENT
Start: 2024-08-01 | End: 2024-08-01 | Stop reason: HOSPADM

## 2024-08-01 RX ORDER — VANCOMYCIN HYDROCHLORIDE 1 G/20ML
INJECTION, POWDER, LYOPHILIZED, FOR SOLUTION INTRAVENOUS AS NEEDED
Status: DISCONTINUED | OUTPATIENT
Start: 2024-08-01 | End: 2024-08-01 | Stop reason: HOSPADM

## 2024-08-01 RX ORDER — SODIUM CHLORIDE 9 MG/ML
INJECTION, SOLUTION INTRAVENOUS CONTINUOUS PRN
Status: DISCONTINUED | OUTPATIENT
Start: 2024-08-01 | End: 2024-08-01 | Stop reason: SURG

## 2024-08-01 RX ORDER — DEXAMETHASONE SODIUM PHOSPHATE 4 MG/ML
INJECTION, SOLUTION INTRA-ARTICULAR; INTRALESIONAL; INTRAMUSCULAR; INTRAVENOUS; SOFT TISSUE AS NEEDED
Status: DISCONTINUED | OUTPATIENT
Start: 2024-08-01 | End: 2024-08-01 | Stop reason: SURG

## 2024-08-01 RX ORDER — MEMANTINE HYDROCHLORIDE 5 MG/1
5 TABLET ORAL 2 TIMES DAILY
COMMUNITY

## 2024-08-01 RX ORDER — ACETAMINOPHEN 325 MG/1
650 TABLET ORAL EVERY 6 HOURS PRN
Status: DISCONTINUED | OUTPATIENT
Start: 2024-08-01 | End: 2024-08-06 | Stop reason: HOSPADM

## 2024-08-01 RX ORDER — TRANEXAMIC ACID 10 MG/ML
1000 INJECTION, SOLUTION INTRAVENOUS ONCE
Status: COMPLETED | OUTPATIENT
Start: 2024-08-01 | End: 2024-08-01

## 2024-08-01 RX ORDER — MORPHINE SULFATE 2 MG/ML
2 INJECTION, SOLUTION INTRAMUSCULAR; INTRAVENOUS EVERY 4 HOURS PRN
Status: ACTIVE | OUTPATIENT
Start: 2024-08-01 | End: 2024-08-06

## 2024-08-01 RX ADMIN — ONDANSETRON 4 MG: 2 INJECTION INTRAMUSCULAR; INTRAVENOUS at 07:43

## 2024-08-01 RX ADMIN — MEMANTINE 5 MG: 5 TABLET ORAL at 23:03

## 2024-08-01 RX ADMIN — PHENYLEPHRINE HYDROCHLORIDE 200 MCG: 10 INJECTION INTRAVENOUS at 17:51

## 2024-08-01 RX ADMIN — ROCURONIUM BROMIDE 50 MG: 10 INJECTION, SOLUTION INTRAVENOUS at 16:56

## 2024-08-01 RX ADMIN — DEXAMETHASONE SODIUM PHOSPHATE 4 MG: 4 INJECTION, SOLUTION INTRAMUSCULAR; INTRAVENOUS at 17:05

## 2024-08-01 RX ADMIN — HYDRALAZINE HYDROCHLORIDE 10 MG: 20 INJECTION INTRAMUSCULAR; INTRAVENOUS at 05:35

## 2024-08-01 RX ADMIN — PROPOFOL 150 MG: 10 INJECTION, EMULSION INTRAVENOUS at 16:56

## 2024-08-01 RX ADMIN — LIDOCAINE HYDROCHLORIDE 80 MG: 20 INJECTION, SOLUTION EPIDURAL; INFILTRATION; INTRACAUDAL; PERINEURAL at 16:56

## 2024-08-01 RX ADMIN — FENTANYL CITRATE 50 MCG: 50 INJECTION, SOLUTION INTRAMUSCULAR; INTRAVENOUS at 17:31

## 2024-08-01 RX ADMIN — SUGAMMADEX 200 MG: 100 INJECTION, SOLUTION INTRAVENOUS at 17:45

## 2024-08-01 RX ADMIN — SODIUM CHLORIDE 75 ML/HR: 9 INJECTION, SOLUTION INTRAVENOUS at 15:37

## 2024-08-01 RX ADMIN — MUPIROCIN 1 APPLICATION: 20 OINTMENT TOPICAL at 16:43

## 2024-08-01 RX ADMIN — HYDROMORPHONE HYDROCHLORIDE 0.25 MG: 1 INJECTION, SOLUTION INTRAMUSCULAR; INTRAVENOUS; SUBCUTANEOUS at 05:34

## 2024-08-01 RX ADMIN — ROCURONIUM BROMIDE 10 MG: 10 INJECTION, SOLUTION INTRAVENOUS at 17:30

## 2024-08-01 RX ADMIN — PHENYLEPHRINE HYDROCHLORIDE 100 MCG: 10 INJECTION INTRAVENOUS at 17:07

## 2024-08-01 RX ADMIN — Medication 10 ML: at 08:02

## 2024-08-01 RX ADMIN — METOPROLOL SUCCINATE 25 MG: 25 TABLET, EXTENDED RELEASE ORAL at 08:11

## 2024-08-01 RX ADMIN — PHENYLEPHRINE HYDROCHLORIDE 200 MCG: 10 INJECTION INTRAVENOUS at 16:59

## 2024-08-01 RX ADMIN — PHENYLEPHRINE HYDROCHLORIDE 200 MCG: 10 INJECTION INTRAVENOUS at 17:37

## 2024-08-01 RX ADMIN — TICAGRELOR 90 MG: 90 TABLET ORAL at 08:11

## 2024-08-01 RX ADMIN — PROPOFOL 80 MCG/KG/MIN: 10 INJECTION, EMULSION INTRAVENOUS at 17:05

## 2024-08-01 RX ADMIN — ACETAMINOPHEN 1000 MG: 10 INJECTION, SOLUTION INTRAVENOUS at 17:58

## 2024-08-01 RX ADMIN — SODIUM CHLORIDE: 9 INJECTION, SOLUTION INTRAVENOUS at 16:56

## 2024-08-01 RX ADMIN — ONDANSETRON 4 MG: 2 INJECTION INTRAMUSCULAR; INTRAVENOUS at 17:05

## 2024-08-01 RX ADMIN — HYDROCODONE BITARTRATE AND ACETAMINOPHEN 1 TABLET: 5; 325 TABLET ORAL at 13:13

## 2024-08-01 RX ADMIN — HYDROMORPHONE HYDROCHLORIDE 0.25 MG: 1 INJECTION, SOLUTION INTRAMUSCULAR; INTRAVENOUS; SUBCUTANEOUS at 02:32

## 2024-08-01 RX ADMIN — SODIUM CHLORIDE 2000 MG: 900 INJECTION INTRAVENOUS at 16:44

## 2024-08-01 RX ADMIN — HYDROCODONE BITARTRATE AND ACETAMINOPHEN 1 TABLET: 5; 325 TABLET ORAL at 23:23

## 2024-08-01 RX ADMIN — PHENYLEPHRINE HYDROCHLORIDE 300 MCG: 10 INJECTION INTRAVENOUS at 17:40

## 2024-08-01 RX ADMIN — FENTANYL CITRATE 50 MCG: 50 INJECTION, SOLUTION INTRAMUSCULAR; INTRAVENOUS at 17:20

## 2024-08-01 RX ADMIN — PHENYLEPHRINE HYDROCHLORIDE 200 MCG: 10 INJECTION INTRAVENOUS at 17:11

## 2024-08-01 RX ADMIN — TRANEXAMIC ACID 1000 MG: 10 INJECTION, SOLUTION INTRAVENOUS at 17:09

## 2024-08-01 NOTE — CONSULTS
Nutrition Services    Patient Name: Miryam Fernández  YOB: 1955  MRN: [id number]  Admission date: 7/31/2024    Comment:    Continue current diet and encourage good PO intake. Pt declined ONS.    Moderate chronic disease related malnutrition related to multiple chronic diseases (COPD, memory loss, depression) as evidenced by PO intake meeting less than 75% of estimated energy requirement for greater than or equal to 1 month and moderate muscle and fat wasting per NFPE.    See MSA below.    CLINICAL NUTRITION ASSESSMENT      Reason for Assessment 8/1: malnutrition screening tool score of 2      H&P      Past Medical History:   Diagnosis Date    Cancer     lung cancer    Carotid stenosis     COPD (chronic obstructive pulmonary disease)     Coronary artery disease     Esophageal stricture     Dr Domingo    Hyperlipidemia     Metabolic encephalopathy 06/28/2024    2nd to UTI    Pulmonary embolism     seen at [facility], fluid around her heart at the time-right lung    UTI (urinary tract infection) 06/28/2024       Past Surgical History:   Procedure Laterality Date    ANGIOPLASTY / STENTING FEMORAL      CARDIAC CATHETERIZATION      CAROTID STENT      CORONARY STENT PLACEMENT      ESOPHAGOSCOPY / EGD      HYSTERECTOMY          Current Problems   Fall  Nondisplaced left femur head neck fracture  -s/p total hip arthroplasty    HTN  HLD    COPD       Encounter Information        Trending Narrative     8/1: Pt admitted to [facility] s/p fall. Pt with nondisplaced L femur head neck junction fracture. Pt s/p total hip arthroplasty 8/1 afternoon/evening. RD visited with pt and pt's  at bedside. Per pt's , pt with poor PO intake at home PTA. Pt reportedly eating better when at [facility] - pt's  relates this to pt eating in dining room in a group setting. Pt does not like ONS and does not wish to drink ONS. Pt does not have food allergies. Pt with some swallowing difficulty - pt's  reports pt has  "had to get esophagus stretched in the past. Pt occasionally gets choked on dry meat. RD encouraged pt to chew thoroughly and recommended to pt's  that he order sauces or gravies with pt's meals to help dry foods move better. NFPE completed, consistent with nutrition diagnosis of malnutrition using AND/ASPEN criteria. See MSA below.        Anthropometrics        Current Height, Weight Height: 160 cm (63\")  Weight: 56.2 kg (123 lb 14.4 oz) (07/31/24 2137)       Usual Body Weight (UBW) 125#        Trending Weight Hx     This admission: 8/1: 123# - wt method not recorded             PTA: Wt fluctuating ~113-127# since May 2024    Wt Readings from Last 30 Encounters:   07/31/24 2137 56.2 kg (123 lb 14.4 oz)   07/31/24 1412 77.1 kg (169 lb 15.6 oz)   07/20/24 0025 77.1 kg (170 lb)   06/29/24 0441 56.9 kg (125 lb 7.1 oz)   06/28/24 1747 51.3 kg (113 lb)   06/28/24 1026 51.6 kg (113 lb 12.8 oz)   06/21/24 1429 55.2 kg (121 lb 11.2 oz)   05/14/24 1054 57.9 kg (127 lb 9.6 oz)   05/06/24 1535 57.6 kg (127 lb)   04/02/24 0957 57.6 kg (127 lb)   03/13/24 0940 57.6 kg (127 lb)   02/19/24 0952 56.8 kg (125 lb 3.2 oz)   02/09/24 0834 58.1 kg (128 lb)   12/26/23 1406 59 kg (130 lb)   12/11/23 0920 59 kg (130 lb)   11/16/23 1339 59.8 kg (131 lb 12.8 oz)   10/31/23 1103 57.6 kg (127 lb)   01/28/23 1019 52.6 kg (116 lb)   06/30/22 0907 48.5 kg (107 lb)   06/02/22 1409 48.9 kg (107 lb 12.9 oz)   05/18/22 1015 50.8 kg (112 lb)   03/08/22 1304 49 kg (108 lb)   09/29/21 1036 49 kg (108 lb)   03/08/19 0836 88 kg (194 lb)   01/25/19 0926 87.5 kg (193 lb)   01/16/19 0926 85.7 kg (189 lb)   12/11/17 0903 85.3 kg (188 lb)   07/07/17 1049 85.5 kg (188 lb 9.6 oz)   02/16/17 0832 83.9 kg (185 lb)   12/19/16 1039 80.3 kg (177 lb 2.1 oz)   11/23/16 1433 82.6 kg (182 lb)   09/22/16 1305 80.5 kg (177 lb 6.4 oz)   07/07/16 1111 82.1 kg (181 lb)      BMI kg/m2 Body mass index is 21.95 kg/m².       Labs        Pertinent Labs    Results from last 7 " days   Lab Units 08/01/24  0235 07/31/24  1551   SODIUM mmol/L 140 140   POTASSIUM mmol/L 4.1 4.3   CHLORIDE mmol/L 103 101   CO2 mmol/L 27.5 28.7   BUN mg/dL 11 14   CREATININE mg/dL 0.82 0.92   CALCIUM mg/dL 9.4 9.7   BILIRUBIN mg/dL  --  0.7   ALK PHOS U/L  --  127*   ALT (SGPT) U/L  --  14   AST (SGOT) U/L  --  18   GLUCOSE mg/dL 90 94     Results from last 7 days   Lab Units 08/01/24  0235   MAGNESIUM mg/dL 1.9   PHOSPHORUS mg/dL 3.4   HEMOGLOBIN g/dL 10.8*   HEMATOCRIT % 33.8*     Lab Results   Component Value Date    HGBA1C 5.44 07/22/2024        Medications    Scheduled Medications aspirin, 81 mg, Oral, Daily  atorvastatin, 40 mg, Oral, Daily  memantine, 5 mg, Oral, BID  metoprolol succinate XL, 25 mg, Oral, Daily  mupirocin, 1 Application, Topical, TID  pantoprazole, 40 mg, Oral, Daily  sodium chloride, 10 mL, Intravenous, Q12H  [Held by provider] ticagrelor, 90 mg, Oral, BID        Infusions sodium chloride, 75 mL/hr, Last Rate: 75 mL/hr (08/01/24 1537)        PRN Medications   acetaminophen    senna-docusate sodium **AND** polyethylene glycol **AND** bisacodyl **AND** bisacodyl    HYDROcodone-acetaminophen    Morphine    [DISCONTINUED] HYDROmorphone **AND** naloxone    ondansetron    sodium chloride    sodium chloride     Physical Findings        Trending Physical   Appearance, NFPE 8/1: NFPE completed, consistent with nutrition diagnosis of malnutrition using AND/ASPEN criteria. See MSA below.      --  Edema  None documented      Bowel Function No documented BM this admission      Tubes None     Chewing/Swallowing Some swallowing issues, pt's  reports pt with hx of esophageal dilation      Skin Intact      --  Current Nutrition Orders & Evaluation of Intake       Oral Nutrition     Food Allergies NKFA   Current PO Diet Diet: Regular/House; Fluid Consistency: Thin (IDDSI 0)   Supplement -   PO Evaluation     Trending % PO Intake 8/2: no intake recorded since diet initiated   --  Nutritional Risk  Screening        NRS-2002 Score          Nutrition Diagnosis         Nutrition Dx Problem 1 Moderate chronic disease related malnutrition related to multiple chronic diseases (COPD, memory loss, depression) as evidenced by PO intake meeting less than 75% of estimated energy requirement for greater than or equal to 1 month and moderate muscle and fat wasting per NFPE.      Nutrition Dx Problem 2        Intervention Goal         Intervention Goal(s) PO intake >= 75%     Nutrition Intervention        RD Action NFPE completed     Nutrition Prescription          Diet Prescription Regular    Supplement Prescription Pt declined    --  Monitor/Evaluation        Monitor Per protocol, PO intake, Pertinent labs, Weight, Swallow function     Malnutrition Severity Assessment      Patient meets criteria for : Moderate (non-severe) Malnutrition  Malnutrition Type (Last 8 Hours)       Malnutrition Severity Assessment       Row Name 08/02/24 0550       Malnutrition Severity Assessment    Malnutrition Type Chronic Disease - Related Malnutrition      Row Name 08/02/24 0550       Insufficient Energy Intake     Insufficient Energy Intake Findings Moderate    Insufficient Energy Intake  <75% of est. energy requirement for > or equal to 1 month      Row Name 08/02/24 0550       Muscle Loss    Loss of Muscle Mass Findings Moderate    Mu-ism Region Moderate - slight depression    Clavicle Bone Region Moderate - some protrusion in females, visible in males    Acromion Bone Region Moderate - acromion may slightly protrude    Patellar Region Moderate - patella more prominent, less muscle definition around patella    Anterior Thigh Region Moderate - mild depression on inner thigh    Posterior Calf Region Moderate - some roundness, slight firmness      Row Name 08/02/24 0550       Fat Loss    Subcutaneous Fat Loss Findings Moderate    Orbital Region  Moderate -  somewhat hollowness, slightly dark circles    Upper Arm Region Moderate - some fat  tissue, not ample      Row Name 08/02/24 0550       Criteria Met (Must meet criteria for severity in at least 2 of these categories: M Wasting, Fat Loss, Fluid, Secondary Signs, Wt. Status, Intake)    Patient meets criteria for  Moderate (non-severe) Malnutrition                           Electronically signed by:  Dee Hyatt RD  08/01/24 16:14 EDT

## 2024-08-01 NOTE — ANESTHESIA PREPROCEDURE EVALUATION
Anesthesia Evaluation     Patient summary reviewed and Nursing notes reviewed   NPO Solid Status: > 8 hours  NPO Liquid Status: > 8 hours           Airway   Mallampati: II  TM distance: >3 FB  Neck ROM: full  No difficulty expected  Dental - normal exam     Pulmonary    (+) lung cancer, COPD,sleep apnea  Cardiovascular     (+) hypertension, CAD, cardiac stents , hyperlipidemia,  carotid artery disease      Neuro/Psych  (+) CVA, weakness, psychiatric history  GI/Hepatic/Renal/Endo    (+) GERD    Musculoskeletal     Abdominal    Substance History      OB/GYN          Other   arthritis,   history of cancer    ROS/Med Hx Other:  Left ventricular systolic function is normal. Calculated left ventricular EF = 61.7% Left ventricular ejection fraction appears to be 61 - 65%.  ·  Left ventricular diastolic function is consistent with (grade I) impaired relaxation.  ·  Saline test results are negative for right to left atrial level shunt.  ·  Estimated right ventricular systolic pressure from tricuspid regurgitation is normal (<35 mmHg).                Anesthesia Plan    ASA 3     general and ERAS Protocol   total IV anesthesia  Reason for not using neuraxial anesthesia or peripheral nerve block: Surgeon Refused  intravenous induction     Anesthetic plan, risks, benefits, and alternatives have been provided, discussed and informed consent has been obtained with: patient.    Plan discussed with CRNA.    CODE STATUS:    Code Status (Patient has no pulse and is not breathing): CPR (Attempt to Resuscitate)  Medical Interventions (Patient has pulse or is breathing): Full Support

## 2024-08-01 NOTE — CASE MANAGEMENT/SOCIAL WORK
Continued Stay Note   Oh     Patient Name: Miryam Fernández  MRN: [id number]  Today's Date: 8/1/2024    Admit Date: 7/31/2024    Plan: Return to [facility] (accepted.) No PASRR required. Will need precert.   Discharge Plan       Row Name 08/01/24 0959       Plan    Plan Return to [facility] (accepted.) No PASRR required. Will need precert.                     Yudelka Hilliard RN     Office phone: [phone]  Office fax: [phone]

## 2024-08-01 NOTE — ANESTHESIA PROCEDURE NOTES
Airway  Urgency: elective    Date/Time: 8/1/2024 4:56 PM  Airway not difficult    General Information and Staff    Patient location during procedure: OR  Anesthesiologist: Chris Flores MD  CRNA/CAA: Zari Quesada CRNA    Indications and Patient Condition  Indications for airway management: airway protection    Preoxygenated: yes  MILS maintained throughout  Mask difficulty assessment: 1 - vent by mask    Final Airway Details  Final airway type: endotracheal airway      Successful airway: ETT  Cuffed: yes   Successful intubation technique: video laryngoscopy  Endotracheal tube insertion site: oral  Blade: Mcgregor  Blade size: 3  ETT size (mm): 7.0  Cormack-Lehane Classification: grade I - full view of glottis  Placement verified by: chest auscultation   Cuff volume (mL): 7  Measured from: gums  ETT/EBT to gums (cm): 20  Number of attempts at approach: 1  Assessment: lips, teeth, and gum same as pre-op and atraumatic intubation

## 2024-08-01 NOTE — CASE MANAGEMENT/SOCIAL WORK
Discharge Planning Assessment  [facility]     Patient Name: Miryam Fernández  MRN: [id number]  Today's Date: 7/31/2024    Admit Date: 7/31/2024    Plan: DC Plan: Return to [facility] pending facility acceptance. Precert will be required. No PASRR needed.   Discharge Needs Assessment       Row Name 07/31/24 2003       Living Environment    People in Home spouse    Name(s) of People in Home Francisco J Jolene    Current Living Arrangements other (see comments)  [facility] SNF    Potentially Unsafe Housing Conditions none    In the past 12 months has the electric, gas, oil, or water company threatened to shut off services in your home? No    Primary Care Provided by other (see comments)  facility staff    Provides Primary Care For no one, unable/limited ability to care for self    Family Caregiver if Needed child(tram), adult    Family Caregiver Names Daughter Neva Burroughs, Spouse Francisco J Fernández    Quality of Family Relationships helpful;involved;supportive    Able to Return to Prior Arrangements yes    Living Arrangement Comments [facility]       Resource/Environmental Concerns    Resource/Environmental Concerns none    Transportation Concerns none       Transportation Needs    In the past 12 months, has lack of transportation kept you from medical appointments or from getting medications? no    In the past 12 months, has lack of transportation kept you from meetings, work, or from getting things needed for daily living? No       Food Insecurity    Within the past 12 months, you worried that your food would run out before you got the money to buy more. Never true    Within the past 12 months, the food you bought just didn't last and you didn't have money to get more. Never true       Transition Planning    Patient/Family Anticipates Transition to inpatient rehabilitation facility    Patient/Family Anticipated Services at Transition skilled nursing    Transportation Anticipated health plan transportation;family or friend  will provide       Discharge Needs Assessment    Readmission Within the Last 30 Days no previous admission in last 30 days    Current Outpatient/Agency/Support Group skilled nursing facility    Equipment Currently Used at Home rollator;wheelchair;other (see comments)  Raised toilet seat    Concerns to be Addressed discharge planning    Anticipated Changes Related to Illness none    Equipment Needed After Discharge none    Outpatient/Agency/Support Group Needs skilled nursing facility    Discharge Facility/Level of Care Needs nursing facility, skilled    Provided Post Acute Provider List? N/A    Provided Post Acute Provider Quality & Resource List? N/A    Patient's Choice of Community Agency(s) Patient and daughter are agreeable for patient to return To [facility]    Current Discharge Risk cognitively impaired;physical impairment                   Discharge Plan       Row Name 07/31/24 2006       Plan    Plan DC Plan: Return to [facility] pending facility acceptance. Precert will be required. No PASRR needed.    Patient/Family in Agreement with Plan yes    Provided Post Acute Provider List? N/A    Provided Post Acute Provider Quality & Resource List? N/A    Plan Comments CM spoke with patient daughter Neva via telephone to discuss admission assessment and discharge planning. Patient confused. Neva confirms at baseline patient lived with spouse Rafy Fernández who is a competent resource for patient. CM obtained contact information for spouse from [facility] and updated in Epic. Neva confirms PCP and pharmacy per facility currently. Neva denies any difficulty affording medications or food at this time. Neva confirms patient came from [facility] and she would be agreeable for patient to return. CM will confirm with spouse his agreeement prior to DC. Neva reports patient currently required assist x1 at facility and does not drive. Family can provide transportation at DC if patient able to tolerate  vehicle transportation when ready for DC. CM placed referral in basket for [facility]. Message left for liaison to review. Awaiting confirmation of acceptance at this time.CM reviewed chart documentation for clinical updates. Patient had a reported fall at facility and now has a left femoral head fracture.CM will continue to follow for any further needs and adjust discharge plan accordingly. DC Barriers: Cardiac monitoring, confusion, and watch for Orthopedic consult and plan.                  Continued Care and Services - Admitted Since 7/31/2024       Destination       Service Provider Request Status Selected Services Address Phone Fax Patient Preferred    [facility] Pending - Request Sent N/A [address] [place] [address] [phone] --                  Selected Continued Care - Prior Encounters Includes continued care and service providers with selected services from prior encounters from 5/2/2024 to 7/31/2024      Discharged on 7/23/2024 Admission date: 7/19/2024 - Discharge disposition: Skilled Nursing Facility (DC - External)      Destination       Service Provider Selected Services Address Phone Fax Patient Preferred    [facility] [address] [place] [address] [phone] --                             Demographic Summary       Row Name 07/31/24 2001       General Information    Admission Type observation    Arrived From emergency department;other (see comments)  SNF    Required Notices Provided Observation Status Notice    Referral Source admission list    Reason for Consult discharge planning    Preferred Language English       Contact Information    Permission Granted to Share Info With                    Functional Status       Row Name 07/31/24 2002       Functional Status    Usual Activity Tolerance good    Current Activity Tolerance poor    Functional Status Comments Patient was in SNF for  rehab when she fell. All admission information obtained from patient daughter via telephone. Patient confused.       Physical Activity    On average, how many days per week do you engage in moderate to strenuous exercise (like a brisk walk)? 0 days    On average, how many minutes do you engage in exercise at this level? 0 min    Number of minutes of exercise per week 0       Functional Status, IADL    Medications assistive person    Meal Preparation assistive person    Housekeeping assistive person    Laundry assistive person    Shopping assistive person       Mental Status    General Appearance WDL WDL       Mental Status Summary    Recent Changes in Mental Status/Cognitive Functioning mental status       Employment/    Employment Status disabled    Current or Previous Occupation not applicable           Current or Previous  Service none                  Patient Forms       Row Name 07/31/24 4476       Patient Forms    Important Message from Medicare ([facility]) Delivered  MAYBERRY delivered per registration.                  Phone communication or documentation only- no physical contact with patient or family.      Sandra Carmona RN    Office Phone: [phone]  Office Cell:     [phone]

## 2024-08-01 NOTE — SIGNIFICANT NOTE
08/01/24 1046   PASRR   PASRR Status Return  (Return to [facility] per CM notes)

## 2024-08-01 NOTE — PLAN OF CARE
Problem: Adult Inpatient Plan of Care  Goal: Plan of Care Review  Outcome: Ongoing, Progressing  Goal: Patient-Specific Goal (Individualized)  Outcome: Ongoing, Progressing  Goal: Absence of Hospital-Acquired Illness or Injury  Outcome: Ongoing, Progressing  Intervention: Identify and Manage Fall Risk  Recent Flowsheet Documentation  Taken 8/1/2024 1600 by Dee Weber RN  Safety Promotion/Fall Prevention: safety round/check completed  Taken 8/1/2024 1408 by Dee Weber RN  Safety Promotion/Fall Prevention: safety round/check completed  Taken 8/1/2024 1221 by Dee Weber RN  Safety Promotion/Fall Prevention: safety round/check completed  Taken 8/1/2024 1009 by Dee Weber RN  Safety Promotion/Fall Prevention: safety round/check completed  Taken 8/1/2024 0817 by Dee Weber RN  Safety Promotion/Fall Prevention: safety round/check completed  Intervention: Prevent Skin Injury  Recent Flowsheet Documentation  Taken 8/1/2024 0817 by Dee Weber RN  Body Position:   side-lying   right  Skin Protection: adhesive use limited  Intervention: Prevent and Manage VTE (Venous Thromboembolism) Risk  Recent Flowsheet Documentation  Taken 8/1/2024 0817 by Dee Weber RN  Activity Management: (untill PT eval) activity minimized  Range of Motion: active ROM (range of motion) encouraged  Intervention: Prevent Infection  Recent Flowsheet Documentation  Taken 8/1/2024 0817 by Dee Weber RN  Infection Prevention: single patient room provided  Goal: Optimal Comfort and Wellbeing  Outcome: Ongoing, Progressing  Intervention: Monitor Pain and Promote Comfort  Recent Flowsheet Documentation  Taken 8/1/2024 1313 by Dee Weber RN  Pain Management Interventions: see MAR  Intervention: Provide Person-Centered Care  Recent Flowsheet Documentation  Taken 8/1/2024 0817 by Dee Weber RN  Trust Relationship/Rapport:   care explained   choices provided  Goal: Readiness for  Transition of Care  Outcome: Ongoing, Progressing     Problem: Fall Injury Risk  Goal: Absence of Fall and Fall-Related Injury  Outcome: Ongoing, Progressing  Intervention: Promote Injury-Free Environment  Recent Flowsheet Documentation  Taken 8/1/2024 1600 by Dee Weber RN  Safety Promotion/Fall Prevention: safety round/check completed  Taken 8/1/2024 1408 by Dee Weber RN  Safety Promotion/Fall Prevention: safety round/check completed  Taken 8/1/2024 1221 by Dee Weber RN  Safety Promotion/Fall Prevention: safety round/check completed  Taken 8/1/2024 1009 by Dee Weber RN  Safety Promotion/Fall Prevention: safety round/check completed  Taken 8/1/2024 0817 by Dee Weber RN  Safety Promotion/Fall Prevention: safety round/check completed     Problem: Skin Injury Risk Increased  Goal: Skin Health and Integrity  Outcome: Ongoing, Progressing  Intervention: Optimize Skin Protection  Recent Flowsheet Documentation  Taken 8/1/2024 0817 by Dee Weber RN  Head of Bed (HOB) Positioning: HOB elevated  Skin Protection: adhesive use limited   Goal Outcome Evaluation:   Pt was NPO for hip arthroplasty. Pt received prn pain medications. She received prn Zofran due to nausea. No concerns and call light within reach.

## 2024-08-01 NOTE — SIGNIFICANT NOTE
08/01/24 0814   OTHER   Discipline physical therapist   Rehab Time/Intention   Session Not Performed unable to evaluate, medical status change;other (see comments)  (femoral neck fx LLE; waiting on ortho consult)   Recommendation   PT - Next Appointment 08/02/24

## 2024-08-01 NOTE — NURSING NOTE
Patient arrived in SIPS from pacu. Patient's alert and oriented to self. Saadia dressing with moderate drainage.

## 2024-08-01 NOTE — DISCHARGE PLACEMENT REQUEST
"Samira Wood (69 y.o. Female)       Date of Birth   1955    Social Security Number       Address   [address] [place] [address] [place] [facility]    Home Phone   [phone]    MRN   [id number]       Caodaism   None    Marital Status                               Admission Date   7/31/24    Admission Type   Emergency    Admitting Provider   Peterson Cobian MD    Attending Provider   Peterson Cobian MD    Department, Room/Bed   [facility] EMERGENCY DEPARTMENT, SA02/SA2       Discharge Date       Discharge Disposition       Discharge Destination                                 Attending Provider: Peterson Cobian MD    Allergies: Albuterol, Hydrocodone, Sulfa Antibiotics, Bacitracin, Benzalkonium Chloride, Codeine, Neomycin, Polymyxin B, Nickel, Penicillins    Isolation: None   Infection: None   Code Status: CPR    Ht: 160 cm (63\")   Wt: 77.1 kg (169 lb 15.6 oz)    Admission Cmt: None   Principal Problem: Left displaced femoral neck fracture [S72.002A]                   Active Insurance as of 7/31/2024       Primary Coverage       Payor Plan Insurance Group Employer/Plan Group    ANTHEM MEDICARE REPLACEMENT ANTHEM MEDICARE ADVANTAGE [id number]       Payor Plan Address Payor Plan Phone Number Payor Plan Fax Number Effective Dates    [address] [phone]  1/1/2020 - None Entered    [facility] [id number]         Subscriber Name Subscriber Birth Date Member ID       SAMIRA WOOD 1955 [id number]                     Emergency Contacts        (Rel.) Home Phone Work Phone Mobile Phone    TOMMY TOMLIN (Daughter) [phone] -- [phone]              History & Physical    No notes of this type exist for this encounter.       "

## 2024-08-01 NOTE — OP NOTE
Anterior Total Hip Operative Note  Dr. ALEJANDRO Joshua II  [phone]    PATIENT NAME: Miryam Fernández  MRN: [id number]  : 1955 AGE: 69 y.o. GENDER: female  DATE OF OPERATION: 2024  PREOPERATIVE DIAGNOSIS: Femoral Neck Fracture  POSTOPERATIVE DIAGNOSIS: Same  OPERATION PERFORMED: Left Anterior Total Hip Arthroplasty  SURGEON: Olu Joshua MD  Circulator: Jamie Velazco RN; Dallas Valencia RN  Radiology Technologist: Magdalena Pepper  Scrub Person: Maribel Roper  Vendor Representative: Noel Dey; Zari Cazares  Assistant: Adam Jaquez CSA  ANESTHESIA: General  ASSISTANT: Adam Jaquez. This case would not have been possible without another set of skilled surgical hands for retraction, use of instrumentation, and general assistance.  This assistance was vital to the success of the case.   ESTIMATED BLOOD LOSS: 250cc  SPONGE AND NEEDLE COUNT: Correct  INDICATIONS:  Fracture: This patient was noted to have a femoral neck fracture.  Surgical options were discussed with the patient and they elected to undergo a total hip replacement. A discussion of operative versus nonoperative treatment was had. They elected to undergo anterior total hip arthroplasty. The risks of surgery were discussed and included the risk of anesthesia, infection, damage to neurovascular structures, implant loosening/failure, fracture, hardware prominence, dislocation, the need for further procedures, medical complications, and others. No guarantees were made. The patient wished to proceed with surgery and a surgical consent was signed.  COMPONENTS:   Acetabular Cup: Smith & Nephew R3 acetabular cup: 50 Outer Diameter  Cup Screws: Smith & Nephew 6.5 mm screws: No Screws Were Used  Smith & Nephew Neutral Acetabular Liner: Neutral  Smith [employer] Nephew Polar Femoral Stem: Size 2  Smith & Nephew Oxinium Head: Dual Mobilitymm +0    PERTINENT FINDINGS: Fracture: Fracture of the femoral neck    DETAILS OF PROCEDURE:   The patient was met  in the preoperative area. The site was marked. The consent and H&P were reviewed. The patient was then wheeled back to the operative suite underwent anesthesia. The [place] table boots were secured to the patients’ feet. The patient was moved onto the [place] table and secured in the supine position. The perineal post was inserted and the boots were secured into the leg holders. Surgical alcohol was used to thoroughly clean the operative area.     The hip and leg was then prepped in the normal sterile fashion, multiple layers of sterile drapes, and surgical space suits for the entire operative team. New outer gloves were used by all sterile surgical team members after final draping. The surgical incision was marked. A surgical timeout was performed.    A Modified Nguyen-Lindsay anterior approach was used. Dissection was carried down to the fascia. The fascia was incised and the tensor fascia armiro muscle was retracted laterally and the sartorius medially. The lateral femoral circumflex vessels were identified and cauterized using bovie. The rectus femoris was retracted medially. A capsulotomy was then performed. The capsule was tagged with Ethibond for later repair. Retractors were placed on either side of the femoral neck and dissection was further carried down so that the lesser trochanter could be palpated and the superior rim of the acetabulum could be visualized.    The femoral neck cut was then made from  just proximal to the lesser trochanter medially headed towards the saddle laterally. Care was taken not to extend the cut into the lesser or greater trochanters. The head and neck segment were removed with a corkscrew. The acetabulum was then exposed. The labrum was removed using a kocher and scalpel and excess osteophytes were removed using an osteotome.    The acetabulum was progressively reamed, beginning with medialization and then finalizing the position of the reamer to approximate the final cup position.  Fluoroscopy was used to ensure proper placement of the reamer, including adequate medialization as well as appropriate abduction and anteversion. The real cup was then opened and inserted using fluoroscopy, ensuring good position in terms of abduction and anteversion.     No Screws: Initial press-fit fixation of the cup was very robust and no screws were required for supplemental fixation.    After thorough irrigation and ensuring that no soft tissue was entrapped within the cup, the real liner was snapped into place.    The hook was placed just distal to the greater trochanter. The femur was then externally rotated, extended and adducted under the well leg. Soft tissue releases were performed to gain exposure to the proximal femur. Capsule was released from the saddle and the lateral femur. Care was taken to preserve the short external rotator tendons. The capsule was also released along the medial femur. The hydraulic femoral lift was then used to better expose the femur. Further soft tissue releases were then performed, again ensuring preservation of the short external rotators.    The femur was machined with a cookie cutter osteotome and then a rasp was used to further lateralize the starting point. The sclerotic bone on the lateral shoulder of the femur was removed with a rongeour and curette as needed to protect the greater trochanter. Progressive broaches were inserted until adequate fill had been achieved. Using fluoroscopy, the femoral stem was visualized after trial reduction of the hip. The length and offset were compared to the non-operative hip. Trials of stem size and neck length were trialed until equal leg length and offset were obtained. Additionally hip stability was tested with internal and external rotation of the leg. The leg was stable with at least 90° of external rotation and there was no impingement at 60° of internal rotation. After implantation of the final stem and ball, the leg was once  "again brought into normal anatomic position and relocated. Final x-rays were taken with final implants noting good position of the stem and cup, and no visualized fracture.. The hip was stable upon reduction.    An analgesic cocktail was then injected about the hip as well as the surgical dissection area. The capsule was closed.  The fascia was then closed with a running stitch and the skin was closed in layers.  A sterile dressing was applied.    The patient was moved from the [place] table to the [place] where the boots were removed. The patient was taken to the recovery room in stable condition. There were no complications and the patient tolerated the procedure well.    R \"Josh\" Josiane BRAND MD  Orthopaedic Surgery  [facility]  [phone]            "

## 2024-08-01 NOTE — SIGNIFICANT NOTE
08/01/24 1133   Rehab Time/Intention   Session Not Performed   (Pt has femur fracture and is awaiting ortho.)   Recommendation   OT - Next Appointment 08/02/24

## 2024-08-01 NOTE — PROGRESS NOTES
[facility] MEDICINE SERVICE  DAILY PROGRESS NOTE    NAME: Miryma Fernández  : 1955  MRN: [id number]      LOS: 0 days     PROVIDER OF SERVICE: Timothy Duane Brammell, MD    Chief Complaint: Left displaced femoral neck fracture    Subjective:     Interval History:  History taken from: patient/  Patient Complaints: Patient is some pain complaints.  She denies any shortness of breath.  Denies any nausea vomiting.   states that she fell attempting to independently ambulate at facility.  Nurse feels that Claudia has constipation to act oddly with for pain management.  No other acute issues.    Review of Systems:   Review of Systems    Objective:     Vital Signs  Temp:  [97.5 °F (36.4 °C)-98.2 °F (36.8 °C)] 97.5 °F (36.4 °C)  Heart Rate:  [70-91] 91  Resp:  [12-16] 12  BP: (129-178)/(53-86) 136/66   Body mass index is 21.95 kg/m².    Physical Exam  Physical Exam    Scheduled Meds   aspirin, 81 mg, Oral, Daily  atorvastatin, 40 mg, Oral, Daily  memantine, 5 mg, Oral, BID  metoprolol succinate XL, 25 mg, Oral, Daily  mupirocin, 1 Application, Topical, TID  pantoprazole, 40 mg, Oral, Daily  sodium chloride, 10 mL, Intravenous, Q12H  ticagrelor, 90 mg, Oral, BID       PRN Meds     acetaminophen    senna-docusate sodium **AND** polyethylene glycol **AND** bisacodyl **AND** bisacodyl    hydrALAZINE    HYDROcodone-acetaminophen    Morphine    [DISCONTINUED] HYDROmorphone **AND** naloxone    ondansetron    sodium chloride    sodium chloride   Infusions  sodium chloride, 75 mL/hr, Last Rate: 75 mL/hr (246)          Diagnostic Data    Results from last 7 days   Lab Units 24  0235 24  1551   WBC 10*3/mm3 7.11 7.77   HEMOGLOBIN g/dL 10.8* 12.4   HEMATOCRIT % 33.8* 39.2   PLATELETS 10*3/mm3 219 241   GLUCOSE mg/dL 90 94   CREATININE mg/dL 0.82 0.92   BUN mg/dL 11 14   SODIUM mmol/L 140 140   POTASSIUM mmol/L 4.1 4.3   AST (SGOT) U/L  --  18   ALT (SGPT) U/L  --  14   ALK PHOS U/L  --   127*   BILIRUBIN mg/dL  --  0.7   ANION GAP mmol/L 9.5 10.3       CT Pelvis Without Contrast    Result Date: 7/31/2024  Impression: Minimal nondisplaced fracture of the left lateral femoral head neck junction. Electronically Signed: Ruthy Perrin MD  7/31/2024 3:43 PM EDT  Workstation ID: [id number]           Assessment:    Acute left hip fracture  COPD  History of lung small cell cancer  Pain control  History of CVA  History of pulmonary emboli  Tobacco abuse  Anemia         Plan: Orthopedic to evaluate.  Pain management.  Medications adjusted.      Active and Resolved Problems  Active Hospital Problems    Diagnosis  POA    **Left displaced femoral neck fracture [S72.002A]  Yes      Resolved Hospital Problems   No resolved problems to display.           VTE Prophylaxis:  No VTE prophylaxis order currently exists.         Code status is   Code Status and Medical Interventions: CPR (Attempt to Resuscitate); Full Support   Ordered at: 07/31/24 1954     Code Status (Patient has no pulse and is not breathing):    CPR (Attempt to Resuscitate)     Medical Interventions (Patient has pulse or is breathing):    Full Support       Plan for disposition:rehab in 3 days    Time: 30 minutes    Signature: Electronically signed by Timothy Duane Brammell, MD, 08/01/24, 09:50 EDT.  [facility] Hospitalist Team

## 2024-08-01 NOTE — CONSULTS
Orthopaedic Surgery  Hip Fracture Consult Note  Dr. ALEJANDRO Plata” Josiane II  [phone]    HPI:  Patient is a 69 y.o. Not  or  female who presents with hip pain after a fall from standing.  They presented to the ER for further workup where a left Femoral Neck Fracture was found. I was consulted for further management. She complains of sharp pains in the left hip worse with any activity.  Made better with bed rest and immobilization.    MEDICAL HISTORY  Past Medical History:   Diagnosis Date   • Cancer     lung cancer   • Carotid stenosis    • COPD (chronic obstructive pulmonary disease)    • Coronary artery disease    • Esophageal stricture     Dr Domingo   • Hyperlipidemia    • Metabolic encephalopathy 06/28/2024    2nd to UTI   • Pulmonary embolism     seen at [facility], fluid around her heart at the time-right lung   • UTI (urinary tract infection) 06/28/2024     Past Surgical History:   Procedure Laterality Date   • ANGIOPLASTY / STENTING FEMORAL     • CARDIAC CATHETERIZATION     • CAROTID STENT     • CORONARY STENT PLACEMENT     • ESOPHAGOSCOPY / EGD     • HYSTERECTOMY       Prior to Admission medications    Medication Sig Start Date End Date Taking? Authorizing Provider   atorvastatin (LIPITOR) 40 MG tablet TAKE 1 TABLET BY MOUTH EVERY DAY 5/13/24  Yes Lashell De La Torre APRN   memantine (NAMENDA) 5 MG tablet Take 1 tablet by mouth 2 (Two) Times a Day.   Yes Miguel Whyte MD   metoprolol succinate XL (TOPROL-XL) 25 MG 24 hr tablet Take 1 tablet by mouth Daily. 5/3/22  Yes Ted Langford MD   aspirin 81 MG EC tablet Take 1 tablet by mouth Daily. 7/24/24   Wilver Arechiga MD   furosemide (LASIX) 20 MG tablet Take 0.5 tablets by mouth Daily As Needed. PRN for swelling    Miguel Whyte MD   pantoprazole (PROTONIX) 40 MG EC tablet Take 1 tablet by mouth Daily. 10/13/23   Miguel Whyte MD   ticagrelor (BRILINTA) 90 MG tablet tablet Take 1 tablet by mouth 2 (Two) Times a  "Day. 7/23/24   Wilver Arechiga MD     Allergies   Allergen Reactions   • Albuterol Shortness Of Breath     Pt states she has sensitivity to albuterol.  She states it causes her to be SOA.   • Hydrocodone Palpitations   • Sulfa Antibiotics Swelling   • Bacitracin Other (See Comments)   • Benzalkonium Chloride Hives   • Codeine Other (See Comments)   • Neomycin Other (See Comments)   • Polymyxin B Other (See Comments)   • Nickel Rash   • Penicillins Rash     Most Recent Immunizations   Administered Date(s) Administered   • 31-influenza Vac Quardvalent Preservativ 12/20/2021   • COVID-19 (PFIZER) Purple Cap Monovalent 12/20/2021   • Flu Vaccine Quad PF 6-35MO 10/22/2017   • Flublok 18+yrs 10/07/2020   • Fluzone (or Fluarix & Flulaval for VFC) >6mos 05/10/2022   • Fluzone High-Dose 65+yrs 12/11/2023   • Influenza Injectable Mdck Pf Quad 12/20/2021   • Influenza Split Preservative Free ID 10/01/2019   • Pneumococcal Conjugate 13-Valent (PCV13) 05/10/2022   • Pneumococcal Conjugate 20-Valent (PCV20) 02/09/2024   • Pneumococcal Polysaccharide (PPSV23) 10/14/2014     Social History   History  Tobacco Use  •  Smoking status:  Former      Current packs/day:  2.00      Average packs/day:  2.0 packs/day for 40.0 years (80.0 ttl pk-yrs)      Types:  Cigarettes      Passive exposure:  Current  •  Smokeless tobacco:  Never  Substance Use Topics  •  Alcohol use:  Not Currently       Social History   History  Substance and Sexual Activity  Drug Use  Never        VITALS: /64 (BP Location: Right arm, Patient Position: Lying)   Pulse 92   Temp 97.8 °F (36.6 °C) (Oral)   Resp 16   Ht 160 cm (63\")   Wt 56.2 kg (123 lb 14.4 oz)   SpO2 96%   BMI 21.95 kg/m²  Body mass index is 21.95 kg/m².    PHYSICAL EXAM:   CONSTITUTIONAL: No acute distress  LUNGS: Equal chest rise, no shortness of air  CARDIOVASCULAR: palpable peripheral pulses  SKIN: no skin lesions in the area examined  LYMPH: no lymphadenopathy in the area " examined  Left Lower Extremity  Tenderness to Palpation: Tenderness to palpation at the hip  Pulses:  Palpable DP/PT pulses  Sensation: Sensation intact to light touch to saphenous/sural/deep peroneal/superficial peroneal/tibial nerves  Motor: 5 out of 5 EHL/FHL/TA/GS motor complexes  Range of Motion: Range of motion of hip deferred secondary to pain.  Positive pain with passive leg roll    RADIOLOGY REVIEW:   CT Pelvis Without Contrast    Result Date: 7/31/2024  Impression: Minimal nondisplaced fracture of the left lateral femoral head neck junction. Electronically Signed: Ruthy Perrin MD  7/31/2024 3:43 PM EDT  Workstation ID: [id number]      LABS:   Recent Results (from the past 24 hour(s))   CBC Auto Differential    Collection Time: 08/01/24  2:35 AM    Specimen: Arm, Left; Blood   Result Value Ref Range    WBC 7.11 3.40 - 10.80 10*3/mm3    RBC 3.55 (L) 3.77 - 5.28 10*6/mm3    Hemoglobin 10.8 (L) 12.0 - 15.9 g/dL    Hematocrit 33.8 (L) 34.0 - 46.6 %    MCV 95.2 79.0 - 97.0 fL    MCH 30.4 26.6 - 33.0 pg    MCHC 32.0 31.5 - 35.7 g/dL    RDW 14.4 12.3 - 15.4 %    RDW-SD 50.2 37.0 - 54.0 fl    MPV 9.8 6.0 - 12.0 fL    Platelets 219 140 - 450 10*3/mm3    Neutrophil % 78.7 (H) 42.7 - 76.0 %    Lymphocyte % 9.3 (L) 19.6 - 45.3 %    Monocyte % 7.0 5.0 - 12.0 %    Eosinophil % 3.8 0.3 - 6.2 %    Basophil % 0.6 0.0 - 1.5 %    Immature Grans % 0.6 (H) 0.0 - 0.5 %    Neutrophils, Absolute 5.60 1.70 - 7.00 10*3/mm3    Lymphocytes, Absolute 0.66 (L) 0.70 - 3.10 10*3/mm3    Monocytes, Absolute 0.50 0.10 - 0.90 10*3/mm3    Eosinophils, Absolute 0.27 0.00 - 0.40 10*3/mm3    Basophils, Absolute 0.04 0.00 - 0.20 10*3/mm3    Immature Grans, Absolute 0.04 0.00 - 0.05 10*3/mm3    nRBC 0.3 (H) 0.0 - 0.2 /100 WBC   Basic Metabolic Panel    Collection Time: 08/01/24  2:35 AM    Specimen: Arm, Left; Blood   Result Value Ref Range    Glucose 90 65 - 99 mg/dL    BUN 11 8 - 23 mg/dL    Creatinine 0.82 0.57 - 1.00 mg/dL    Sodium 140 136 -  "145 mmol/L    Potassium 4.1 3.5 - 5.2 mmol/L    Chloride 103 98 - 107 mmol/L    CO2 27.5 22.0 - 29.0 mmol/L    Calcium 9.4 8.6 - 10.5 mg/dL    BUN/Creatinine Ratio 13.4 7.0 - 25.0    Anion Gap 9.5 5.0 - 15.0 mmol/L    eGFR 78.5 >60.0 mL/min/1.73   Magnesium    Collection Time: 08/01/24  2:35 AM    Specimen: Arm, Left; Blood   Result Value Ref Range    Magnesium 1.9 1.6 - 2.4 mg/dL   Phosphorus    Collection Time: 08/01/24  2:35 AM    Specimen: Arm, Left; Blood   Result Value Ref Range    Phosphorus 3.4 2.5 - 4.5 mg/dL   Protime-INR    Collection Time: 08/01/24  2:35 AM    Specimen: Arm, Left; Blood   Result Value Ref Range    Protime 10.4 9.6 - 11.7 Seconds    INR 0.95 0.93 - 1.10       IMPRESSION:  Patient is a 69 y.o. Not  or  female with left Femoral Neck Fracture    PLAN:   Admited to: Peterson Cobian MD  Diet: NPO Now  Weight Bearing:Left Lower Extremity Non Weight Bearing until after surgery  Labs: None additional needed  Imaging: None additional needed  Surgery: Total hip arthroplasty for femoral neck fracture  Risk benefits and alternatives thoroughly discussed with patient and family at bedside.  We did discuss the increased risk as she was given a blood thinner this morning for some strange reason.  Additionally, I apologize for the delay in her care.  Orthopedics was not notified that she had been admitted until 24 hours after her arrival to the hospital.  Fortunately, we are going to be able to get her taken care of today.  Plan is surgery in the left hip shortly.  Disposition: Surgery left hip this afternoon    R \"Josh\" Josiane BRAND MD  Orthopaedic Surgery  [facility]  [phone] - [place] Office  [phone] - [place] Office          "

## 2024-08-01 NOTE — PLAN OF CARE
Goal Outcome Evaluation:   Pt is resting in bed, c/o pain throughout night, pain meds given, see mar. BP was 178/80 @ 0500. Messaged  See orders. All other v/s wnl. Call light within reach, plan of care ongoing

## 2024-08-02 PROBLEM — S72.002A CLOSED FRACTURE OF LEFT HIP: Status: ACTIVE | Noted: 2024-08-02

## 2024-08-02 PROBLEM — E44.0 MODERATE MALNUTRITION: Status: ACTIVE | Noted: 2024-08-02

## 2024-08-02 LAB
ANION GAP SERPL CALCULATED.3IONS-SCNC: 12.5 MMOL/L (ref 5–15)
BASOPHILS # BLD AUTO: 0.03 10*3/MM3 (ref 0–0.2)
BASOPHILS NFR BLD AUTO: 0.2 % (ref 0–1.5)
BUN SERPL-MCNC: 20 MG/DL (ref 8–23)
BUN/CREAT SERPL: 16.9 (ref 7–25)
CALCIUM SPEC-SCNC: 8.8 MG/DL (ref 8.6–10.5)
CHLORIDE SERPL-SCNC: 102 MMOL/L (ref 98–107)
CO2 SERPL-SCNC: 21.5 MMOL/L (ref 22–29)
CREAT SERPL-MCNC: 1.18 MG/DL (ref 0.57–1)
DEPRECATED RDW RBC AUTO: 51.2 FL (ref 37–54)
EGFRCR SERPLBLD CKD-EPI 2021: 50.7 ML/MIN/1.73
EOSINOPHIL # BLD AUTO: 0.02 10*3/MM3 (ref 0–0.4)
EOSINOPHIL NFR BLD AUTO: 0.1 % (ref 0.3–6.2)
ERYTHROCYTE [DISTWIDTH] IN BLOOD BY AUTOMATED COUNT: 14.6 % (ref 12.3–15.4)
GLUCOSE SERPL-MCNC: 144 MG/DL (ref 65–99)
HCT VFR BLD AUTO: 28.5 % (ref 34–46.6)
HGB BLD-MCNC: 9.1 G/DL (ref 12–15.9)
IMM GRANULOCYTES # BLD AUTO: 0.09 10*3/MM3 (ref 0–0.05)
IMM GRANULOCYTES NFR BLD AUTO: 0.6 % (ref 0–0.5)
LYMPHOCYTES # BLD AUTO: 0.6 10*3/MM3 (ref 0.7–3.1)
LYMPHOCYTES NFR BLD AUTO: 4.1 % (ref 19.6–45.3)
MCH RBC QN AUTO: 30.6 PG (ref 26.6–33)
MCHC RBC AUTO-ENTMCNC: 31.9 G/DL (ref 31.5–35.7)
MCV RBC AUTO: 96 FL (ref 79–97)
MONOCYTES # BLD AUTO: 1.14 10*3/MM3 (ref 0.1–0.9)
MONOCYTES NFR BLD AUTO: 7.9 % (ref 5–12)
NEUTROPHILS NFR BLD AUTO: 12.63 10*3/MM3 (ref 1.7–7)
NEUTROPHILS NFR BLD AUTO: 87.1 % (ref 42.7–76)
NRBC BLD AUTO-RTO: 0 /100 WBC (ref 0–0.2)
PLATELET # BLD AUTO: 213 10*3/MM3 (ref 140–450)
PMV BLD AUTO: 10.2 FL (ref 6–12)
POTASSIUM SERPL-SCNC: 4.2 MMOL/L (ref 3.5–5.2)
RBC # BLD AUTO: 2.97 10*6/MM3 (ref 3.77–5.28)
SODIUM SERPL-SCNC: 136 MMOL/L (ref 136–145)
WBC NRBC COR # BLD AUTO: 14.51 10*3/MM3 (ref 3.4–10.8)

## 2024-08-02 PROCEDURE — 97166 OT EVAL MOD COMPLEX 45 MIN: CPT

## 2024-08-02 PROCEDURE — 25810000003 SODIUM CHLORIDE 0.9 % SOLUTION: Performed by: ORTHOPAEDIC SURGERY

## 2024-08-02 PROCEDURE — 97162 PT EVAL MOD COMPLEX 30 MIN: CPT

## 2024-08-02 PROCEDURE — 25010000002 CEFAZOLIN PER 500 MG: Performed by: ORTHOPAEDIC SURGERY

## 2024-08-02 PROCEDURE — 85025 COMPLETE CBC W/AUTO DIFF WBC: CPT | Performed by: NURSE PRACTITIONER

## 2024-08-02 PROCEDURE — 80048 BASIC METABOLIC PNL TOTAL CA: CPT | Performed by: NURSE PRACTITIONER

## 2024-08-02 RX ADMIN — MEMANTINE 5 MG: 5 TABLET ORAL at 21:24

## 2024-08-02 RX ADMIN — SODIUM CHLORIDE 2000 MG: 900 INJECTION INTRAVENOUS at 00:43

## 2024-08-02 RX ADMIN — MEMANTINE 5 MG: 5 TABLET ORAL at 08:37

## 2024-08-02 RX ADMIN — Medication 10 ML: at 21:24

## 2024-08-02 RX ADMIN — HYDROCODONE BITARTRATE AND ACETAMINOPHEN 1 TABLET: 5; 325 TABLET ORAL at 12:00

## 2024-08-02 RX ADMIN — ASPIRIN 81 MG: 81 TABLET, COATED ORAL at 08:37

## 2024-08-02 RX ADMIN — METOPROLOL SUCCINATE 25 MG: 25 TABLET, EXTENDED RELEASE ORAL at 08:37

## 2024-08-02 RX ADMIN — SODIUM CHLORIDE 2000 MG: 900 INJECTION INTRAVENOUS at 16:45

## 2024-08-02 RX ADMIN — SODIUM CHLORIDE 2000 MG: 900 INJECTION INTRAVENOUS at 08:39

## 2024-08-02 RX ADMIN — SODIUM CHLORIDE 75 ML/HR: 9 INJECTION, SOLUTION INTRAVENOUS at 06:17

## 2024-08-02 RX ADMIN — Medication 10 ML: at 08:40

## 2024-08-02 RX ADMIN — PANTOPRAZOLE SODIUM 40 MG: 40 TABLET, DELAYED RELEASE ORAL at 08:37

## 2024-08-02 RX ADMIN — HYDROCODONE BITARTRATE AND ACETAMINOPHEN 1 TABLET: 5; 325 TABLET ORAL at 21:24

## 2024-08-02 RX ADMIN — ATORVASTATIN CALCIUM 40 MG: 40 TABLET, FILM COATED ORAL at 08:37

## 2024-08-02 NOTE — CASE MANAGEMENT/SOCIAL WORK
Continued Stay Note   Oh     Patient Name: Miryam Fernández  MRN: [id number]  Today's Date: 8/2/2024    Admit Date: 7/31/2024    Plan: Return to [facility] skilled.  Will need precert.  NO  new PASRR  needed.   Discharge Plan       Row Name 08/02/24 1653       Plan    Plan Return to [facility] skilled.  Will need precert.  NO  new PASRR  needed.    Plan Comments Okay per patient and liaison for patient to return to [facility] ( skilled)  Will need precert.    Dc barriers: Pod #1 left [place],  PT OT evals pending                  Hawa Chaudhari RN    SIPS 1  [email or web address]  Office [phone]  Cell [phone]

## 2024-08-02 NOTE — PROGRESS NOTES
Patient is 1 day status post left total hip arthroplasty due to fracture.  Patient states she is doing well postoperatively.  Dressing is clean dry and intact.  Patient may weight-bear as tolerated.  A CBC and BMP were ordered this morning.  Patient is cleared from a Ortho standpoint to be discharged when ready.  Patient will follow-up in office 3 weeks status post left total hip arthroplasty.  We will obtain x-rays and evaluate the hip further at this visit.

## 2024-08-02 NOTE — PLAN OF CARE
Goal Outcome Evaluation:     Patient c/o  mild pain  with activity,  NOAH dressing was changed for moderate drainage. Ice pack changed PRN. Plan of are is ongoing. Call light within reach.

## 2024-08-02 NOTE — THERAPY EVALUATION
Patient Name: Miryam Fernández  : 1955    MRN: [id number]                              Today's Date: 2024       Admit Date: 2024    Visit Dx:     ICD-10-CM ICD-9-CM   1. Left displaced femoral neck fracture  S72.002A 820.8     Patient Active Problem List   Diagnosis    Obstructive sleep apnea, adult    Tobacco use    Normocytic anemia    Meniere's disease    Lung nodule    Hilar lymphadenopathy    Hyperlipidemia    Heartburn    Gastroesophageal reflux disease    Dysphagia    Chronic obstructive pulmonary disease    CAD (coronary artery disease)    Bruises easily    Chronic pain    Small cell lung cancer    Carotid stenosis    Coronary artery disease    Primary hypertension    Long term (current) use of opiate analgesic    DDD (degenerative disc disease), lumbar    Screening mammogram for breast cancer    Preventative health care    Vitamin D deficiency    Need for influenza vaccination    Moderate episode of recurrent major depressive disorder    Need for vaccination against Streptococcus pneumoniae    Frequent falls    Weakness    Nasal congestion    Memory loss    Localized swelling of both lower legs    Meniere disease, bilateral    Abdominal aortic aneurysm (AAA) without rupture    Thoracic aortic aneurysm without rupture    Plantar wart    Screening for osteoporosis    Asymptomatic menopause    Screening for malignant neoplasm of colon    Skin tear of left forearm without complication    Encounter for subsequent annual wellness visit (AWV) in Medicare patient    Acute cystitis    Altered mental status    Left displaced femoral neck fracture    Closed fracture of left hip    Moderate malnutrition     Past Medical History:   Diagnosis Date    Cancer     lung cancer    Carotid stenosis     COPD (chronic obstructive pulmonary disease)     Coronary artery disease     Esophageal stricture     Dr Domingo    Hyperlipidemia     Metabolic encephalopathy 2024    2nd to UTI    Pulmonary embolism      seen at [facility], fluid around her heart at the time-right lung    UTI (urinary tract infection) 06/28/2024     Past Surgical History:   Procedure Laterality Date    ANGIOPLASTY / STENTING FEMORAL      CARDIAC CATHETERIZATION      CAROTID STENT      CORONARY STENT PLACEMENT      ESOPHAGOSCOPY / EGD      HYSTERECTOMY        General Information       Row Name 08/02/24 1509 08/02/24 1156       OT Time and Intention    Document Type evaluation  -MS evaluation  -MS    Mode of Treatment occupational therapy  -MS occupational therapy  -MS      Row Name 08/02/24 1509 08/02/24 1156       General Information    Patient Profile Reviewed yes  -MS yes  -MS    Prior Level of Function independent:;ADL's;all household mobility  -MS independent:;ADL's  -MS    Existing Precautions/Restrictions fall;hip, anterior;left  -MS fall;left;hip, anterior  -MS    Barriers to Rehab medically complex;previous functional deficit;cognitive status  -MS cognitive status;previous functional deficit  -MS      Row Name 08/02/24 1509 08/02/24 1156       Occupational Profile    Reason for Services/Referral (Occupational Profile) Pt is a 68 y/o F admitted to [facility] 7/31/24 with c/o L hip pain after mechanical fall at [facility]. Pt has been re-admitted multiple times over last month, has been at rehab. Pt noted to have probable subacute ischemic CVA in R basal ganglia during last admission. POD#1 anterior L [place] by Dr. Joshua. At baseline pt resides with spouse in [facility] with ramp entry. Prior to readmission, pt ambulated with rollator and (I) with most ADLs, recently requiring increased assist with ADLs and mobility.  -MS Pt is a 68 y/o F admitted to [facility] 7/31/24 with c/o L hip pain after mechanical fall at [facility]. Pt has been re-admitted multiple times over last month, has been at rehab. Pt noted to have probable subacute ischemic CVA in R basal ganglia during last admission. POD#1 anterior L [place] by Dr. Joshua. PMHx significant for Meniere's disease, hx lung cancer, MDD, and  hx frequent falls. At baseline pt resides with spouse in [facility] with ramp entry. Prior to readmission, pt ambulated with rollator and (I) with most ADLs, recently requiring increased assist with ADLs and mobility.  -MS    Environmental Supports and Barriers (Occupational Profile) supportive family  -MS supportive family, per chart review spouse works and daughter scheduled for back sx soon  -MS      Row Name 08/02/24 1509 08/02/24 1156       Living Environment    People in Home spouse  -MS spouse  -MS      Row Name 08/02/24 1509 08/02/24 1156       Home Main Entrance    Number of Stairs, Main Entrance other (see comments)  ramp entry  -MS other (see comments)  ramp entry  -MS      Row Name 08/02/24 1509 08/02/24 1156       Stairs Within Home, Primary    Number of Stairs, Within Home, Primary none  -MS none  -MS      Row Name 08/02/24 1509 08/02/24 1156       Cognition    Orientation Status (Cognition) oriented to;person  pt requires cues for place, time, and situation  -MS oriented to;person;verbal cues/prompts needed for orientation;situation  cues for place, time  -MS      Row Name 08/02/24 1509 08/02/24 1156       Safety Issues, Functional Mobility    Safety Issues Affecting Function (Mobility) ability to follow commands;insight into deficits/self-awareness;judgment;problem-solving  -MS insight into deficits/self-awareness;judgment;problem-solving  -MS    Impairments Affecting Function (Mobility) balance;cognition;endurance/activity tolerance;pain;postural/trunk control;range of motion (ROM);strength  -MS balance;cognition;endurance/activity tolerance;pain;range of motion (ROM);strength  -MS    Cognitive Impairments, Mobility Safety/Performance awareness, need for assistance;impulsivity;insight into deficits/self-awareness;judgment;problem-solving/reasoning  -MS --              User Key  (r) = Recorded By, (t) = Taken By, (c) = Cosigned By      Initials Name Provider Type    Lizbeth Vazquez OT Occupational  Therapist                     Mobility/ADL's       Row Name 08/02/24 1511          Bed Mobility    Bed Mobility supine-sit  -MS     Supine-Sit [place] (Bed Mobility) minimum assist (75% patient effort);2 person assist  -MS     Assistive Device (Bed Mobility) bed rails;head of bed elevated  -MS     Comment, (Bed Mobility) extended time, verbal and tactile cues to follow commands  -MS       Row Name 08/02/24 1511          Transfers    Transfers sit-stand transfer;bed-chair transfer  -MS       Row Name 08/02/24 1511          Bed-Chair Transfer    Bed-Chair [place] (Transfers) minimum assist (75% patient effort);2 person assist  -MS     Assistive Device (Bed-Chair Transfers) walker, front-wheeled  -MS       Row Name 08/02/24 1511          Sit-Stand Transfer    Sit-Stand [place] (Transfers) minimum assist (75% patient effort);2 person assist  -MS     Assistive Device (Sit-Stand Transfers) walker, front-wheeled  -MS       Row Name 08/02/24 1511          Functional Mobility    Patient was able to Ambulate yes  -MS       Row Name 08/02/24 1511          Activities of Daily Living    BADL Assessment/Intervention lower body dressing  -MS       Row Name 08/02/24 1511          Mobility    Extremity Weight-bearing Status left lower extremity  -MS     Left Lower Extremity (Weight-bearing Status) weight-bearing as tolerated (WBAT)  -MS       Row Name 08/02/24 1511          Lower Body Dressing Assessment/Training    [place] Level (Lower Body Dressing) don;socks;maximum assist (25% patient effort)  -MS     Position (Lower Body Dressing) supine  -MS               User Key  (r) = Recorded By, (t) = Taken By, (c) = Cosigned By      Initials Name Provider Type    Lizbeth Vazquez OT Occupational Therapist                   Obj/Interventions       Row Name 08/02/24 1512          Sensory Assessment (Somatosensory)    Sensory Assessment (Somatosensory) UE sensation intact  -MS       Row Name 08/02/24 1512           Vision Assessment/Intervention    Visual Impairment/Limitations WFL  -MS       Row Name 08/02/24 1512          Range of Motion Comprehensive    Comment, General Range of Motion BUE WFL  -MS       Row Name 08/02/24 1512          Strength Comprehensive (MMT)    Comment, General Manual Muscle Testing (MMT) Assessment BUE grossly 3+/5  -MS       Row Name 08/02/24 1512          Balance    Balance Assessment sitting static balance;sitting dynamic balance;standing static balance  -MS     Static Sitting Balance supervision  -MS     Dynamic Sitting Balance minimal assist  -MS     Position, Sitting Balance unsupported;sitting edge of bed  -MS     Static Standing Balance minimal assist;2-person assist  -MS     Dynamic Standing Balance unable to assess  -MS     Position/Device Used, Standing Balance supported;walker, front-wheeled  -MS               User Key  (r) = Recorded By, (t) = Taken By, (c) = Cosigned By      Initials Name Provider Type    Lizbeth Vazquez OT Occupational Therapist                   Goals/Plan       Row Name 08/02/24 1516          Bed Mobility Goal 1 (OT)    Activity/Assistive Device (Bed Mobility Goal 1, OT) bed mobility activities, all  -MS     [place] Level/Cues Needed (Bed Mobility Goal 1, OT) supervision required  -MS     Time Frame (Bed Mobility Goal 1, OT) long term goal (LTG);2 weeks  -MS     Progress/Outcomes (Bed Mobility Goal 1, OT) new goal  -MS       Row Name 08/02/24 1516          Transfer Goal 1 (OT)    Activity/Assistive Device (Transfer Goal 1, OT) transfers, all;sit-to-stand/stand-to-sit;bed-to-chair/chair-to-bed;toilet  -MS     [place] Level/Cues Needed (Transfer Goal 1, OT) standby assist  -MS     Time Frame (Transfer Goal 1, OT) long term goal (LTG);2 weeks  -MS     Progress/Outcome (Transfer Goal 1, OT) new goal  -MS       Row Name 08/02/24 1516          Dressing Goal 1 (OT)    Activity/Device (Dressing Goal 1, OT) lower body dressing  -MS     [place]/Cues Needed  (Dressing Goal 1, OT) minimum assist (75% or more patient effort)  -MS     Time Frame (Dressing Goal 1, OT) long term goal (LTG);2 weeks  -MS     Progress/Outcome (Dressing Goal 1, OT) new goal  -MS       Row Name 08/02/24 1516          Toileting Goal 1 (OT)    Activity/Device (Toileting Goal 1, OT) toileting skills, all  -MS     [place] Level/Cues Needed (Toileting Goal 1, OT) minimum assist (75% or more patient effort)  -MS     Time Frame (Toileting Goal 1, OT) long term goal (LTG);2 weeks  -MS     Progress/Outcome (Toileting Goal 1, OT) new goal  -MS       Row Name 08/02/24 1516          Therapy Assessment/Plan (OT)    Planned Therapy Interventions (OT) activity tolerance training;adaptive equipment training;BADL retraining;functional balance retraining;IADL retraining;occupation/activity based interventions;passive ROM/stretching;patient/caregiver education/training;transfer/mobility retraining;strengthening exercise;ROM/therapeutic exercise  -MS               User Key  (r) = Recorded By, (t) = Taken By, (c) = Cosigned By      Initials Name Provider Type    MS Nguyen Lizbeth, OT Occupational Therapist                   Clinical Impression       Row Name 08/02/24 1512          Pain Assessment    Pain Intervention(s) Repositioned;Emotional support  -MS     Additional Documentation Pain Scale: FACES Pre/Post-Treatment (Group)  -MS       Row Name 08/02/24 1512          Pain Scale: FACES Pre/Post-Treatment    Pain: FACES Scale, Pretreatment 0-->no hurt  -MS     Posttreatment Pain Rating 4-->hurts little more  -MS     Pain Location - Side/Orientation Left  -MS     Pain Location - hip  -MS       Row Name 08/02/24 1512          Plan of Care Review    Plan of Care Reviewed With patient;spouse;daughter  -MS     Progress no change  -MS     Outcome Evaluation Pt is a 70 y/o F admitted to [facility] 7/31/24 with c/o L hip pain after mechanical fall at SNF. Pt has been re-admitted multiple times over last month, has been at  rehab. Pt noted to have probable subacute ischemic CVA in R basal ganglia during last admission. POD#1 anterior L [place] by Dr. Joshua. At baseline pt resides with spouse in [facility] with ramp entry. Prior to readmission, pt ambulated with rollator and (I) with most ADLs, recently requiring increased assist with ADLs and mobility. This date pt oriented to self, requiring cues for place, time and situation. Pt on RA, c/o no pain at rest, increased pain with mobility in L hip. Pt requires min A x2 supine to sit and min A x2 to come to standing. Pt requiring min A x2 to ambulate within room, demo good navigation of RW once instructed on foot placement and task segmentation. Pt will require increased assist with ADL routine and functional mobility and is at high risk for falls. OT recommend return to SNF, will follow within acute setting.  -MS       Row Name 08/02/24 1512          Therapy Assessment/Plan (OT)    Rehab Potential (OT) good, to achieve stated therapy goals  -MS     Criteria for Skilled Therapeutic Interventions Met (OT) yes;meets criteria;skilled treatment is necessary  -MS     Therapy Frequency (OT) 5 times/wk  -MS     Predicted Duration of Therapy Intervention (OT) until d/c  -MS       Row Name 08/02/24 1512          Therapy Plan Review/Discharge Plan (OT)    Anticipated Discharge Disposition (OT) skilled nursing facility  -MS       Row Name 08/02/24 1512          Vital Signs    O2 Delivery Pre Treatment room air  -MS     O2 Delivery Intra Treatment room air  -MS     O2 Delivery Post Treatment room air  -MS     Pre Patient Position Supine  -MS     Intra Patient Position Standing  -MS     Post Patient Position Sitting  -MS       Row Name 08/02/24 1512          Positioning and Restraints    Pre-Treatment Position in bed  -MS     Post Treatment Position chair  -MS     In Chair notified nsg;reclined;call light within reach;encouraged to call for assist;exit alarm on;with family/caregiver;legs elevated  -MS                User Key  (r) = Recorded By, (t) = Taken By, (c) = Cosigned By      Initials Name Provider Type    Lizbeth Vazquez, OT Occupational Therapist                   Outcome Measures       Row Name 08/02/24 1517          How much help from another is currently needed...    Putting on and taking off regular lower body clothing? 2  -MS     Bathing (including washing, rinsing, and drying) 2  -MS     Toileting (which includes using toilet bed pan or urinal) 2  -MS     Putting on and taking off regular upper body clothing 3  -MS     Taking care of personal grooming (such as brushing teeth) 4  -MS     Eating meals 4  -MS     AM-PAC 6 Clicks Score (OT) 17  -MS       Row Name 08/02/24 1411 08/02/24 1330       How much help from another person do you currently need...    Turning from your back to your side while in flat bed without using bedrails? 3  -BR 3  -BR    Moving from lying on back to sitting on the side of a flat bed without bedrails? 3  -BR 2  -BR    Moving to and from a bed to a chair (including a wheelchair)? 3  -BR 2  -BR    Standing up from a chair using your arms (e.g., wheelchair, bedside chair)? 3  -BR 2  -BR    Climbing 3-5 steps with a railing? 1  -BR 1  -BR    To walk in hospital room? 2  -BR 2  -BR    AM-PAC 6 Clicks Score (PT) 15  -BR 12  -BR    Highest Level of Mobility Goal 4 --> Transfer to chair/commode  -BR 4 --> Transfer to chair/commode  -BR      Row Name 08/02/24 0800 08/02/24 0600       How much help from another person do you currently need...    Turning from your back to your side while in flat bed without using bedrails? 3  -CS 3  -LR    Moving from lying on back to sitting on the side of a flat bed without bedrails? 3  -CS 3  -LR    Moving to and from a bed to a chair (including a wheelchair)? 3  -CS 2  -LR    Standing up from a chair using your arms (e.g., wheelchair, bedside chair)? 3  -CS 2  -LR    Climbing 3-5 steps with a railing? 2  -CS 1  -LR    To walk in hospital room? 2  -CS 2   -LR    AM-PAC 6 Clicks Score (PT) 16  -CS 13  -LR    Highest Level of Mobility Goal 5 --> Static standing  -CS 4 --> Transfer to chair/commode  -LR      Row Name 08/02/24 1517 08/02/24 1411       Functional Assessment    Outcome Measure Options AM-PAC 6 Clicks Daily Activity (OT)  -MS AM-PAC 6 Clicks Basic Mobility (PT)  -BR      Row Name 08/02/24 1330          Functional Assessment    Outcome Measure Options AM-PAC 6 Clicks Basic Mobility (PT)  -BR               User Key  (r) = Recorded By, (t) = Taken By, (c) = Cosigned By      Initials Name Provider Type    LR Xena Pacheco RN Registered Nurse    Anna Barros, RN Registered Nurse    MS Lizbeth Nguyen, OT Occupational Therapist    BR Tracy De La Torre, PT Physical Therapist                    Occupational Therapy Education       Title: PT OT SLP Therapies (In Progress)       Topic: Occupational Therapy (In Progress)       Point: ADL training (Not Started)       Description:   Instruct learner(s) on proper safety adaptation and remediation techniques during self care or transfers.   Instruct in proper use of assistive devices.                  Learner Progress:  Not documented in this visit.              Point: Precautions (Not Started)       Description:   Instruct learner(s) on prescribed precautions during self-care and functional transfers.                  Learner Progress:  Not documented in this visit.              Point: Body mechanics (Not Started)       Description:   Instruct learner(s) on proper positioning and spine alignment during self-care, functional mobility activities and/or exercises.                  Learner Progress:  Not documented in this visit.                                  OT Recommendation and Plan  Planned Therapy Interventions (OT): activity tolerance training, adaptive equipment training, BADL retraining, functional balance retraining, IADL retraining, occupation/activity based interventions, passive ROM/stretching,  patient/caregiver education/training, transfer/mobility retraining, strengthening exercise, ROM/therapeutic exercise  Therapy Frequency (OT): 5 times/wk  Plan of Care Review  Plan of Care Reviewed With: patient, spouse, daughter  Progress: no change  Outcome Evaluation: Pt is a 68 y/o F admitted to [facility] 7/31/24 with c/o L hip pain after mechanical fall at SNF. Pt has been re-admitted multiple times over last month, has been at rehab. Pt noted to have probable subacute ischemic CVA in R basal ganglia during last admission. POD#1 anterior L [place] by Dr. Joshua. At baseline pt resides with spouse in [facility] with ramp entry. Prior to readmission, pt ambulated with rollator and (I) with most ADLs, recently requiring increased assist with ADLs and mobility. This date pt oriented to self, requiring cues for place, time and situation. Pt on RA, c/o no pain at rest, increased pain with mobility in L hip. Pt requires min A x2 supine to sit and min A x2 to come to standing. Pt requiring min A x2 to ambulate within room, demo good navigation of RW once instructed on foot placement and task segmentation. Pt will require increased assist with ADL routine and functional mobility and is at high risk for falls. OT recommend return to SNF, will follow within acute setting.     Time Calculation:                   Lizbeth Nguyen OT  8/2/2024

## 2024-08-02 NOTE — PROGRESS NOTES
[facility] MEDICINE SERVICE  DAILY PROGRESS NOTE    NAME: Miryam Fernández  : 1955  MRN: [id number]      LOS: 0 days     PROVIDER OF SERVICE: Shiv Hurst MD    Chief Complaint: Left displaced femoral neck fracture    Subjective:     Interval History:  History taken from: patient/      History of Present Illness:   This is a 69 years old history of hyperlipidemia, hypertension, COPD, female with a past medical and lung cancer who presented to the ED after a fall.  She was in the bathroom today when she fell and given significant pain they had an x-ray on admission home with concerns of femoral neck fracture she was then sent to the ED for further workup and management.       Vital signs on presentation showed a blood pressure 141/70, heart rate 73, afebrile 36.6 neurosurgeons, respiratory rate 16 saturation 97% on room air.     Labs showed BMP and LFT within normal limits, CBC within normal limits.  CT of the pelvis showed minimal nondisplaced fracture of the left lateral femoral head neck junction.     24-Patient Complaints: Patient is some pain complaints.  She denies any shortness of breath.  Denies any nausea vomiting.   states that she fell attempting to independently ambulate at facility.  Nurse feels that Claudia has constipation to act oddly with for pain management.  No other acute issues.  24-Patient is 1 day status post left total hip arthroplasty due to fracture.  Family at bedside, mental status improving, vital signs stable, discussed with RN.  [facility]      Review of Systems   Unable to perform ROS: Mental status change       Objective:     Vital Signs  Temp:  [97.3 °F (36.3 °C)-99.1 °F (37.3 °C)] 97.6 °F (36.4 °C)  Heart Rate:  [74-96] 77  Resp:  [11-24] 14  BP: ()/(50-72) 130/72  Flow (L/min):  [2] 2   Body mass index is 21.95 kg/m².      Physical Exam  Appearance: No apparent distress, nontoxic-appearing.   HEENT/Neck:  Neck is supple, Extraocular movements intact, Moist mucous membranes  Cardiovascular: Regular Rate and Rhythm, No murmurs  Pulmonary: Clear to auscultation Bilaterally. No wheeze,  Abdomen: BS+, Soft, nontender, nondistended.   Ext: No Cyanosis, Clubbing, Edema, left lower extremity examination limited by pain.    Neuro: Nonfocal, Alert & Oriented x 3    Scheduled Meds   aspirin, 81 mg, Oral, Daily  atorvastatin, 40 mg, Oral, Daily  ceFAZolin, 2,000 mg, Intravenous, Q8H  memantine, 5 mg, Oral, BID  metoprolol succinate XL, 25 mg, Oral, Daily  mupirocin, 1 Application, Topical, TID  pantoprazole, 40 mg, Oral, Daily  sodium chloride, 10 mL, Intravenous, Q12H  [Held by provider] ticagrelor, 90 mg, Oral, BID       PRN Meds     acetaminophen    senna-docusate sodium **AND** polyethylene glycol **AND** bisacodyl **AND** bisacodyl    HYDROcodone-acetaminophen    Morphine    [DISCONTINUED] HYDROmorphone **AND** naloxone    ondansetron    sodium chloride    sodium chloride   Infusions  sodium chloride, 75 mL/hr, Last Rate: 75 mL/hr (08/02/24 0617)          Diagnostic Data    Results from last 7 days   Lab Units 08/02/24  1024 08/01/24  0235 07/31/24  1551   WBC 10*3/mm3  --  7.11 7.77   HEMOGLOBIN g/dL  --  10.8* 12.4   HEMATOCRIT %  --  33.8* 39.2   PLATELETS 10*3/mm3  --  219 241   GLUCOSE mg/dL 144* 90 94   CREATININE mg/dL 1.18* 0.82 0.92   BUN mg/dL 20 11 14   SODIUM mmol/L 136 140 140   POTASSIUM mmol/L 4.2 4.1 4.3   AST (SGOT) U/L  --   --  18   ALT (SGPT) U/L  --   --  14   ALK PHOS U/L  --   --  127*   BILIRUBIN mg/dL  --   --  0.7   ANION GAP mmol/L 12.5 9.5 10.3       XR Hip With or Without Pelvis 1 View Left    Result Date: 8/1/2024  Impression: Left total hip arthroplasty without evidence of immediate complication. Electronically Signed: Jluis Hamilton  8/1/2024 6:55 PM EDT  Workstation ID: [id number]    CT Pelvis Without Contrast    Result Date: 7/31/2024  Impression: Minimal nondisplaced fracture of the left  lateral femoral head neck junction. Electronically Signed: Ruthy Perrin MD  7/31/2024 3:43 PM EDT  Workstation ID: [id number]       Assessment:    Acute left hip fracture  COPD  History of lung small cell cancer  Pain control  History of CVA  History of pulmonary emboli  Tobacco abuse  Anemia         Plan   continue present care,  -Patient presented after a fall, found on CT of the pelvis to have a minimal nondisplaced fracture of the left lateral femoral head and neck junction.    -Orthopedics consulted, Patient is 1 day status post left total hip arthroplasty due to fracture   -pain control as needed, gentle volume resuscitation.  -Continue home aspirin and Brilinta for now.  -PT/OT awaiting placement  -Continue to monitor..      Active and Resolved Problems  Active Hospital Problems    Diagnosis  POA    **Left displaced femoral neck fracture [S72.002A]  Yes    Closed fracture of left hip [S72.002A]  Yes      Resolved Hospital Problems   No resolved problems to display.       VTE Prophylaxis:  No VTE prophylaxis order currently exists.      Code status is   Code Status and Medical Interventions: CPR (Attempt to Resuscitate); Full Support   Ordered at: 07/31/24 1954     Code Status (Patient has no pulse and is not breathing):    CPR (Attempt to Resuscitate)     Medical Interventions (Patient has pulse or is breathing):    Full Support     Plan for disposition:rehab in 3 days    Time: 30 minutes    Signature: Electronically signed by Shiv Hurst MD, 08/02/24, 12:23 EDT.  [facility] Hospitalist Team

## 2024-08-02 NOTE — PLAN OF CARE
Goal Outcome Evaluation:                 Patient up in chair .  at bedside. Pain controlled. Patient remains confused

## 2024-08-02 NOTE — ANESTHESIA POSTPROCEDURE EVALUATION
Patient: Miryam Fernández    Procedure Summary       Date: 08/01/24 Room / Location: [facility] OR 06 / [facility] MAIN OR    Anesthesia Start: 1651 Anesthesia Stop: 1811    Procedure: TOTAL HIP ARTHROPLASTY (Left: Hip) Diagnosis:     Surgeons: Olu Joshua II, MD Provider: Chris Flores MD    Anesthesia Type: general, ERAS Protocol ASA Status: 3            Anesthesia Type: general, ERAS Protocol    Vitals  Vitals Value Taken Time   /58 08/01/24 1854   Temp 97.6 °F (36.4 °C) 08/01/24 1808   Pulse 81 08/01/24 1855   Resp 11 08/01/24 1838   SpO2 95 % 08/01/24 1855   Vitals shown include unfiled device data.        Post Anesthesia Care and Evaluation    Patient location during evaluation: PACU  Patient participation: complete - patient participated  Level of consciousness: awake  Pain scale: See nurse's notes for pain score.  Pain management: adequate    Airway patency: patent  Anesthetic complications: No anesthetic complications  PONV Status: none  Cardiovascular status: acceptable  Respiratory status: acceptable and spontaneous ventilation  Hydration status: acceptable    Comments: Patient seen and examined postoperatively; vital signs stable; SpO2 greater than or equal to 90%; cardiopulmonary status stable; nausea/vomiting adequately controlled; pain adequately controlled; no apparent anesthesia complications; patient discharged from anesthesia care when discharge criteria were met

## 2024-08-02 NOTE — PLAN OF CARE
Goal Outcome Evaluation:  Plan of Care Reviewed With: patient, spouse, daughter        Progress: no change  Outcome Evaluation: Pt is a 70 y/o F admitted to [facility] 7/31/24 with c/o L hip pain after mechanical fall at SNF. Pt has been re-admitted multiple times over last month, has been at rehab. Pt noted to have probable subacute ischemic CVA in R basal ganglia during last admission. POD#1 anterior L CLINTON by Dr. Joshua. At baseline pt resides with spouse in [facility] with ramp entry. Prior to readmission, pt ambulated with rollator and (I) with most ADLs, recently requiring increased assist with ADLs and mobility. This date pt oriented to self, requiring cues for place, time and situation. Pt on RA, c/o no pain at rest, increased pain with mobility in L hip. Pt requires min A x2 supine to sit and min A x2 to come to standing. Pt requiring min A x2 to ambulate within room, demo good navigation of RW once instructed on foot placement and task segmentation. Pt will require increased assist with ADL routine and functional mobility and is at high risk for falls. OT recommend return to SNF, will follow within acute setting.      Anticipated Discharge Disposition (OT): skilled nursing facility

## 2024-08-02 NOTE — THERAPY EVALUATION
Patient Name: Miryam Fernández  : 1955    MRN: [id number]                              Today's Date: 2024       Admit Date: 2024    Visit Dx:     ICD-10-CM ICD-9-CM   1. Left displaced femoral neck fracture  S72.002A 820.8     Patient Active Problem List   Diagnosis    Obstructive sleep apnea, adult    Tobacco use    Normocytic anemia    Meniere's disease    Lung nodule    Hilar lymphadenopathy    Hyperlipidemia    Heartburn    Gastroesophageal reflux disease    Dysphagia    Chronic obstructive pulmonary disease    CAD (coronary artery disease)    Bruises easily    Chronic pain    Small cell lung cancer    Carotid stenosis    Coronary artery disease    Primary hypertension    Long term (current) use of opiate analgesic    DDD (degenerative disc disease), lumbar    Screening mammogram for breast cancer    Preventative health care    Vitamin D deficiency    Need for influenza vaccination    Moderate episode of recurrent major depressive disorder    Need for vaccination against Streptococcus pneumoniae    Frequent falls    Weakness    Nasal congestion    Memory loss    Localized swelling of both lower legs    Meniere disease, bilateral    Abdominal aortic aneurysm (AAA) without rupture    Thoracic aortic aneurysm without rupture    Plantar wart    Screening for osteoporosis    Asymptomatic menopause    Screening for malignant neoplasm of colon    Skin tear of left forearm without complication    Encounter for subsequent annual wellness visit (AWV) in Medicare patient    Acute cystitis    Altered mental status    Left displaced femoral neck fracture    Closed fracture of left hip    Moderate malnutrition     Past Medical History:   Diagnosis Date    Cancer     lung cancer    Carotid stenosis     COPD (chronic obstructive pulmonary disease)     Coronary artery disease     Esophageal stricture     Dr Domingo    Hyperlipidemia     Metabolic encephalopathy 2024    2nd to UTI    Pulmonary embolism      seen at [facility], fluid around her heart at the time-right lung    UTI (urinary tract infection) 06/28/2024     Past Surgical History:   Procedure Laterality Date    ANGIOPLASTY / STENTING FEMORAL      CARDIAC CATHETERIZATION      CAROTID STENT      CORONARY STENT PLACEMENT      ESOPHAGOSCOPY / EGD      HYSTERECTOMY        General Information       Row Name 08/02/24 1322          Physical Therapy Time and Intention    Document Type evaluation  -BR     Mode of Treatment physical therapy  -BR       Row Name 08/02/24 1322          General Information    Patient Profile Reviewed yes  -BR     Prior Level of Function independent:;all household mobility;gait;transfer  Pt  uses a rollator at baseline.  -BR     Existing Precautions/Restrictions fall;hip, anterior;left  -BR     Barriers to Rehab cognitive status;previous functional deficit  -BR       Row Name 08/02/24 1322          Living Environment    People in Home spouse;other (see comments)  Pt's daughter assists pt QD as needed as well. Pt has been receiving rehab at [facility].  -BR       Row Name 08/02/24 1322          Home Main Entrance    Number of Stairs, Main Entrance none  ramp entry  -BR       Row Name 08/02/24 1322          Cognition    Orientation Status (Cognition) disoriented to;place;situation;time;verbal cues/prompts needed for orientation  -BR       Row Name 08/02/24 1322          Safety Issues, Functional Mobility    Impairments Affecting Function (Mobility) balance;cognition;pain;range of motion (ROM);strength;endurance/activity tolerance  -BR     Cognitive Impairments, Mobility Safety/Performance problem-solving/reasoning;insight into deficits/self-awareness  -BR               User Key  (r) = Recorded By, (t) = Taken By, (c) = Cosigned By      Initials Name Provider Type    BR Tracy De La Torre, PT Physical Therapist                   Mobility       Row Name 08/02/24 1045          Bed Mobility    Bed Mobility supine-sit  -BR     Supine-Sit [place] (Bed  Mobility) minimum assist (75% patient effort);verbal cues;2 person assist  -BR     Assistive Device (Bed Mobility) head of bed elevated;draw sheet  -BR       Row Name 08/02/24 1045          Sit-Stand Transfer    Sit-Stand [place] (Transfers) minimum assist (75% patient effort);2 person assist;verbal cues  -BR     Assistive Device (Sit-Stand Transfers) walker, front-wheeled  -BR       Row Name 08/02/24 1045          Gait/Stairs (Locomotion)    [place] Level (Gait) minimum assist (75% patient effort);2 person assist  -BR     Assistive Device (Gait) walker, front-wheeled  -BR     Patient was able to Ambulate yes  -BR     Distance in Feet (Gait) 15  -BR     Deviations/Abnormal Patterns (Gait) antalgic;weight shifting decreased  -BR     Bilateral Gait Deviations forward flexed posture;heel strike decreased  -BR       Row Name 08/02/24 1045          Mobility    Extremity Weight-bearing Status left lower extremity  -BR     Left Lower Extremity (Weight-bearing Status) weight-bearing as tolerated (WBAT)  -BR               User Key  (r) = Recorded By, (t) = Taken By, (c) = Cosigned By      Initials Name Provider Type    BR Tracy De La Torre, PT Physical Therapist                   Obj/Interventions       Row Name 08/02/24 1327          Range of Motion Comprehensive    General Range of Motion bilateral lower extremity ROM WFL  -BR       Row Name 08/02/24 1327          Strength Comprehensive (MMT)    Comment, General Manual Muscle Testing (MMT) Assessment RLE grossly 3+/5; LLE grossly 3-/5  -BR       Row Name 08/02/24 1327          Balance    Balance Assessment sitting static balance;sitting dynamic balance;standing static balance;standing dynamic balance  -BR     Static Sitting Balance supervision  -BR     Dynamic Sitting Balance minimal assist  -BR     Position, Sitting Balance unsupported;sitting edge of bed  -BR     Static Standing Balance minimal assist;2-person assist  -BR     Dynamic Standing Balance unable to  assess  -BR     Position/Device Used, Standing Balance supported;walker, rolling  -BR       Row Name 08/02/24 1327          Sensory Assessment (Somatosensory)    Sensory Assessment (Somatosensory) LE sensation intact  -BR               User Key  (r) = Recorded By, (t) = Taken By, (c) = Cosigned By      Initials Name Provider Type    Tracy Kerns PT Physical Therapist                   Goals/Plan       Row Name 08/02/24 1410          Bed Mobility Goal 1 (PT)    Activity/Assistive Device (Bed Mobility Goal 1, PT) bed mobility activities, all  -BR     [place] Level/Cues Needed (Bed Mobility Goal 1, PT) modified independence  -BR     Time Frame (Bed Mobility Goal 1, PT) long term goal (LTG);2 weeks  -BR       Row Name 08/02/24 1410          Transfer Goal 1 (PT)    Activity/Assistive Device (Transfer Goal 1, PT) transfers, all  -BR     [place] Level/Cues Needed (Transfer Goal 1, PT) supervision required  -BR     Time Frame (Transfer Goal 1, PT) long term goal (LTG);2 weeks  -BR       Row Name 08/02/24 1410          Gait Training Goal 1 (PT)    Activity/Assistive Device (Gait Training Goal 1, PT) gait (walking locomotion);assistive device use  -BR     [place] Level (Gait Training Goal 1, PT) supervision required  -BR     Distance (Gait Training Goal 1, PT) 100  -BR     Time Frame (Gait Training Goal 1, PT) long term goal (LTG);2 weeks  -BR       Row Name 08/02/24 1410          Therapy Assessment/Plan (PT)    Planned Therapy Interventions (PT) balance training;bed mobility training;gait training;home exercise program;patient/family education;transfer training;strengthening;ROM (range of motion)  -BR               User Key  (r) = Recorded By, (t) = Taken By, (c) = Cosigned By      Initials Name Provider Type    Tracy Kerns PT Physical Therapist                   Clinical Impression       Row Name 08/02/24 1328          Pain    Additional Documentation Pain Scale: FACES Pre/Post-Treatment  (Group)  -BR       Row Name 08/02/24 1328          Pain Scale: FACES Pre/Post-Treatment    Pain: FACES Scale, Pretreatment 0-->no hurt  -BR     Posttreatment Pain Rating 4-->hurts little more  -BR     Pain Location - Side/Orientation Left  -BR     Pain Location - hip  -BR       Row Name 08/02/24 1410 08/02/24 1328       Plan of Care Review    Plan of Care Reviewed With -- patient;spouse;daughter  -BR    Outcome Evaluation Pt presents as a 70 y/o F admitted to [facility] on 7/31/24 due to a fall. Imaging:Minimal nondisplaced fracture of the left lateral femoral head neck junction. Pt is POD #1 for left anterior CLINTON per Dr. Joshua. Pt is WBAT LLE. Procedure was tolerated without complications. Pt has been at [facility] receiving rehab post hospitalization for CVA and UTI. PMHx: Meniere's Disease, anemia, HTN, hx lung CA, memory loss. Pt Alert, pleasant and cooperative and oriented to self only. Pt follows directions well and responds well to cues to mobilize. Pt typically lives with spouse in [facility] with ramp entry and a daughter checks on pt daily and assists as needed. Pt uses a rollator at baseline. This date, pt requiring min assist of 2 for supine>sit and sit<>stand. Pt ambulated 15 feet with rolling walker WBAT LLE with min assist of 2 with antalgic, step to gait pattern. PT will follow pt with PT recommendation of return to Skilled Nursing Facility.  -BR --      Row Name 08/02/24 1328          Therapy Assessment/Plan (PT)    Rehab Potential (PT) good, to achieve stated therapy goals  -BR     Criteria for Skilled Interventions Met (PT) yes;meets criteria;skilled treatment is necessary  -BR     Therapy Frequency (PT) daily  -BR     Predicted Duration of Therapy Intervention (PT) until D/C  -BR       Row Name 08/02/24 1328          Vital Signs    Pre SpO2 (%) 99  -BR     O2 Delivery Pre Treatment room air  -BR     Post SpO2 (%) 100  -BR     O2 Delivery Post Treatment room air  -BR     Pre Patient Position Supine  -BR      Intra Patient Position Standing  -BR     Post Patient Position Sitting  -BR       Row Name 08/02/24 1328          Positioning and Restraints    Pre-Treatment Position in bed  -BR     Post Treatment Position chair  -BR     In Chair notified nsg;reclined;call light within reach;encouraged to call for assist;exit alarm on;with family/caregiver;legs elevated;waffle cushion  -BR               User Key  (r) = Recorded By, (t) = Taken By, (c) = Cosigned By      Initials Name Provider Type    BR Tracy De La Torre, JANNET Physical Therapist                   Outcome Measures       Row Name 08/02/24 1411 08/02/24 1330       How much help from another person do you currently need...    Turning from your back to your side while in flat bed without using bedrails? 3  -BR 3  -BR    Moving from lying on back to sitting on the side of a flat bed without bedrails? 3  -BR 2  -BR    Moving to and from a bed to a chair (including a wheelchair)? 3  -BR 2  -BR    Standing up from a chair using your arms (e.g., wheelchair, bedside chair)? 3  -BR 2  -BR    Climbing 3-5 steps with a railing? 1  -BR 1  -BR    To walk in hospital room? 2  -BR 2  -BR    AM-PAC 6 Clicks Score (PT) 15  -BR 12  -BR    Highest Level of Mobility Goal 4 --> Transfer to chair/commode  -BR 4 --> Transfer to chair/commode  -BR      Row Name 08/02/24 0600          How much help from another person do you currently need...    Turning from your back to your side while in flat bed without using bedrails? 3  -LR     Moving from lying on back to sitting on the side of a flat bed without bedrails? 3  -LR     Moving to and from a bed to a chair (including a wheelchair)? 2  -LR     Standing up from a chair using your arms (e.g., wheelchair, bedside chair)? 2  -LR     Climbing 3-5 steps with a railing? 1  -LR     To walk in hospital room? 2  -LR     AM-PAC 6 Clicks Score (PT) 13  -LR     Highest Level of Mobility Goal 4 --> Transfer to chair/commode  -LR       Row Name 08/02/24 1411  08/02/24 1330       Functional Assessment    Outcome Measure Options AM-PAC 6 Clicks Basic Mobility (PT)  -BR AM-PAC 6 Clicks Basic Mobility (PT)  -BR              User Key  (r) = Recorded By, (t) = Taken By, (c) = Cosigned By      Initials Name Provider Type    Xena Crane, RN Registered Nurse    Tracy Kerns, PT Physical Therapist                                 Physical Therapy Education       Title: PT OT SLP Therapies (Done)       Topic: Physical Therapy (Done)       Point: Mobility training (Done)       Learning Progress Summary             Patient Acceptance, E,D, VU,DU by BR at 8/2/2024 1413    Acceptance, E,D, VU,DU,NR by BR at 8/2/2024 1331   Family Acceptance, E,D, VU,DU by BR at 8/2/2024 1413    Acceptance, E,D, VU,DU,NR by BR at 8/2/2024 1331                         Point: Body mechanics (Done)       Learning Progress Summary             Patient Acceptance, E,D, VU,DU by BR at 8/2/2024 1413    Acceptance, E,D, VU,DU,NR by BR at 8/2/2024 1331   Family Acceptance, E,D, VU,DU by BR at 8/2/2024 1413    Acceptance, E,D, VU,DU,NR by BR at 8/2/2024 1331                         Point: Precautions (Done)       Learning Progress Summary             Patient Acceptance, E,D, VU,DU by BR at 8/2/2024 1413    Acceptance, E,D, VU,DU,NR by BR at 8/2/2024 1331   Family Acceptance, E,D, VU,DU by BR at 8/2/2024 1413    Acceptance, E,D, VU,DU,NR by BR at 8/2/2024 1331                                         User Key       Initials Effective Dates Name Provider Type Discipline     02/01/22 -  Tracy De La Torre, JANNET Physical Therapist PT                  PT Recommendation and Plan  Planned Therapy Interventions (PT): balance training, bed mobility training, gait training, home exercise program, patient/family education, transfer training, strengthening, ROM (range of motion)  Plan of Care Reviewed With: patient, spouse, daughter  Outcome Evaluation: Pt presents as a 70 y/o F admitted to [facility] on 7/31/24 due to a  fall. Imaging:Minimal nondisplaced fracture of the left lateral femoral head neck junction. Pt is POD #1 for left anterior CLINTON per Dr. Joshua. Pt is WBAT LLE. Procedure was tolerated without complications. Pt has been at [facility] receiving rehab post hospitalization for CVA and UTI. PMHx: Meniere's Disease, anemia, HTN, hx lung CA, memory loss. Pt Alert, pleasant and cooperative and oriented to self only. Pt follows directions well and responds well to cues to mobilize. Pt typically lives with spouse in [facility] with ramp entry and a daughter checks on pt daily and assists as needed. Pt uses a rollator at baseline. This date, pt requiring min assist of 2 for supine>sit and sit<>stand. Pt ambulated 15 feet with rolling walker WBAT LLE with min assist of 2 with antalgic, step to gait pattern. PT will follow pt with PT recommendation of return to Skilled Nursing Facility.     Time Calculation:              PT G-Codes  Outcome Measure Options: AM-PAC 6 Clicks Basic Mobility (PT)  AM-PAC 6 Clicks Score (PT): 15  PT Discharge Summary  Anticipated Discharge Disposition (PT): skilled nursing facility    Tracy De La Torre, PT  8/2/2024

## 2024-08-02 NOTE — PLAN OF CARE
Goal Outcome Evaluation:  Plan of Care Reviewed With: patient, spouse, daughter   Pt presents as a 70 y/o F admitted to [facility] on 7/31/24 due to a fall. Imaging:Minimal nondisplaced fracture of the left lateral femoral head neck junction. Pt is POD #1 for left anterior CLINTON per Dr. Joshua. Pt is WBAT LLE. Procedure was tolerated without complications. Pt has been at [facility] receiving rehab post hospitalization for CVA and UTI. PMHx: Meniere's Disease, anemia, HTN, hx lung CA, memory loss. Pt Alert, pleasant and cooperative and oriented to self only. Pt follows directions well and responds well to cues to mobilize. Pt typically lives with spouse in [facility] with ramp entry and a daughter checks on pt daily and assists as needed. Pt uses a rollator at baseline. This date, pt requiring min assist of 2 for supine>sit and sit<>stand. Pt ambulated 15 feet with rolling walker WBAT LLE with min assist of 2 with antalgic, step to gait pattern. PT will follow pt with PT recommendation of return to Skilled Nursing Facility.                Anticipated Discharge Disposition (PT): skilled nursing facility

## 2024-08-03 PROCEDURE — 80053 COMPREHEN METABOLIC PANEL: CPT | Performed by: INTERNAL MEDICINE

## 2024-08-03 PROCEDURE — 25810000003 SODIUM CHLORIDE 0.9 % SOLUTION: Performed by: ORTHOPAEDIC SURGERY

## 2024-08-03 PROCEDURE — 84145 PROCALCITONIN (PCT): CPT | Performed by: INTERNAL MEDICINE

## 2024-08-03 PROCEDURE — 97116 GAIT TRAINING THERAPY: CPT

## 2024-08-03 PROCEDURE — 97112 NEUROMUSCULAR REEDUCATION: CPT

## 2024-08-03 PROCEDURE — 87040 BLOOD CULTURE FOR BACTERIA: CPT | Performed by: INTERNAL MEDICINE

## 2024-08-03 PROCEDURE — 25010000002 CEFTRIAXONE PER 250 MG: Performed by: INTERNAL MEDICINE

## 2024-08-03 PROCEDURE — 83880 ASSAY OF NATRIURETIC PEPTIDE: CPT | Performed by: INTERNAL MEDICINE

## 2024-08-03 PROCEDURE — 97110 THERAPEUTIC EXERCISES: CPT

## 2024-08-03 PROCEDURE — 97530 THERAPEUTIC ACTIVITIES: CPT

## 2024-08-03 RX ADMIN — SODIUM CHLORIDE 75 ML/HR: 9 INJECTION, SOLUTION INTRAVENOUS at 20:35

## 2024-08-03 RX ADMIN — CEFTRIAXONE 2000 MG: 2 INJECTION, POWDER, FOR SOLUTION INTRAMUSCULAR; INTRAVENOUS at 13:31

## 2024-08-03 RX ADMIN — METOPROLOL SUCCINATE 25 MG: 25 TABLET, EXTENDED RELEASE ORAL at 08:43

## 2024-08-03 RX ADMIN — ASPIRIN 81 MG: 81 TABLET, COATED ORAL at 08:43

## 2024-08-03 RX ADMIN — SODIUM CHLORIDE 75 ML/HR: 9 INJECTION, SOLUTION INTRAVENOUS at 05:42

## 2024-08-03 RX ADMIN — PANTOPRAZOLE SODIUM 40 MG: 40 TABLET, DELAYED RELEASE ORAL at 08:43

## 2024-08-03 RX ADMIN — ATORVASTATIN CALCIUM 40 MG: 40 TABLET, FILM COATED ORAL at 08:44

## 2024-08-03 RX ADMIN — MEMANTINE 5 MG: 5 TABLET ORAL at 21:07

## 2024-08-03 RX ADMIN — Medication 10 ML: at 08:45

## 2024-08-03 RX ADMIN — Medication 10 ML: at 21:08

## 2024-08-03 RX ADMIN — HYDROCODONE BITARTRATE AND ACETAMINOPHEN 1 TABLET: 5; 325 TABLET ORAL at 21:07

## 2024-08-03 RX ADMIN — HYDROCODONE BITARTRATE AND ACETAMINOPHEN 1 TABLET: 5; 325 TABLET ORAL at 05:54

## 2024-08-03 RX ADMIN — MEMANTINE 5 MG: 5 TABLET ORAL at 08:43

## 2024-08-03 NOTE — PROGRESS NOTES
[facility] MEDICINE SERVICE  DAILY PROGRESS NOTE    NAME: Miryam Fernández  : 1955  MRN: [id number]      LOS: 1 day     PROVIDER OF SERVICE: Shiv Hurst MD    Chief Complaint: Left displaced femoral neck fracture    Subjective:     Interval History:  History taken from: patient/    History of Present Illness:   This is a 69 years old history of hyperlipidemia, hypertension, COPD, female with a past medical and lung cancer who presented to the ED after a fall.  She was in the bathroom today when she fell and given significant pain they had an x-ray on admission home with concerns of femoral neck fracture she was then sent to the ED for further workup and management.       Vital signs on presentation showed a blood pressure 141/70, heart rate 73, afebrile 36.6 neurosurgeons, respiratory rate 16 saturation 97% on room air.     Labs showed BMP and LFT within normal limits, CBC within normal limits.  CT of the pelvis showed minimal nondisplaced fracture of the left lateral femoral head neck junction.     24-Patient Complaints: Patient is some pain complaints.  She denies any shortness of breath.  Denies any nausea vomiting.   states that she fell attempting to independently ambulate at facility.  Nurse feels that Claudia has constipation to act oddly with for pain management.  No other acute issues.  24-Patient is 1 day status post left total hip arthroplasty due to fracture.  Family at bedside, mental status improving, vital signs stable, discussed with RN.  [facility]  8/3/24 patient seen and examined in bed no acute distress.  Family at bedside, patient still confused, discussed with RN and, discussed with , awaiting placement.      Review of Systems   Unable to perform ROS: Mental status change     Objective:     Vital Signs  Temp:  [98 °F (36.7 °C)-98.9 °F (37.2 °C)] 98 °F (36.7 °C)  Heart Rate:  [75-80] 77  Resp:  [14-16] 14  BP:  ()/(60-80) 128/80   Body mass index is 21.95 kg/m².    Physical Exam  Appearance: No apparent distress, nontoxic-appearing.   HEENT/Neck: Neck is supple, Extraocular movements intact, Moist mucous membranes  Cardiovascular: Regular Rate and Rhythm, No murmurs  Pulmonary: Clear to auscultation Bilaterally. No wheeze,  Abdomen: BS+, Soft, nontender, nondistended.   Ext: No Cyanosis, Clubbing, Edema, left lower extremity examination limited by pain.    Neuro: Nonfocal, Alert & Oriented x 3    Scheduled Meds   aspirin, 81 mg, Oral, Daily  atorvastatin, 40 mg, Oral, Daily  memantine, 5 mg, Oral, BID  metoprolol succinate XL, 25 mg, Oral, Daily  mupirocin, 1 Application, Topical, TID  pantoprazole, 40 mg, Oral, Daily  sodium chloride, 10 mL, Intravenous, Q12H  [Held by provider] ticagrelor, 90 mg, Oral, BID       PRN Meds     acetaminophen    senna-docusate sodium **AND** polyethylene glycol **AND** bisacodyl **AND** bisacodyl    HYDROcodone-acetaminophen    Morphine    [DISCONTINUED] HYDROmorphone **AND** naloxone    ondansetron    sodium chloride    sodium chloride   Infusions  sodium chloride, 75 mL/hr, Last Rate: 75 mL/hr (08/03/24 0542)        Diagnostic Data    Results from last 7 days   Lab Units 08/02/24  1224 08/02/24  1024 08/01/24  0235 07/31/24  1551   WBC 10*3/mm3 14.51*  --    < > 7.77   HEMOGLOBIN g/dL 9.1*  --    < > 12.4   HEMATOCRIT % 28.5*  --    < > 39.2   PLATELETS 10*3/mm3 213  --    < > 241   GLUCOSE mg/dL  --  144*   < > 94   CREATININE mg/dL  --  1.18*   < > 0.92   BUN mg/dL  --  20   < > 14   SODIUM mmol/L  --  136   < > 140   POTASSIUM mmol/L  --  4.2   < > 4.3   AST (SGOT) U/L  --   --   --  18   ALT (SGPT) U/L  --   --   --  14   ALK PHOS U/L  --   --   --  127*   BILIRUBIN mg/dL  --   --   --  0.7   ANION GAP mmol/L  --  12.5   < > 10.3    < > = values in this interval not displayed.       XR Hip With or Without Pelvis 1 View Left    Result Date: 8/1/2024  Impression: Left total hip  arthroplasty without evidence of immediate complication. Electronically Signed: Jluis Hamilton  8/1/2024 6:55 PM EDT  Workstation ID: [id number]       Assessment:    Acute left hip fracture  COPD  History of lung small cell cancer  Pain control  History of CVA  History of pulmonary emboli  Tobacco abuse  Anemia     Plan   continue present care,  -Patient presented after a fall, found on CT of the pelvis to have a minimal nondisplaced fracture of the left lateral femoral head and neck junction.    -Orthopedics consulted, Patient is 1 day status post left total hip arthroplasty due to fracture   -pain control as needed, gentle volume resuscitation.  -Continue home aspirin and Brilinta for now.  -PT/OT awaiting placement  -Continue to monitor..      Active and Resolved Problems  Active Hospital Problems    Diagnosis  POA    **Left displaced femoral neck fracture [S72.002A]  Yes    Closed fracture of left hip [S72.002A]  Yes    Moderate malnutrition [E44.0]  Yes      Resolved Hospital Problems   No resolved problems to display.       VTE Prophylaxis:  No VTE prophylaxis order currently exists.      Code status is   Code Status and Medical Interventions: CPR (Attempt to Resuscitate); Full Support   Ordered at: 07/31/24 1954     Code Status (Patient has no pulse and is not breathing):    CPR (Attempt to Resuscitate)     Medical Interventions (Patient has pulse or is breathing):    Full Support     Plan for disposition:rehab in 3 days    Time: 30 minutes    Signature: Electronically signed by Shiv Hurst MD, 08/03/24, 12:38 EDT.  [facility] Hospitalist Team

## 2024-08-03 NOTE — TREATMENT PLAN
Subjective: Pt agreeable to therapeutic plan of care. Daughter reports Pt had already been up about 3 hours in recliner this morning and had walked to/from the bathroom.    Objective:     Precautions - Fall risk    Bed mobility - Min-A at BLE's sit to supine  Transfers - CGA, Min-A, and with rolling walker  Ambulation - 15 feet CGA, Mod-A, and with rolling walker    Therapeutic Exercise - 10 Reps Bilaterally AROM unsupported sitting / EOB      Assessment: Miryam Fernández presents with functional mobility impairments which indicate the need for skilled intervention. Pt requires extra processing time responding best to 1 step commands throughout. Ax1 for all mobility although requires significant assist for AD management as Pt demo's poor carry over of verbal cues including during there ex in which pt requires verbal/tactile/visual cues with poor carryover. Continue to recommend return to rehab at discharge as pt is below baseline at this time. Tolerating session today without incident. Will continue to follow and progress as tolerated.

## 2024-08-03 NOTE — PLAN OF CARE
Goal Outcome Evaluation:           Progress: improving          Pt VS stable. Pt confused but able to make needs known. Pt with minimal complaints of pain treated per MAR. Personal items and call light within reach. Plan of care ongoing.

## 2024-08-03 NOTE — THERAPY TREATMENT NOTE
"Subjective: Pt agreeable to therapeutic plan of care. Daughter reports Pt had already been up about 3 hours in recliner this morning and had walked to/from the bathroom.    Objective:     Precautions - Fall risk    Bed mobility - Min-A  Transfers - CGA, Min-A, and with rolling walker  Ambulation - 15 feet CGA, Mod-A, and with rolling walker    Therapeutic Exercise - 10 Reps Bilaterally AROM unsupported sitting / EOB    Pain: 5 VAS   Location: L hip  Intervention for pain: Repositioned, Increased Activity, and Therapeutic Presence    Education: Provided education on the importance of mobility in the acute care setting, Verbal/Tactile Cues, Transfer Training, and Gait Training    Assessment: Miryam Fernández presents with functional mobility impairments which indicate the need for skilled intervention. Pt requires extra processing time responding best to 1 step commands throughout. Ax1 for all mobility although requires significant assist for AD management as Pt demo's poor carry over of verbal cues including during there ex in which pt requires verbal/tactile/visual cues with poor carryover. Continue to recommend return to rehab at discharge as pt is below baseline at this time. Tolerating session today without incident. Will continue to follow and progress as tolerated.     Plan/Recommendations:   If medically appropriate, Moderate Intensity Therapy recommended post-acute care. This is recommended as therapy feels the patient would require 3-4 days per week and wouldn't tolerate \"3 hour daily\" rehab intensity. SNF would be the preferred choice. If the patient does not agree to SNF, arrange HH or OP depending on home bound status. If patient is medically complex, consider LTACH. Pt requires no DME at discharge.     Pt desires Skilled Rehab placement at discharge. Pt cooperative; agreeable to therapeutic recommendations and plan of care.         Basic Mobility 6-click:  Rollin = Total, A lot = 2, A little = " 3; 4 = None  Supine>Sit:   1 = Total, A lot = 2, A little = 3; 4 = None   Sit>Stand with arms:  1 = Total, A lot = 2, A little = 3; 4 = None  Bed>Chair:   1 = Total, A lot = 2, A little = 3; 4 = None  Ambulate in room:  1 = Total, A lot = 2, A little = 3; 4 = None  3-5 Steps with railin = Total, A lot = 2, A little = 3; 4 = None  Score: 16    Modified [place]: N/A = No pre-op stroke/TIA    Post-Tx Position: Supine with HOB Elevated, Alarms activated, and Call light and personal items within reach  PPE: gloves

## 2024-08-03 NOTE — PLAN OF CARE
Goal Outcome Evaluation:         Patient rested well this shift. Dressing in place, dry and intact. Spouse is at bedside. Plan of care is ongoing. Call light within reach.

## 2024-08-04 LAB
ALBUMIN SERPL-MCNC: 3.1 G/DL (ref 3.5–5.2)
ALBUMIN/GLOB SERPL: 1.1 G/DL
ALP SERPL-CCNC: 89 U/L (ref 39–117)
ALT SERPL W P-5'-P-CCNC: <5 U/L (ref 1–33)
ANION GAP SERPL CALCULATED.3IONS-SCNC: 9 MMOL/L (ref 5–15)
AST SERPL-CCNC: 22 U/L (ref 1–32)
BASOPHILS # BLD AUTO: 0.03 10*3/MM3 (ref 0–0.2)
BASOPHILS NFR BLD AUTO: 0.4 % (ref 0–1.5)
BILIRUB SERPL-MCNC: 0.2 MG/DL (ref 0–1.2)
BUN SERPL-MCNC: 17 MG/DL (ref 8–23)
BUN/CREAT SERPL: 21.5 (ref 7–25)
CALCIUM SPEC-SCNC: 8.6 MG/DL (ref 8.6–10.5)
CHLORIDE SERPL-SCNC: 107 MMOL/L (ref 98–107)
CO2 SERPL-SCNC: 22 MMOL/L (ref 22–29)
CREAT SERPL-MCNC: 0.79 MG/DL (ref 0.57–1)
D-LACTATE SERPL-SCNC: 1.6 MMOL/L (ref 0.5–2)
DEPRECATED RDW RBC AUTO: 53.7 FL (ref 37–54)
EGFRCR SERPLBLD CKD-EPI 2021: 82.1 ML/MIN/1.73
EOSINOPHIL # BLD AUTO: 0.19 10*3/MM3 (ref 0–0.4)
EOSINOPHIL NFR BLD AUTO: 2.8 % (ref 0.3–6.2)
ERYTHROCYTE [DISTWIDTH] IN BLOOD BY AUTOMATED COUNT: 15.1 % (ref 12.3–15.4)
GLOBULIN UR ELPH-MCNC: 2.8 GM/DL
GLUCOSE SERPL-MCNC: 108 MG/DL (ref 65–99)
HCT VFR BLD AUTO: 23.8 % (ref 34–46.6)
HGB BLD-MCNC: 7.3 G/DL (ref 12–15.9)
IMM GRANULOCYTES # BLD AUTO: 0.03 10*3/MM3 (ref 0–0.05)
IMM GRANULOCYTES NFR BLD AUTO: 0.4 % (ref 0–0.5)
LYMPHOCYTES # BLD AUTO: 0.51 10*3/MM3 (ref 0.7–3.1)
LYMPHOCYTES NFR BLD AUTO: 7.4 % (ref 19.6–45.3)
MCH RBC QN AUTO: 30.8 PG (ref 26.6–33)
MCHC RBC AUTO-ENTMCNC: 30.7 G/DL (ref 31.5–35.7)
MCV RBC AUTO: 100.4 FL (ref 79–97)
MONOCYTES # BLD AUTO: 0.57 10*3/MM3 (ref 0.1–0.9)
MONOCYTES NFR BLD AUTO: 8.3 % (ref 5–12)
NEUTROPHILS NFR BLD AUTO: 5.57 10*3/MM3 (ref 1.7–7)
NEUTROPHILS NFR BLD AUTO: 80.7 % (ref 42.7–76)
NRBC BLD AUTO-RTO: 0 /100 WBC (ref 0–0.2)
NT-PROBNP SERPL-MCNC: 3896 PG/ML (ref 0–900)
PLATELET # BLD AUTO: 181 10*3/MM3 (ref 140–450)
PMV BLD AUTO: 10.1 FL (ref 6–12)
POTASSIUM SERPL-SCNC: 4.2 MMOL/L (ref 3.5–5.2)
PROCALCITONIN SERPL-MCNC: 0.13 NG/ML (ref 0–0.25)
PROT SERPL-MCNC: 5.9 G/DL (ref 6–8.5)
RBC # BLD AUTO: 2.37 10*6/MM3 (ref 3.77–5.28)
SODIUM SERPL-SCNC: 138 MMOL/L (ref 136–145)
WBC NRBC COR # BLD AUTO: 6.9 10*3/MM3 (ref 3.4–10.8)

## 2024-08-04 PROCEDURE — 87040 BLOOD CULTURE FOR BACTERIA: CPT | Performed by: INTERNAL MEDICINE

## 2024-08-04 PROCEDURE — 25010000002 CEFTRIAXONE PER 250 MG: Performed by: INTERNAL MEDICINE

## 2024-08-04 PROCEDURE — 83605 ASSAY OF LACTIC ACID: CPT | Performed by: INTERNAL MEDICINE

## 2024-08-04 PROCEDURE — 97116 GAIT TRAINING THERAPY: CPT

## 2024-08-04 PROCEDURE — 97530 THERAPEUTIC ACTIVITIES: CPT

## 2024-08-04 PROCEDURE — 85025 COMPLETE CBC W/AUTO DIFF WBC: CPT | Performed by: INTERNAL MEDICINE

## 2024-08-04 RX ADMIN — HYDROCODONE BITARTRATE AND ACETAMINOPHEN 1 TABLET: 5; 325 TABLET ORAL at 04:17

## 2024-08-04 RX ADMIN — ASPIRIN 81 MG: 81 TABLET, COATED ORAL at 08:30

## 2024-08-04 RX ADMIN — MEMANTINE 5 MG: 5 TABLET ORAL at 08:30

## 2024-08-04 RX ADMIN — HYDROCODONE BITARTRATE AND ACETAMINOPHEN 1 TABLET: 5; 325 TABLET ORAL at 11:17

## 2024-08-04 RX ADMIN — Medication 10 ML: at 08:32

## 2024-08-04 RX ADMIN — CEFTRIAXONE 2000 MG: 2 INJECTION, POWDER, FOR SOLUTION INTRAMUSCULAR; INTRAVENOUS at 13:17

## 2024-08-04 RX ADMIN — METOPROLOL SUCCINATE 25 MG: 25 TABLET, EXTENDED RELEASE ORAL at 08:29

## 2024-08-04 RX ADMIN — Medication 10 ML: at 21:04

## 2024-08-04 RX ADMIN — TICAGRELOR 90 MG: 90 TABLET ORAL at 21:04

## 2024-08-04 RX ADMIN — PANTOPRAZOLE SODIUM 40 MG: 40 TABLET, DELAYED RELEASE ORAL at 08:30

## 2024-08-04 RX ADMIN — MEMANTINE 5 MG: 5 TABLET ORAL at 21:04

## 2024-08-04 RX ADMIN — ATORVASTATIN CALCIUM 40 MG: 40 TABLET, FILM COATED ORAL at 08:30

## 2024-08-04 RX ADMIN — HYDROCODONE BITARTRATE AND ACETAMINOPHEN 1 TABLET: 5; 325 TABLET ORAL at 18:21

## 2024-08-04 NOTE — PROGRESS NOTES
[facility] MEDICINE SERVICE  DAILY PROGRESS NOTE    NAME: Miryam Fernández  : 1955  MRN: [id number]      LOS: 2 days     PROVIDER OF SERVICE: Shiv Hurst MD    Chief Complaint: Left displaced femoral neck fracture    Subjective:     Interval History:  History taken from: patient/    History of Present Illness:   This is a 69 years old history of hyperlipidemia, hypertension, COPD, female with a past medical and lung cancer who presented to the ED after a fall.  She was in the bathroom today when she fell and given significant pain they had an x-ray on admission home with concerns of femoral neck fracture she was then sent to the ED for further workup and management.       Vital signs on presentation showed a blood pressure 141/70, heart rate 73, afebrile 36.6 neurosurgeons, respiratory rate 16 saturation 97% on room air.     Labs showed BMP and LFT within normal limits, CBC within normal limits.  CT of the pelvis showed minimal nondisplaced fracture of the left lateral femoral head neck junction.     24-Patient Complaints: Patient is some pain complaints.  She denies any shortness of breath.  Denies any nausea vomiting.   states that she fell attempting to independently ambulate at facility.  Nurse feels that Claudia has constipation to act oddly with for pain management.  No other acute issues.  24-Patient is 1 day status post left total hip arthroplasty due to fracture.  Family at bedside, mental status improving, vital signs stable, discussed with RN.  [facility]  8/3/24 patient seen and examined in bed no acute distress.  Family at bedside, patient still confused, discussed with RN and, discussed with , awaiting placement.  24 patient seen and examined in bed no acute distress, vital signs stable, family at bedside, discussed with RN, discussed with .  Patient to return back to rehab on discharge    Review of  Systems   Unable to perform ROS: Mental status change     Objective:     Vital Signs  Temp:  [97.4 °F (36.3 °C)-98.2 °F (36.8 °C)] 98.2 °F (36.8 °C)  Heart Rate:  [79-85] 79  Resp:  [14-15] 14  BP: (122-150)/(70-82) 150/70   Body mass index is 21.95 kg/m².    Physical Exam  Appearance: No apparent distress, nontoxic-appearing.   HEENT/Neck: Neck is supple, Extraocular movements intact, Moist mucous membranes  Cardiovascular: Regular Rate and Rhythm, No murmurs  Pulmonary: Clear to auscultation Bilaterally. No wheeze,  Abdomen: BS+, Soft, nontender, nondistended.   Ext: No Cyanosis, Clubbing, Edema, left lower extremity examination limited by pain.    Neuro: Nonfocal, Alert & Oriented x 3    Scheduled Meds   aspirin, 81 mg, Oral, Daily  atorvastatin, 40 mg, Oral, Daily  cefTRIAXone, 2,000 mg, Intravenous, Q24H  memantine, 5 mg, Oral, BID  metoprolol succinate XL, 25 mg, Oral, Daily  mupirocin, 1 Application, Topical, TID  pantoprazole, 40 mg, Oral, Daily  sodium chloride, 10 mL, Intravenous, Q12H  [Held by provider] ticagrelor, 90 mg, Oral, BID       PRN Meds     acetaminophen    senna-docusate sodium **AND** polyethylene glycol **AND** bisacodyl **AND** bisacodyl    HYDROcodone-acetaminophen    Morphine    [DISCONTINUED] HYDROmorphone **AND** naloxone    ondansetron    sodium chloride    sodium chloride   Infusions  [Held by provider] sodium chloride, 75 mL/hr, Last Rate: Stopped (08/04/24 0326)        Diagnostic Data    Results from last 7 days   Lab Units 08/04/24  0044 08/03/24  2339   WBC 10*3/mm3 6.90  --    HEMOGLOBIN g/dL 7.3*  --    HEMATOCRIT % 23.8*  --    PLATELETS 10*3/mm3 181  --    GLUCOSE mg/dL  --  108*   CREATININE mg/dL  --  0.79   BUN mg/dL  --  17   SODIUM mmol/L  --  138   POTASSIUM mmol/L  --  4.2   AST (SGOT) U/L  --  22   ALT (SGPT) U/L  --  <5   ALK PHOS U/L  --  89   BILIRUBIN mg/dL  --  0.2   ANION GAP mmol/L  --  9.0       No radiology results for the last day      Assessment:    Acute  left hip fracture  COPD  History of lung small cell cancer  Pain control  History of CVA  History of pulmonary emboli  Tobacco abuse  Anemia     Plan   continue present care,  -Patient presented after a fall, found on CT of the pelvis to have a minimal nondisplaced fracture of the left lateral femoral head and neck junction.    -Orthopedics consulted, Patient is 1 day status post left total hip arthroplasty due to fracture   -pain control as needed, gentle volume resuscitation.  -Continue home aspirin and Brilinta for now.  -PT/OT awaiting placement  -Continue to monitor..  -Monitor hemoglobin, presently 7.3.  12 on admission.  Might need transfusion in AM.    Active and Resolved Problems  Active Hospital Problems    Diagnosis  POA    **Left displaced femoral neck fracture [S72.002A]  Yes    Closed fracture of left hip [S72.002A]  Yes    Moderate malnutrition [E44.0]  Yes      Resolved Hospital Problems   No resolved problems to display.       VTE Prophylaxis:  No VTE prophylaxis order currently exists.      Code status is   Code Status and Medical Interventions: CPR (Attempt to Resuscitate); Full Support   Ordered at: 07/31/24 1954     Code Status (Patient has no pulse and is not breathing):    CPR (Attempt to Resuscitate)     Medical Interventions (Patient has pulse or is breathing):    Full Support     Plan for disposition:rehab in 3 days    Time: 30 minutes    Signature: Electronically signed by Shiv Hurst MD, 08/04/24, 13:39 EDT.  [facility] Hospitalist Team

## 2024-08-04 NOTE — PLAN OF CARE
Goal Outcome Evaluation:      C/o pain on incision site, medicated per MAR. Alert and oriented x 2, dressing is dry and intact. BNP is elevated, result called to NP, IV fluid is on hold. SCDs on. Plan of care is ongoing. Call light within reach.

## 2024-08-04 NOTE — PLAN OF CARE
Goal Outcome Evaluation:  Plan of Care Reviewed With: patient, spouse        Progress: improving       Pt VS stable. Pt confused but able to make needs known. Pt with complaints of pain treated per MAR. Spouse at bedside. Personal items and call light within reach. Plan of care ongoing.

## 2024-08-04 NOTE — PLAN OF CARE
Assessment: Miryam Fernández presents with functional mobility impairments which indicate the need for skilled intervention. Tolerating session today without incident. Pt with c/o pain during mobitliy but tolerated fair. Was able to increase ambulation with consistent cueing for AD management and for distance to Rwx. Pt began to fatigue and required seat to be brought to her. PT assisted pt with transfers to/from toilet and for keshawn care. Will continue to follow and progress as tolerated.

## 2024-08-05 LAB
HCT VFR BLD AUTO: 23.5 % (ref 34–46.6)
HGB BLD-MCNC: 7.5 G/DL (ref 12–15.9)

## 2024-08-05 PROCEDURE — 97535 SELF CARE MNGMENT TRAINING: CPT

## 2024-08-05 PROCEDURE — 85018 HEMOGLOBIN: CPT | Performed by: INTERNAL MEDICINE

## 2024-08-05 PROCEDURE — 97530 THERAPEUTIC ACTIVITIES: CPT

## 2024-08-05 PROCEDURE — 97116 GAIT TRAINING THERAPY: CPT

## 2024-08-05 PROCEDURE — 85014 HEMATOCRIT: CPT | Performed by: INTERNAL MEDICINE

## 2024-08-05 PROCEDURE — 97110 THERAPEUTIC EXERCISES: CPT

## 2024-08-05 PROCEDURE — 25010000002 CEFTRIAXONE PER 250 MG: Performed by: INTERNAL MEDICINE

## 2024-08-05 RX ADMIN — SENNOSIDES AND DOCUSATE SODIUM 2 TABLET: 50; 8.6 TABLET ORAL at 20:38

## 2024-08-05 RX ADMIN — HYDROCODONE BITARTRATE AND ACETAMINOPHEN 1 TABLET: 5; 325 TABLET ORAL at 01:12

## 2024-08-05 RX ADMIN — MEMANTINE 5 MG: 5 TABLET ORAL at 09:10

## 2024-08-05 RX ADMIN — METOPROLOL SUCCINATE 25 MG: 25 TABLET, EXTENDED RELEASE ORAL at 09:10

## 2024-08-05 RX ADMIN — MEMANTINE 5 MG: 5 TABLET ORAL at 20:38

## 2024-08-05 RX ADMIN — CEFTRIAXONE 2000 MG: 2 INJECTION, POWDER, FOR SOLUTION INTRAMUSCULAR; INTRAVENOUS at 14:11

## 2024-08-05 RX ADMIN — ATORVASTATIN CALCIUM 40 MG: 40 TABLET, FILM COATED ORAL at 09:10

## 2024-08-05 RX ADMIN — POLYETHYLENE GLYCOL 3350 17 G: 17 POWDER, FOR SOLUTION ORAL at 20:38

## 2024-08-05 RX ADMIN — TICAGRELOR 90 MG: 90 TABLET ORAL at 20:38

## 2024-08-05 RX ADMIN — HYDROCODONE BITARTRATE AND ACETAMINOPHEN 1 TABLET: 5; 325 TABLET ORAL at 10:48

## 2024-08-05 RX ADMIN — TICAGRELOR 90 MG: 90 TABLET ORAL at 09:16

## 2024-08-05 RX ADMIN — ASPIRIN 81 MG: 81 TABLET, COATED ORAL at 09:10

## 2024-08-05 RX ADMIN — PANTOPRAZOLE SODIUM 40 MG: 40 TABLET, DELAYED RELEASE ORAL at 09:11

## 2024-08-05 RX ADMIN — Medication 10 ML: at 20:38

## 2024-08-05 RX ADMIN — Medication 10 ML: at 09:14

## 2024-08-05 NOTE — PLAN OF CARE
Goal Outcome Evaluation:           Progress: improving            VSS, able to make needs known, remains slightly confused, plan to DC tomorrow, pain controlled per MAR. Plan of care ongoing.

## 2024-08-05 NOTE — PLAN OF CARE
"Assessment: Miryam Fernández presents with ADL impairments affecting function including balance, endurance / activity tolerance, pain, postural / trunk control, and strength. Demonstrated functioning below baseline abilities indicate the need for continued skilled intervention while inpatient. Pt demo poor insight to deficits this date, continues to be confused however able to follow commands appropriately. Pt requiring task segmentation with mobility, requiring increased duration and increased assistance. Pt with limited activity d/t pain, however eager to participate with therapy and progressing towards stated goals. OT continues to recommend SNF at [place]. Tolerating session today without incident. Will continue to follow and progress as tolerated.     Plan/Recommendations:   Moderate Intensity Therapy recommended post-acute care. This is recommended as therapy feels the patient would require 3-4 days per week and wouldn't tolerate \"3 hour daily\" rehab intensity. SNF would be the preferred choice. If the patient does not agree to SNF, arrange HH or OP depending on home bound status. If patient is medically complex, consider LTACH.. Pt requires no DME at discharge.     Pt desires Skilled Rehab placement at discharge. Pt cooperative; agreeable to therapeutic recommendations and plan of care.   "

## 2024-08-05 NOTE — PLAN OF CARE
Goal Outcome Evaluation:  Plan of Care Reviewed With: patient, spouse, daughter     Bed mobility - Min-A and Assist x 2  Transfers - Min-A and Assist x 2  Ambulation - 20 feet Mod-A and with rolling walker     WB'ing status: L Lower Extremity Weight Bearing As Tolerated    Therapeutic Exercise: 10 Reps L Lower Extremity AAROM     Precautions: Anterior hip precautions   Vitals: WNL    Pain: Pt unable to rate pain but grimaces with mobility VAS   Location: Left hip  Intervention for pain: Repositioned, Increased Activity, and Therapeutic Presence    Education: Provided education on the importance of mobility in the acute care setting, Verbal/Tactile Cues, Transfer Training, and Gait Training    Assessment: Miryam Fernández presents with functional mobility impairments which indicate the need for skilled intervention. Pt required 100% verbal cueing for safe step sequencing and walker placement. Pt ambulated with a step to gait pattern.  Tolerating session today without incident. Will continue to follow and progress as tolerated.   Anticipated Discharge Disposition (PT): skilled nursing facility

## 2024-08-05 NOTE — CASE MANAGEMENT/SOCIAL WORK
Continued Stay Note   Oh     Patient Name: Miryam Fernández  MRN: [id number]  Today's Date: 8/5/2024    Admit Date: 7/31/2024    Plan: Return to [facility] skilled. Will need precert. NO new PASRR needed.   Discharge Plan       Row Name 08/05/24 1629       Plan    Plan Return to [facility] skilled. Will need precert. NO new PASRR needed.    Plan Comments DC barriers: pod#4 left total hip.  monitoring hgb.  pending precert                         Expected Discharge Date and Time       Expected Discharge Date Expected Discharge Time    Aug 6, 2024           Hawa Chaudhari RN    SIPS 1  [email or web address] MD  Office [phone]  Cell [phone]

## 2024-08-05 NOTE — PROGRESS NOTES
Nutrition Services    Patient Name: Miryam Fernández  YOB: 1955  MRN: [id number]  Admission date: 7/31/2024    PROGRESS NOTE      Encounter Information: Check on for PO intakes.  S/P left hip replacement 8/1 per ortho.  Patient to return to [facility] at D/C.         PO Diet: Diet: Regular/House; Fluid Consistency: Thin (IDDSI 0)   PO Supplements: None ordered    PO Intake:  58% average PO intakes x 1- documented meals since last RD review       Current nutrition support:    Nutrition support review:        Labs (reviewed below): Reviewed, management per attending        GI Function:  No documented BM since admission (x 5 days ago) - messaged RN suggesting use of PRN bowel regimen if this is accurate        Nutrition Intervention Updates: Continue current diet and encourage good PO intakes.       Results from last 7 days   Lab Units 08/03/24  2339 08/02/24  1024 08/01/24  0235 07/31/24  1551   SODIUM mmol/L 138 136 140 140   POTASSIUM mmol/L 4.2 4.2 4.1 4.3   CHLORIDE mmol/L 107 102 103 101   CO2 mmol/L 22.0 21.5* 27.5 28.7   BUN mg/dL 17 20 11 14   CREATININE mg/dL 0.79 1.18* 0.82 0.92   CALCIUM mg/dL 8.6 8.8 9.4 9.7   BILIRUBIN mg/dL 0.2  --   --  0.7   ALK PHOS U/L 89  --   --  127*   ALT (SGPT) U/L <5  --   --  14   AST (SGOT) U/L 22  --   --  18   GLUCOSE mg/dL 108* 144* 90 94     Results from last 7 days   Lab Units 08/05/24  0839 08/02/24  1224 08/01/24  0235   MAGNESIUM mg/dL  --   --  1.9   PHOSPHORUS mg/dL  --   --  3.4   HEMOGLOBIN g/dL 7.5*   < > 10.8*   HEMATOCRIT % 23.5*   < > 33.8*    < > = values in this interval not displayed.     COVID19   Date Value Ref Range Status   06/28/2024 Not Detected Not Detected - Ref. Range Final     Lab Results   Component Value Date    HGBA1C 5.44 07/22/2024       RD to follow up per protocol.    Electronically signed by:  Peace Nunez RD  08/05/24 15:49 EDT

## 2024-08-05 NOTE — CASE MANAGEMENT/SOCIAL WORK
Case Management Readmission Assessment Note    Case Management Readmission Assessment (all recorded)       Readmission Interview       Row Name 08/05/24 1431             Readmission Indications    Is the patient and/or family able to complete the readmission assessment questions? Yes      Is this hospitalization related to the prior hospital diagnosis? No  patient fell at [facility] Name 08/05/24 1431             Recommendation for rehospitalization    Did you speak with your physician prior to coming to the hospital No  facility sent her        Row Name 08/05/24 1431             Follow-up Appointments    Do you have a PCP? Yes      Did you have an appointment with PCP after your hospitalization? No  went to skilled nursing      Did you have an appointment with a Specialist? No  when to [facility] for rehab      Are you current with the Pulmonary Clinic? No      Are you current with the CHF Clinic? No        [facility] Name 08/05/24 1431             Medications    Did you have newly prescribed medications at discharge? Yes      Did you understand the reasons for your medications at discharge and how to take them? No  they were sent to facility and they give them      Did you understand the side effects of your medications? No      Are you taking all of you prescribed medications? Yes  the nursing home is giving meds      What pharmacy was used to fill prescription(s)? [facility] pharmacy      Were medications picked up? --  NA        [facility] Name 08/05/24 1431             Discharge Instructions    Did you understand your discharge instructions? No  WAS SENT TO nh for rehab      Did your family/caregiver hear your instructions? Yes      Were you told to eat a special diet? No      Were you given a number of someone to call if you had questions or concerns? No  went to nursing home        [facility] Name 08/05/24 1431             Index discharge location/services    Where did you go  "upon discharge? Skilled Nursing Facility  [facility]      Do you have supportive family or friends in the home? Yes      What services were arranged at discharge? --  NA  WENT TO NH      Was the provider seen at the facility? Yes      What actions were taken to avoid a readmit? \" I fell, and the facility did an xray and found my hip was broken and sent me to the hospital\"      Which Skilled Nursing Facility were your admitted from? [facility]        Row Name 08/05/24 1431             Discharge Readiness    On a scale of 1-5 (5 being well prepared), how ready were you for discharge 4        Row Name 08/05/24 1431             Palliative Care/Hospice    Are you current with Palliative Care? No      Are you current with Hospice Care? No        Row Name 07/31/24 2113             Advance Directives (For Healthcare)    Pre-existing AND/MOST/POLST Order No      Advance Directive Status Patient does not have advance directive      Have you reviewed your Advance Directive and is it valid for this stay? No      Literature Provided on Advance Directives No      Patient Requests Assistance on Advance Directives Patient Declined        Row Name 08/05/24 1431             Readmission Assessment Final Comments    Final Comments Patient was her 7/19/24-7/23/24  for altered mental status due to acute ischemic stroke,   Patient was discharged to [facility].    while at NH patient fell in bathroom and hurt her hip, facility did an xray and showed it was fractured so they sent her back to the hospitalon 7/31/2024  Patient had surgery on 8/1/24.  Patient will return to [facility] at discharge.                    "

## 2024-08-05 NOTE — PROGRESS NOTES
[facility] MEDICINE SERVICE  DAILY PROGRESS NOTE    NAME: Miryam Fernández  : 1955  MRN: [id number]      LOS: 3 days     PROVIDER OF SERVICE: Shiv Hurst MD    Chief Complaint: Left displaced femoral neck fracture    Subjective:     Interval History:  History taken from: patient/    History of Present Illness:   This is a 69 years old history of hyperlipidemia, hypertension, COPD, female with a past medical and lung cancer who presented to the ED after a fall.  She was in the bathroom today when she fell and given significant pain they had an x-ray on admission home with concerns of femoral neck fracture she was then sent to the ED for further workup and management.       Vital signs on presentation showed a blood pressure 141/70, heart rate 73, afebrile 36.6 neurosurgeons, respiratory rate 16 saturation 97% on room air.     Labs showed BMP and LFT within normal limits, CBC within normal limits.  CT of the pelvis showed minimal nondisplaced fracture of the left lateral femoral head neck junction.     24-Patient Complaints: Patient is some pain complaints.  She denies any shortness of breath.  Denies any nausea vomiting.   states that she fell attempting to independently ambulate at facility.  Nurse feels that Claudia has constipation to act oddly with for pain management.  No other acute issues.  24-Patient is 1 day status post left total hip arthroplasty due to fracture.  Family at bedside, mental status improving, vital signs stable, discussed with RN.  [facility]  8/3/24 patient seen and examined in bed no acute distress.  Family at bedside, patient still confused, discussed with RN and, discussed with , awaiting placement.  24 patient seen and examined in bed no acute distress, vital signs stable, family at bedside, discussed with RN, discussed with .  Patient to return back to rehab on discharge  24 patient  seen and examined in bed acute distress, vital signs stable, awaiting placement, discussed with RN, discussed with case management.    Review of Systems   Unable to perform ROS: Mental status change     Objective:     Vital Signs  Temp:  [98 °F (36.7 °C)-98.2 °F (36.8 °C)] 98.1 °F (36.7 °C)  Heart Rate:  [76-93] 93  Resp:  [14-16] 14  BP: (103-156)/(67-71) 103/67   Body mass index is 21.95 kg/m².    Physical Exam  Appearance: No apparent distress, nontoxic-appearing.   HEENT/Neck: Neck is supple, Extraocular movements intact, Moist mucous membranes  Cardiovascular: Regular Rate and Rhythm, No murmurs  Pulmonary: Clear to auscultation Bilaterally. No wheeze,  Abdomen: BS+, Soft, nontender, nondistended.   Ext: No Cyanosis, Clubbing, Edema, left lower extremity examination limited by pain.    Neuro: Nonfocal, Alert & Oriented x 3    Scheduled Meds   aspirin, 81 mg, Oral, Daily  atorvastatin, 40 mg, Oral, Daily  cefTRIAXone, 2,000 mg, Intravenous, Q24H  memantine, 5 mg, Oral, BID  metoprolol succinate XL, 25 mg, Oral, Daily  mupirocin, 1 Application, Topical, TID  pantoprazole, 40 mg, Oral, Daily  sodium chloride, 10 mL, Intravenous, Q12H  ticagrelor, 90 mg, Oral, BID       PRN Meds     acetaminophen    senna-docusate sodium **AND** polyethylene glycol **AND** bisacodyl **AND** bisacodyl    HYDROcodone-acetaminophen    Morphine    [DISCONTINUED] HYDROmorphone **AND** naloxone    ondansetron    sodium chloride    sodium chloride   Infusions  [Held by provider] sodium chloride, 75 mL/hr, Last Rate: Stopped (08/04/24 0326)        Diagnostic Data    Results from last 7 days   Lab Units 08/05/24  0839 08/04/24  0044 08/03/24  2339   WBC 10*3/mm3  --  6.90  --    HEMOGLOBIN g/dL 7.5* 7.3*  --    HEMATOCRIT % 23.5* 23.8*  --    PLATELETS 10*3/mm3  --  181  --    GLUCOSE mg/dL  --   --  108*   CREATININE mg/dL  --   --  0.79   BUN mg/dL  --   --  17   SODIUM mmol/L  --   --  138   POTASSIUM mmol/L  --   --  4.2   AST (SGOT)  U/L  --   --  22   ALT (SGPT) U/L  --   --  <5   ALK PHOS U/L  --   --  89   BILIRUBIN mg/dL  --   --  0.2   ANION GAP mmol/L  --   --  9.0       No radiology results for the last day      Assessment:    Acute left hip fracture  COPD  History of lung small cell cancer  Pain control  History of CVA  History of pulmonary emboli  Tobacco abuse  Anemia     Plan   continue present care,  -Patient presented after a fall, found on CT of the pelvis to have a minimal nondisplaced fracture of the left lateral femoral head and neck junction.    -Orthopedics consulted, Patient is 1 day status post left total hip arthroplasty due to fracture   -pain control as needed, gentle volume resuscitation.  -Continue home aspirin and Brilinta for now.  -PT/OT awaiting placement  -Continue to monitor..  -Monitor hemoglobin, presently 7.3.  12 on admission.  Might need transfusion in AM.    Active and Resolved Problems  Active Hospital Problems    Diagnosis  POA    **Left displaced femoral neck fracture [S72.002A]  Yes    Closed fracture of left hip [S72.002A]  Yes    Moderate malnutrition [E44.0]  Yes      Resolved Hospital Problems   No resolved problems to display.       VTE Prophylaxis:  No VTE prophylaxis order currently exists.      Code status is   Code Status and Medical Interventions: CPR (Attempt to Resuscitate); Full Support   Ordered at: 07/31/24 1954     Code Status (Patient has no pulse and is not breathing):    CPR (Attempt to Resuscitate)     Medical Interventions (Patient has pulse or is breathing):    Full Support     Plan for disposition:rehab in 3 days    Time: 30 minutes    Signature: Electronically signed by Shiv Hurst MD, 08/05/24, 10:54 EDT.  [facility] Hospitalist Team

## 2024-08-05 NOTE — THERAPY TREATMENT NOTE
"Subjective: Pt agreeable to therapeutic plan of care.    Objective:   Subjective: Pt agreeable to therapeutic plan of care.    Objective:     Bed mobility - Min-A and Assist x 2  Transfers - Min-A and Assist x 2  Ambulation - 20 feet Mod-A and with rolling walker     WB'ing status: L Lower Extremity Weight Bearing As Tolerated    Therapeutic Exercise: 10 Reps L Lower Extremity AAROM     Precautions: Anterior hip precautions   Vitals: WNL    Pain: Pt unable to rate pain but grimaces with mobility VAS   Location: Left hip  Intervention for pain: Repositioned, Increased Activity, and Therapeutic Presence    Education: Provided education on the importance of mobility in the acute care setting, Verbal/Tactile Cues, Transfer Training, and Gait Training    Assessment: Miryam Fernández presents with functional mobility impairments which indicate the need for skilled intervention. Pt required 100% verbal cueing for safe step sequencing and walker placement. Pt ambulated with a step to gait pattern.  Tolerating session today without incident. Will continue to follow and progress as tolerated.     Plan/Recommendations:   If medically appropriate, Moderate Intensity Therapy recommended post-acute care. This is recommended as therapy feels the patient would require 3-4 days per week and wouldn't tolerate \"3 hour daily\" rehab intensity. SNF would be the preferred choice. If the patient does not agree to SNF, arrange HH or OP depending on home bound status. If patient is medically complex, consider LTACH. Pt requires no DME at discharge.     Pt desires Skilled Rehab placement at discharge. Pt cooperative; agreeable to therapeutic recommendations and plan of care.         Basic Mobility 6-click:  Rollin = Total, A lot = 2, A little = 3; 4 = None  Supine>Sit:   1 = Total, A lot = 2, A little = 3; 4 = None   Sit>Stand with arms:  1 = Total, A lot = 2, A little = 3; 4 = None  Bed>Chair:   1 = Total, A lot = 2, A little = 3; " 4 = None  Ambulate in room:  1 = Total, A lot = 2, A little = 3; 4 = None  3-5 Steps with railin = Total, A lot = 2, A little = 3; 4 = None  Score: 13    Modified [place]: N/A = No pre-op stroke/TIA    Post-Tx Position: Up in Chair, Alarms activated, and Call light and personal items within reach  PPE: gloves

## 2024-08-05 NOTE — THERAPY TREATMENT NOTE
"Subjective: Pt agreeable to therapeutic plan of care.     Cognition: oriented to Person and Place, safety/judgement: fair, and awareness of deficits: fair awareness of safety precautions and fair awareness of deficits     Objective:     Precautions - LLE CLINTON anterior, WBAT     Bed Mobility: Min-A and Assist x 2   Functional Transfers: Min-A, Assist x 2, and with rolling walker     Balance: sitting EOB, unsupported, and static CGA  Functional Ambulation: Mod-A and with rolling walker    Vitals: WNL    Pain: does not give numeric value Location: LLE   Interventions for pain: Repositioned, Increased Activity, and Therapeutic Presence  Education: Provided education on the importance of mobility in the acute care setting, Verbal/Tactile Cues, and Transfer Training      Assessment: Miryam Fernández presents with ADL impairments affecting function including balance, endurance / activity tolerance, pain, postural / trunk control, and strength. Demonstrated functioning below baseline abilities indicate the need for continued skilled intervention while inpatient. Pt demo poor insight to deficits this date, continues to be confused however able to follow commands appropriately. Pt requiring task segmentation with mobility, requiring increased duration and increased assistance. Pt with limited activity d/t pain, however eager to participate with therapy and progressing towards stated goals. OT continues to recommend SNF at [place]. Tolerating session today without incident. Will continue to follow and progress as tolerated.     Plan/Recommendations:   Moderate Intensity Therapy recommended post-acute care. This is recommended as therapy feels the patient would require 3-4 days per week and wouldn't tolerate \"3 hour daily\" rehab intensity. SNF would be the preferred choice. If the patient does not agree to SNF, arrange HH or OP depending on home bound status. If patient is medically complex, consider LTACH.. Pt requires no DME at " discharge.     Pt desires Skilled Rehab placement at discharge. Pt cooperative; agreeable to therapeutic recommendations and plan of care.     Modified [place]: N/A = No pre-op stroke/TIA    Post-Tx Position: Up in Chair, Alarms activated, and Call light and personal items within reach  PPE: gloves

## 2024-08-05 NOTE — PLAN OF CARE
Goal Outcome Evaluation:  Plan of Care Reviewed With: patient        Progress: no change  Outcome Evaluation: Pt VSS, L hip FX, 2 assist with a walker, family at bedside, Pt is confused at times, Pain is treated with prn medication, Call light in reach, Plan on going

## 2024-08-06 VITALS
WEIGHT: 123.9 LBS | TEMPERATURE: 97.4 F | OXYGEN SATURATION: 98 % | BODY MASS INDEX: 21.95 KG/M2 | SYSTOLIC BLOOD PRESSURE: 125 MMHG | DIASTOLIC BLOOD PRESSURE: 78 MMHG | RESPIRATION RATE: 17 BRPM | HEART RATE: 86 BPM | HEIGHT: 63 IN

## 2024-08-06 PROCEDURE — 97116 GAIT TRAINING THERAPY: CPT

## 2024-08-06 PROCEDURE — 97110 THERAPEUTIC EXERCISES: CPT

## 2024-08-06 PROCEDURE — 97535 SELF CARE MNGMENT TRAINING: CPT

## 2024-08-06 PROCEDURE — 97530 THERAPEUTIC ACTIVITIES: CPT

## 2024-08-06 RX ORDER — AMOXICILLIN 250 MG
2 CAPSULE ORAL 2 TIMES DAILY PRN
Qty: 30 TABLET | Refills: 0 | Status: SHIPPED | OUTPATIENT
Start: 2024-08-06

## 2024-08-06 RX ORDER — HYDROCODONE BITARTRATE AND ACETAMINOPHEN 5; 325 MG/1; MG/1
1 TABLET ORAL EVERY 6 HOURS PRN
Qty: 6 TABLET | Refills: 0 | Status: SHIPPED | OUTPATIENT
Start: 2024-08-06 | End: 2024-08-06

## 2024-08-06 RX ORDER — HYDROCODONE BITARTRATE AND ACETAMINOPHEN 5; 325 MG/1; MG/1
1 TABLET ORAL EVERY 6 HOURS PRN
Qty: 6 TABLET | Refills: 0 | Status: SHIPPED | OUTPATIENT
Start: 2024-08-06

## 2024-08-06 RX ADMIN — ATORVASTATIN CALCIUM 40 MG: 40 TABLET, FILM COATED ORAL at 10:10

## 2024-08-06 RX ADMIN — SENNOSIDES AND DOCUSATE SODIUM 2 TABLET: 50; 8.6 TABLET ORAL at 13:42

## 2024-08-06 RX ADMIN — ASPIRIN 81 MG: 81 TABLET, COATED ORAL at 10:09

## 2024-08-06 RX ADMIN — ACETAMINOPHEN 650 MG: 325 TABLET, FILM COATED ORAL at 10:09

## 2024-08-06 RX ADMIN — METOPROLOL SUCCINATE 25 MG: 25 TABLET, EXTENDED RELEASE ORAL at 10:10

## 2024-08-06 RX ADMIN — Medication 10 ML: at 10:10

## 2024-08-06 RX ADMIN — BISACODYL 5 MG: 5 TABLET, COATED ORAL at 05:11

## 2024-08-06 RX ADMIN — PANTOPRAZOLE SODIUM 40 MG: 40 TABLET, DELAYED RELEASE ORAL at 10:10

## 2024-08-06 RX ADMIN — MEMANTINE 5 MG: 5 TABLET ORAL at 10:10

## 2024-08-06 RX ADMIN — TICAGRELOR 90 MG: 90 TABLET ORAL at 10:10

## 2024-08-06 NOTE — PLAN OF CARE
"Assessment: Miryam Fernández presents with ADL impairments affecting function including balance, cognition, endurance / activity tolerance, pain, postural / trunk control, and strength. Demonstrated functioning below baseline abilities indicate the need for continued skilled intervention while inpatient. Pt continues to be pleasantly confused, however able to follow commands appropriately. Pt requiring significant assist with ambulation, requiring task segmentation to advance RW, then surgical leg, the non-operative leg when ambulating. Pt requiring consistent verbal cues to advance in taking steps. Pt with increased pain with mobility, limiting activity. OT continues to recommend SNF at [place]. Tolerating session today without incident. Will continue to follow and progress as tolerated.     Plan/Recommendations:   Moderate Intensity Therapy recommended post-acute care. This is recommended as therapy feels the patient would require 3-4 days per week and wouldn't tolerate \"3 hour daily\" rehab intensity. SNF would be the preferred choice. If the patient does not agree to SNF, arrange HH or OP depending on home bound status. If patient is medically complex, consider LTACH.. Pt requires no DME at discharge.     Pt desires Skilled Rehab placement at discharge. Pt cooperative; agreeable to therapeutic recommendations and plan of care.   "

## 2024-08-06 NOTE — NURSING NOTE
Pt VSS, Pt has been confused at times, Pt needs to have a BM, I have given stool softeners and miralax with no BM will continue to monitor. Call light in reach ,plan on going

## 2024-08-06 NOTE — THERAPY TREATMENT NOTE
"Subjective: Pt agreeable to therapeutic plan of care.    Objective:     WB'ing status: L Lower Extremity Weight Bearing As Tolerated    Precautions: High falls risk     Bed mobility - Min-A with additional time  Transfers - Min-A, Mod-A, and Assist x 2  Ambulation - 22 feet Min-A, Assist x 2, and with rolling walker    Therapeutic Exercise - 10 Reps L Lower Extremity AAROM lying supine and supported sitting / chair    Vitals: WNL    Pain: Pt grimacing with gait VAS   Location: LLE  Intervention for pain: RN notified    Education: Provided education on the importance of mobility in the acute care setting, Verbal/Tactile Cues, Transfer Training, and Gait Training    Assessment: Miryam Fernández presents with functional mobility impairments which indicate the need for skilled intervention. Pt is POD #5 for L ant CLINTON for hip fracture. PT cueing pt for step to gait pattern to offload painful LLE with good carryover by pt. Pt progressing well with mobility. Pain meds before PT session would likely be helpful for pt. Tolerating session today without incident. Will continue to follow and progress as tolerated.     Plan/Recommendations:   If medically appropriate, Moderate Intensity Therapy recommended post-acute care. This is recommended as therapy feels the patient would require 3-4 days per week and wouldn't tolerate \"3 hour daily\" rehab intensity. SNF would be the preferred choice. If the patient does not agree to SNF, arrange HH or OP depending on home bound status. If patient is medically complex, consider LTACH. Pt requires no DME at discharge.     Pt desires Skilled Rehab placement at discharge. Pt cooperative; agreeable to therapeutic recommendations and plan of care.         Basic Mobility 6-click:  Rollin = Total, A lot = 2, A little = 3; 4 = None  Supine>Sit:   1 = Total, A lot = 2, A little = 3; 4 = None   Sit>Stand with arms:  1 = Total, A lot = 2, A little = 3; 4 = None  Bed>Chair:   1 = Total, A " lot = 2, A little = 3; 4 = None  Ambulate in room:  1 = Total, A lot = 2, A little = 3; 4 = None  3-5 Steps with railin = Total, A lot = 2, A little = 3; 4 = None  Score: 13    Modified [place]: N/A = No pre-op stroke/TIA    Post-Tx Position: Up in Chair, Staff Present, Alarms activated, and Call light and personal items within reach  PPE: gloves

## 2024-08-06 NOTE — PROGRESS NOTES
[facility] MEDICINE SERVICE  DAILY PROGRESS NOTE    NAME: Miryam Fernández  : 1955  MRN: [id number]      LOS: 4 days     PROVIDER OF SERVICE: Shiv Hurst MD    Chief Complaint: Left displaced femoral neck fracture    Subjective:     Interval History:  History taken from: patient/    History of Present Illness:   This is a 69 years old history of hyperlipidemia, hypertension, COPD, female with a past medical and lung cancer who presented to the ED after a fall.  She was in the bathroom today when she fell and given significant pain they had an x-ray on admission home with concerns of femoral neck fracture she was then sent to the ED for further workup and management.       Vital signs on presentation showed a blood pressure 141/70, heart rate 73, afebrile 36.6 neurosurgeons, respiratory rate 16 saturation 97% on room air.     Labs showed BMP and LFT within normal limits, CBC within normal limits.  CT of the pelvis showed minimal nondisplaced fracture of the left lateral femoral head neck junction.     24-Patient Complaints: Patient is some pain complaints.  She denies any shortness of breath.  Denies any nausea vomiting.   states that she fell attempting to independently ambulate at facility.  Nurse feels that Claudia has constipation to act oddly with for pain management.  No other acute issues.  24-Patient is 1 day status post left total hip arthroplasty due to fracture.  Family at bedside, mental status improving, vital signs stable, discussed with RN.  [facility]  8/3/24 patient seen and examined in bed no acute distress.  Family at bedside, patient still confused, discussed with RN and, discussed with , awaiting placement.  24 patient seen and examined in bed no acute distress, vital signs stable, family at bedside, discussed with RN, discussed with .  Patient to return back to rehab on discharge  24 patient  seen and examined in bed acute distress, vital signs stable, awaiting placement, discussed with RN, discussed with case management.  8/6/24 seen in bed NAD, no new complaints, DW RN, awaiting placement    Review of Systems   Unable to perform ROS: Mental status change     Objective:     Vital Signs  Temp:  [97.8 °F (36.6 °C)-98.2 °F (36.8 °C)] 97.8 °F (36.6 °C)  Heart Rate:  [79-84] 84  Resp:  [14-16] 16  BP: (116-158)/(66-71) 155/67   Body mass index is 21.95 kg/m².    Physical Exam  Appearance: No apparent distress, nontoxic-appearing.   HEENT/Neck: Neck is supple, Extraocular movements intact, Moist mucous membranes  Cardiovascular: Regular Rate and Rhythm, No murmurs  Pulmonary: Clear to auscultation Bilaterally. No wheeze,  Abdomen: BS+, Soft, nontender, nondistended.   Ext: No Cyanosis, Clubbing, Edema, left lower extremity examination limited by pain.    Neuro: Nonfocal, Alert & Oriented x 3    Scheduled Meds   aspirin, 81 mg, Oral, Daily  atorvastatin, 40 mg, Oral, Daily  memantine, 5 mg, Oral, BID  metoprolol succinate XL, 25 mg, Oral, Daily  pantoprazole, 40 mg, Oral, Daily  sodium chloride, 10 mL, Intravenous, Q12H  ticagrelor, 90 mg, Oral, BID       PRN Meds     acetaminophen    senna-docusate sodium **AND** polyethylene glycol **AND** bisacodyl **AND** bisacodyl    HYDROcodone-acetaminophen    Morphine    [DISCONTINUED] HYDROmorphone **AND** naloxone    ondansetron    sodium chloride    sodium chloride   Infusions  [Held by provider] sodium chloride, 75 mL/hr, Last Rate: Stopped (08/04/24 0326)        Diagnostic Data    Results from last 7 days   Lab Units 08/05/24  0839 08/04/24  0044 08/03/24  2339   WBC 10*3/mm3  --  6.90  --    HEMOGLOBIN g/dL 7.5* 7.3*  --    HEMATOCRIT % 23.5* 23.8*  --    PLATELETS 10*3/mm3  --  181  --    GLUCOSE mg/dL  --   --  108*   CREATININE mg/dL  --   --  0.79   BUN mg/dL  --   --  17   SODIUM mmol/L  --   --  138   POTASSIUM mmol/L  --   --  4.2   AST (SGOT) U/L  --   --   22   ALT (SGPT) U/L  --   --  <5   ALK PHOS U/L  --   --  89   BILIRUBIN mg/dL  --   --  0.2   ANION GAP mmol/L  --   --  9.0       No radiology results for the last day      Assessment:    Acute left hip fracture  COPD  History of lung small cell cancer  Pain control  History of CVA  History of pulmonary emboli  Tobacco abuse  Anemia     Plan   continue present care,  -Patient presented after a fall, found on CT of the pelvis to have a minimal nondisplaced fracture of the left lateral femoral head and neck junction.    -Orthopedics consulted, Patient is 1 day status post left total hip arthroplasty due to fracture   -pain control as needed, gentle volume resuscitation.  -Continue home aspirin and Brilinta for now.  -PT/OT awaiting placement  -Continue to monitor..  -Monitor hemoglobin, presently 7.3.  12 on admission.  Might need transfusion in AM.    Active and Resolved Problems  Active Hospital Problems    Diagnosis  POA    **Left displaced femoral neck fracture [S72.002A]  Yes    Closed fracture of left hip [S72.002A]  Yes    Moderate malnutrition [E44.0]  Yes      Resolved Hospital Problems   No resolved problems to display.       VTE Prophylaxis:  No VTE prophylaxis order currently exists.      Code status is   Code Status and Medical Interventions: CPR (Attempt to Resuscitate); Full Support   Ordered at: 07/31/24 1954     Code Status (Patient has no pulse and is not breathing):    CPR (Attempt to Resuscitate)     Medical Interventions (Patient has pulse or is breathing):    Full Support     Plan for disposition:rehab in 3 days    Time: 30 minutes    Signature: Electronically signed by Shiv Hurst MD, 08/06/24, 09:30 EDT.  [facility] Hospitalist Team

## 2024-08-06 NOTE — PLAN OF CARE
Goal Outcome Evaluation:           Progress: improving          VSS, pain controlled per MAR, going to rehab facility tonight. Plan ongoing.

## 2024-08-06 NOTE — DISCHARGE PLACEMENT REQUEST
"Miryam Wood (69 y.o. Female)       Date of Birth   1955    Social Security Number       Address   [address] [place] [address] [place] [facility]    Home Phone   [phone]    MRN   [id number]       Alevism   None    Marital Status                               Admission Date   24    Admission Type   Emergency    Admitting Provider   Shiv Hurst MD    Attending Provider   Shiv Hurst MD    Department, Room/Bed   [facility] SURGICAL INPATIENT,        Discharge Date       Discharge Disposition       Discharge Destination                                 Attending Provider: Shiv Hurst MD    Allergies: Albuterol, Hydrocodone, Sulfa Antibiotics, Bacitracin, Benzalkonium Chloride, Codeine, Neomycin, Polymyxin B, Nickel, Penicillins    Isolation: None   Infection: None   Code Status: CPR    Ht: 160 cm (63\")   Wt: 56.2 kg (123 lb 14.4 oz)    Admission Cmt: None   Principal Problem: Left displaced femoral neck fracture [S72.002A]                   Active Insurance as of 2024       Primary Coverage       Payor Plan Insurance Group Employer/Plan Group    ANTHEM MEDICARE REPLACEMENT ANTHEM MEDICARE ADVANTAGE [id number]       Payor Plan Address Payor Plan Phone Number Payor Plan Fax Number Effective Dates    [address] [phone]  2020 - None Entered    [facility] [id number]         Subscriber Name Subscriber Birth Date Member ID       MIRYAM WOOD 1955 [id number]                     Emergency Contacts        (Rel.) Home Phone Work Phone Mobile Phone    FANTA WOOD (Spouse) -- -- [phone]    TOMMY TOMLIN (Daughter) [phone] -- [phone]                 History & Physical        Peterson Cobian MD at 24 [facility]              [facility]  History & Physical    Patient Name: Miryam Wood  : 1955  MRN: [id number]  Primary Care Physician:  Lashell De La Torre, KENNY  Date of " admission: 7/31/2024  Date and Time of Service: 7/31/2024     Subjective      Chief Complaint: Fall    History of Present Illness:   This is a 69 years old history of hyperlipidemia, hypertension, COPD, female with a past medical and lung cancer who presented to the ED after a fall.  She was in the bathroom today when she fell and given significant pain they had an x-ray on admission home with concerns of femoral neck fracture she was then sent to the ED for further workup and management.      Vital signs on presentation showed a blood pressure 141/70, heart rate 73, afebrile 36.6 neurosurgeons, respiratory rate 16 saturation 97% on room air.    Labs showed BMP and LFT within normal limits, CBC within normal limits.  CT of the pelvis showed minimal nondisplaced fracture of the left lateral femoral head neck junction.        Review of Systems    Personal History     Past Medical History:   Diagnosis Date    Cancer     lung cancer    Carotid stenosis     COPD (chronic obstructive pulmonary disease)     Coronary artery disease     Esophageal stricture     Dr Domingo    Hyperlipidemia     Pulmonary embolism     seen at [facility], fluid around her heart at the time-right lung       Past Surgical History:   Procedure Laterality Date    ANGIOPLASTY / STENTING FEMORAL      CARDIAC CATHETERIZATION      CAROTID STENT      CORONARY STENT PLACEMENT      ESOPHAGOSCOPY / EGD      HYSTERECTOMY         Family History: family history includes Diabetes in her paternal uncle; Heart disease in her mother; Hyperlipidemia in her father; Hypertension in her mother; Leukemia in her paternal grandmother; Lung cancer in her father. Otherwise pertinent FHx was reviewed and not pertinent to current issue.    Social History:  reports that she has been smoking cigarettes. She has a 80 pack-year smoking history. She has been exposed to tobacco smoke. She does not have any smokeless tobacco history on file. She reports that she does not currently use  alcohol. She reports that she does not use drugs.    Home Medications:  Prior to Admission Medications       Prescriptions Last Dose Informant Patient Reported? Taking?    aspirin 81 MG EC tablet   No No    Take 1 tablet by mouth Daily.    atorvastatin (LIPITOR) 40 MG tablet   No No    TAKE 1 TABLET BY MOUTH EVERY DAY    furosemide (LASIX) 20 MG tablet   Yes No    Take 1 tablet by mouth As Needed. PRN for swelling    memantine (Namenda) 5 MG tablet   No No    Take 1 tablet by mouth 2 (Two) Times a Day.    metoprolol succinate XL (TOPROL-XL) 25 MG 24 hr tablet   Yes No    Take 1 tablet by mouth Daily.    mupirocin (BACTROBAN) 2 % ointment   No No    Apply 1 Application topically to the appropriate area as directed 3 (Three) Times a Day.    pantoprazole (PROTONIX) 40 MG EC tablet   Yes No    Take 1 tablet by mouth Daily.    ticagrelor (BRILINTA) 90 MG tablet tablet   No No    Take 1 tablet by mouth 2 (Two) Times a Day.              Allergies:  Allergies   Allergen Reactions    Albuterol Shortness Of Breath     Pt states she has sensitivity to albuterol.  She states it causes her to be SOA.    Hydrocodone Palpitations    Sulfa Antibiotics Swelling    Bacitracin Other (See Comments)    Benzalkonium Chloride Hives    Codeine Other (See Comments)    Neomycin Other (See Comments)    Polymyxin B Other (See Comments)    Nickel Rash    Penicillins Rash       Objective      Vitals:   Temp:  [97.9 °F (36.6 °C)] 97.9 °F (36.6 °C)  Heart Rate:  [73] 73  Resp:  [16] 16  BP: (141)/(70) 141/70  Body mass index is 30.11 kg/m².  Physical Exam    Appearance: No apparent distress, nontoxic-appearing.   HEENT/Neck: Neck is supple, Extraocular movements intact, Moist mucous membranes  Cardiovascular: Regular Rate and Rhythm, No murmurs  Pulmonary: Clear to auscultation Bilaterally. No wheeze,  Abdomen: BS+, Soft, nontender, nondistended.   Ext: No Cyanosis, Clubbing, Edema, left lower extremity examination limited by pain.    Neuro:  Nonfocal, Alert & Oriented x 3        Diagnostic Data:  Lab Results (last 24 hours)       Procedure Component Value Units Date/Time    Comprehensive Metabolic Panel [[id number]]  (Abnormal) Collected: 07/31/24 1551    Specimen: Blood Updated: 07/31/24 1629     Glucose 94 mg/dL      BUN 14 mg/dL      Creatinine 0.92 mg/dL      Sodium 140 mmol/L      Potassium 4.3 mmol/L      Comment: Slight hemolysis detected by analyzer. Result may be falsely elevated.        Chloride 101 mmol/L      CO2 28.7 mmol/L      Calcium 9.7 mg/dL      Total Protein 7.2 g/dL      Albumin 4.1 g/dL      ALT (SGPT) 14 U/L      AST (SGOT) 18 U/L      Alkaline Phosphatase 127 U/L      Total Bilirubin 0.7 mg/dL      Globulin 3.1 gm/dL      A/G Ratio 1.3 g/dL      BUN/Creatinine Ratio 15.2     Anion Gap 10.3 mmol/L      eGFR 68.4 mL/min/1.73     Narrative:      GFR Normal >60  Chronic Kidney Disease <60  Kidney Failure <15      CK [[id number]]  (Normal) Collected: 07/31/24 1551    Specimen: Blood Updated: 07/31/24 1625     Creatine Kinase 36 U/L     Protime-INR [[id number]]  (Abnormal) Collected: 07/31/24 1551    Specimen: Blood Updated: 07/31/24 1620     Protime 10.1 Seconds      INR <0.93    aPTT [[id number]]  (Abnormal) Collected: 07/31/24 1551    Specimen: Blood Updated: 07/31/24 1620     PTT 23.1 seconds     CBC & Differential [[id number]]  (Abnormal) Collected: 07/31/24 1551    Specimen: Blood Updated: 07/31/24 1557    Narrative:      The following orders were created for panel order CBC & Differential.  Procedure                               Abnormality         Status                     ---------                               -----------         ------                     CBC Auto Differential[[id number]]        Abnormal            Final result                 Please view results for these tests on the individual orders.    CBC Auto Differential [[id number]]  (Abnormal) Collected: 07/31/24 1551    Specimen: Blood Updated: 07/31/24 1557     WBC  7.77 10*3/mm3      RBC 4.13 10*6/mm3      Hemoglobin 12.4 g/dL      Hematocrit 39.2 %      MCV 94.9 fL      MCH 30.0 pg      MCHC 31.6 g/dL      RDW 14.5 %      RDW-SD 50.3 fl      MPV 9.5 fL      Platelets 241 10*3/mm3      Neutrophil % 83.9 %      Lymphocyte % 8.0 %      Monocyte % 5.7 %      Eosinophil % 1.4 %      Basophil % 0.5 %      Immature Grans % 0.5 %      Neutrophils, Absolute 6.52 10*3/mm3      Lymphocytes, Absolute 0.62 10*3/mm3      Monocytes, Absolute 0.44 10*3/mm3      Eosinophils, Absolute 0.11 10*3/mm3      Basophils, Absolute 0.04 10*3/mm3      Immature Grans, Absolute 0.04 10*3/mm3      nRBC 0.0 /100 WBC              Imaging Results (Last 24 Hours)       Procedure Component Value Units Date/Time    CT Pelvis Without Contrast [[id number]] Collected: 07/31/24 1537     Updated: 07/31/24 1545    Narrative:      CT PELVIS WO CONTRAST    Date of Exam: 7/31/2024 3:34 PM EDT    Indication: Possible left subcapital hip fracture, also possibly subacute.    Comparison: None available.    Technique: Axial CT images were obtained of the pelvis without contrast administration.  Sagittal and coronal reconstructions were performed.  Automated exposure control and iterative reconstruction methods were used.      Findings:  There is a nondisplaced fracture involving the lateral aspect of the left femoral head neck junction with slight cortical offset and no extension to the opposite cortex. There is no joint involvement. The hip joint appears well-maintained. No additional   fractures are identified. The right hip joint also is well-maintained. The bony pelvic ring is intact.      Impression:      Impression:  Minimal nondisplaced fracture of the left lateral femoral head neck junction.        Electronically Signed: Ruthy Perrin MD    7/31/2024 3:43 PM EDT    Workstation ID: [id number]              Assessment & Plan    Fall  Nondisplaced left femur head neck fracture.  Hypertension  Hyperlipidemia  COPD  -Patient  presented after a fall, found on CT of the pelvis to have a minimal nondisplaced fracture of the left lateral femoral head and neck junction.    -Orthopedics consulted, pain control as needed, gentle volume resuscitation.  -Keep n.p.o. at midnight, orthopedics to see in the morning.  -Continue home aspirin and Brilinta for now.  -PT/OT when appropriate  -Continue to monitor.            VTE Prophylaxis:SCD  Full code  Inpatient level of care      Signature:     This document has been electronically signed by Peterson Cobian MD on 2024 19:54 EDT   [facility]    Electronically signed by Peterson Cobian MD at 24 2111          Operative/Procedure Notes (all)        Olu Joshua II, MD at 24 175  Version 1 of 1           Anterior Total Hip Operative Note  Dr. ALEJANDRO Plata” Josiane BRAND  [phone]    PATIENT NAME: Miryam Fernández  MRN: [id number]  : 1955 AGE: 69 y.o. GENDER: female  DATE OF OPERATION: 2024  PREOPERATIVE DIAGNOSIS: Femoral Neck Fracture  POSTOPERATIVE DIAGNOSIS: Same  OPERATION PERFORMED: Left Anterior Total Hip Arthroplasty  SURGEON: Olu Joshua MD  Circulator: Jamie Velazco RN; Dallas Valencia RN  Radiology Technologist: Magdalena Pepper  Scrub Person: Maribel Roper  Vendor Representative: Radha Dey Emily  Assistant: Adam Jaquez CSA  ANESTHESIA: General  ASSISTANT: Adam Jaquez. This case would not have been possible without another set of skilled surgical hands for retraction, use of instrumentation, and general assistance.  This assistance was vital to the success of the case.   ESTIMATED BLOOD LOSS: 250cc  SPONGE AND NEEDLE COUNT: Correct  INDICATIONS:  Fracture: This patient was noted to have a femoral neck fracture.  Surgical options were discussed with the patient and they elected to undergo a total hip replacement. A discussion of operative versus nonoperative treatment was had. They elected to undergo anterior  total hip arthroplasty. The risks of surgery were discussed and included the risk of anesthesia, infection, damage to neurovascular structures, implant loosening/failure, fracture, hardware prominence, dislocation, the need for further procedures, medical complications, and others. No guarantees were made. The patient wished to proceed with surgery and a surgical consent was signed.  COMPONENTS:   Acetabular Cup: Smith [employer] Nephew R3 acetabular cup: 50 Outer Diameter  Cup Screws: Smith & Nephew 6.5 mm screws: No Screws Were Used  Smith [employer] Nephew Neutral Acetabular Liner: Neutral  Smith & Nephew Polar Femoral Stem: Size 2  Smith & Nephew Oxinium Head: Dual Mobilitymm +0    PERTINENT FINDINGS: Fracture: Fracture of the femoral neck    DETAILS OF PROCEDURE:   The patient was met in the preoperative area. The site was marked. The consent and H&P were reviewed. The patient was then wheeled back to the operative suite underwent anesthesia. The [place] table boots were secured to the patients’ feet. The patient was moved onto the [place] table and secured in the supine position. The perineal post was inserted and the boots were secured into the leg holders. Surgical alcohol was used to thoroughly clean the operative area.     The hip and leg was then prepped in the normal sterile fashion, multiple layers of sterile drapes, and surgical space suits for the entire operative team. New outer gloves were used by all sterile surgical team members after final draping. The surgical incision was marked. A surgical timeout was performed.    A Modified Nguyen-Lindsay anterior approach was used. Dissection was carried down to the fascia. The fascia was incised and the tensor fascia ramiro muscle was retracted laterally and the sartorius medially. The lateral femoral circumflex vessels were identified and cauterized using bovie. The rectus femoris was retracted medially. A capsulotomy was then performed. The capsule was tagged with Ethibond for  later repair. Retractors were placed on either side of the femoral neck and dissection was further carried down so that the lesser trochanter could be palpated and the superior rim of the acetabulum could be visualized.    The femoral neck cut was then made from  just proximal to the lesser trochanter medially headed towards the saddle laterally. Care was taken not to extend the cut into the lesser or greater trochanters. The head and neck segment were removed with a corkscrew. The acetabulum was then exposed. The labrum was removed using a kocher and scalpel and excess osteophytes were removed using an osteotome.    The acetabulum was progressively reamed, beginning with medialization and then finalizing the position of the reamer to approximate the final cup position. Fluoroscopy was used to ensure proper placement of the reamer, including adequate medialization as well as appropriate abduction and anteversion. The real cup was then opened and inserted using fluoroscopy, ensuring good position in terms of abduction and anteversion.     No Screws: Initial press-fit fixation of the cup was very robust and no screws were required for supplemental fixation.    After thorough irrigation and ensuring that no soft tissue was entrapped within the cup, the real liner was snapped into place.    The hook was placed just distal to the greater trochanter. The femur was then externally rotated, extended and adducted under the well leg. Soft tissue releases were performed to gain exposure to the proximal femur. Capsule was released from the saddle and the lateral femur. Care was taken to preserve the short external rotator tendons. The capsule was also released along the medial femur. The hydraulic femoral lift was then used to better expose the femur. Further soft tissue releases were then performed, again ensuring preservation of the short external rotators.    The femur was machined with a cookie cutter osteotome and then a  "rasp was used to further lateralize the starting point. The sclerotic bone on the lateral shoulder of the femur was removed with a rongeour and curette as needed to protect the greater trochanter. Progressive broaches were inserted until adequate fill had been achieved. Using fluoroscopy, the femoral stem was visualized after trial reduction of the hip. The length and offset were compared to the non-operative hip. Trials of stem size and neck length were trialed until equal leg length and offset were obtained. Additionally hip stability was tested with internal and external rotation of the leg. The leg was stable with at least 90° of external rotation and there was no impingement at 60° of internal rotation. After implantation of the final stem and ball, the leg was once again brought into normal anatomic position and relocated. Final x-rays were taken with final implants noting good position of the stem and cup, and no visualized fracture.. The hip was stable upon reduction.    An analgesic cocktail was then injected about the hip as well as the surgical dissection area. The capsule was closed.  The fascia was then closed with a running stitch and the skin was closed in layers.  A sterile dressing was applied.    The patient was moved from the [place] table to the [facility] where the boots were removed. The patient was taken to the recovery room in stable condition. There were no complications and the patient tolerated the procedure well.    R \"Josh\" Josiane BRAND MD  Orthopaedic Surgery  [facility]  [phone]              Electronically signed by Olu Joshua II, MD at 24 2293          Physician Progress Notes (last 48 hours)        Shiv Hurst MD at 24 1054              [facility] MEDICINE SERVICE  DAILY PROGRESS NOTE    NAME: Miryam Fernández  : 1955  MRN: [id number]      LOS: 3 days     PROVIDER OF SERVICE: Shiv Hurst MD    Chief Complaint: Left " displaced femoral neck fracture    Subjective:     Interval History:  History taken from: patient/    History of Present Illness:   This is a 69 years old history of hyperlipidemia, hypertension, COPD, female with a past medical and lung cancer who presented to the ED after a fall.  She was in the bathroom today when she fell and given significant pain they had an x-ray on admission home with concerns of femoral neck fracture she was then sent to the ED for further workup and management.       Vital signs on presentation showed a blood pressure 141/70, heart rate 73, afebrile 36.6 neurosurgeons, respiratory rate 16 saturation 97% on room air.     Labs showed BMP and LFT within normal limits, CBC within normal limits.  CT of the pelvis showed minimal nondisplaced fracture of the left lateral femoral head neck junction.     8/1/24-Patient Complaints: Patient is some pain complaints.  She denies any shortness of breath.  Denies any nausea vomiting.   states that she fell attempting to independently ambulate at facility.  Nurse feels that Claudia has constipation to act oddly with for pain management.  No other acute issues.  8/2/24-Patient is 1 day status post left total hip arthroplasty due to fracture.  Family at bedside, mental status improving, vital signs stable, discussed with RN.  Patient is from [facility] rehab.  8/3/24 patient seen and examined in bed no acute distress.  Family at bedside, patient still confused, discussed with RN and, discussed with , awaiting placement.  8/4/24 patient seen and examined in bed no acute distress, vital signs stable, family at bedside, discussed with RN, discussed with .  Patient to return back to rehab on discharge  8/5/24 patient seen and examined in bed acute distress, vital signs stable, awaiting placement, discussed with RN, discussed with case management.    Review of Systems   Unable to perform ROS: Mental status change      Objective:     Vital Signs  Temp:  [98 °F (36.7 °C)-98.2 °F (36.8 °C)] 98.1 °F (36.7 °C)  Heart Rate:  [76-93] 93  Resp:  [14-16] 14  BP: (103-156)/(67-71) 103/67   Body mass index is 21.95 kg/m².    Physical Exam  Appearance: No apparent distress, nontoxic-appearing.   HEENT/Neck: Neck is supple, Extraocular movements intact, Moist mucous membranes  Cardiovascular: Regular Rate and Rhythm, No murmurs  Pulmonary: Clear to auscultation Bilaterally. No wheeze,  Abdomen: BS+, Soft, nontender, nondistended.   Ext: No Cyanosis, Clubbing, Edema, left lower extremity examination limited by pain.    Neuro: Nonfocal, Alert & Oriented x 3    Scheduled Meds   aspirin, 81 mg, Oral, Daily  atorvastatin, 40 mg, Oral, Daily  cefTRIAXone, 2,000 mg, Intravenous, Q24H  memantine, 5 mg, Oral, BID  metoprolol succinate XL, 25 mg, Oral, Daily  mupirocin, 1 Application, Topical, TID  pantoprazole, 40 mg, Oral, Daily  sodium chloride, 10 mL, Intravenous, Q12H  ticagrelor, 90 mg, Oral, BID       PRN Meds     acetaminophen    senna-docusate sodium **AND** polyethylene glycol **AND** bisacodyl **AND** bisacodyl    HYDROcodone-acetaminophen    Morphine    [DISCONTINUED] HYDROmorphone **AND** naloxone    ondansetron    sodium chloride    sodium chloride   Infusions  [Held by provider] sodium chloride, 75 mL/hr, Last Rate: Stopped (08/04/24 0326)        Diagnostic Data    Results from last 7 days   Lab Units 08/05/24  0839 08/04/24  0044 08/03/24  2339   WBC 10*3/mm3  --  6.90  --    HEMOGLOBIN g/dL 7.5* 7.3*  --    HEMATOCRIT % 23.5* 23.8*  --    PLATELETS 10*3/mm3  --  181  --    GLUCOSE mg/dL  --   --  108*   CREATININE mg/dL  --   --  0.79   BUN mg/dL  --   --  17   SODIUM mmol/L  --   --  138   POTASSIUM mmol/L  --   --  4.2   AST (SGOT) U/L  --   --  22   ALT (SGPT) U/L  --   --  <5   ALK PHOS U/L  --   --  89   BILIRUBIN mg/dL  --   --  0.2   ANION GAP mmol/L  --   --  9.0       No radiology results for the last  day      Assessment:    Acute left hip fracture  COPD  History of lung small cell cancer  Pain control  History of CVA  History of pulmonary emboli  Tobacco abuse  Anemia     Plan   continue present care,  -Patient presented after a fall, found on CT of the pelvis to have a minimal nondisplaced fracture of the left lateral femoral head and neck junction.    -Orthopedics consulted, Patient is 1 day status post left total hip arthroplasty due to fracture   -pain control as needed, gentle volume resuscitation.  -Continue home aspirin and Brilinta for now.  -PT/OT awaiting placement  -Continue to monitor..  -Monitor hemoglobin, presently 7.3.  12 on admission.  Might need transfusion in AM.    Active and Resolved Problems  Active Hospital Problems    Diagnosis  POA    **Left displaced femoral neck fracture [S72.002A]  Yes    Closed fracture of left hip [S72.002A]  Yes    Moderate malnutrition [E44.0]  Yes      Resolved Hospital Problems   No resolved problems to display.       VTE Prophylaxis:  No VTE prophylaxis order currently exists.      Code status is   Code Status and Medical Interventions: CPR (Attempt to Resuscitate); Full Support   Ordered at: 24     Code Status (Patient has no pulse and is not breathing):    CPR (Attempt to Resuscitate)     Medical Interventions (Patient has pulse or is breathing):    Full Support     Plan for disposition:rehab in 3 days    Time: 30 minutes    Signature: Electronically signed by Shiv Hurst MD, 24, 10:54 EDT.  [facility] Hospitalist Team     Electronically signed by Shiv Hurst MD at 24 1055       Shiv Hurst MD at 24 1339              [facility] MEDICINE SERVICE  DAILY PROGRESS NOTE    NAME: Miryam Fernández  : 1955  MRN: [id number]      LOS: 2 days     PROVIDER OF SERVICE: Shiv Hurst MD    Chief Complaint: Left displaced femoral neck fracture    Subjective:     Interval History:  History taken  from: patient/    History of Present Illness:   This is a 69 years old history of hyperlipidemia, hypertension, COPD, female with a past medical and lung cancer who presented to the ED after a fall.  She was in the bathroom today when she fell and given significant pain they had an x-ray on admission home with concerns of femoral neck fracture she was then sent to the ED for further workup and management.       Vital signs on presentation showed a blood pressure 141/70, heart rate 73, afebrile 36.6 neurosurgeons, respiratory rate 16 saturation 97% on room air.     Labs showed BMP and LFT within normal limits, CBC within normal limits.  CT of the pelvis showed minimal nondisplaced fracture of the left lateral femoral head neck junction.     8/1/24-Patient Complaints: Patient is some pain complaints.  She denies any shortness of breath.  Denies any nausea vomiting.   states that she fell attempting to independently ambulate at facility.  Nurse feels that Claudia has constipation to act oddly with for pain management.  No other acute issues.  8/2/24-Patient is 1 day status post left total hip arthroplasty due to fracture.  Family at bedside, mental status improving, vital signs stable, discussed with RN.  [facility]  8/3/24 patient seen and examined in bed no acute distress.  Family at bedside, patient still confused, discussed with RN and, discussed with , awaiting placement.  8/4/24 patient seen and examined in bed no acute distress, vital signs stable, family at bedside, discussed with RN, discussed with .  Patient to return back to rehab on discharge    Review of Systems   Unable to perform ROS: Mental status change     Objective:     Vital Signs  Temp:  [97.4 °F (36.3 °C)-98.2 °F (36.8 °C)] 98.2 °F (36.8 °C)  Heart Rate:  [79-85] 79  Resp:  [14-15] 14  BP: (122-150)/(70-82) 150/70   Body mass index is 21.95 kg/m².    Physical Exam  Appearance: No  apparent distress, nontoxic-appearing.   HEENT/Neck: Neck is supple, Extraocular movements intact, Moist mucous membranes  Cardiovascular: Regular Rate and Rhythm, No murmurs  Pulmonary: Clear to auscultation Bilaterally. No wheeze,  Abdomen: BS+, Soft, nontender, nondistended.   Ext: No Cyanosis, Clubbing, Edema, left lower extremity examination limited by pain.    Neuro: Nonfocal, Alert & Oriented x 3    Scheduled Meds   aspirin, 81 mg, Oral, Daily  atorvastatin, 40 mg, Oral, Daily  cefTRIAXone, 2,000 mg, Intravenous, Q24H  memantine, 5 mg, Oral, BID  metoprolol succinate XL, 25 mg, Oral, Daily  mupirocin, 1 Application, Topical, TID  pantoprazole, 40 mg, Oral, Daily  sodium chloride, 10 mL, Intravenous, Q12H  [Held by provider] ticagrelor, 90 mg, Oral, BID       PRN Meds     acetaminophen    senna-docusate sodium **AND** polyethylene glycol **AND** bisacodyl **AND** bisacodyl    HYDROcodone-acetaminophen    Morphine    [DISCONTINUED] HYDROmorphone **AND** naloxone    ondansetron    sodium chloride    sodium chloride   Infusions  [Held by provider] sodium chloride, 75 mL/hr, Last Rate: Stopped (08/04/24 0326)        Diagnostic Data    Results from last 7 days   Lab Units 08/04/24  0044 08/03/24  2339   WBC 10*3/mm3 6.90  --    HEMOGLOBIN g/dL 7.3*  --    HEMATOCRIT % 23.8*  --    PLATELETS 10*3/mm3 181  --    GLUCOSE mg/dL  --  108*   CREATININE mg/dL  --  0.79   BUN mg/dL  --  17   SODIUM mmol/L  --  138   POTASSIUM mmol/L  --  4.2   AST (SGOT) U/L  --  22   ALT (SGPT) U/L  --  <5   ALK PHOS U/L  --  89   BILIRUBIN mg/dL  --  0.2   ANION GAP mmol/L  --  9.0       No radiology results for the last day      Assessment:    Acute left hip fracture  COPD  History of lung small cell cancer  Pain control  History of CVA  History of pulmonary emboli  Tobacco abuse  Anemia     Plan   continue present care,  -Patient presented after a fall, found on CT of the pelvis to have a minimal nondisplaced fracture of the left  lateral femoral head and neck junction.    -Orthopedics consulted, Patient is 1 day status post left total hip arthroplasty due to fracture   -pain control as needed, gentle volume resuscitation.  -Continue home aspirin and Brilinta for now.  -PT/OT awaiting placement  -Continue to monitor..  -Monitor hemoglobin, presently 7.3.  12 on admission.  Might need transfusion in AM.    Active and Resolved Problems  Active Hospital Problems    Diagnosis  POA    **Left displaced femoral neck fracture [S72.002A]  Yes    Closed fracture of left hip [S72.002A]  Yes    Moderate malnutrition [E44.0]  Yes      Resolved Hospital Problems   No resolved problems to display.       VTE Prophylaxis:  No VTE prophylaxis order currently exists.      Code status is   Code Status and Medical Interventions: CPR (Attempt to Resuscitate); Full Support   Ordered at: 07/31/24 1954     Code Status (Patient has no pulse and is not breathing):    CPR (Attempt to Resuscitate)     Medical Interventions (Patient has pulse or is breathing):    Full Support     Plan for disposition:rehab in 3 days    Time: 30 minutes    Signature: Electronically signed by Shiv Hurst MD, 08/04/24, 13:39 EDT.  [facility] Hospitalist Team     Electronically signed by Shiv Hurst MD at 08/04/24 1340       Consult Notes (last 48 hours)  Notes from 08/04/24 0858 through 08/06/24 0858   No notes of this type exist for this encounter.          Nutrition Notes (most recent note)        Peace Nunez RD at 08/05/24 1549          Nutrition Services    Patient Name: Miryam Fernández  YOB: 1955  MRN: [id number]  Admission date: 7/31/2024    PROGRESS NOTE      Encounter Information: Check on for PO intakes.  S/P left hip replacement 8/1 per ortho.  Patient to return to [facility] at D/C.         PO Diet: Diet: Regular/House; Fluid Consistency: Thin (IDDSI 0)   PO Supplements: None ordered    PO Intake:  58% average PO intakes x 1- documented  meals since last RD review       Current nutrition support:    Nutrition support review:        Labs (reviewed below): Reviewed, management per attending        GI Function:  No documented BM since admission (x 5 days ago) - messaged RN suggesting use of PRN bowel regimen if this is accurate        Nutrition Intervention Updates: Continue current diet and encourage good PO intakes.       Results from last 7 days   Lab Units 08/03/24  2339 08/02/24  1024 08/01/24  0235 07/31/24  1551   SODIUM mmol/L 138 136 140 140   POTASSIUM mmol/L 4.2 4.2 4.1 4.3   CHLORIDE mmol/L 107 102 103 101   CO2 mmol/L 22.0 21.5* 27.5 28.7   BUN mg/dL 17 20 11 14   CREATININE mg/dL 0.79 1.18* 0.82 0.92   CALCIUM mg/dL 8.6 8.8 9.4 9.7   BILIRUBIN mg/dL 0.2  --   --  0.7   ALK PHOS U/L 89  --   --  127*   ALT (SGPT) U/L <5  --   --  14   AST (SGOT) U/L 22  --   --  18   GLUCOSE mg/dL 108* 144* 90 94     Results from last 7 days   Lab Units 08/05/24  0839 08/02/24  1224 08/01/24  0235   MAGNESIUM mg/dL  --   --  1.9   PHOSPHORUS mg/dL  --   --  3.4   HEMOGLOBIN g/dL 7.5*   < > 10.8*   HEMATOCRIT % 23.5*   < > 33.8*    < > = values in this interval not displayed.     COVID19   Date Value Ref Range Status   06/28/2024 Not Detected Not Detected - Ref. Range Final     Lab Results   Component Value Date    HGBA1C 5.44 07/22/2024       RD to follow up per protocol.    Electronically signed by:  Peace Nunez RD  08/05/24 15:49 EDT      Electronically signed by Peace Nunez RD at 08/05/24 8166          Physical Therapy Notes (last 24 hours)        Tracy De La Torre, PT at 08/05/24 1110  Version 1 of 1         Subjective: Pt agreeable to therapeutic plan of care.    Objective:   Subjective: Pt agreeable to therapeutic plan of care.    Objective:     Bed mobility - Min-A and Assist x 2  Transfers - Min-A and Assist x 2  Ambulation - 20 feet Mod-A and with rolling walker     WB'ing status: L Lower Extremity Weight Bearing As  "Tolerated    Therapeutic Exercise: 10 Reps L Lower Extremity AAROM     Precautions: Anterior hip precautions   Vitals: WNL    Pain: Pt unable to rate pain but grimaces with mobility VAS   Location: Left hip  Intervention for pain: Repositioned, Increased Activity, and Therapeutic Presence    Education: Provided education on the importance of mobility in the acute care setting, Verbal/Tactile Cues, Transfer Training, and Gait Training    Assessment: Miryam Fernández presents with functional mobility impairments which indicate the need for skilled intervention. Pt required 100% verbal cueing for safe step sequencing and walker placement. Pt ambulated with a step to gait pattern.  Tolerating session today without incident. Will continue to follow and progress as tolerated.     Plan/Recommendations:   If medically appropriate, Moderate Intensity Therapy recommended post-acute care. This is recommended as therapy feels the patient would require 3-4 days per week and wouldn't tolerate \"3 hour daily\" rehab intensity. SNF would be the preferred choice. If the patient does not agree to SNF, arrange HH or OP depending on home bound status. If patient is medically complex, consider LTACH. Pt requires no DME at discharge.     Pt desires Skilled Rehab placement at discharge. Pt cooperative; agreeable to therapeutic recommendations and plan of care.         Basic Mobility 6-click:  Rollin = Total, A lot = 2, A little = 3; 4 = None  Supine>Sit:   1 = Total, A lot = 2, A little = 3; 4 = None   Sit>Stand with arms:  1 = Total, A lot = 2, A little = 3; 4 = None  Bed>Chair:   1 = Total, A lot = 2, A little = 3; 4 = None  Ambulate in room:  1 = Total, A lot = 2, A little = 3; 4 = None  3-5 Steps with railin = Total, A lot = 2, A little = 3; 4 = None  Score: 13    Modified [place]: N/A = No pre-op stroke/TIA    Post-Tx Position: Up in Chair, Alarms activated, and Call light and personal items within reach  PPE: gloves "     Electronically signed by Tracy De La Torre PT at 08/05/24 1126       Tracy De La Torre PT at 08/05/24 1126  Version 1 of 1         Goal Outcome Evaluation:  Plan of Care Reviewed With: patient, spouse, daughter     Bed mobility - Min-A and Assist x 2  Transfers - Min-A and Assist x 2  Ambulation - 20 feet Mod-A and with rolling walker     WB'ing status: L Lower Extremity Weight Bearing As Tolerated    Therapeutic Exercise: 10 Reps L Lower Extremity AAROM     Precautions: Anterior hip precautions   Vitals: WNL    Pain: Pt unable to rate pain but grimaces with mobility VAS   Location: Left hip  Intervention for pain: Repositioned, Increased Activity, and Therapeutic Presence    Education: Provided education on the importance of mobility in the acute care setting, Verbal/Tactile Cues, Transfer Training, and Gait Training    Assessment: Miryam Fernández presents with functional mobility impairments which indicate the need for skilled intervention. Pt required 100% verbal cueing for safe step sequencing and walker placement. Pt ambulated with a step to gait pattern.  Tolerating session today without incident. Will continue to follow and progress as tolerated.   Anticipated Discharge Disposition (PT): skilled nursing facility                          Electronically signed by Tracy De La Torre PT at 08/05/24 1126          Occupational Therapy Notes (last 24 hours)        Lizbeth Nguyen, OT at 08/05/24 1610          Assessment: Miryam Fernández presents with ADL impairments affecting function including balance, endurance / activity tolerance, pain, postural / trunk control, and strength. Demonstrated functioning below baseline abilities indicate the need for continued skilled intervention while inpatient. Pt demo poor insight to deficits this date, continues to be confused however able to follow commands appropriately. Pt requiring task segmentation with mobility, requiring increased duration and increased assistance.  "Pt with limited activity d/t pain, however eager to participate with therapy and progressing towards stated goals. OT continues to recommend SNF at [place]. Tolerating session today without incident. Will continue to follow and progress as tolerated.     Plan/Recommendations:   Moderate Intensity Therapy recommended post-acute care. This is recommended as therapy feels the patient would require 3-4 days per week and wouldn't tolerate \"3 hour daily\" rehab intensity. SNF would be the preferred choice. If the patient does not agree to SNF, arrange HH or OP depending on home bound status. If patient is medically complex, consider LTACH.. Pt requires no DME at discharge.     Pt desires Skilled Rehab placement at discharge. Pt cooperative; agreeable to therapeutic recommendations and plan of care.     Electronically signed by Lizbeth Nguyen OT at 08/05/24 1610       Lizbeth Nguyen OT at 08/05/24 1606          Subjective: Pt agreeable to therapeutic plan of care.     Cognition: oriented to Person and Place, safety/judgement: fair, and awareness of deficits: fair awareness of safety precautions and fair awareness of deficits     Objective:     Precautions - LLE CLINTON anterior, WBAT     Bed Mobility: Min-A and Assist x 2   Functional Transfers: Min-A, Assist x 2, and with rolling walker     Balance: sitting EOB, unsupported, and static CGA  Functional Ambulation: Mod-A and with rolling walker    Vitals: WNL    Pain: does not give numeric value Location: LLE   Interventions for pain: Repositioned, Increased Activity, and Therapeutic Presence  Education: Provided education on the importance of mobility in the acute care setting, Verbal/Tactile Cues, and Transfer Training      Assessment: Miryam SUSIE Fernández presents with ADL impairments affecting function including balance, endurance / activity tolerance, pain, postural / trunk control, and strength. Demonstrated functioning below baseline abilities indicate the need for continued " "skilled intervention while inpatient. Pt demo poor insight to deficits this date, continues to be confused however able to follow commands appropriately. Pt requiring task segmentation with mobility, requiring increased duration and increased assistance. Pt with limited activity d/t pain, however eager to participate with therapy and progressing towards stated goals. OT continues to recommend SNF at [place]. Tolerating session today without incident. Will continue to follow and progress as tolerated.     Plan/Recommendations:   Moderate Intensity Therapy recommended post-acute care. This is recommended as therapy feels the patient would require 3-4 days per week and wouldn't tolerate \"3 hour daily\" rehab intensity. SNF would be the preferred choice. If the patient does not agree to SNF, arrange HH or OP depending on home bound status. If patient is medically complex, consider LTACH.. Pt requires no DME at discharge.     Pt desires Skilled Rehab placement at discharge. Pt cooperative; agreeable to therapeutic recommendations and plan of care.     Modified [place]: N/A = No pre-op stroke/TIA    Post-Tx Position: Up in Chair, Alarms activated, and Call light and personal items within reach  PPE: gloves      Electronically signed by Lizbeth Nguyen OT at 24 1610       Speech Language Pathology Notes (most recent note)    No notes exist for this encounter.       Tracy De La Torre, PT   Physical Therapist  Physical Therapy  Therapy Evaluation     Signed  Date of Service:  24 1045  Creation Time:  24 1413     Signed        Expand All Collapse All  Patient Name: Miryam Fernández                 : 1955                        MRN: [id number]                              Today's Date: 2024                                     Admit Date: 2024                        Visit Dx:   Visit Diagnosis       ICD-10-CM ICD-9-CM   1. Left displaced femoral neck fracture  S72.002A 820.8         Problem List     "   Patient Active Problem List   Diagnosis    Obstructive sleep apnea, adult    Tobacco use    Normocytic anemia    Meniere's disease    Lung nodule    Hilar lymphadenopathy    Hyperlipidemia    Heartburn    Gastroesophageal reflux disease    Dysphagia    Chronic obstructive pulmonary disease    CAD (coronary artery disease)    Bruises easily    Chronic pain    Small cell lung cancer    Carotid stenosis    Coronary artery disease    Primary hypertension    Long term (current) use of opiate analgesic    DDD (degenerative disc disease), lumbar    Screening mammogram for breast cancer    Preventative health care    Vitamin D deficiency    Need for influenza vaccination    Moderate episode of recurrent major depressive disorder    Need for vaccination against Streptococcus pneumoniae    Frequent falls    Weakness    Nasal congestion    Memory loss    Localized swelling of both lower legs    Meniere disease, bilateral    Abdominal aortic aneurysm (AAA) without rupture    Thoracic aortic aneurysm without rupture    Plantar wart    Screening for osteoporosis    Asymptomatic menopause    Screening for malignant neoplasm of colon    Skin tear of left forearm without complication    Encounter for subsequent annual wellness visit (AWV) in Medicare patient    Acute cystitis    Altered mental status    Left displaced femoral neck fracture    Closed fracture of left hip    Moderate malnutrition         Medical History        Past Medical History:   Diagnosis Date    Cancer       lung cancer    Carotid stenosis      COPD (chronic obstructive pulmonary disease)      Coronary artery disease      Esophageal stricture       Dr Domingo    Hyperlipidemia      Metabolic encephalopathy 06/28/2024     2nd to UTI    Pulmonary embolism       seen at [facility], fluid around her heart at the time-right lung    UTI (urinary tract infection) 06/28/2024         Surgical History         Past Surgical History:   Procedure Laterality Date    ANGIOPLASTY  / STENTING FEMORAL        CARDIAC CATHETERIZATION        CAROTID STENT        CORONARY STENT PLACEMENT        ESOPHAGOSCOPY / EGD        HYSTERECTOMY               General Information         Row Name 08/02/24 1322                 Physical Therapy Time and Intention     Document Type evaluation  -BR       Mode of Treatment physical therapy  -BR          Row Name 08/02/24 1322                 General Information     Patient Profile Reviewed yes  -BR       Prior Level of Function independent:;all household mobility;gait;transfer  Pt  uses a rollator at baseline.  -BR       Existing Precautions/Restrictions fall;hip, anterior;left  -BR       Barriers to Rehab cognitive status;previous functional deficit  -BR          Row Name 08/02/24 1322                 Living Environment     People in Home spouse;other (see comments)  Pt's daughter assists pt QD as needed as well. Pt has been receiving rehab at [facility].  -BR          Row Name 08/02/24 1322                 Home Main Entrance     Number of Stairs, Main Entrance none  ramp entry  -BR          Row Name 08/02/24 1322                 Cognition     Orientation Status (Cognition) disoriented to;place;situation;time;verbal cues/prompts needed for orientation  -BR          Row Name 08/02/24 1322                 Safety Issues, Functional Mobility     Impairments Affecting Function (Mobility) balance;cognition;pain;range of motion (ROM);strength;endurance/activity tolerance  -BR       Cognitive Impairments, Mobility Safety/Performance problem-solving/reasoning;insight into deficits/self-awareness  -BR                       User Key  (r) = Recorded By, (t) = Taken By, (c) = Cosigned By        Initials Name Provider Type     BR Tracy De La Torre, PT Physical Therapist                            Mobility         Row Name 08/02/24 1045                 Bed Mobility     Bed Mobility supine-sit  -BR       Supine-Sit [place] (Bed Mobility) minimum assist (75% patient effort);verbal  cues;2 person assist  -BR       Assistive Device (Bed Mobility) head of bed elevated;draw sheet  -BR          Row Name 08/02/24 1045                 Sit-Stand Transfer     Sit-Stand [place] (Transfers) minimum assist (75% patient effort);2 person assist;verbal cues  -BR       Assistive Device (Sit-Stand Transfers) walker, front-wheeled  -BR          Row Name 08/02/24 1045                 Gait/Stairs (Locomotion)     [place] Level (Gait) minimum assist (75% patient effort);2 person assist  -BR       Assistive Device (Gait) walker, front-wheeled  -BR       Patient was able to Ambulate yes  -BR       Distance in Feet (Gait) 15  -BR       Deviations/Abnormal Patterns (Gait) antalgic;weight shifting decreased  -BR       Bilateral Gait Deviations forward flexed posture;heel strike decreased  -BR          Row Name 08/02/24 1045                 Mobility     Extremity Weight-bearing Status left lower extremity  -BR       Left Lower Extremity (Weight-bearing Status) weight-bearing as tolerated (WBAT)  -BR                       User Key  (r) = Recorded By, (t) = Taken By, (c) = Cosigned By        Initials Name Provider Type     BR Tracy De La oTrre, PT Physical Therapist                            Obj/Interventions         Row Name 08/02/24 1327                 Range of Motion Comprehensive     General Range of Motion bilateral lower extremity ROM WFL  -BR          Row Name 08/02/24 1327                 Strength Comprehensive (MMT)     Comment, General Manual Muscle Testing (MMT) Assessment RLE grossly 3+/5; LLE grossly 3-/5  -BR          Row Name 08/02/24 1327                 Balance     Balance Assessment sitting static balance;sitting dynamic balance;standing static balance;standing dynamic balance  -BR       Static Sitting Balance supervision  -BR       Dynamic Sitting Balance minimal assist  -BR       Position, Sitting Balance unsupported;sitting edge of bed  -BR       Static Standing Balance minimal  assist;2-person assist  -BR       Dynamic Standing Balance unable to assess  -BR       Position/Device Used, Standing Balance supported;walker, rolling  -BR          Row Name 08/02/24 1327                 Sensory Assessment (Somatosensory)     Sensory Assessment (Somatosensory) LE sensation intact  -BR                       User Key  (r) = Recorded By, (t) = Taken By, (c) = Cosigned By        Initials Name Provider Type     Tracy Kerns, PT Physical Therapist                            Goals/Plan         Row Name 08/02/24 1410                 Bed Mobility Goal 1 (PT)     Activity/Assistive Device (Bed Mobility Goal 1, PT) bed mobility activities, all  -BR       [place] Level/Cues Needed (Bed Mobility Goal 1, PT) modified independence  -BR       Time Frame (Bed Mobility Goal 1, PT) long term goal (LTG);2 weeks  -BR          Row Name 08/02/24 1410                 Transfer Goal 1 (PT)     Activity/Assistive Device (Transfer Goal 1, PT) transfers, all  -BR       [place] Level/Cues Needed (Transfer Goal 1, PT) supervision required  -BR       Time Frame (Transfer Goal 1, PT) long term goal (LTG);2 weeks  -BR          Row Name 08/02/24 1410                 Gait Training Goal 1 (PT)     Activity/Assistive Device (Gait Training Goal 1, PT) gait (walking locomotion);assistive device use  -BR       [place] Level (Gait Training Goal 1, PT) supervision required  -BR       Distance (Gait Training Goal 1, PT) 100  -BR       Time Frame (Gait Training Goal 1, PT) long term goal (LTG);2 weeks  -BR          Row Name 08/02/24 1410                 Therapy Assessment/Plan (PT)     Planned Therapy Interventions (PT) balance training;bed mobility training;gait training;home exercise program;patient/family education;transfer training;strengthening;ROM (range of motion)  -BR                    User Key  (r) = Recorded By, (t) = Taken By, (c) = Cosigned By        Initials Name Provider Type     Tracy Kerns, PT  Physical Therapist                         Clinical Impression         Row Name 08/02/24 1328                 Pain     Additional Documentation Pain Scale: FACES Pre/Post-Treatment (Group)  -BR          Row Name 08/02/24 1328                 Pain Scale: FACES Pre/Post-Treatment     Pain: FACES Scale, Pretreatment 0-->no hurt  -BR       Posttreatment Pain Rating 4-->hurts little more  -BR       Pain Location - Side/Orientation Left  -BR       Pain Location - hip  -BR          Row Name 08/02/24 1410 08/02/24 1328            Plan of Care Review     Plan of Care Reviewed With -- patient;spouse;daughter  -BR     Outcome Evaluation Pt presents as a 68 y/o F admitted to [facility] 7/[facility] due to a fall. Imaging:Minimal nondisplaced fracture of the left lateral femoral head neck junction. Pt is POD #1 for left anterior CLINTON per Dr. Joshua. Pt is WBAT LLE. Procedure was tolerated without complications. Pt has been at [facility] receiving rehab post hospitalization for CVA and UTI. PMHx: Meniere's Disease, anemia, HTN, hx lung CA, memory loss. Pt Alert, pleasant and cooperative and oriented to self only. Pt follows directions well and responds well to cues to mobilize. Pt typically lives with spouse in [facility] with ramp entry and a daughter checks on pt daily and assists as needed. Pt uses a rollator at baseline. This date, pt requiring min assist of 2 for supine>sit and sit<>stand. Pt ambulated 15 feet with rolling walker WBAT LLE with min assist of 2 with antalgic, step to gait pattern. PT will follow pt with PT recommendation of return to Skilled Nursing Facility.  -BR --        Row Name 08/02/24 1328                 Therapy Assessment/Plan (PT)     Rehab Potential (PT) good, to achieve stated therapy goals  -BR       Criteria for Skilled Interventions Met (PT) yes;meets criteria;skilled treatment is necessary  -BR       Therapy Frequency (PT) daily  -BR       Predicted Duration of Therapy Intervention (PT) until D/C  -BR           Row Name 08/02/24 1328                 Vital Signs     Pre SpO2 (%) 99  -BR       O2 Delivery Pre Treatment room air  -BR       Post SpO2 (%) 100  -BR       O2 Delivery Post Treatment room air  -BR       Pre Patient Position Supine  -BR       Intra Patient Position Standing  -BR       Post Patient Position Sitting  -BR          Row Name 08/02/24 1328                 Positioning and Restraints     Pre-Treatment Position in bed  -BR       Post Treatment Position chair  -BR       In Chair notified nsg;reclined;call light within reach;encouraged to call for assist;exit alarm on;with family/caregiver;legs elevated;waffle cushion  -BR                       User Key  (r) = Recorded By, (t) = Taken By, (c) = Cosigned By        Initials Name Provider Type     BR Tracy De La Torre, PT Physical Therapist                            Outcome Measures         Row Name 08/02/24 1411 08/02/24 1330            How much help from another person do you currently need...     Turning from your back to your side while in flat bed without using bedrails? 3  -BR 3  -BR     Moving from lying on back to sitting on the side of a flat bed without bedrails? 3  -BR 2  -BR     Moving to and from a bed to a chair (including a wheelchair)? 3  -BR 2  -BR     Standing up from a chair using your arms (e.g., wheelchair, bedside chair)? 3  -BR 2  -BR     Climbing 3-5 steps with a railing? 1  -BR 1  -BR     To walk in hospital room? 2  -BR 2  -BR     AM-PAC 6 Clicks Score (PT) 15  -BR 12  -BR     Highest Level of Mobility Goal 4 --> Transfer to chair/commode  -BR 4 --> Transfer to chair/commode  -BR        Row Name 08/02/24 0600                 How much help from another person do you currently need...     Turning from your back to your side while in flat bed without using bedrails? 3  -LR       Moving from lying on back to sitting on the side of a flat bed without bedrails? 3  -LR       Moving to and from a bed to a chair (including a wheelchair)? 2  -LR        Standing up from a chair using your arms (e.g., wheelchair, bedside chair)? 2  -LR       Climbing 3-5 steps with a railing? 1  -LR       To walk in hospital room? 2  -LR       AM-PAC 6 Clicks Score (PT) 13  -LR       Highest Level of Mobility Goal 4 --> Transfer to chair/commode  -LR          Row Name 08/02/24 1411 08/02/24 1330            Functional Assessment     Outcome Measure Options AM-PAC 6 Clicks Basic Mobility (PT)  -BR AM-PAC 6 Clicks Basic Mobility (PT)  -BR                     User Key  (r) = Recorded By, (t) = Taken By, (c) = Cosigned By        Initials Name Provider Type     LR Xena Pacheco RN Registered Nurse     Tracy Kerns, PT Physical Therapist                          Physical Therapy Education            Title: PT OT SLP Therapies (Done)         Topic: Physical Therapy (Done)         Point: Mobility training (Done)         Learning Progress Summary               Patient Acceptance, E,D, VU,DU by BR at 8/2/2024 1413     Acceptance, E,D, VU,DU,NR by BR at 8/2/2024 1331   Family Acceptance, E,D, VU,DU by BR at 8/2/2024 1413     Acceptance, E,D, VU,DU,NR by BR at 8/2/2024 1331                               Point: Body mechanics (Done)         Learning Progress Summary               Patient Acceptance, E,D, VU,DU by BR at 8/2/2024 1413     Acceptance, E,D, VU,DU,NR by BR at 8/2/2024 1331   Family Acceptance, E,D, VU,DU by BR at 8/2/2024 1413     Acceptance, E,D, VU,DU,NR by BR at 8/2/2024 1331                               Point: Precautions (Done)         Learning Progress Summary               Patient Acceptance, E,D, VU,DU by BR at 8/2/2024 1413     Acceptance, E,D, VU,DU,NR by BR at 8/2/2024 1331   Family Acceptance, E,D, VU,DU by BR at 8/2/2024 1413     Acceptance, E,D, VU,DU,NR by BR at 8/2/2024 1331                                                   User Key         Initials Effective Dates Name Provider Type Discipline      02/01/22 -  Tracy De La Torre, JANNET Physical Therapist  PT                          PT Recommendation and Plan  Planned Therapy Interventions (PT): balance training, bed mobility training, gait training, home exercise program, patient/family education, transfer training, strengthening, ROM (range of motion)  Plan of Care Reviewed With: patient, spouse, daughter  Outcome Evaluation: Pt presents as a 70 y/o F admitted to [facility] on 7/[facility]/24 due to a fall. Imaging:Minimal nondisplaced fracture of the left lateral femoral head neck junction. Pt is POD #1 for left anterior CLINTON per Dr. Joshua. Pt is WBAT LLE. Procedure was tolerated without complications. Pt has been at [facility] receiving rehab post hospitalization for CVA and UTI. PMHx: Meniere's Disease, anemia, HTN, hx lung CA, memory loss. Pt Alert, pleasant and cooperative and oriented to self only. Pt follows directions well and responds well to cues to mobilize. Pt typically lives with spouse in [facility] with ramp entry and a daughter checks on pt daily and assists as needed. Pt uses a rollator at baseline. This date, pt requiring min assist of 2 for supine>sit and sit<>stand. Pt ambulated 15 feet with rolling walker WBAT LLE with min assist of 2 with antalgic, step to gait pattern. PT will follow pt with PT recommendation of return to Skilled Nursing Facility.      Time Calculation:               PT G-Codes  Outcome Measure Options: AM-PAC 6 Clicks Basic Mobility (PT)  AM-PAC 6 Clicks Score (PT): 15  PT Discharge Summary  Anticipated Discharge Disposition (PT): skilled nursing facility     Tracy De La Torre, PT                        8/2/2024

## 2024-08-06 NOTE — THERAPY TREATMENT NOTE
"Subjective: Pt agreeable to therapeutic plan of care.   Cognition: oriented to Person and Place    \"Why does my leg hurt so bad?\" Requiring to be re-oriented throughout session of situation     Objective:     Precautions - LLE CLINTON anterior, WBAT     Bed Mobility: Min-A   Functional Transfers: Min-A, Mod-A, Assist x 2, and with rolling walker     Balance: sitting EOB, unsupported, and static SBA  Functional Ambulation: Min-A, Assist x 2, and with rolling walker task segmentation with mobility and managing RW     Grooming: Supervision  ADL Position: supported sitting  ADL Comments: brushing hair, sitting up in chair       Vitals: WNL    Pain: does not give numeric value VAS  Location: LLE   Interventions for pain: Repositioned, Increased Activity, and Therapeutic Presence, ice pack applied post session   Education: Provided education on the importance of mobility in the acute care setting, Verbal/Tactile Cues, and Transfer Training      Assessment: Miryam Fernández presents with ADL impairments affecting function including balance, cognition, endurance / activity tolerance, pain, postural / trunk control, and strength. Demonstrated functioning below baseline abilities indicate the need for continued skilled intervention while inpatient. Pt continues to be pleasantly confused, however able to follow commands appropriately. Pt requiring significant assist with ambulation, requiring task segmentation to advance RW, then surgical leg, the non-operative leg when ambulating. Pt requiring consistent verbal cues to advance in taking steps. Pt with increased pain with mobility, limiting activity. OT continues to recommend SNF at ID. Tolerating session today without incident. Will continue to follow and progress as tolerated.     Plan/Recommendations:   Moderate Intensity Therapy recommended post-acute care. This is recommended as therapy feels the patient would require 3-4 days per week and wouldn't tolerate \"3 hour daily\" " rehab intensity. SNF would be the preferred choice. If the patient does not agree to SNF, arrange HH or OP depending on home bound status. If patient is medically complex, consider LTACH.. Pt requires no DME at discharge.     Pt desires Skilled Rehab placement at discharge. Pt cooperative; agreeable to therapeutic recommendations and plan of care.     Modified Jovon: N/A = No pre-op stroke/TIA    Post-Tx Position: Up in Chair, Alarms activated, and Call light and personal items within reach  PPE: gloves

## 2024-08-06 NOTE — DISCHARGE SUMMARY
[facility] Medicine Services  Discharge Summary    Date of Service: 24  Patient Name: Miryam Fernández  : 1955  MRN: [id number]    Date of Admission: 2024  Discharge Diagnosis: hip fx  Date of Discharge:  24  Primary Care Physician: Lashell De La Torre APRN      Presenting Problem:   Left displaced femoral neck fracture [S72.002A]  Closed fracture of left hip [S72.002A]    Active and Resolved Hospital Problems:  Active Hospital Problems    Diagnosis POA    **Left displaced femoral neck fracture [S72.002A] Yes    Closed fracture of left hip [S72.002A] Yes    Moderate malnutrition [E44.0] Yes      Resolved Hospital Problems   No resolved problems to display.     Patient presented after a fall, found on CT of the pelvis to have a minimal nondisplaced fracture of the left lateral femoral head and neck junction.    -Orthopedics consulted, Patient is status post left total hip arthroplasty due to fracture   -pain control as needed, gentle volume resuscitation.  -Continue home aspirin and Brilinta for now.  -PT/OT awaiting placement  -Continue to monitor..  -Monitor hemoglobin, presently 7.5.  12 on admission.       Hospital Course       History of Present Illness:   This is a 69 years old history of hyperlipidemia, hypertension, COPD, female with a past medical and lung cancer who presented to the ED after a fall.  She was in the bathroom today when she fell and given significant pain they had an x-ray on admission home with concerns of femoral neck fracture she was then sent to the ED for further workup and management.       Vital signs on presentation showed a blood pressure 141/70, heart rate 73, afebrile 36.6 neurosurgeons, respiratory rate 16 saturation 97% on room air.     Labs showed BMP and LFT within normal limits, CBC within normal limits.  CT of the pelvis showed minimal nondisplaced fracture of the left lateral femoral head neck junction.     24-Patient Complaints:  Patient is some pain complaints.  She denies any shortness of breath.  Denies any nausea vomiting.   states that she fell attempting to independently ambulate at facility.  Nurse feels that Claudia has constipation to act oddly with for pain management.  No other acute issues.  8/2/24-Patient is 1 day status post left total hip arthroplasty due to fracture.  Family at bedside, mental status improving, vital signs stable, discussed with RN.  Patient is from [facility].  8/3/24 patient seen and examined in bed no acute distress.  Family at bedside, patient still confused, discussed with RN and, discussed with , awaiting placement.  8/4/24 patient seen and examined in bed no acute distress, vital signs stable, family at bedside, discussed with RN, discussed with .  Patient to return back to rehab on discharge  8/5/24 patient seen and examined in bed acute distress, vital signs stable, awaiting placement, discussed with RN, discussed with case management.  8/6/24 seen in bed NAD, no new complaints, EDUARDO RN, will dc to nursing home, condition on dc stable    DISCHARGE Follow Up Recommendations for labs and diagnostics: follow with pcp in one week  Follow with surgery 2 weeks      Reasons For Change In Medications and Indications for New Medications:      Day of Discharge     Vital Signs:  Temp:  [97.4 °F (36.3 °C)-98 °F (36.7 °C)] 97.4 °F (36.3 °C)  Heart Rate:  [79-90] 86  Resp:  [14-17] 17  BP: (120-158)/(67-78) 125/78    Physical Exam:  Physical Exam Appearance: No apparent distress, nontoxic-appearing.   HEENT/Neck: Neck is supple, Extraocular movements intact, Moist mucous membranes  Cardiovascular: Regular Rate and Rhythm, No murmurs  Pulmonary: Clear to auscultation Bilaterally. No wheeze,  Abdomen: BS+, Soft, nontender, nondistended.   Ext: No Cyanosis, Clubbing, Edema, left lower extremity examination limited by pain.    Neuro: Nonfocal, Alert & Oriented x 3      Pertinent   and/or Most Recent Results     LAB RESULTS:      Lab 08/05/24  0839 08/04/24  0044 08/03/24  2339 08/02/24  1224 08/01/24  0235 07/31/24  1551   WBC  --  6.90  --  14.51* 7.11 7.77   HEMOGLOBIN 7.5* 7.3*  --  9.1* 10.8* 12.4   HEMATOCRIT 23.5* 23.8*  --  28.5* 33.8* 39.2   PLATELETS  --  181  --  213 219 241   NEUTROS ABS  --  5.57  --  12.63* 5.60 6.52   IMMATURE GRANS (ABS)  --  0.03  --  0.09* 0.04 0.04   LYMPHS ABS  --  0.51*  --  0.60* 0.66* 0.62*   MONOS ABS  --  0.57  --  1.14* 0.50 0.44   EOS ABS  --  0.19  --  0.02 0.27 0.11   MCV  --  100.4*  --  96.0 95.2 94.9   PROCALCITONIN  --   --  0.13  --   --   --    LACTATE  --  1.6  --   --   --   --    PROTIME  --   --   --   --  10.4 10.1   APTT  --   --   --   --   --  23.1*         Lab 08/03/24 2339 08/02/24  1024 08/01/24  0235 07/31/24  1551   SODIUM 138 136 140 140   POTASSIUM 4.2 4.2 4.1 4.3   CHLORIDE 107 102 103 101   CO2 22.0 21.5* 27.5 28.7   ANION GAP 9.0 12.5 9.5 10.3   BUN 17 20 11 14   CREATININE 0.79 1.18* 0.82 0.92   EGFR 82.1 50.7* 78.5 68.4   GLUCOSE 108* 144* 90 94   CALCIUM 8.6 8.8 9.4 9.7   MAGNESIUM  --   --  1.9  --    PHOSPHORUS  --   --  3.4  --          Lab 08/03/24 2339 07/31/24  1551   TOTAL PROTEIN 5.9* 7.2   ALBUMIN 3.1* 4.1   GLOBULIN 2.8 3.1   ALT (SGPT) <5 14   AST (SGOT) 22 18   BILIRUBIN 0.2 0.7   ALK PHOS 89 127*         Lab 08/03/24  2339 08/01/24  0235 07/31/24  1551   PROBNP 3,896.0*  --   --    PROTIME  --  10.4 10.1   INR  --  0.95 <0.93*             Lab 08/01/24  1607   ABO TYPING B   RH TYPING Positive   ANTIBODY SCREEN Negative         Brief Urine Lab Results  (Last result in the past 365 days)        Color   Clarity   Blood   Leuk Est   Nitrite   Protein   CREAT   Urine HCG        07/21/24 0513 Yellow   Clear   Negative   Trace   Positive   Negative                 Microbiology Results (last 10 days)       Procedure Component Value - Date/Time    Blood Culture - Blood, Arm, Left [[id number]]  (Normal) Collected:  08/04/24 0044    Lab Status: Preliminary result Specimen: Blood from Arm, Left Updated: 08/06/24 0100     Blood Culture No growth at 2 days    Narrative:      Less than seven (7) mL's of blood was collected.  Insufficient quantity may yield false negative results.    Blood Culture - Blood, Arm, Left [[id number]]  (Normal) Collected: 08/03/24 2339    Lab Status: Preliminary result Specimen: Blood from Arm, Left Updated: 08/06/24 0000     Blood Culture No growth at 2 days    Narrative:      Less than seven (7) mL's of blood was collected.  Insufficient quantity may yield false negative results.            XR Hip With or Without Pelvis 1 View Left    Result Date: 8/1/2024  Impression: Impression: Left total hip arthroplasty without evidence of immediate complication. Electronically Signed: Jluis Hamilton  8/1/2024 6:55 PM EDT  Workstation ID: [id number]    CT Pelvis Without Contrast    Result Date: 7/31/2024  Impression: Impression: Minimal nondisplaced fracture of the left lateral femoral head neck junction. Electronically Signed: Ruthy Perrin MD  7/31/2024 3:43 PM EDT  Workstation ID: [id number]    CT Abdomen Pelvis Stone Protocol    Result Date: 7/23/2024  Impression: Impression: 1.Questionable indistinctness of the right colon versus artifact from respiratory motion. Right-sided colitis may be considered in the appropriate clinical setting. 2.Right punctate nonobstructive nephrolithiasis. No hydronephrosis is seen. There is excreted contrast throughout the urinary tract. 3.Ectasia of the distal abdominal aorta measuring 2.7 cm in caliber. 4.Several subcentimeter bilateral lung base nodules measuring up to approximately 5 mm. Small right pneumothorax is partially imaged. This was also seen on the neck CTA from 7/21/2024. 5.Additional findings as detailed above. Electronically Signed: Josue Fuentes MD  7/23/2024 12:47 PM EDT  Workstation ID: [id number]    CT Angiogram Head    Result Date: 7/21/2024  Impression: 1.No  intracranial large vessel occlusion is identified. 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 Reconstituted trace flow within the left vertebral artery at about the C3 level. This likely represents a chronic finding. Proximal left vertebral artery is likely occluded on the chest CTA from 10/31/2023. No corresponding acute ischemia noted on yesterday's brain MRI. 3.Estimated 40% stenosis within the left proximal ICA (series 7, image 60). 4.No significant stenosis of the right ICA. 5.Moderate to severe stenosis of the proximal left subclavian artery. There is a surgical bypass between the left common carotid and subclavian artery with moderate stenosis seen within this bypass. 6.Small right apical pneumothorax. 7.Emphysematous changes and masslike scarring within the bilateral upper lobes. The above findings were discussed with Lois Elder, house supervisor, via telephone on 7/21/2024 8:05 PM EDT. Electronically Signed: Josue Fuentes MD  7/21/2024 8:06 PM EDT  Workstation ID: [id number]    CT Angiogram Carotids    Result Date: 7/21/2024  Impression: 1.No intracranial large vessel occlusion is identified. 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 Reconstituted trace flow within the left vertebral artery at about the C3 level. This likely represents a chronic finding. Proximal left vertebral artery is likely occluded on the chest CTA from 10/31/2023. No corresponding acute ischemia noted on yesterday's brain MRI. 3.Estimated 40% stenosis within the left proximal ICA (series 7, image 60). 4.No significant stenosis of the right ICA. 5.Moderate to severe stenosis of the proximal left subclavian artery. There is a surgical bypass between the left common carotid and subclavian artery with moderate stenosis seen within this bypass. 6.Small right apical pneumothorax. 7.Emphysematous changes and masslike scarring within the bilateral upper lobes. The above findings were discussed with Lois  Jerrod, house supervisor, via telephone on 7/21/2024 8:05 PM EDT. Electronically Signed: Josue Fuentes MD  7/21/2024 8:06 PM EDT  Workstation ID: [id number]    CT Head Without Contrast    Result Date: 7/20/2024  Impression: Impression: 1. Chronic changes, but no acute intracranial pathology. Electronically Signed: Mo Tejeda MD  7/20/2024 6:35 PM EDT  Workstation ID: [id number]    MRI Brain With & Without Contrast    Result Date: 7/20/2024  Impression: Impression: 1. Small less than 1 cm areas of increased signal on diffusion imaging with associated FLAIR and T2 signal abnormality within the right basal ganglia, corona radiata suggesting evolution of small subacute infarcts. No convincing evidence for acute ischemic changes otherwise noted. 2. Mild increased T1 signal and questionable enhancement in this region may represent sequela of evolving small infarct. Metastatic lesion is thought to be less likely although this will need to be followed per patient's oncologic protocol 3. No abnormal enhancement otherwise noted within the brain parenchyma. 4. Extensive confluent white matter changes again noted which can be seen with small vessel ischemic disease as well as represent sequela of prior therapy Electronically Signed: John Posada MD  7/20/2024 6:07 PM EDT  Workstation ID: [id number]    XR Chest 1 View    Result Date: 7/19/2024  Impression: Impression: Stable findings without definite acute cardiopulmonary abnormality. This exam was completed during a system downtime. Preliminary report was provided earlier in written form. Electronically Signed: Samina Escobedo  7/19/2024 11:16 PM EDT  Workstation ID: [id number]             Results for orders placed during the hospital encounter of 07/19/24    Adult Transthoracic Echo Complete W/ Cont if Necessary Per Protocol    Interpretation Summary    Left ventricular systolic function is normal. Calculated left ventricular EF = 61.7% Left ventricular ejection fraction  appears to be 61 - 65%.    Left ventricular diastolic function is consistent with (grade I) impaired relaxation.    Saline test results are negative for right to left atrial level shunt.    Estimated right ventricular systolic pressure from tricuspid regurgitation is normal (<35 mmHg).      Labs Pending at Discharge:  Pending Labs       Order Current Status    Blood Culture - Blood, Arm, Left Preliminary result    Blood Culture - Blood, Arm, Left Preliminary result            Procedures Performed  Procedure(s):  TOTAL HIP ARTHROPLASTY         Consults:   Consults       Date and Time Order Name Status Description    8/1/2024 12:32 PM Inpatient Orthopedic Surgery Consult Completed     7/31/2024  9:37 PM Inpatient Orthopedic Surgery Consult      7/23/2024  9:33 AM Inpatient Urology Consult Completed     7/19/2024  5:09 PM Inpatient Neurology Consult General Completed               Discharge Details        Discharge Medications        New Medications        Instructions Start Date   HYDROcodone-acetaminophen 5-325 MG per tablet  Commonly known as: NORCO   1 tablet, Oral, Every 6 Hours PRN      sennosides-docusate 8.6-50 MG per tablet  Commonly known as: PERICOLACE   2 tablets, Oral, 2 Times Daily PRN             Continue These Medications        Instructions Start Date   aspirin 81 MG EC tablet   81 mg, Oral, Daily      atorvastatin 40 MG tablet  Commonly known as: LIPITOR   40 mg, Oral, Daily      furosemide 20 MG tablet  Commonly known as: LASIX   10 mg, Oral, Daily PRN, PRN for swelling       memantine 5 MG tablet  Commonly known as: NAMENDA   5 mg, Oral, 2 Times Daily      metoprolol succinate XL 25 MG 24 hr tablet  Commonly known as: TOPROL-XL   25 mg, Oral, Daily      pantoprazole 40 MG EC tablet  Commonly known as: PROTONIX   1 tablet, Oral, Daily      ticagrelor 90 MG tablet tablet  Commonly known as: BRILINTA   90 mg, Oral, 2 Times Daily               Allergies   Allergen Reactions    Albuterol Shortness Of  Breath     Pt states she has sensitivity to albuterol.  She states it causes her to be SOA.    Hydrocodone Palpitations    Sulfa Antibiotics Swelling    Bacitracin Other (See Comments)    Benzalkonium Chloride Hives    Codeine Other (See Comments)    Neomycin Other (See Comments)    Polymyxin B Other (See Comments)    Nickel Rash    Penicillins Rash         Discharge Disposition:   Skilled Nursing Facility (DC - External)    Diet:  Hospital:  Diet Order   Procedures    Diet: Regular/House; Fluid Consistency: Thin (IDDSI 0)         Discharge Activity:   Activity Instructions       Gradually Increase Activity Until at Pre-Hospitalization Level                CODE STATUS:  Code Status and Medical Interventions: CPR (Attempt to Resuscitate); Full Support   Ordered at: 07/31/24 1954     Code Status (Patient has no pulse and is not breathing):    CPR (Attempt to Resuscitate)     Medical Interventions (Patient has pulse or is breathing):    Full Support         Future Appointments   Date Time Provider Department Center   8/16/2024  8:30 AM Sonia Colvin APRN [facility] PODIATRY [facility]   9/10/2024 10:30 AM LAB MD [facility] ONC LAB NA  LAG ONAL [facility]   9/10/2024 10:30 AM Gunnar Moon MD [facility] ONC NA [facility]   9/24/2024 10:15 AM Lashell De La Torre APRN [facility] PC SB [facility]   9/30/2024 10:15 AM Nai Lorenzana MD [facility] CAR JFSC [facility]   10/23/2024  8:00 AM Kandice Tomlinson APRN [facility] NEURO NA [facility]   11/8/2024 10:00 AM [facility] VASC MACHINE 1 BH [facility] CARDI [facility]   11/8/2024 10:45 AM [facility] VASC MACHINE 4 BH [facility] CARDI [facility]   11/13/2024 10:45 AM [facility] VASC MACHINE 1 BH [facility] CARDI [facility]   11/15/2024  8:30 AM [facility] VASC MACHINE 4  [facility] CARDI [facility]   11/18/2024 12:15 PM Dev Landrum MD [facility] VS [facility]       Additional Instructions for the Follow-ups that You Need to Schedule       Discharge Follow-up with PCP   As directed       Currently Documented PCP:    Lashell De La Torre APRN    PCP Phone Number:    [phone]     Follow Up Details: 1 week                 Time spent on Discharge including face to face service:  >30 minutes    Signature: Electronically signed by Shiv Hurst MD, 08/06/24, 13:19 EDT.  [facility] Hospitalist Team

## 2024-08-06 NOTE — SIGNIFICANT NOTE
Case Management/Social Work    Patient Name:  Miryam Fernández  YOB: 1955  MRN: [id number]  Admit Date:  7/31/2024 08/06/24 1138   Post Acute Pre-Cert Documentation   Date Post Acute Pre-Cert Completed 08/06/24   All Clinicals Submitted? Yes   Response Received from Insurance? Approval   Post Acute Pre-Cert Initiated Comment MARIANNE verified SNF precert approval via [employer] portal. Authorization ID [id number]. SNF precert valid 8/6-8/9. Approval letter indexed to media. CM made aware.           Electronically signed by:  Jules Thakkar CMA  08/06/24 11:40 EDT    Jules Thakkar  Case Management Associate  [address] [address]  P: [phone]  F: [phone]

## 2024-08-06 NOTE — PLAN OF CARE
"Goal Outcome Evaluation:  Plan of Care Reviewed With: patient, spouse, daughter     Assessment: Miryam Fernández presents with functional mobility impairments which indicate the need for skilled intervention. Pt is POD #5 for L ant [place] for hip fracture. PT cueing pt for step to gait pattern to offload painful LLE with good carryover by pt. Pt progressing well with mobility. Pain meds before PT session would likely be helpful for pt. Tolerating session today without incident. Will continue to follow and progress as tolerated.     Plan/Recommendations:   If medically appropriate, Moderate Intensity Therapy recommended post-acute care. This is recommended as therapy feels the patient would require 3-4 days per week and wouldn't tolerate \"3 hour daily\" rehab intensity. SNF would be the preferred choice. If the patient does not agree to SNF, arrange HH or OP depending on home bound status. If patient is medically complex, consider LTACH. Pt requires no DME at discharge.     Pt desires Skilled Rehab placement at discharge. Pt cooperative; agreeable to therapeutic recommendations and plan of care.        Anticipated Discharge Disposition (PT): skilled nursing facility                        "

## 2024-08-06 NOTE — CASE MANAGEMENT/SOCIAL WORK
Continued Stay Note   Oh     Patient Name: Miryam Fernández  MRN: [id number]  Today's Date: 8/6/2024    Admit Date: 7/31/2024    Plan: Return to [facility] skilled.  Precert approved, 8/6-8/9/24, Bed ready today 0806/2024 Pharmacy updated.   Discharge Plan       Row Name 08/06/24 1315       Plan    Plan Return to [facility] skilled.  Precert approved, 8/6-8/9/24, Bed ready today 0806/2024 Pharmacy updated.    Plan Comments POD#5 left total hip replacement,    Precert approved. for [facility]  Bed ready today               Hawa Chaudhari RN    SIPS 1  [email or web address]  Office [phone]  Cell [phone]

## 2024-08-07 NOTE — PLAN OF CARE
Goal Outcome Evaluation:  Plan of Care Reviewed With: patient        Progress: improving  Outcome Evaluation: Pt Discharged by wheelchair van at 2105 with all of her belongings and  was with her.Pt was going to [facility]

## 2024-08-09 LAB
BACTERIA SPEC AEROBE CULT: NORMAL
BACTERIA SPEC AEROBE CULT: NORMAL

## 2024-08-16 ENCOUNTER — OFFICE VISIT (OUTPATIENT)
Dept: PODIATRY | Facility: CLINIC | Age: 69
End: 2024-08-16
Payer: MEDICARE

## 2024-08-16 VITALS
HEART RATE: 82 BPM | BODY MASS INDEX: 21.79 KG/M2 | WEIGHT: 123 LBS | OXYGEN SATURATION: 100 % | RESPIRATION RATE: 20 BRPM | HEIGHT: 63 IN

## 2024-08-16 DIAGNOSIS — L85.3 XEROSIS CUTIS: ICD-10-CM

## 2024-08-16 DIAGNOSIS — B07.0 PLANTAR WART: ICD-10-CM

## 2024-08-16 DIAGNOSIS — L85.2 PUNCTATE KERATOSIS: Primary | ICD-10-CM

## 2024-08-16 DIAGNOSIS — M79.671 PAIN OF RIGHT FOOT: ICD-10-CM

## 2024-08-16 NOTE — PROGRESS NOTES
08/16/2024  Foot and Ankle Surgery - Established Patient/Follow-up  Provider: KENNY Shelley   Location: [facility]    Subjective:  Miryam Fernández is a 69 y.o. female.     Chief Complaint   Patient presents with    Left Foot - Initial Evaluation, Plantar Warts    Right Foot - Initial Evaluation, Plantar Warts    Initial Evaluation     CARLOS ESCOTO LAST PCP VISIT 06/21/2024       History of Present Illness  The patient is a 69-year-old female who presents for evaluation of possible plantar wart to her feet. She is accompanied by an adult male.    She reports experiencing intermittent pain in her right foot, which she describes as severe. This issue has been persisting for some time. Her mobility is limited, as she primarily uses a wheelchair for movement. She has a history of hip surgery and is not diabetic.       Allergies   Allergen Reactions    Albuterol Shortness Of Breath     Pt states she has sensitivity to albuterol.  She states it causes her to be SOA.    Hydrocodone Palpitations    Sulfa Antibiotics Swelling    Bacitracin Other (See Comments)    Benzalkonium Chloride Hives    Codeine Other (See Comments)    Neomycin Other (See Comments)    Polymyxin B Other (See Comments)    Nickel Rash    Penicillins Rash       Current Outpatient Medications on File Prior to Visit   Medication Sig Dispense Refill    aspirin 81 MG EC tablet Take 1 tablet by mouth Daily. 30 tablet 2    atorvastatin (LIPITOR) 40 MG tablet TAKE 1 TABLET BY MOUTH EVERY DAY 90 tablet 0    furosemide (LASIX) 20 MG tablet Take 0.5 tablets by mouth Daily As Needed. PRN for swelling      HYDROcodone-acetaminophen (NORCO) 5-325 MG per tablet Take 1 tablet by mouth Every 6 (Six) Hours As Needed for Moderate Pain. 6 tablet 0    memantine (NAMENDA) 5 MG tablet Take 1 tablet by mouth 2 (Two) Times a Day.      metoprolol succinate XL (TOPROL-XL) 25 MG 24 hr tablet Take 1 tablet by mouth Daily.      pantoprazole (PROTONIX) 40 MG EC tablet  "Take 1 tablet by mouth Daily.      sennosides-docusate (PERICOLACE) 8.6-50 MG per tablet Take 2 tablets by mouth 2 (Two) Times a Day As Needed for Constipation. 30 tablet 0    ticagrelor (BRILINTA) 90 MG tablet tablet Take 1 tablet by mouth 2 (Two) Times a Day. 60 tablet 2     No current facility-administered medications on file prior to visit.       Objective   Pulse 82   Resp 20   Ht 160 cm (63\")   Wt 55.8 kg (123 lb)   SpO2 100%   BMI 21.79 kg/m²     Foot/Ankle Exam    GENERAL  Appearance:  appears stated age  Orientation:  AAOx3  Affect:  appropriate  Gait:  non-weight bearing  Assistance:  wheelchair  Right shoe gear: sock and casual shoe  Left shoe gear: sock and casual shoe    VASCULAR     Right Foot Vascularity   Dorsalis pedis:  2+  Skin temperature:  warm  Edema grading:  None  CFT:  < 3 seconds  Pedal hair growth:  Absent  Varicosities:  mild varicosities     Left Foot Vascularity   Dorsalis pedis:  2+  Skin temperature:  warm  Edema grading:  None  Pedal hair growth:  Absent  Varicosities:  mild varicosities     NEUROLOGIC     Right Foot Neurologic   Light touch sensation: normal     Left Foot Neurologic   Light touch sensation: normal    MUSCULOSKELETAL     Right Foot Musculoskeletal   Ecchymosis:  none  Tenderness:  callous right foot       Left Foot Musculoskeletal   Ecchymosis:  none    DERMATOLOGIC      Right Foot Dermatologic   Skin  Positive for corn, dryness and skin changes.   Physical Exam         Results       Assessment & Plan   Diagnoses and all orders for this visit:    1. Punctate keratosis (Primary)    2. Pain of right foot    3. Xerosis cutis      Assessment & Plan  1. Punctate keratosis.  She has punctate keratosis on her right foot, which resembles a wart but is essentially a pinpoint callus area caused by high pressure. The affected areas were treated with cantharone to exfoliate and promote new skin growth. She was advised to wear more supportive shoes, such as tennis shoes with " insoles that have a metatarsal pad, to prevent recurrence. Additionally, she should use a good lotion on her feet. Moleskin was applied to the treated areas, and she was instructed to leave it on for a day, then reapply it daily for about a week. If it becomes too difficult to return for a follow-up, she may cancel the appointment.    Follow-up  She will follow up in 2 to 3 weeks for reevaluation.       No orders of the defined types were placed in this encounter.         Patient or patient representative verbalized consent for the use of Ambient Listening during the visit with  KENNY Persaud for chart documentation. 8/16/2024  09:26 EDT

## 2024-08-22 RX ORDER — FUROSEMIDE 20 MG/1
TABLET ORAL
Qty: 45 TABLET | Refills: 1 | Status: SHIPPED | OUTPATIENT
Start: 2024-08-22

## 2024-09-04 DIAGNOSIS — M51.36 DDD (DEGENERATIVE DISC DISEASE), LUMBAR: Primary | ICD-10-CM

## 2024-09-04 RX ORDER — OXYCODONE AND ACETAMINOPHEN 5; 325 MG/1; MG/1
1 TABLET ORAL EVERY 8 HOURS PRN
Qty: 90 TABLET | Refills: 0 | Status: SHIPPED | OUTPATIENT
Start: 2024-09-04

## 2024-09-09 NOTE — PROGRESS NOTES
HEMATOLOGY ONCOLOGY OUTPATIENT FOLLOWUP       Patient name: Miryam Fernández  : 1955  MRN: [id number]  Primary Care Physician: Lashell De La Torre APRN  Referring Physician: No ref. provider found  Reason For Consult: limited stage small cell lung cancer      History of Present Illness:  Patient is a 69 y.o. female diagnosed and treated for limited stage small cell lung cancer with chemoradiation currently undergoing surveillance.     Ms Fernández is a 66-year-old female who was diagnosed in   with small cell lung cancer after presenting with dyspnea, chest pain, nausea and vomiting. CT chest was suspicious for lung     20 PET CT -  showed hypermetabolic lower cervical, upper and middle mediastinal lymphadenopathy and left perihilar lung nodule. There was also intense focal uptake in the right thyroid lobe.   MRI brain was negative for metastases.      20  biopsy of the left supraclavicular node that showed small cell lung  cancer. She was diagnosed with limited stage disease and was treated with definitive chemoradiation.     20 - C1 of cisplatin/etoposide with radiation  2020 C4     2020 - PCI     CT chest on 20 showed significant interval reduction in mediastinal masses though there was a new PE for which she was started on Eliquis. She continued following with Dr Castillo and has had stable disease on imaging.      CT C/A/P on 21 showed a more conspicuous 5 mm LLL nodule compared to previous scans with numerous additional bilateral nodules that are stable and unchanged since . There was an increasing moderate pericardial effusion and stable perihilar scarring as well as mediastinal soft tissue density thought to be treated carcinoma. There was no clear evidence of progressive or recurrence malignancy in the chest.    She transferred care to [facility] in 2021 and had repeat imaging in December including MRI brain that did not show disease  progression. However, the pericardial effusion had increased. 2D echo on 12/1/21 showed hemodynamic compromise and concerns  for cardiac tamponade. She was subsequently admitted to the cardiology service and had a pericardiocentesis on 12/10/21 with the fluid negative for malignancy.    No further recurrence of disease.     Last CT imaging in 5/2023 with no evidence of recurrence. No recurrence of effusion.     3/26/2024 CT imaging with suggestive of of posttreatment fibrosis in the paramediastinal perihilar left upper lobe with no significant change as compared to October 2023.  Multiple pulmonary nodules are unchanged from 2021 hence benign.  Moderate emphysema, severe stenosis of the distal left external iliac artery which is new suspected severe renal artery ostial stenosis, thoracic aorta fusiform aneurysm.  No convincing evidence of recurrence of disease  After this she was referred to vascular surgery recommendation was medical management without any surgical intervention given no symptoms.    3/26/2024 MRI brain with confluent foci of T2 flair signal hyperintensity within the bilateral hemispheric white matter.  Related to chronic microvascular ischemic change.    6/28/24: CT Abdomen/Pelvis:  Impression:  1. Heterogeneous diminished enhancement in the right kidney. Correlate for symptoms of nephritis.  2. 2 mm nonobstructing stone in the right upper renal pole.  3. Stable 5 mm right lower lobe and 3 mm left lower lobe noncalcified nodules. According to previous report, these nodules have been stable since 2021, suggesting benign etiology.  4. 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 Suspected stenosis of the right renal artery ostium again noted.  5. 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 Small less than 1 cm areas of increased signal on diffusion imaging with  associated FLAIR and T2 signal abnormality within the right basal ganglia, corona radiata suggesting evolution of small subacute infarcts. No convincing evidence for acute ischemic changes otherwise noted.   2. Mild increased T1 signal and questionable enhancement in this region may represent sequela of evolving small infarct. Metastatic lesion is thought to be less likely although this will need to be followed per patient's oncologic protocol  3. No abnormal enhancement otherwise noted within the brain parenchyma.  4. Extensive confluent white matter changes again noted which can be seen with small vessel ischemic disease as well as represent sequela of prior therapy    7/23/24: CT Abdomen/Pelvis:  Impression:  1.Questionable indistinctness of the right colon versus artifact from respiratory motion. Right-sided colitis may be considered in the appropriate clinical setting.  2.Right punctate nonobstructive nephrolithiasis. No hydronephrosis is seen. There is excreted contrast throughout the urinary tract.  3.Ectasia of the distal abdominal aorta measuring 2.7 cm in caliber.  4.Several subcentimeter bilateral lung base nodules measuring up to approximately 5 mm. Small right pneumothorax is partially imaged. This was also seen on the neck CTA from 7/21/2024.      7/31/24-8/6/24: INTERVAL HOSPITALIZATION UPDATE:  Patient presented after a fall, found on CT of the pelvis to have a minimal nondisplaced fracture of the left lateral femoral head and neck junction.    -Orthopedics consulted, Patient is status post left total hip arthroplasty due to fracture     Subjective:  Patient seen for initial evaluation by me. She was previously being seen by Dr. Morales in our practice with underlying history of small cell lung cancer and is status post ChemoRadiation. She has excellent treatment response and continues to be in remission.  She is Status post Recent hospitalization as above for a fall with Left hip fracture requiring her  to have Total Hip arthroplasty. She is gradually recovering from this and is undergoing outpatient PT.    Past Medical History:   Diagnosis Date    Cancer     lung cancer    Carotid stenosis     COPD (chronic obstructive pulmonary disease)     Coronary artery disease     Esophageal stricture     Dr Chayo    Hyperlipidemia     Metabolic encephalopathy 06/28/2024    2nd to UTI    Pulmonary embolism     seen at [facility], fluid around her heart at the time-right lung    UTI (urinary tract infection) 06/28/2024       Past Surgical History:   Procedure Laterality Date    ANGIOPLASTY / STENTING FEMORAL      CARDIAC CATHETERIZATION      CAROTID STENT      CORONARY STENT PLACEMENT      ESOPHAGOSCOPY / EGD      HYSTERECTOMY      TOTAL HIP ARTHROPLASTY Left 8/1/2024    Procedure: TOTAL HIP ARTHROPLASTY;  Surgeon: Olu Joshua II, MD;  Location: [facility] MAIN OR;  Service: Orthopedics;  Laterality: Left;         Current Outpatient Medications:     aspirin 81 MG EC tablet, Take 1 tablet by mouth Daily., Disp: 30 tablet, Rfl: 2    atorvastatin (LIPITOR) 40 MG tablet, TAKE 1 TABLET BY MOUTH EVERY DAY, Disp: 90 tablet, Rfl: 0    furosemide (LASIX) 20 MG tablet, TAKE 0.5 TABLETS BY MOUTH DAILY AS NEEDED (SWELLING) FOR UP TO 30 DAYS., Disp: 45 tablet, Rfl: 1    memantine (NAMENDA) 5 MG tablet, Take 1 tablet by mouth 2 (Two) Times a Day., Disp: , Rfl:     metoprolol succinate XL (TOPROL-XL) 25 MG 24 hr tablet, Take 1 tablet by mouth Daily., Disp: , Rfl:     oxyCODONE-acetaminophen (PERCOCET) 5-325 MG per tablet, Take 1 tablet by mouth Every 8 (Eight) Hours As Needed for Severe Pain., Disp: 90 tablet, Rfl: 0    pantoprazole (PROTONIX) 40 MG EC tablet, Take 1 tablet by mouth Daily., Disp: , Rfl:     sennosides-docusate (PERICOLACE) 8.6-50 MG per tablet, Take 2 tablets by mouth 2 (Two) Times a Day As Needed for Constipation., Disp: 30 tablet, Rfl: 0    ticagrelor (BRILINTA) 90 MG tablet tablet, Take 1 tablet by mouth 2 (Two)  Times a Day., Disp: 60 tablet, Rfl: 2    Allergies   Allergen Reactions    Albuterol Shortness Of Breath     Pt states she has sensitivity to albuterol.  She states it causes her to be SOA.    Hydrocodone Palpitations    Sulfa Antibiotics Swelling    Bacitracin Other (See Comments)    Benzalkonium Chloride Hives    Codeine Other (See Comments)    Neomycin Other (See Comments)    Polymyxin B Other (See Comments)    Nickel Rash    Penicillins Rash       Family History   Problem Relation Age of Onset    Heart disease Mother     Hypertension Mother     Hyperlipidemia Father     Lung cancer Father     Diabetes Paternal Uncle     Leukemia Paternal Grandmother        Cancer-related family history includes Lung cancer in her father.      Social History     Tobacco Use    Smoking status: Former     Current packs/day: 2.00     Average packs/day: 2.0 packs/day for 40.0 years (80.0 ttl pk-yrs)     Types: Cigarettes     Passive exposure: Current    Smokeless tobacco: Never   Vaping Use    Vaping status: Never Used   Substance Use Topics    Alcohol use: Not Currently    Drug use: Never     Social History     Social History Narrative    Not on file       ROS:   Review of Systems   Constitutional:  Negative for fatigue and fever.   HENT:  Negative for congestion and nosebleeds.    Eyes:  Negative for pain.   Respiratory:  Negative for cough and shortness of breath.    Cardiovascular:  Negative for chest pain.   Gastrointestinal:  Negative for abdominal pain, blood in stool, diarrhea, nausea and vomiting.   Endocrine: Negative for cold intolerance and heat intolerance.   Genitourinary:  Negative for difficulty urinating.   Musculoskeletal:  Negative for arthralgias.   Skin:  Negative for rash.   Neurological:  Negative for dizziness and headaches.   Hematological:  Does not bruise/bleed easily.   Psychiatric/Behavioral:  Negative for behavioral problems.          Objective:    Vital Signs:  Vitals:    09/10/24 0957   BP: 138/74  "  Pulse: 84   SpO2: 99%   Weight: 54.1 kg (119 lb 3.2 oz)   Height: 160 cm (63\")   PainSc: 0-No pain         Body mass index is 21.12 kg/m².    ECOG  (1) Restricted in physically strenuous activity, ambulatory and able to do work of light nature    Physical Exam:   Physical Exam  Constitutional:       Appearance: Normal appearance.   HENT:      Head: Normocephalic and atraumatic.   Eyes:      Pupils: Pupils are equal, round, and reactive to light.   Cardiovascular:      Rate and Rhythm: Normal rate and regular rhythm.      Pulses: Normal pulses.      Heart sounds: No murmur heard.  Pulmonary:      Effort: Pulmonary effort is normal.      Breath sounds: Rhonchi present.   Abdominal:      General: There is no distension.      Palpations: Abdomen is soft. There is no mass.      Tenderness: There is no abdominal tenderness.   Musculoskeletal:         General: Normal range of motion.      Cervical back: Normal range of motion and neck supple.   Skin:     General: Skin is warm.   Neurological:      General: No focal deficit present.      Mental Status: She is alert.   Psychiatric:         Mood and Affect: Mood normal.       Lab Results - Last 18 Months   Lab Units 09/10/24  1130 08/05/24  0839 08/04/24  0044 08/02/24  1224   WBC 10*3/mm3 11.54*  --  6.90 14.51*   HEMOGLOBIN g/dL 11.4* 7.5* 7.3* 9.1*   HEMATOCRIT % 37.4 23.5* 23.8* 28.5*   PLATELETS 10*3/mm3 332  --  181 213   MCV fL 101.4*  --  100.4* 96.0     Lab Results - Last 18 Months   Lab Units 08/03/24  2339 08/02/24  1024 08/01/24  0235 07/31/24  1551 07/22/24  0528 07/19/24  2323   SODIUM mmol/L 138 136 140 140   < > 138   POTASSIUM mmol/L 4.2 4.2 4.1 4.3   < > 4.1   CHLORIDE mmol/L 107 102 103 101   < > 104   CO2 mmol/L 22.0 21.5* 27.5 28.7   < > 25.1   BUN mg/dL 17 20 11 14   < > 22   CREATININE mg/dL 0.79 1.18* 0.82 0.92   < > 0.91   CALCIUM mg/dL 8.6 8.8 9.4 9.7   < > 9.1   BILIRUBIN mg/dL 0.2  --   --  0.7  --  0.9   ALK PHOS U/L 89  --   --  127*  --  98 " "  ALT (SGPT) U/L <5  --   --  14  --  <5   AST (SGOT) U/L 22  --   --  18  --  11   GLUCOSE mg/dL 108* 144* 90 94   < > 98    < > = values in this interval not displayed.       Lab Results   Component Value Date    GLUCOSE 108 (H) 08/03/2024    BUN 17 08/03/2024    CREATININE 0.79 08/03/2024    EGFRIFAFRI >60 09/09/2021    BCR 21.5 08/03/2024    K 4.2 08/03/2024    CO2 22.0 08/03/2024    CALCIUM 8.6 08/03/2024    ALBUMIN 3.1 (L) 08/03/2024    LABIL2 1.3 09/09/2021    AST 22 08/03/2024    ALT <5 08/03/2024       Lab Results - Last 18 Months   Lab Units 08/01/24  0235 07/31/24  1551 06/01/23  0751   INR  0.95 <0.93* 0.9   APTT seconds  --  23.1* 23.3       Lab Results   Component Value Date    IRON 102 07/22/2024    TIBC 320 07/22/2024    FERRITIN 171.0 03/19/2020       Lab Results   Component Value Date    FOLATE 6.43 07/20/2024       No results found for: \"OCCULTBLD\"    No results found for: \"RETICCTPCT\"  Lab Results   Component Value Date    KUBGPCUF02 353 07/20/2024     No results found for: \"SPEP\", \"UPEP\"  No results found for: \"LDH\", \"URICACID\"  No results found for: \"YOLANDA\", \"RF\", \"SEDRATE\"  No results found for: \"FIBRINOGEN\", \"HAPTOGLOBIN\"  Lab Results   Component Value Date    PTT 23.1 (L) 07/31/2024    INR 0.95 08/01/2024     No results found for: \"\"  No results found for: \"CEA\"  No components found for: \"CA-19-9\"  No results found for: \"PSA\"  No results found for: \"SEDRATE\"       Assessment & Plan     Patient is a 69 yr old female with limited stage small cell carcinoma s/p chemoradiation and PCI now on surveillance with no recurrence.    Limited stage small cell carcinoma  Good response to treatment. S/p chemo radiation with cisplatin/etoposide   5/2023 CT imaging with no evidence of recurrence  Repeat imaging as above in March 2024 with no concerns for recurrence MRI brain reviewed as well  Restaging Scans from July 2024 are АНДРЕЙ, no concern for disease recurrence/progression. Continue with Surveillance " scans every 6 months.    History of PE   Now off Eliquis, she took herself off eliquis after 6 months  No signs or symptoms of recurrent VTE continue monitor.  Continue to monitor for signs of recurrent VTEs.    History of pericardial effusion  S/p pericardiocentesis negative for cytology  No recurrence  Monitor for now     IgA MGUS:  SPEP and KHUSHBOO showed IgA Kappa M Oz (0.2g./dl). FLC ratio 2.12  -This is likely low risk MGUS, per iSTOPMM risk model, The predicted risk of having ?10% bone marrow plasma cells is 7.4%., Will continue to monitor Myeloma labs,   -Her anemia has improved after recent hospitalization, Hb improved from 7.5 to 11.4. Remainder of CBC is WNL. Renal function and S. Ca levels WNL.   -continue to monitor labs.      Left hip Fracture:  S/p CLINTON. She is gradually recovering.    Smoking   Continue to smoking, Strongly counseled on quitting smokign.    3 month follow up with APRN/PA with repeat labs, MD follow up in 6 months with Scans.    Thank you very much for providing the opportunity to participate in this patient’s care. Please do not hesitate to call if there are any other questions.

## 2024-09-10 ENCOUNTER — LAB (OUTPATIENT)
Dept: LAB | Facility: HOSPITAL | Age: 69
End: 2024-09-10
Payer: MEDICARE

## 2024-09-10 ENCOUNTER — OFFICE VISIT (OUTPATIENT)
Dept: ONCOLOGY | Facility: CLINIC | Age: 69
End: 2024-09-10
Payer: MEDICARE

## 2024-09-10 ENCOUNTER — OFFICE VISIT (OUTPATIENT)
Dept: PODIATRY | Facility: CLINIC | Age: 69
End: 2024-09-10
Payer: MEDICARE

## 2024-09-10 VITALS
BODY MASS INDEX: 21.12 KG/M2 | OXYGEN SATURATION: 99 % | SYSTOLIC BLOOD PRESSURE: 138 MMHG | WEIGHT: 119.2 LBS | HEART RATE: 84 BPM | HEIGHT: 63 IN | DIASTOLIC BLOOD PRESSURE: 74 MMHG

## 2024-09-10 VITALS
HEIGHT: 63 IN | OXYGEN SATURATION: 99 % | WEIGHT: 123 LBS | RESPIRATION RATE: 20 BRPM | BODY MASS INDEX: 21.79 KG/M2 | HEART RATE: 84 BPM

## 2024-09-10 DIAGNOSIS — G62.9 NEUROPATHY: ICD-10-CM

## 2024-09-10 DIAGNOSIS — M79.674 PAIN IN TOES OF BOTH FEET: ICD-10-CM

## 2024-09-10 DIAGNOSIS — M79.675 PAIN IN TOES OF BOTH FEET: ICD-10-CM

## 2024-09-10 DIAGNOSIS — B35.1 ONYCHOMYCOSIS: Primary | ICD-10-CM

## 2024-09-10 DIAGNOSIS — I73.9 PAD (PERIPHERAL ARTERY DISEASE): ICD-10-CM

## 2024-09-10 DIAGNOSIS — D64.9 ANEMIA, UNSPECIFIED TYPE: ICD-10-CM

## 2024-09-10 DIAGNOSIS — D64.9 ANEMIA, UNSPECIFIED TYPE: Primary | ICD-10-CM

## 2024-09-10 DIAGNOSIS — R26.81 UNSTEADINESS ON FEET: ICD-10-CM

## 2024-09-10 DIAGNOSIS — C34.90 SMALL CELL LUNG CANCER: ICD-10-CM

## 2024-09-10 DIAGNOSIS — D47.2 MGUS (MONOCLONAL GAMMOPATHY OF UNKNOWN SIGNIFICANCE): ICD-10-CM

## 2024-09-10 LAB
BASOPHILS # BLD AUTO: 0.02 10*3/MM3 (ref 0–0.2)
BASOPHILS NFR BLD AUTO: 0.2 % (ref 0–1.5)
DEPRECATED RDW RBC AUTO: 55.1 FL (ref 37–54)
EOSINOPHIL # BLD AUTO: 0.38 10*3/MM3 (ref 0–0.4)
EOSINOPHIL NFR BLD AUTO: 3.3 % (ref 0.3–6.2)
ERYTHROCYTE [DISTWIDTH] IN BLOOD BY AUTOMATED COUNT: 15.2 % (ref 12.3–15.4)
FERRITIN SERPL-MCNC: 216 NG/ML (ref 13–150)
FOLATE SERPL-MCNC: 9.77 NG/ML (ref 4.78–24.2)
HCT VFR BLD AUTO: 37.4 % (ref 34–46.6)
HGB BLD-MCNC: 11.4 G/DL (ref 12–15.9)
IRON 24H UR-MRATE: 38 MCG/DL (ref 37–145)
IRON SATN MFR SERPL: 10 % (ref 20–50)
LYMPHOCYTES # BLD AUTO: 0.5 10*3/MM3 (ref 0.7–3.1)
LYMPHOCYTES NFR BLD AUTO: 4.3 % (ref 19.6–45.3)
MCH RBC QN AUTO: 30.9 PG (ref 26.6–33)
MCHC RBC AUTO-ENTMCNC: 30.5 G/DL (ref 31.5–35.7)
MCV RBC AUTO: 101.4 FL (ref 79–97)
MONOCYTES # BLD AUTO: 0.48 10*3/MM3 (ref 0.1–0.9)
MONOCYTES NFR BLD AUTO: 4.2 % (ref 5–12)
NEUTROPHILS NFR BLD AUTO: 10.16 10*3/MM3 (ref 1.7–7)
NEUTROPHILS NFR BLD AUTO: 88 % (ref 42.7–76)
PLATELET # BLD AUTO: 332 10*3/MM3 (ref 140–450)
PMV BLD AUTO: 9.9 FL (ref 6–12)
RBC # BLD AUTO: 3.69 10*6/MM3 (ref 3.77–5.28)
RETICS # AUTO: 0.09 10*6/MM3 (ref 0.02–0.13)
RETICS/RBC NFR AUTO: 2.53 % (ref 0.7–1.9)
TIBC SERPL-MCNC: 395 MCG/DL (ref 298–536)
TRANSFERRIN SERPL-MCNC: 265 MG/DL (ref 200–360)
VIT B12 BLD-MCNC: 402 PG/ML (ref 211–946)
WBC NRBC COR # BLD AUTO: 11.54 10*3/MM3 (ref 3.4–10.8)

## 2024-09-10 PROCEDURE — 1159F MED LIST DOCD IN RCRD: CPT

## 2024-09-10 PROCEDURE — 3078F DIAST BP <80 MM HG: CPT | Performed by: STUDENT IN AN ORGANIZED HEALTH CARE EDUCATION/TRAINING PROGRAM

## 2024-09-10 PROCEDURE — 99214 OFFICE O/P EST MOD 30 MIN: CPT | Performed by: STUDENT IN AN ORGANIZED HEALTH CARE EDUCATION/TRAINING PROGRAM

## 2024-09-10 PROCEDURE — 82728 ASSAY OF FERRITIN: CPT | Performed by: STUDENT IN AN ORGANIZED HEALTH CARE EDUCATION/TRAINING PROGRAM

## 2024-09-10 PROCEDURE — 82746 ASSAY OF FOLIC ACID SERUM: CPT | Performed by: STUDENT IN AN ORGANIZED HEALTH CARE EDUCATION/TRAINING PROGRAM

## 2024-09-10 PROCEDURE — 83540 ASSAY OF IRON: CPT | Performed by: STUDENT IN AN ORGANIZED HEALTH CARE EDUCATION/TRAINING PROGRAM

## 2024-09-10 PROCEDURE — 85045 AUTOMATED RETICULOCYTE COUNT: CPT | Performed by: STUDENT IN AN ORGANIZED HEALTH CARE EDUCATION/TRAINING PROGRAM

## 2024-09-10 PROCEDURE — 99213 OFFICE O/P EST LOW 20 MIN: CPT

## 2024-09-10 PROCEDURE — 1126F AMNT PAIN NOTED NONE PRSNT: CPT | Performed by: STUDENT IN AN ORGANIZED HEALTH CARE EDUCATION/TRAINING PROGRAM

## 2024-09-10 PROCEDURE — 36415 COLL VENOUS BLD VENIPUNCTURE: CPT

## 2024-09-10 PROCEDURE — 84466 ASSAY OF TRANSFERRIN: CPT | Performed by: STUDENT IN AN ORGANIZED HEALTH CARE EDUCATION/TRAINING PROGRAM

## 2024-09-10 PROCEDURE — 3075F SYST BP GE 130 - 139MM HG: CPT | Performed by: STUDENT IN AN ORGANIZED HEALTH CARE EDUCATION/TRAINING PROGRAM

## 2024-09-10 PROCEDURE — 11721 DEBRIDE NAIL 6 OR MORE: CPT

## 2024-09-10 PROCEDURE — 1160F RVW MEDS BY RX/DR IN RCRD: CPT

## 2024-09-10 PROCEDURE — 85025 COMPLETE CBC W/AUTO DIFF WBC: CPT

## 2024-09-10 PROCEDURE — 82607 VITAMIN B-12: CPT | Performed by: STUDENT IN AN ORGANIZED HEALTH CARE EDUCATION/TRAINING PROGRAM

## 2024-09-11 LAB
ALBUMIN SERPL ELPH-MCNC: 3.6 G/DL (ref 2.9–4.4)
ALBUMIN/GLOB SERPL: 1.2 {RATIO} (ref 0.7–1.7)
ALPHA1 GLOB SERPL ELPH-MCNC: 0.3 G/DL (ref 0–0.4)
ALPHA2 GLOB SERPL ELPH-MCNC: 0.9 G/DL (ref 0.4–1)
B-GLOBULIN SERPL ELPH-MCNC: 1 G/DL (ref 0.7–1.3)
GAMMA GLOB SERPL ELPH-MCNC: 1 G/DL (ref 0.4–1.8)
GLOBULIN SER-MCNC: 3.2 G/DL (ref 2.2–3.9)
IGA SERPL-MCNC: 376 MG/DL (ref 87–352)
IGG SERPL-MCNC: 954 MG/DL (ref 586–1602)
IGM SERPL-MCNC: 83 MG/DL (ref 26–217)
INTERPRETATION SERPL IEP-IMP: ABNORMAL
KAPPA LC FREE SER-MCNC: 32 MG/L (ref 3.3–19.4)
KAPPA LC FREE/LAMBDA FREE SER: 2.12 {RATIO} (ref 0.26–1.65)
LABORATORY COMMENT REPORT: ABNORMAL
LAMBDA LC FREE SERPL-MCNC: 15.1 MG/L (ref 5.7–26.3)
M PROTEIN SERPL ELPH-MCNC: 0.2 G/DL
PROT SERPL-MCNC: 6.8 G/DL (ref 6–8.5)

## 2024-09-11 NOTE — PROGRESS NOTES
09/10/2024  Foot and Ankle Surgery - Established Patient/Follow-up  Provider: KENNY Shelley   Location: [facility]    Subjective:  Miryam Fernández is a 69 y.o. female.     Chief Complaint   Patient presents with    Left Foot - Follow-up, Plantar Warts    Right Foot - Follow-up, Plantar Warts    Follow-up     CARLOS De La Torre last pcp visit 06/21/2024       History of Present Illness  The patient is a 69-year-old female here today for a follow-up of a plantar wart. She is accompanied by an adult male.    She reports no current foot pain and has questions regarding toenail debridement or trimming. Her toenails grow rapidly, and it has been some time since they were last trimmed.    She confirms that she does not have diabetes and is not experiencing any numbness, burning, or tingling sensations in her feet.    She has stents in her legs and is under the care of a vascular doctor, whom she last saw a few months ago. She has a history of peripheral artery disease.       Allergies   Allergen Reactions    Albuterol Shortness Of Breath     Pt states she has sensitivity to albuterol.  She states it causes her to be SOA.    Hydrocodone Palpitations    Sulfa Antibiotics Swelling    Bacitracin Other (See Comments)    Benzalkonium Chloride Hives    Codeine Other (See Comments)    Neomycin Other (See Comments)    Polymyxin B Other (See Comments)    Nickel Rash    Penicillins Rash       Current Outpatient Medications on File Prior to Visit   Medication Sig Dispense Refill    aspirin 81 MG EC tablet Take 1 tablet by mouth Daily. 30 tablet 2    atorvastatin (LIPITOR) 40 MG tablet TAKE 1 TABLET BY MOUTH EVERY DAY 90 tablet 0    furosemide (LASIX) 20 MG tablet TAKE 0.5 TABLETS BY MOUTH DAILY AS NEEDED (SWELLING) FOR UP TO 30 DAYS. 45 tablet 1    memantine (NAMENDA) 5 MG tablet Take 1 tablet by mouth 2 (Two) Times a Day.      metoprolol succinate XL (TOPROL-XL) 25 MG 24 hr tablet Take 1 tablet by mouth Daily.       "oxyCODONE-acetaminophen (PERCOCET) 5-325 MG per tablet Take 1 tablet by mouth Every 8 (Eight) Hours As Needed for Severe Pain. 90 tablet 0    pantoprazole (PROTONIX) 40 MG EC tablet Take 1 tablet by mouth Daily.      ticagrelor (BRILINTA) 90 MG tablet tablet Take 1 tablet by mouth 2 (Two) Times a Day. 60 tablet 2     No current facility-administered medications on file prior to visit.       Objective   Pulse 84   Resp 20   Ht 160 cm (63\")   Wt 55.8 kg (123 lb)   SpO2 99%   BMI 21.79 kg/m²     Foot/Ankle Exam    GENERAL  Appearance:  appears stated age and elderly  Orientation:  AAOx3  Affect:  appropriate  Gait:  partial weight bearing  Assistance:  walker  Right shoe gear: diabetic shoe and sock  Left shoe gear: diabetic shoe and sock    VASCULAR     Right Foot Vascularity   Dorsalis pedis:  1+  Skin temperature:  warm  Edema grading:  None  CFT:  < 3 seconds  Pedal hair growth:  Absent  Varicosities:  mild varicosities     Left Foot Vascularity   Dorsalis pedis:  1+  Skin temperature:  warm  Edema grading:  None  CFT:  < 3 seconds  Pedal hair growth:  Absent  Varicosities:  mild varicosities     NEUROLOGIC     Right Foot Neurologic   Light touch sensation: normal  Protective Sensation using Carr-Raul Monofilament:   Sites intact: 6  Sites tested: 10     Left Foot Neurologic   Light touch sensation: normal  Protective Sensation using Carr-Raul Monofilament:   Sites intact: 9  Sites tested: 10    MUSCULOSKELETAL     Right Foot Musculoskeletal   Ecchymosis:  none  Tenderness:  toenail problem       Left Foot Musculoskeletal   Ecchymosis:  none  Tenderness:  toenail problem    DERMATOLOGIC      Right Foot Dermatologic   Skin  Positive for dryness and skin changes.   Nails  1.  Positive for elongated, abnormal thickness and dystrophic nail.  2.  Positive for elongated, abnormal thickness and dystrophic nail.  3.  Positive for elongated, abnormal thickness and dystrophic nail.  4.  Positive for " elongated, abnormal thickness and dystrophic nail.  5.  Positive for elongated, abnormal thickness and dystrophic nail.     Left Foot Dermatologic   Skin  Positive for dryness and skin changes.   Nails  1.  Positive for elongated, abnormal thickness and dystrophic nail.  2.  Positive for elongated, abnormal thickness and dystrophic nail.  3.  Positive for elongated, abnormal thickness and dystrophic nail.  4.  Positive for elongated, abnormally thick and dystrophic nail.  5.  Positive for elongated, abnormally thick and dystrophic nail.  Physical Exam         Results  Testing  Sensation test: 6 out of 10 on the left foot, 9 out of 10 on the right foot.     Assessment & Plan   Diagnoses and all orders for this visit:    1. Onychomycosis (Primary)    2. Unsteadiness on feet    3. PAD (peripheral artery disease)    4. Neuropathy    5. Pain in toes of both feet      Assessment & Plan  1. Punctate keratosis.  The patient's condition is consistent with punctate keratosis, which tends to occur over areas of high pressure and with dry skin. She reports no pain currently. Recommendations include wearing tennis shoes to offload pressure, gentle exfoliation with a pumice stone or lynn egg during showers or baths, and applying moisturizer to her feet daily to prevent dry skin buildup.    2. Toenail debridement.  The patient inquired about toenail debridement. She has thickened toenails, unsteadiness on her feet, uses a walker, and has a history of peripheral artery disease, which makes it difficult for her to cut her toenails. She also has some neuropathy, as evidenced by reduced sensation (6 out of 10 on the left foot and 9 out of 10 on the right foot). Toenails were debrided today. She is advised to contact her insurance company to confirm coverage for future debridement procedures.    Nail debridement: Both feet x10    Consent and time out was performed before proceeding with the procedure. Nails were debrided with a nail  nipper without complication.  No anesthesia was required.  Indications for procedure were thickened, dystrophic, and fungal appearing nails which are difficult to trim.  Proper self-care and technique was discussed with patient.  Patient was stable after procedure.     Follow-up  She will follow up in 3 months.       No orders of the defined types were placed in this encounter.         Patient or patient representative verbalized consent for the use of Ambient Listening during the visit with  KENNY Persaud for chart documentation. 9/11/2024  07:05 EDT

## 2024-09-24 ENCOUNTER — OFFICE VISIT (OUTPATIENT)
Dept: FAMILY MEDICINE CLINIC | Facility: CLINIC | Age: 69
End: 2024-09-24
Payer: MEDICARE

## 2024-09-24 VITALS
WEIGHT: 116 LBS | OXYGEN SATURATION: 98 % | HEIGHT: 63 IN | DIASTOLIC BLOOD PRESSURE: 80 MMHG | HEART RATE: 89 BPM | SYSTOLIC BLOOD PRESSURE: 120 MMHG | BODY MASS INDEX: 20.55 KG/M2

## 2024-09-24 DIAGNOSIS — R41.3 MEMORY LOSS: ICD-10-CM

## 2024-09-24 DIAGNOSIS — H81.09 MENIERE'S DISEASE, UNSPECIFIED LATERALITY: ICD-10-CM

## 2024-09-24 DIAGNOSIS — I10 PRIMARY HYPERTENSION: ICD-10-CM

## 2024-09-24 DIAGNOSIS — R29.6 FREQUENT FALLS: ICD-10-CM

## 2024-09-24 DIAGNOSIS — J44.1 CHRONIC OBSTRUCTIVE PULMONARY DISEASE WITH ACUTE EXACERBATION: Primary | ICD-10-CM

## 2024-09-24 DIAGNOSIS — M51.369 DDD (DEGENERATIVE DISC DISEASE), LUMBAR: ICD-10-CM

## 2024-09-24 DIAGNOSIS — I71.40 ABDOMINAL AORTIC ANEURYSM (AAA) WITHOUT RUPTURE, UNSPECIFIED PART: ICD-10-CM

## 2024-09-24 PROCEDURE — G2211 COMPLEX E/M VISIT ADD ON: HCPCS | Performed by: NURSE PRACTITIONER

## 2024-09-24 PROCEDURE — 3079F DIAST BP 80-89 MM HG: CPT | Performed by: NURSE PRACTITIONER

## 2024-09-24 PROCEDURE — 3074F SYST BP LT 130 MM HG: CPT | Performed by: NURSE PRACTITIONER

## 2024-09-24 PROCEDURE — 1126F AMNT PAIN NOTED NONE PRSNT: CPT | Performed by: NURSE PRACTITIONER

## 2024-09-24 PROCEDURE — 99214 OFFICE O/P EST MOD 30 MIN: CPT | Performed by: NURSE PRACTITIONER

## 2024-09-24 PROCEDURE — 1160F RVW MEDS BY RX/DR IN RCRD: CPT | Performed by: NURSE PRACTITIONER

## 2024-09-24 PROCEDURE — 1159F MED LIST DOCD IN RCRD: CPT | Performed by: NURSE PRACTITIONER

## 2024-09-24 RX ORDER — BENZONATATE 100 MG/1
100 CAPSULE ORAL 3 TIMES DAILY PRN
Qty: 30 CAPSULE | Refills: 0 | Status: SHIPPED | OUTPATIENT
Start: 2024-09-24

## 2024-09-24 RX ORDER — MONTELUKAST SODIUM 10 MG/1
10 TABLET ORAL NIGHTLY
Qty: 30 TABLET | Refills: 1 | Status: SHIPPED | OUTPATIENT
Start: 2024-09-24

## 2024-09-24 RX ORDER — AZITHROMYCIN 250 MG/1
TABLET, FILM COATED ORAL
Qty: 6 TABLET | Refills: 0 | Status: SHIPPED | OUTPATIENT
Start: 2024-09-24

## 2024-09-26 ENCOUNTER — HOME HEALTH ADMISSION (OUTPATIENT)
Dept: HOME HEALTH SERVICES | Facility: HOME HEALTHCARE | Age: 69
End: 2024-09-26
Payer: COMMERCIAL

## 2024-09-28 ENCOUNTER — HOME CARE VISIT (OUTPATIENT)
Dept: HOME HEALTH SERVICES | Facility: HOME HEALTHCARE | Age: 69
End: 2024-09-28
Payer: COMMERCIAL

## 2024-09-28 VITALS
TEMPERATURE: 98.7 F | DIASTOLIC BLOOD PRESSURE: 80 MMHG | SYSTOLIC BLOOD PRESSURE: 134 MMHG | RESPIRATION RATE: 18 BRPM | OXYGEN SATURATION: 97 % | WEIGHT: 116 LBS | HEIGHT: 63 IN | BODY MASS INDEX: 20.55 KG/M2 | HEART RATE: 80 BPM

## 2024-09-28 PROCEDURE — G0151 HHCP-SERV OF PT,EA 15 MIN: HCPCS

## 2024-09-29 ENCOUNTER — HOME CARE VISIT (OUTPATIENT)
Dept: HOME HEALTH SERVICES | Facility: HOME HEALTHCARE | Age: 69
End: 2024-09-29
Payer: COMMERCIAL

## 2024-09-30 ENCOUNTER — OFFICE VISIT (OUTPATIENT)
Dept: CARDIOLOGY | Facility: CLINIC | Age: 69
End: 2024-09-30
Payer: MEDICARE

## 2024-09-30 VITALS
HEART RATE: 76 BPM | HEIGHT: 63 IN | OXYGEN SATURATION: 99 % | SYSTOLIC BLOOD PRESSURE: 118 MMHG | BODY MASS INDEX: 21.02 KG/M2 | WEIGHT: 118.6 LBS | DIASTOLIC BLOOD PRESSURE: 78 MMHG

## 2024-09-30 DIAGNOSIS — I25.118 CORONARY ARTERY DISEASE OF NATIVE ARTERY OF NATIVE HEART WITH STABLE ANGINA PECTORIS: ICD-10-CM

## 2024-09-30 DIAGNOSIS — E78.2 MIXED HYPERLIPIDEMIA: ICD-10-CM

## 2024-09-30 DIAGNOSIS — I10 PRIMARY HYPERTENSION: ICD-10-CM

## 2024-09-30 DIAGNOSIS — I71.20 THORACIC AORTIC ANEURYSM WITHOUT RUPTURE, UNSPECIFIED PART: Primary | ICD-10-CM

## 2024-09-30 NOTE — PROGRESS NOTES
Cardiology Office Visit      Encounter Date:  09/30/2024    Patient ID:   Miryam Fernández is a 69 y.o. female.    Reason For Followup:  Coronary artery disease  Peripheral vascular disease     Brief Clinical History:  Dear Dr. Langford, Ted Liu MD     I had the pleasure of seeing Miryam Fernández today. As you are well aware, this is a 69 y.o. female past medical history that is significant for history of coronary artery disease history of peripheral vascular disease prior coronary intervention prior peripheral intervention prior carotid endarterectomy prior PCI and endovascular intervention on the lower extremities was recently diagnosed with a non-small cell lung cancer currently being treated and evaluated by [facility] with oncology and patient underwent radiation and chemotherapy here for follow-up      Interval History:  Recent hospitalization both for hip fracture and also mental status changes and confusion with extensive workup  Denies any new cardiac symptoms  Denies any chest pain  Recent significant weight loss and malnutrition        Assessment & Plan     Impressions:  Coronary artery disease prior coronary intervention  Hypertension  Hyperlipidemia  Peripheral arterial disease with prior carotid endarterectomy prior PCI and stenting of the bilateral lower extremities  Non-small cell lung cancer  Prior history of subclavian steal syndrome  History of pulmonary embolism  Recent hospitalization for hip fracture patient had an echocardiogram with normal LV systolic function    Recommendations:  Prior workup and recent medical records reviewed and discussed with patient and family  Decrease the dose of Brilinta from 90 mg p.o. twice daily to 60 mg p.o. twice daily  Patient is currently not on any anticoagulation that was stopped 6 months back for prior history of pulmonary embolism being followed up with hematology and oncology  Stable from cardiac standpoint  Continue current medical  "therapy with aspirin 81 mg p.o. once a day Brilinta 60 mg p.o. twice daily Lipitor 40 mg p.o. once a day Toprol-XL 25 mg p.o. once a day  Further recommendations based on patient course  Need for close monitoring and follow-up reviewed and discussed with patient  Follow-up in office in 6 months        Vitals:  Vitals:    09/30/24 1005   BP: 118/78   Pulse: 76   SpO2: 99%   Weight: 53.8 kg (118 lb 9.6 oz)   Height: 160 cm (63\")       Physical Exam:    General: Alert, cooperative, no distress, appears stated age  Head:  Normocephalic, atraumatic, mucous membranes moist  Eyes:  Conjunctiva/corneas clear, EOM's intact     Neck:  Supple,  no adenopathy;      Lungs: Clear to auscultation bilaterally, no wheezes rhonchi rales are noted  Chest wall: No tenderness  Heart::  Regular rate and rhythm, S1 and S2 normal, no murmur, rub or gallop  Abdomen: Soft, non-tender, nondistended bowel sounds active  Extremities: No cyanosis, clubbing, or edema  Pulses: 2+ and symmetric all extremities  Skin:  No rashes or lesions  Neuro/psych: A&O x3. CN II through XII are grossly intact with appropriate affect              Lab Results   Component Value Date    GLUCOSE 108 (H) 08/03/2024    BUN 17 08/03/2024    CREATININE 0.79 08/03/2024    EGFRRESULT >60 03/02/2022    EGFR 82.1 08/03/2024    BCR 21.5 08/03/2024    K 4.2 08/03/2024    CO2 22.0 08/03/2024    CALCIUM 8.6 08/03/2024    PROTENTOTREF 6.8 09/10/2024    ALBUMIN 3.6 09/10/2024    BILITOT 0.2 08/03/2024    AST 22 08/03/2024    ALT <5 08/03/2024     Results for orders placed during the hospital encounter of 07/19/24    Adult Transthoracic Echo Complete W/ Cont if Necessary Per Protocol    Interpretation Summary    Left ventricular systolic function is normal. Calculated left ventricular EF = 61.7% Left ventricular ejection fraction appears to be 61 - 65%.    Left ventricular diastolic function is consistent with (grade I) impaired relaxation.    Saline test results are negative for " right to left atrial level shunt.    Estimated right ventricular systolic pressure from tricuspid regurgitation is normal (<35 mmHg).     No results found for this or any previous visit.     Lab Results   Component Value Date    CHOL 207 (H) 07/22/2024    TRIG 125 07/22/2024    HDL 38 (L) 07/22/2024     (H) 07/22/2024      Results for orders placed during the hospital encounter of 06/30/22    Stress Test With Myocardial Perfusion One Day    Interpretation Summary  · Findings consistent with a normal ECG stress test.  · Left ventricular ejection fraction is hyperdynamic (Calculated EF > 70%). .  · Myocardial perfusion imaging indicates a normal myocardial perfusion study with no evidence of ischemia.  · Impressions are consistent with a low risk study.            Objective:          Allergies:  Allergies   Allergen Reactions    Albuterol Shortness Of Breath     Pt states she has sensitivity to albuterol.  She states it causes her to be SOA.    Hydrocodone Palpitations    Sulfa Antibiotics Swelling    Bacitracin Other (See Comments)    Benzalkonium Chloride Hives    Codeine Other (See Comments)    Neomycin Other (See Comments)    Polymyxin B Other (See Comments)    Nickel Rash    Penicillins Rash       Medication Review:     Current Outpatient Medications:     aspirin 81 MG EC tablet, Take 1 tablet by mouth Daily., Disp: 30 tablet, Rfl: 2    atorvastatin (LIPITOR) 40 MG tablet, TAKE 1 TABLET BY MOUTH EVERY DAY, Disp: 90 tablet, Rfl: 0    azithromycin (Zithromax) 250 MG tablet, Take 2 tablets the first day, then 1 tablet daily for 4 days., Disp: 6 tablet, Rfl: 0    benzonatate (Tessalon Perles) 100 MG capsule, Take 1 capsule by mouth 3 (Three) Times a Day As Needed for Cough., Disp: 30 capsule, Rfl: 0    Cholecalciferol 50 MCG (2000 UT) tablet, Take 1 tablet by mouth Daily. Indications: Vitamin D Deficiency, Disp: , Rfl:     furosemide (LASIX) 20 MG tablet, TAKE 0.5 TABLETS BY MOUTH DAILY AS NEEDED (SWELLING)  FOR UP TO 30 DAYS., Disp: 45 tablet, Rfl: 1    memantine (NAMENDA) 5 MG tablet, Take 1 tablet by mouth 2 (Two) Times a Day. Indications: Memory loss, Disp: , Rfl:     metoprolol succinate XL (TOPROL-XL) 25 MG 24 hr tablet, Take 1 tablet by mouth Daily. Indications: High Blood Pressure, Disp: , Rfl:     montelukast (Singulair) 10 MG tablet, Take 1 tablet by mouth Every Night., Disp: 30 tablet, Rfl: 1    oxyCODONE-acetaminophen (PERCOCET) 5-325 MG per tablet, Take 1 tablet by mouth Every 8 (Eight) Hours As Needed for Severe Pain., Disp: 90 tablet, Rfl: 0    pantoprazole (PROTONIX) 40 MG EC tablet, Take 1 tablet by mouth Daily. Indications: Gastroesophageal Reflux Disease, Disp: , Rfl:     ticagrelor (Brilinta) 60 MG tablet tablet, Take 1 tablet by mouth 2 (Two) Times a Day., Disp: 180 tablet, Rfl: 3    Family History:  Family History   Problem Relation Age of Onset    Heart disease Mother     Hypertension Mother     Hyperlipidemia Father     Lung cancer Father     Diabetes Paternal Uncle     Leukemia Paternal Grandmother        Past Medical History:  Past Medical History:   Diagnosis Date    Cancer     lung cancer    Carotid stenosis     COPD (chronic obstructive pulmonary disease)     Coronary artery disease     Esophageal stricture     Dr Domingo    Hyperlipidemia     Metabolic encephalopathy 06/28/2024    2nd to UTI    Pulmonary embolism     seen at [facility], fluid around her heart at the time-right lung    UTI (urinary tract infection) 06/28/2024       Past surgical History:  Past Surgical History:   Procedure Laterality Date    ANGIOPLASTY / STENTING FEMORAL      CARDIAC CATHETERIZATION      CAROTID STENT      CORONARY STENT PLACEMENT      ESOPHAGOSCOPY / EGD      HYSTERECTOMY      TOTAL HIP ARTHROPLASTY Left 8/1/2024    Procedure: TOTAL HIP ARTHROPLASTY;  Surgeon: Olu Joshua II, MD;  Location: [facility] MAIN OR;  Service: Orthopedics;  Laterality: Left;       Social History:  Social History      Socioeconomic History    Marital status:    Tobacco Use    Smoking status: Every Day     Current packs/day: 2.00     Average packs/day: 2.0 packs/day for 40.0 years (80.0 ttl pk-yrs)     Types: Cigarettes     Passive exposure: Current    Smokeless tobacco: Never   Vaping Use    Vaping status: Never Used   Substance and Sexual Activity    Alcohol use: Not Currently    Drug use: Never    Sexual activity: Defer       Review of Systems:  The following systems were reviewed as they relate to the cardiovascular system: Constitutional, Eyes, ENT, Cardiovascular, Respiratory, Gastrointestinal, Integumentary, Neurological, Psychiatric, Hematologic, Endocrine, Musculoskeletal, and Genitourinary. The pertinent cardiovascular findings are reported above with all other cardiovascular points within those systems being negative.    Diagnostic Study Review:     Current Electrocardiogram:  Procedures          NOTE: The following portions of the patient's history were reviewed and updated this visit as appropriate: allergies, current medications, past family history, past medical history, past social history, past surgical history and problem list.

## 2024-10-01 ENCOUNTER — HOME CARE VISIT (OUTPATIENT)
Dept: HOME HEALTH SERVICES | Facility: HOME HEALTHCARE | Age: 69
End: 2024-10-01
Payer: MEDICARE

## 2024-10-01 VITALS
OXYGEN SATURATION: 99 % | RESPIRATION RATE: 18 BRPM | HEART RATE: 77 BPM | SYSTOLIC BLOOD PRESSURE: 115 MMHG | DIASTOLIC BLOOD PRESSURE: 65 MMHG | TEMPERATURE: 98.3 F

## 2024-10-01 PROCEDURE — G0151 HHCP-SERV OF PT,EA 15 MIN: HCPCS

## 2024-10-08 ENCOUNTER — HOME CARE VISIT (OUTPATIENT)
Dept: HOME HEALTH SERVICES | Facility: HOME HEALTHCARE | Age: 69
End: 2024-10-08
Payer: MEDICARE

## 2024-10-08 VITALS
SYSTOLIC BLOOD PRESSURE: 118 MMHG | OXYGEN SATURATION: 98 % | RESPIRATION RATE: 18 BRPM | HEART RATE: 78 BPM | TEMPERATURE: 97.8 F | DIASTOLIC BLOOD PRESSURE: 78 MMHG

## 2024-10-08 PROCEDURE — G0151 HHCP-SERV OF PT,EA 15 MIN: HCPCS

## 2024-10-12 DIAGNOSIS — E78.2 MIXED HYPERLIPIDEMIA: Primary | ICD-10-CM

## 2024-10-14 RX ORDER — ATORVASTATIN CALCIUM 40 MG/1
40 TABLET, FILM COATED ORAL DAILY
Qty: 90 TABLET | Refills: 1 | Status: SHIPPED | OUTPATIENT
Start: 2024-10-14

## 2024-10-14 RX ORDER — PANTOPRAZOLE SODIUM 40 MG/1
40 TABLET, DELAYED RELEASE ORAL DAILY
Qty: 90 TABLET | Refills: 1 | Status: SHIPPED | OUTPATIENT
Start: 2024-10-14

## 2024-10-15 ENCOUNTER — HOME CARE VISIT (OUTPATIENT)
Dept: HOME HEALTH SERVICES | Facility: HOME HEALTHCARE | Age: 69
End: 2024-10-15
Payer: COMMERCIAL

## 2024-10-15 VITALS
RESPIRATION RATE: 18 BRPM | TEMPERATURE: 97.8 F | DIASTOLIC BLOOD PRESSURE: 100 MMHG | OXYGEN SATURATION: 98 % | SYSTOLIC BLOOD PRESSURE: 140 MMHG | HEART RATE: 78 BPM

## 2024-10-15 PROCEDURE — G0151 HHCP-SERV OF PT,EA 15 MIN: HCPCS

## 2024-10-17 NOTE — PROGRESS NOTES
Chief Complaint  Memory    Subjective            Miryam Fernández presents to [facility] NEUROLOGY for memory.  History of Present Illness    Medication- Namenda 5 mg bid  6- Last MMSE was 12/30 Per Makayla COX office visit.     Miryam Fernández is a 69-year-old female seen today for concerns of memory decline.  She presents today with her daughter, Kimberley, who provides primary history.  She was referred by KENNY Sánchez in June.  Patient was diagnosed with small cell lung cancer in 2020 and was treated with chemoradiation.  Around this time both the patient and her daughter  appreciated issues with memory.  The patient was admitted to the hospital in July 2024 after a fall at which time neurology was consulted due to disorientation.  MRI brain showed evolution of small subacute infarcts of right basal ganglia and corona radiata.  It was recommended she have repeat MRI in a few weeks to further evaluate for possible metastatic disease; this has not been completed.    Memory decline is associated with difficulties in recent memory recall.  She is not having issues with remote recall or recalling people's names.  Her daughter will have a conversation with her and the patient will forget what they discussed the following day.  She lives at home with her  and is no longer driving.  She does not do any of the cooking  and her daughter manages all of her medication.  The patient denies having visual or auditory hallucinations.  No mood or behavior complaints.  The patient has MORA but did not tolerate PAP therapy.  She still smokes 1 to 2 packs/day and has no interest in quitting.  She has high cholesterol and high blood pressure.  She is currently taking atorvastatin 40 mg.    Patient reports that her father possibly had memory issues.  He also had a facial tic and was not sure if this was related.  Her father was also a smoker.  No other known history of dementia or  "Alzheimer's.    Complaint: memory  Onset: 2 years ago getting worse   Aura: na   Location: na  Quality:   Severity: getting worse  Duration:  Frequency:   Timing:   Worsening factors:  Alleviating factors:  Associated Signs and symptoms:   Current meds: namenda 5 mg  Past medications:   Family History: father         Maximum   Score Patient's   Score Questions   5 1 \"What is the year?Season?Date?Day of the week?Month?\"   5 0 \"Where are we now: State?County?Town/city?Hospital?Floor?\"   3 3 3 Unrelated objects Number of trials:___   5 3 Count backward from 100 by sevens or spell WORLD backwards   3 2 Name 3 things from above   2 2 Identify 2 objects   1 0 Repeat the phrase: No ifs, ands,or buts.   3 0 Take paper in right hand, fold it in half, and put it on the floor.   1 1 Please read this and do what it says. \"Close your eyes\"   1 0 Make up and write a sentence about anything. Noun and verb   1 1 Copy this picture 10 angles must be present.   30 13 Total MMSE            06/21/2024 Lashell De La Torre APRN Office Visit  Miryam Fernández is a 69 y.o. female who presents for a Subsequent Medicare Wellness Visit.  Hypertension is stable on metoprolol XL 25 mg daily.  She also takes Brilinta 60 mg twice daily per cardiology, followed by Dr. Lorenzana. Patient is taking Lipitor 20 mg daily for hyperlipidemia.   Patient is followed by Dr. Morales for history of lung cancer.  Hx COPD, taking Singulair 10 mg nightly. She is also using albuterol PRN.   Hx Ménière's disease, has intermittent dizziness. Patient referred for PT, has not startd   Patient is prescribed Percocet 5/325 mg PRN for chronic low back pain. She has LESI in 2019 without relief. MRI in 2019 showed mild degenerative changes at L4-5. Also hx spondylosis.    Cognitive impairment, prescribed Namenda 5 mg daily. MMSE 12/30.   AAA, followed by Vascular surgery.  The following portions of the patient's history were reviewed and   updated as appropriate: allergies, " current medications, past family history, past medical history, past social history, past surgical history, and problem list.  Compared to one year ago, the patient feels her physical   health is the same.  Compared to one year ago, the patient feels her mental   health is worse    Family History   Problem Relation Age of Onset    Heart disease Mother     Hypertension Mother     Dementia Father     Hyperlipidemia Father     Lung cancer Father     Diabetes Paternal Uncle     Leukemia Paternal Grandmother        Past Medical History:   Diagnosis Date    Cancer     lung cancer    Carotid stenosis     COPD (chronic obstructive pulmonary disease)     Coronary artery disease     Esophageal stricture     Dr Domingo    Hyperlipidemia     Metabolic encephalopathy 06/28/2024    2nd to UTI    Pulmonary embolism     seen at [facility], fluid around her heart at the time-right lung    UTI (urinary tract infection) 06/28/2024       Social History     Socioeconomic History    Marital status:    Tobacco Use    Smoking status: Every Day     Current packs/day: 2.00     Average packs/day: 2.0 packs/day for 40.0 years (80.0 ttl pk-yrs)     Types: Cigarettes     Passive exposure: Current    Smokeless tobacco: Never   Vaping Use    Vaping status: Never Used   Substance and Sexual Activity    Alcohol use: Not Currently    Drug use: Never    Sexual activity: Defer         Current Outpatient Medications:     memantine (NAMENDA) 10 MG tablet, Take 1 tablet by mouth 2 (Two) Times a Day. Indications: Memory loss, Disp: 60 tablet, Rfl: 5    acyclovir (ZOVIRAX) 5 % ointment, APPLY 1 APPLICATION TOPICALLY TO THE APPROPRIATE AREA AS DIRECTED EVERY 3 (THREE) HOURS., Disp: , Rfl:     albuterol sulfate  (90 Base) MCG/ACT inhaler, Inhale 2 puffs Every 4 (Four) Hours As Needed., Disp: , Rfl:     aspirin 81 MG EC tablet, Take 1 tablet by mouth Daily., Disp: 30 tablet, Rfl: 2    atorvastatin (LIPITOR) 40 MG tablet, TAKE 1 TABLET BY MOUTH EVERY  DAY, Disp: 90 tablet, Rfl: 1    benzonatate (Tessalon Perles) 100 MG capsule, Take 1 capsule by mouth 3 (Three) Times a Day As Needed for Cough., Disp: 30 capsule, Rfl: 0    carbamide peroxide (DEBROX) 6.5 % otic solution, ADMINISTER 5 DROPS INTO BOTH EARS 2 TIMES A DAY FOR 4 DAYS., Disp: , Rfl:     cephalexin (KEFLEX) 500 MG capsule, TAKE 1 CAPSULE BY MOUTH 3 (THREE) TIMES A DAY FOR 4 DAYS., Disp: , Rfl:     Cholecalciferol 50 MCG (2000 UT) tablet, Take 1 tablet by mouth Daily. Indications: Vitamin D Deficiency, Disp: , Rfl:     citalopram (CeleXA) 10 MG tablet, Take 1 tablet by mouth Daily., Disp: , Rfl:     diazePAM (VALIUM) 5 MG tablet, Take 1 tablet by mouth Every 6 (Six) Hours As Needed., Disp: , Rfl:     furosemide (LASIX) 20 MG tablet, TAKE 0.5 TABLETS BY MOUTH DAILY AS NEEDED (SWELLING) FOR UP TO 30 DAYS., Disp: 45 tablet, Rfl: 1    meclizine (ANTIVERT) 25 MG tablet, TAKE 1 TABLET BY MOUTH 3 TIMES A DAY AS NEEDED FOR DIZZINESS., Disp: , Rfl:     metoprolol succinate XL (TOPROL-XL) 25 MG 24 hr tablet, Take 1 tablet by mouth Daily. Indications: High Blood Pressure, Disp: , Rfl:     montelukast (Singulair) 10 MG tablet, Take 1 tablet by mouth Every Night., Disp: 30 tablet, Rfl: 1    oxyCODONE-acetaminophen (PERCOCET) 5-325 MG per tablet, Take 1 tablet by mouth Every 8 (Eight) Hours As Needed for Severe Pain., Disp: 90 tablet, Rfl: 0    pantoprazole (PROTONIX) 40 MG EC tablet, Take 1 tablet by mouth Daily. Indications: Gastroesophageal Reflux Disease, Disp: 90 tablet, Rfl: 1    ticagrelor (Brilinta) 60 MG tablet tablet, Take 1 tablet by mouth 2 (Two) Times a Day., Disp: 180 tablet, Rfl: 3    vitamin B-12 (CYANOCOBALAMIN) 1000 MCG tablet, Take 1 tablet by mouth Daily. Indications: Inadequate Vitamin B12, Disp: 30 tablet, Rfl: 5    Review of Systems   Constitutional:  Positive for appetite change and fatigue.   HENT:  Positive for congestion, hearing loss and sinus pressure.    Respiratory:  Positive for cough.   "  Hematological:  Bruises/bleeds easily.            Objective   Vital Signs:   /81   Pulse 81   Ht 160 cm (63\")   Wt 53.5 kg (118 lb)   BMI 20.90 kg/m²     Physical Exam  Vitals reviewed.   Constitutional:       Appearance: She is well-developed.   HENT:      Head: Normocephalic and atraumatic.      Mouth/Throat:      Dentition: Has dentures.   Eyes:      General: Lids are normal.      Extraocular Movements: Extraocular movements intact.      Pupils: Pupils are equal, round, and reactive to light.   Neck:      Vascular: No carotid bruit.   Cardiovascular:      Rate and Rhythm: Normal rate.      Heart sounds: No murmur heard.  Pulmonary:      Effort: Pulmonary effort is normal.      Breath sounds: Rhonchi present.   Musculoskeletal:      Cervical back: Normal range of motion and neck supple.   Skin:     General: Skin is warm and dry.   Neurological:      Mental Status: She is alert and oriented to person, place, and time.      Cranial Nerves: Cranial nerves 2-12 are intact. No cranial nerve deficit.      Sensory: No sensory deficit.      Motor: Motor function is intact. No tremor.      Gait: Gait abnormal.      Deep Tendon Reflexes:      Reflex Scores:       Tricep reflexes are 2+ on the right side and 2+ on the left side.       Bicep reflexes are 2+ on the right side and 2+ on the left side.       Brachioradialis reflexes are 1+ on the right side and 1+ on the left side.       Patellar reflexes are 2+ on the right side and 2+ on the left side.  Psychiatric:         Attention and Perception: Attention normal.         Mood and Affect: Mood normal.         Speech: Speech normal.         Behavior: Behavior normal.         Cognition and Memory: Cognition normal. Memory is impaired. She exhibits impaired recent memory.         Judgment: Judgment normal.      Comments: MMSE 13/30        Result Review :   The following data was reviewed by: KENNY Blake on 10/23/2024:  CMP          8/2/2024    10:24 " 8/3/2024    23:39 9/10/2024    11:30   CMP   Glucose 144  108     BUN 20  17     Creatinine 1.18  0.79     EGFR 50.7  82.1     Sodium 136  138     Potassium 4.2  4.2     Chloride 102  107     Calcium 8.8  8.6     Total Protein   6.8    Total Protein  5.9     Albumin  3.1  3.6    Globulin   3.2    Globulin  2.8     Total Bilirubin  0.2     Alkaline Phosphatase  89     AST (SGOT)  22     ALT (SGPT)  <5     Albumin/Globulin Ratio  1.1     BUN/Creatinine Ratio 16.9  21.5     Anion Gap 12.5  9.0       CBC          8/4/2024    00:44 8/5/2024    08:39 9/10/2024    11:30   CBC   WBC 6.90   11.54    RBC 2.37   3.69    Hemoglobin 7.3  7.5  11.4    Hematocrit 23.8  23.5  37.4    .4   101.4    MCH 30.8   30.9    MCHC 30.7   30.5    RDW 15.1   15.2    Platelets 181   332      TSH          12/11/2023    10:07 7/19/2024    23:23   TSH   TSH 1.250  3.060      Labs 9/10/2024  B12 402  Protein electrophoresis and free light chains: 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  Impression:   1. Chronic changes, but no acute intracranial pathology.       MRI BRAIN W WO CONTRAST   Date of Exam: 7/20/2024 5:00 PM EDT   Indication: And status changes, history of lung cancer.   Comparison: 7/19/2024 and prior  Impression:   1. Small less than 1 cm areas of increased signal on diffusion imaging with associated FLAIR and T2 signal abnormality within the right basal ganglia, corona radiata suggesting evolution of small subacute infarcts. No convincing evidence for acute   ischemic changes otherwise noted.   2. Mild increased T1 signal and questionable enhancement in this region may represent sequela of evolving small infarct. Metastatic lesion is thought to be less likely although this will need to be followed per patient's oncologic protocol   3. No abnormal enhancement otherwise noted within the  brain parenchyma.   4. Extensive confluent white matter changes again noted which can be seen with small vessel ischemic disease as well as represent sequela of prior therapy                Neurological Exam  Mental Status  Alert. Oriented only to person. Oriented to person, place, and time. Recalls 2 of 3 objects immediately. At 5 minutes recalls 0 of 3 objects. Speech is normal. Language is fluent with no aphasia. Difficulty spelling words backwards. MMSE score: 13.    Cranial Nerves  CN II: Visual acuity is normal. Visual fields full to confrontation.  CN III, IV, VI: Extraocular movements intact bilaterally. Normal lids and orbits bilaterally. Pupils equal round and reactive to light bilaterally.  CN V: Facial sensation is normal.  CN VII: Full and symmetric facial movement.  CN IX, X: Palate elevates symmetrically. Normal gag reflex.  CN XI: Shoulder shrug strength is normal.  CN XII: Tongue midline without atrophy or fasciculations.    Motor   No abnormal involuntary movements. No pronator drift.    Sensory  Light touch is normal in upper and lower extremities.     Reflexes                                            Right                      Left  Brachioradialis                    1+                         1+  Biceps                                 2+                         2+  Triceps                                2+                         2+  Patellar                                2+                         2+    Coordination  No tremorRight: Finger-to-nose normal.Left: Finger-to-nose normal.    Gait   Abnormal gait.Casual gait: Unable to rise from chair without using arms.  Cautious gait.  Ambulates with a walker.              Assessment and Plan    Diagnoses and all orders for this visit:    1. Obstructive sleep apnea, adult (Primary)  -     Cancel: Ambulatory Referral to Pulmonology  -     Ambulatory Referral to Pulmonology    2. Memory loss  -     MRI Brain With & Without Contrast; Future  -      vitamin B-12 (CYANOCOBALAMIN) 1000 MCG tablet; Take 1 tablet by mouth Daily. Indications: Inadequate Vitamin B12  Dispense: 30 tablet; Refill: 5  -     memantine (NAMENDA) 10 MG tablet; Take 1 tablet by mouth 2 (Two) Times a Day. Indications: Memory loss  Dispense: 60 tablet; Refill: 5  -     Home Sleep Study; Future    3. Has daytime drowsiness  -     Home Sleep Study; Future    4. Hypersomnia, unspecified  -     Home Sleep Study; Future    5. Chronic bronchitis, unspecified chronic bronchitis type  -     Cancel: Ambulatory Referral to Pulmonology  -     Ambulatory Referral to Pulmonology    6. Small cell carcinoma of hilum of lung, unspecified laterality  -     Cancel: Ambulatory Referral to Pulmonology  -     Ambulatory Referral to Pulmonology        Miryam Fernández is seen today for memory decline.  She was last seen by neurology in the hospital in July after a fall and disorientation.  She was found to have subacute infarcts and extensive small vessel ischemic disease.  Patient's memory issues have been present since chemoradiation for small cell lung cancer in 2020.  She continues to smoke 1 to 2 packs/day and has high blood pressure and high cholesterol is currently controlled on medication.  Patient lives with her  and there are no safety can concerns other than increased risk for falls at this time.  MMSE today was 13/30.      Will increase Namenda by 5 mg per dose every month up to 10 mg p.o. twice daily.  We discussed the potential side effects of this medication.    Repeat MRI brain with and without contrast to better evaluate for possible metastatic disease as recommended by radiologist in July.  We reviewed the images today.  She does not have significant temporoparietal or hippocampal atrophy as seen with Alzheimer's.    We discussed reducing risk factors for recurrent stroke and progressive memory decline due to vascular dementia.  She should quit smoking.  The patient does have a history of MORA  and COPD but did not tolerate PAP therapy.  Her daughter believes her last sleep study was over 10 years ago and we do not have the results.  Will order home sleep study to reevaluate severity of sleep apnea.  If she does not tolerate PAP therapy, she may benefit from nocturnal oxygen.  She states that she feels so much better when she is in the hospital on oxygen.    The patient has a history of small cell lung cancer and COPD.  Will refer her to pulmonology for management.    I would like for her to start B12 1000 mcg p.o. daily.  Her B12 level was 400 but I would like this over 500 if possible.    She will follow-up in 6 months or sooner if needed        I spent 60 minutes caring for Miryam on this date of service. This time includes time spent by me in the following activities:preparing for the visit, reviewing tests, performing a medically appropriate examination and/or evaluation , counseling and educating the patient/family/caregiver, ordering medications, tests, or procedures, referring and communicating with other health care professionals , and documenting information in the medical record  Follow Up   Return in about 6 months (around 4/23/2025).  Patient was given instructions and counseling regarding her condition or for health maintenance advice. Please see specific information pulled into the AVS if appropriate.         This document has been electronically signed by KENNY Phillips on October 23, 2024 09:03 EDT

## 2024-10-22 ENCOUNTER — HOME CARE VISIT (OUTPATIENT)
Dept: HOME HEALTH SERVICES | Facility: HOME HEALTHCARE | Age: 69
End: 2024-10-22
Payer: MEDICARE

## 2024-10-22 VITALS
DIASTOLIC BLOOD PRESSURE: 70 MMHG | OXYGEN SATURATION: 98 % | HEART RATE: 78 BPM | RESPIRATION RATE: 18 BRPM | TEMPERATURE: 97.8 F | SYSTOLIC BLOOD PRESSURE: 105 MMHG

## 2024-10-22 PROCEDURE — G0151 HHCP-SERV OF PT,EA 15 MIN: HCPCS

## 2024-10-22 NOTE — HOME HEALTH
Clinical condition of patient at initial or last assessment:   fair/poor condition at initial eval    Current clinical condition of patient:   Pt currently in good condition and improving steadily with good compliance    Overall progress towards measurable treatment goals:   Pt progressing toward all goals and is anticipated to meet all goals by time of DC next week    Plan for continuing or discontinuing service:   Plan to continue PT only for DC visit next week    Statement of expectation of continued progress toward goals:  PT expects pt's continued progress as she has displayed good participation and compliance with ther ex    Why is it necessary for therapy to continue?  PT necessary for DC visit and follow up mobility training next week
None

## 2024-10-23 ENCOUNTER — OFFICE VISIT (OUTPATIENT)
Dept: NEUROLOGY | Facility: CLINIC | Age: 69
End: 2024-10-23
Payer: MEDICARE

## 2024-10-23 ENCOUNTER — TELEPHONE (OUTPATIENT)
Dept: FAMILY MEDICINE CLINIC | Facility: CLINIC | Age: 69
End: 2024-10-23
Payer: MEDICARE

## 2024-10-23 VITALS
SYSTOLIC BLOOD PRESSURE: 130 MMHG | HEART RATE: 81 BPM | WEIGHT: 118 LBS | HEIGHT: 63 IN | DIASTOLIC BLOOD PRESSURE: 81 MMHG | BODY MASS INDEX: 20.91 KG/M2

## 2024-10-23 DIAGNOSIS — G47.10 HYPERSOMNIA, UNSPECIFIED: ICD-10-CM

## 2024-10-23 DIAGNOSIS — G47.33 OBSTRUCTIVE SLEEP APNEA, ADULT: Primary | ICD-10-CM

## 2024-10-23 DIAGNOSIS — J42 CHRONIC BRONCHITIS, UNSPECIFIED CHRONIC BRONCHITIS TYPE: ICD-10-CM

## 2024-10-23 DIAGNOSIS — C34.00 SMALL CELL CARCINOMA OF HILUM OF LUNG, UNSPECIFIED LATERALITY: ICD-10-CM

## 2024-10-23 DIAGNOSIS — R41.3 MEMORY LOSS: ICD-10-CM

## 2024-10-23 DIAGNOSIS — R40.0 HAS DAYTIME DROWSINESS: ICD-10-CM

## 2024-10-23 RX ORDER — CEPHALEXIN 500 MG/1
CAPSULE ORAL
COMMUNITY
End: 2024-10-28

## 2024-10-23 RX ORDER — ACYCLOVIR 50 MG/G
OINTMENT TOPICAL
COMMUNITY
End: 2024-10-28

## 2024-10-23 RX ORDER — LANOLIN ALCOHOL/MO/W.PET/CERES
1000 CREAM (GRAM) TOPICAL DAILY
Qty: 30 TABLET | Refills: 5 | Status: SHIPPED | OUTPATIENT
Start: 2024-10-23

## 2024-10-23 RX ORDER — DIAZEPAM 5 MG
1 TABLET ORAL EVERY 6 HOURS PRN
COMMUNITY
End: 2024-10-28

## 2024-10-23 RX ORDER — CITALOPRAM HYDROBROMIDE 10 MG/1
1 TABLET ORAL DAILY
COMMUNITY
End: 2024-10-28

## 2024-10-23 RX ORDER — ALBUTEROL SULFATE 90 UG/1
2 INHALANT RESPIRATORY (INHALATION) EVERY 4 HOURS PRN
COMMUNITY
End: 2024-10-28

## 2024-10-23 RX ORDER — DOXYCYCLINE 100 MG/1
100 CAPSULE ORAL 2 TIMES DAILY
Qty: 14 CAPSULE | Refills: 0 | Status: SHIPPED | OUTPATIENT
Start: 2024-10-23

## 2024-10-23 RX ORDER — MECLIZINE HYDROCHLORIDE 25 MG/1
TABLET ORAL
COMMUNITY
End: 2024-10-28

## 2024-10-23 RX ORDER — MEMANTINE HYDROCHLORIDE 10 MG/1
10 TABLET ORAL 2 TIMES DAILY
Qty: 60 TABLET | Refills: 5 | Status: SHIPPED | OUTPATIENT
Start: 2024-10-23

## 2024-10-23 NOTE — TELEPHONE ENCOUNTER
Pt daughter Neva called in with concern of possible sinus infection, drainage, cough that is starting to become productive. Pt daughter states that sinus symptoms started 2-3 weeks ago. Pt is taking mucinex liquid, it is helping a little. Pt was wanting same day appt, informed not appt avaliable

## 2024-10-26 ENCOUNTER — PATIENT ROUNDING (BHMG ONLY) (OUTPATIENT)
Dept: NEUROLOGY | Facility: CLINIC | Age: 69
End: 2024-10-26
Payer: MEDICARE

## 2024-10-28 ENCOUNTER — HOME CARE VISIT (OUTPATIENT)
Dept: HOME HEALTH SERVICES | Facility: HOME HEALTHCARE | Age: 69
End: 2024-10-28
Payer: COMMERCIAL

## 2024-10-28 VITALS
OXYGEN SATURATION: 96 % | HEART RATE: 66 BPM | RESPIRATION RATE: 18 BRPM | SYSTOLIC BLOOD PRESSURE: 115 MMHG | TEMPERATURE: 99.1 F | DIASTOLIC BLOOD PRESSURE: 75 MMHG

## 2024-10-28 PROCEDURE — G0151 HHCP-SERV OF PT,EA 15 MIN: HCPCS

## 2024-10-28 NOTE — HOME HEALTH
Pt seated on couch upon PT arrival.  Pt conts to display compliance with use of Even-Up RLE shoe lift.  Pt has rollator for use when needed but is able to demo independence with mobility on level terrain without AD.  PT recommends continued use of rollator out of home or on uneven terrain.  Pt verbalized improved confidence with her mobility and no falls since beginning home care PT.  Pt met all goals with home care PT.  Pt verbalized understanding and signed discharge agreement for DC from home care PT services as of 10/28/24.  Pt has no further questions or concerns

## 2024-11-04 ENCOUNTER — TELEPHONE (OUTPATIENT)
Dept: FAMILY MEDICINE CLINIC | Facility: CLINIC | Age: 69
End: 2024-11-04
Payer: MEDICARE

## 2024-11-04 NOTE — TELEPHONE ENCOUNTER
Spoke with daughter regarding overdue cologuard order. They have the kit but Miryam has not been letting daughter know when she needs to have bm despite multiple reminders.

## 2024-11-06 ENCOUNTER — OFFICE VISIT (OUTPATIENT)
Dept: PULMONOLOGY | Facility: HOSPITAL | Age: 69
End: 2024-11-06
Payer: MEDICARE

## 2024-11-06 VITALS
SYSTOLIC BLOOD PRESSURE: 133 MMHG | HEART RATE: 78 BPM | BODY MASS INDEX: 20.91 KG/M2 | RESPIRATION RATE: 12 BRPM | DIASTOLIC BLOOD PRESSURE: 80 MMHG | HEIGHT: 63 IN | WEIGHT: 118 LBS | OXYGEN SATURATION: 98 %

## 2024-11-06 DIAGNOSIS — J44.9 CHRONIC OBSTRUCTIVE PULMONARY DISEASE, UNSPECIFIED COPD TYPE: Primary | ICD-10-CM

## 2024-11-06 PROCEDURE — G0463 HOSPITAL OUTPT CLINIC VISIT: HCPCS

## 2024-11-06 RX ORDER — FLUTICASONE FUROATE, UMECLIDINIUM BROMIDE AND VILANTEROL TRIFENATATE 100; 62.5; 25 UG/1; UG/1; UG/1
1 POWDER RESPIRATORY (INHALATION)
Qty: 1 EACH | Refills: 11 | Status: SHIPPED | OUTPATIENT
Start: 2024-11-06 | End: 2024-11-06 | Stop reason: SDUPTHER

## 2024-11-06 RX ORDER — FLUTICASONE FUROATE, UMECLIDINIUM BROMIDE AND VILANTEROL TRIFENATATE 100; 62.5; 25 UG/1; UG/1; UG/1
1 POWDER RESPIRATORY (INHALATION)
Qty: 1 EACH | Refills: 0 | COMMUNITY
Start: 2024-11-06 | End: 2024-12-06

## 2024-11-06 RX ORDER — MEMANTINE HYDROCHLORIDE 5 MG/1
1 TABLET ORAL EVERY 12 HOURS SCHEDULED
COMMUNITY
Start: 2024-10-25

## 2024-11-06 NOTE — PROGRESS NOTES
PULMONARY/ CRITICAL CARE/ SLEEP MEDICINE OUTPATIENT CONSULT/ FOLLOW UP NOTE        Patient Name:  Miryam Fernández    :  1955    Medical Record:  [id number]    PRIMARY CARE PHYSICIAN     Lashell De La Torre APRN    REASON FOR CONSULTATION    Miryam Fernández is a 69 y.o. female who is referred for consultation for COPD  REVIEW OF SYSTEMS    Constitutional:  Denies fever or chills   Eyes:  Denies change in visual acuity   HENT:  Denies nasal congestion or sore throat   Respiratory:  Denies cough or shortness of breath   Cardiovascular:  Denies chest pain or edema   GI:  Denies abdominal pain, nausea, vomiting, bloody stools or diarrhea   :  Denies dysuria   Musculoskeletal:  Denies back pain or joint pain   Integument:  Denies rash   Neurologic:  Denies headache, focal weakness or sensory changes   Endocrine:  Denies polyuria or polydipsia   Lymphatic:  Denies swollen glands   Psychiatric:  Denies depression or anxiety     MEDICAL HISTORY    Past Medical History:   Diagnosis Date    Cancer     lung cancer    Carotid stenosis     COPD (chronic obstructive pulmonary disease)     Coronary artery disease     Esophageal stricture     Dr Domingo    Hyperlipidemia     Metabolic encephalopathy 2024    2nd to UTI    Pulmonary embolism     seen at [facility], fluid around her heart at the time-right lung    UTI (urinary tract infection) 2024        SURGICAL HISTORY    Past Surgical History:   Procedure Laterality Date    ANGIOPLASTY / STENTING FEMORAL      CARDIAC CATHETERIZATION      CAROTID STENT      CORONARY STENT PLACEMENT      ESOPHAGOSCOPY / EGD      HYSTERECTOMY      TOTAL HIP ARTHROPLASTY Left 2024    Procedure: TOTAL HIP ARTHROPLASTY;  Surgeon: Olu Joshua II, MD;  Location: [facility] MAIN OR;  Service: Orthopedics;  Laterality: Left;        FAMILY HISTORY    Family History   Problem Relation Age of Onset    Heart disease Mother     Hypertension Mother     Dementia Father      Hyperlipidemia Father     Lung cancer Father     Diabetes Paternal Uncle     Leukemia Paternal Grandmother        SOCIAL HISTORY    Social History     Tobacco Use    Smoking status: Every Day     Current packs/day: 2.00     Average packs/day: 2.0 packs/day for 40.0 years (80.0 ttl pk-yrs)     Types: Cigarettes     Passive exposure: Current    Smokeless tobacco: Never   Substance Use Topics    Alcohol use: Not Currently        ALLERGIES    Allergies   Allergen Reactions    Albuterol Shortness Of Breath     Pt states she has sensitivity to albuterol.  She states it causes her to be SOA.    Hydrocodone Palpitations    Sulfa Antibiotics Swelling    Bacitracin Other (See Comments)    Benzalkonium Chloride Hives    Codeine Other (See Comments)    Neomycin Other (See Comments)    Nickel Rash    Penicillins Rash    Polymyxin B Other (See Comments)         MEDICATIONS    Current Outpatient Medications on File Prior to Visit   Medication Sig Dispense Refill    aspirin 81 MG EC tablet Take 1 tablet by mouth Daily. 30 tablet 2    atorvastatin (LIPITOR) 40 MG tablet TAKE 1 TABLET BY MOUTH EVERY DAY 90 tablet 1    benzonatate (Tessalon Perles) 100 MG capsule Take 1 capsule by mouth 3 (Three) Times a Day As Needed for Cough. 30 capsule 0    Cholecalciferol 50 MCG (2000 UT) tablet Take 1 tablet by mouth Daily. Indications: Vitamin D Deficiency      doxycycline (VIBRAMYCIN) 100 MG capsule Take 1 capsule by mouth 2 (Two) Times a Day. Indications: Upper Respiratory Tract Infection 14 capsule 0    furosemide (LASIX) 20 MG tablet TAKE 0.5 TABLETS BY MOUTH DAILY AS NEEDED (SWELLING) FOR UP TO 30 DAYS. 45 tablet 1    memantine (NAMENDA) 10 MG tablet Take 1 tablet by mouth 2 (Two) Times a Day. Indications: Memory loss 60 tablet 5    metoprolol succinate XL (TOPROL-XL) 25 MG 24 hr tablet Take 1 tablet by mouth Daily. Indications: High Blood Pressure      montelukast (Singulair) 10 MG tablet Take 1 tablet by mouth Every Night. 30 tablet 1     oxyCODONE-acetaminophen (PERCOCET) 5-325 MG per tablet Take 1 tablet by mouth Every 8 (Eight) Hours As Needed for Severe Pain. 90 tablet 0    pantoprazole (PROTONIX) 40 MG EC tablet Take 1 tablet by mouth Daily. Indications: Gastroesophageal Reflux Disease 90 tablet 1    ticagrelor (Brilinta) 60 MG tablet tablet Take 1 tablet by mouth 2 (Two) Times a Day. 180 tablet 3    vitamin B-12 (CYANOCOBALAMIN) 1000 MCG tablet Take 1 tablet by mouth Daily. Indications: Inadequate Vitamin B12 30 tablet 5     No current facility-administered medications on file prior to visit.       PHYSICAL EXAM    There were no vitals filed for this visit.     Constitutional:  Well developed, well nourished, no acute distress, non-toxic appearance   Eyes:  PERRL, conjunctiva normal   HENT:  Atraumatic, external ears normal, nose normal, oropharynx moist, no pharyngeal exudates. mallampatti   Neck- normal range of motion, no tenderness, supple   Respiratory:  No respiratory distress, normal breath sounds, no rales, no wheezing   Cardiovascular:  Normal rate, normal rhythm, no murmurs, no gallops, no rubs   GI:  Soft, nondistended, normal bowel sounds, nontender, no organomegaly, no mass, no rebound, no guarding   :  No costovertebral angle tenderness   Musculoskeletal:  No edema, no tenderness, no deformities. Back- no tenderness  Integument:  Well hydrated, no rash   Lymphatic:  No lymphadenopathy noted   Neurologic:  Alert & oriented x 3, CN 2-12 normal, normal motor function, normal sensory function, no focal deficits noted   Psychiatric:  Speech and behavior appropriate     No radiology results for the last 90 days.   Results for orders placed during the hospital encounter of 07/19/24    Adult Transthoracic Echo Complete W/ Cont if Necessary Per Protocol    Interpretation Summary    Left ventricular systolic function is normal. Calculated left ventricular EF = 61.7% Left ventricular ejection fraction appears to be 61 - 65%.    Left  ventricular diastolic function is consistent with (grade I) impaired relaxation.    Saline test results are negative for right to left atrial level shunt.    Estimated right ventricular systolic pressure from tricuspid regurgitation is normal (<35 mmHg).      ASSESSMENT & PLAN:      69-year-old female with 50-pack-year history of smoking  COPD    Pulmonary function test 1/12/2024.     Spirometry  FVC 2.3 L which is 77%, improved to 85% postbronchodilator which is 10% change  FEV1 1.64 L which is 71%, improved to 77% postbronchodilator  FEV1/FVC ratio 71.     Lung volumes  Residual volume 142%  Total lung capacity 109%.     Diffusion capacity 48%.        History of lung cancer  Recent fall and femur fracture August 2024    hyperlipidemia, hypertension,    Plan  Had long discussion with patient about smoking cessation  Start Trelegy inhaler  CT scan surveillance per oncology    This document has been electronically signed by  Jose Manuel Payne MD  10:38 EST

## 2024-11-08 ENCOUNTER — HOSPITAL ENCOUNTER (OUTPATIENT)
Dept: CARDIOLOGY | Facility: HOSPITAL | Age: 69
Discharge: HOME | End: 2024-11-08
Payer: MEDICARE

## 2024-11-08 DIAGNOSIS — I73.9 PERIPHERAL VASCULAR DISEASE, UNSPECIFIED: ICD-10-CM

## 2024-11-08 DIAGNOSIS — I70.1 ATHEROSCLEROSIS OF RENAL ARTERY: ICD-10-CM

## 2024-11-08 LAB
BH CV ECHO MEAS - DIST REN A EDV LEFT: 15.6 CM/S
BH CV ECHO MEAS - DIST REN A PSV LEFT: 71.4 CM/S
BH CV ECHO MEAS - MID REN A EDV LEFT: 16.8 CM/S
BH CV ECHO MEAS - MID REN A PSV LEFT: 65.8 CM/S
BH CV ECHO MEAS - PROX REN A EDV LEFT: 17.2 CM/S
BH CV ECHO MEAS - PROX REN A PSV LEFT: 72.7 CM/S
BH CV LOWER ARTERIAL LEFT ABI RATIO: 0.74
BH CV LOWER ARTERIAL LEFT DORSALIS PEDIS SYS MAX: 107
BH CV LOWER ARTERIAL LEFT GREAT TOE SYS MAX: 57
BH CV LOWER ARTERIAL LEFT POST TIBIAL SYS MAX: 126
BH CV LOWER ARTERIAL LEFT TBI RATIO: 0.34
BH CV LOWER ARTERIAL RIGHT ABI RATIO: 0.91
BH CV LOWER ARTERIAL RIGHT DORSALIS PEDIS SYS MAX: 155
BH CV LOWER ARTERIAL RIGHT GREAT TOE SYS MAX: 123
BH CV LOWER ARTERIAL RIGHT POST TIBIAL SYS MAX: 149
BH CV LOWER ARTERIAL RIGHT TBI RATIO: 0.72
BH CV VAS KIDNEY HEIGHT LEFT: 5.4 CM
BH CV VAS RENAL AORTIC MID PSV: 34.3 CM/S
BH CV VAS RENAL HILUM LEFT EDV: -2.8 CM/S
BH CV VAS RENAL HILUM LEFT PSV: -10.8 CM/S
BH CV VAS RENAL HILUM RIGHT EDV: 10.1 CM/S
BH CV VAS RENAL HILUM RIGHT PSV: 31.3 CM/S
BH CV XLRA MEAS - KID L LEFT: 9.8 CM
BH CV XLRA MEAS DIST REN A EDV RIGHT: 16.3 CM/S
BH CV XLRA MEAS DIST REN A PSV RIGHT: 70.1 CM/S
BH CV XLRA MEAS KID H RIGHT: 3.8 CM
BH CV XLRA MEAS KID L RIGHT: 8.9 CM
BH CV XLRA MEAS KID W RIGHT: 5.1 CM
BH CV XLRA MEAS MID REN A EDV RIGHT: 15.2 CM/S
BH CV XLRA MEAS MID REN A PSV RIGHT: 61.9 CM/S
BH CV XLRA MEAS PROX REN A EDV RIGHT: -59 CM/S
BH CV XLRA MEAS PROX REN A PSV RIGHT: -455 CM/S
BH CV XLRA MEAS RAR LEFT: 5.64
BH CV XLRA MEAS RAR RIGHT: 13.27
BH CV XLRA MEAS RENAL A ORG EDV LEFT: 61.4 CM/S
BH CV XLRA MEAS RENAL A ORG EDV RIGHT: 14.8 CM/S
BH CV XLRA MEAS RENAL A ORG PSV LEFT: 194 CM/S
BH CV XLRA MEAS RENAL A ORG PSV RIGHT: 66.2 CM/S
LEFT KIDNEY WIDTH: 4.7 CM
LEFT RENAL UPPER PARENCHYMA MAX: 18.1 CM/S
LEFT RENAL UPPER PARENCHYMA MIN: 5.55 CM/S
LEFT RENAL UPPER PARENCHYMA RI: 0.69
RIGHT RENAL UPPER PARENCHYMA MAX: 12.9 CM/S
RIGHT RENAL UPPER PARENCHYMA MIN: 3.38 CM/S
RIGHT RENAL UPPER PARENCHYMA RI: 0.74
UPPER ARTERIAL LEFT ARM BRACHIAL SYS MAX: 165
UPPER ARTERIAL RIGHT ARM BRACHIAL SYS MAX: 170

## 2024-11-08 PROCEDURE — 93975 VASCULAR STUDY: CPT

## 2024-11-08 PROCEDURE — 93922 UPR/L XTREMITY ART 2 LEVELS: CPT

## 2024-11-11 ENCOUNTER — TELEPHONE (OUTPATIENT)
Dept: NEUROLOGY | Facility: CLINIC | Age: 69
End: 2024-11-11
Payer: MEDICARE

## 2024-11-11 NOTE — TELEPHONE ENCOUNTER
Our office did not order HST or any sleep study for this patient. She was referred to Dr. Payne for evaluation.  BENITA snyder to relay

## 2024-11-11 NOTE — TELEPHONE ENCOUNTER
Caller: TOMMIE    Relationship: INSURER    Best call back number: [phone]    What test/procedure requested: REPEAT HOME SLEEP STUDY    When is it needed: ?    Where is the test/procedure going to be performed: HOME    Additional information or concerns: (TYPE 2L8550) HOME SLEEP STUDY REPEAT. OFFERING A [id number] OR WE CAN PROVIDE ADDITIONAL CLINICAL INFO. WILL CLOSE ON 11/14 IF WE DON'T CONTACT THEM.

## 2024-11-13 ENCOUNTER — HOSPITAL ENCOUNTER (OUTPATIENT)
Dept: CARDIOLOGY | Facility: HOSPITAL | Age: 69
Discharge: HOME | End: 2024-11-13
Admitting: SURGERY
Payer: MEDICARE

## 2024-11-13 DIAGNOSIS — K55.1 CHRONIC VASCULAR INSUFFICIENCY OF INTESTINE: ICD-10-CM

## 2024-11-13 LAB
BH CV VAS SMA AORTA DIAMETER: 3 CM
BH CV VAS SMA AORTA PSV: 70 CM/S
BH CV VAS SMA CELIAC DIST EDV: 60 CM/S
BH CV VAS SMA CELIAC DIST PSV: 242 CM/S
BH CV VAS SMA CELIAC MID EDV: 35 CM/S
BH CV VAS SMA CELIAC MID PSV: 196 CM/S
BH CV VAS SMA CELIAC ORIGIN EDV: 29 CM/S
BH CV VAS SMA CELIAC ORIGIN PSV: 163 CM/S
BH CV VAS SMA CELIAC PROX EDV: 63 CM/S
BH CV VAS SMA CELIAC PROX PSV: 222 CM/S
BH CV VAS SMA HEPATIC PSV: 136 CM/S
BH CV VAS SMA IMA PSV: 42 CM/S
BH CV VAS SMA ORIGIN EDV: 14 CM/S
BH CV VAS SMA ORIGIN PSV: 159 CM/S
BH CV VAS SMA SMA DIST EDV: 6 CM/S
BH CV VAS SMA SMA DIST PSV: 59 CM/S
BH CV VAS SMA SMA MID EDV: 30 CM/S
BH CV VAS SMA SMA MID PSV: 222 CM/S
BH CV VAS SMA SMA PROX EDV: 30 CM/S
BH CV VAS SMA SMA PROX PSV: 249 CM/S
BH CV VAS SMA SPLENIC EDV: 118 CM/S
BH CV VAS SMA SPLENIC PSV: 465 CM/S

## 2024-11-13 PROCEDURE — 93975 VASCULAR STUDY: CPT

## 2024-11-15 ENCOUNTER — HOSPITAL ENCOUNTER (OUTPATIENT)
Dept: CARDIOLOGY | Facility: HOSPITAL | Age: 69
Discharge: HOME | End: 2024-11-15
Payer: MEDICARE

## 2024-11-15 DIAGNOSIS — Z95.828 S/P INSERTION OF ILIAC ARTERY STENT: ICD-10-CM

## 2024-11-15 DIAGNOSIS — I71.43 INFRARENAL ABDOMINAL AORTIC ANEURYSM (AAA) WITHOUT RUPTURE: ICD-10-CM

## 2024-11-15 LAB
ABDOMINAL DIST AORTA AP: 2.6 CM
ABDOMINAL DIST AORTA TRANS: 2.6 CM
ABDOMINAL DIST AORTA VEL: 62.6 CM/S
ABDOMINAL LT EXT ILIAC VEL: 551 CM/S
ABDOMINAL MID AORTA AP: 2.9 CM
ABDOMINAL MID AORTA TRANS: 2.6 CM
ABDOMINAL MID AORTA VEL: 55.7 CM/S
ABDOMINAL PROX AORTA AP: 2.4 CM
ABDOMINAL PROX AORTA TRANS: 2.2 CM
ABDOMINAL PROX AORTA VEL: 55.9 CM/S
BH CV BAS DIALYSIS STENT TWO DIST STENT PSV: 145 CM/SEC
BH CV VAS DIALYSIS STENT ONE  DIST STENT PSV: 112 CM/SEC
BH CV VAS DIALYSIS STENT ONE MID STENT PSV: 224 CM/SEC
BH CV VAS DIALYSIS STENT ONE POST STENT PSV: 170 CM/SEC
BH CV VAS DIALYSIS STENT ONE PRE STENT PSV: 52.3 CM/SEC
BH CV VAS DIALYSIS STENT ONE PROX STENT PSV: 129 CM/SEC
BH CV VAS DIALYSIS STENT TWO MID STENT PSV: 127 CM/SEC
BH CV VAS DIALYSIS STENT TWO POST STENT PSV: 129 CM/SEC
BH CV VAS DIALYSIS STENT TWO PRE STENT PSV: 52.3 CM/SEC
BH CV VAS DIALYSIS STENT TWO PROX STENT PSV: 133 CM/SEC
BH CV VAS FREE TEXT STENT ONE: NORMAL
BH CV VAS FREE TEXT STENT TWO: NORMAL
LEFT EXTERNAL ILIAC RATIO: 5.05

## 2024-11-15 PROCEDURE — 93978 VASCULAR STUDY: CPT

## 2024-11-18 ENCOUNTER — HOSPITAL ENCOUNTER (OUTPATIENT)
Dept: SLEEP MEDICINE | Facility: HOSPITAL | Age: 69
End: 2024-11-18
Payer: MEDICARE

## 2024-11-18 ENCOUNTER — HOSPITAL ENCOUNTER (OUTPATIENT)
Dept: MRI IMAGING | Facility: HOSPITAL | Age: 69
Discharge: HOME | End: 2024-11-18
Admitting: NURSE PRACTITIONER
Payer: MEDICARE

## 2024-11-18 DIAGNOSIS — R40.0 HAS DAYTIME DROWSINESS: ICD-10-CM

## 2024-11-18 DIAGNOSIS — G47.10 HYPERSOMNIA, UNSPECIFIED: ICD-10-CM

## 2024-11-18 DIAGNOSIS — R41.3 MEMORY LOSS: ICD-10-CM

## 2024-11-18 PROCEDURE — 95806 SLEEP STUDY UNATT&RESP EFFT: CPT

## 2024-11-18 PROCEDURE — A9579 GAD-BASE MR CONTRAST NOS,1ML: HCPCS | Performed by: NURSE PRACTITIONER

## 2024-11-18 PROCEDURE — 70553 MRI BRAIN STEM W/O & W/DYE: CPT

## 2024-11-18 PROCEDURE — 25010000002 GADOTERIDOL PER 1 ML: Performed by: NURSE PRACTITIONER

## 2024-11-18 RX ADMIN — GADOTERIDOL 15 ML: 279.3 INJECTION, SOLUTION INTRAVENOUS at 13:44

## 2024-11-19 NOTE — PROGRESS NOTES
[facility] VASCULAR SURGERY    Chief Complaint  Peripheral Vascular Disease, Renal Artery Stenosis, and Mesenteric Artery Stenosis    Subjective          History of Present Illness  Miryam Fernández is a 69 y.o. female with PAD, renal artery stenosis, and celiac artery stenosis. She presents to the office today for follow up. She is followed by Dr. Landrum. She has had the following vascular surgery interventions performed:    Bilateral common iliac and right external iliac stent placement at [facility] in 2019    She denies any hospitalizations or new diagnosis since we last saw her. She denies any claudication.  She tells me she can walk as far she would like without an issue.  She does use a walker with ambulation.  She denies any rest pain.  She has no wounds.  Reports her eating has improved.  She was diagnosed with cancer and had issues with appetite.  This is getting much better.  She denies any symptoms of postprandial pain, food fear, or unintentional weight loss.  She reports her blood pressure is fairly well-controlled on current oral antihypertensives.  She does not check her blood pressure at home but the last couple of doctors visits that she has been to she has noted it is a little higher than normal.  She is going to call her cardiologist to see if her dose of metoprolol needs to be adjusted or if she needs a new medication added.  Currently she is only on a single antihypertensive agent.  She has not had any decline in renal function.  She is maintained on aspirin, Brilinta, and a statin. She reports compliance with her medications. She is a current every day smoker.  She is smoking about 1 pack of cigarettes daily.  She was hospitalized back in August and reports since then she has cut down on her cigarette usage.    Review of Systems   Constitutional:  Negative for unexpected weight loss.   Gastrointestinal:  Negative for abdominal pain.   Musculoskeletal:  Negative  for myalgias.   Skin:  Negative for skin lesions and wound.        Allergies: Albuterol, Hydrocodone, Sulfa antibiotics, Bacitracin, Benzalkonium chloride, Codeine, Neomycin, Nickel, Penicillins, and Polymyxin b    Prior to Admission medications    Medication Sig Start Date End Date Taking? Authorizing Provider   aspirin 81 MG EC tablet Take 1 tablet by mouth Daily. 7/24/24   Wilver Arechiga MD   atorvastatin (LIPITOR) 40 MG tablet TAKE 1 TABLET BY MOUTH EVERY DAY 10/14/24   Lashell De La Torre APRN   benzonatate (Tessalon Perles) 100 MG capsule Take 1 capsule by mouth 3 (Three) Times a Day As Needed for Cough. 9/24/24   Lashell De La Torre APRN   Cholecalciferol 50 MCG (2000 UT) tablet Take 1 tablet by mouth Daily. Indications: Vitamin D Deficiency 1/1/24   Lashell De La Torre APRN   doxycycline (VIBRAMYCIN) 100 MG capsule Take 1 capsule by mouth 2 (Two) Times a Day. Indications: Upper Respiratory Tract Infection 10/23/24   Lashell De La Torre APRN   Fluticasone-Umeclidin-Vilant (Trelegy Ellipta) 100-62.5-25 MCG/ACT inhaler Inhale 1 puff Daily for 30 days. 11/6/24 12/6/24  Jose Manuel Payne MD   furosemide (LASIX) 20 MG tablet TAKE 0.5 TABLETS BY MOUTH DAILY AS NEEDED (SWELLING) FOR UP TO 30 DAYS. 8/22/24   Lashell De La Torre APRN   memantine (NAMENDA) 10 MG tablet Take 1 tablet by mouth 2 (Two) Times a Day. Indications: Memory loss  Patient taking differently: Take 1 tablet by mouth Every Night. Indications: Memory loss 10/23/24   Kandice Tomlinson APRN   memantine (NAMENDA) 5 MG tablet Take 1 tablet by mouth Every 12 (Twelve) Hours.  Patient taking differently: Take 1 tablet by mouth Every Morning. 10/25/24   ProviderMiguel MD   metoprolol succinate XL (TOPROL-XL) 25 MG 24 hr tablet Take 1 tablet by mouth Daily. Indications: High Blood Pressure 5/3/22   Ted Langford MD   montelukast (Singulair) 10 MG tablet Take 1 tablet by mouth Every Night. 9/24/24   Lashell De La Torre, KENNY  "  oxyCODONE-acetaminophen (PERCOCET) 5-325 MG per tablet Take 1 tablet by mouth Every 8 (Eight) Hours As Needed for Severe Pain. 9/4/24   Lashell De La Torre APRN   pantoprazole (PROTONIX) 40 MG EC tablet Take 1 tablet by mouth Daily. Indications: Gastroesophageal Reflux Disease 10/14/24   Lashell De La Torre APRN   ticagrelor (Brilinta) 60 MG tablet tablet Take 1 tablet by mouth 2 (Two) Times a Day. 9/30/24   Nai Lorenzana MD   vitamin B-12 (CYANOCOBALAMIN) 1000 MCG tablet Take 1 tablet by mouth Daily. Indications: Inadequate Vitamin B12 10/23/24   Kandice Tomlinson APRN         Objective   Vital Signs:  /74 (BP Location: Left arm)   Pulse 78   Ht 160 cm (62.99\")   Wt 53.5 kg (118 lb)   BMI 20.91 kg/m²   Estimated body mass index is 20.91 kg/m² as calculated from the following:    Height as of this encounter: 160 cm (62.99\").    Weight as of this encounter: 53.5 kg (118 lb).       Physical Exam  Vitals reviewed.   Constitutional:       General: She is not in acute distress.     Appearance: She is not ill-appearing.   Cardiovascular:      Pulses:           Radial pulses are 2+ on the right side and 2+ on the left side.        Dorsalis pedis pulses are 1+ on the right side and detected w/ Doppler on the left side.        Posterior tibial pulses are 1+ on the right side and detected w/ Doppler on the left side.   Pulmonary:      Effort: Pulmonary effort is normal. No respiratory distress.   Neurological:      General: No focal deficit present.      Mental Status: She is alert and oriented to person, place, and time.        Result Review :    The following data was reviewed by: KENNY Toscano on 11/20/2024:  Doppler Ankle Brachial Index Single Level CAR (11/08/2024 10:30)   Right: 0.91, digit pressure 123 mmHg  Left: 0.74, digit pressure 57 mmHg    Duplex Renal Artery - Bilateral Complete CAR (11/08/2024 10:41)     Hemodynamically significant stenosis right renal artery stenosis.    " Hemodynamically significant left renal artery stenosis.    Duplex Mesenteric Complete CAR (11/13/2024 11:20)     Greater than 70% stenosis of the celiac artery is noted.    No hemodynamically significant stenosis of the superior mesenteric artery (SMA) is noted.    Duplex Aorta IVC Iliac Graft Complete CAR (11/15/2024 09:06)    Patent bilateral iliac stents with mild to moderate mid stent stenosis noted on the right side.     Progress Notes by Jose Manuel Payne MD (11/06/2024 10:10)   CT Angiogram Carotids (07/21/2024 19:05)      Assessment and Plan     Diagnoses and all orders for this visit:    1. Renal artery stenosis (Primary)  -     Duplex Renal Artery - Bilateral Complete CAR; Future    2. Celiac artery stenosis  -     Duplex Mesenteric Complete CAR; Future    3. S/P insertion of iliac artery stent  -     Duplex Aorta IVC Iliac Graft Complete CAR; Future    4. PAD (peripheral artery disease)  -     Duplex Aorta IVC Iliac Graft Complete CAR; Future  -     Doppler Ankle Brachial Index Single Level CAR; Future    BMI is within normal parameters. No other follow-up for BMI required.         Miryam Fernández is a 69 y.o. female with PAD, renal artery stenosis, and celiac artery stenosis. She denies any lifestyle limiting claudication. She has no rest pain or wounds to her  legs or feet. Her most recent ABIs show normal arterial circulation in the right lower extremity at 0.91 with digit pressure of 123 mmHg and moderate arterial insufficiency in the left lower extremity at 0.74 with digit pressure 57 mmHg.  Her recent aortoiliac duplex shows patent bilateral iliac stents with mild to moderate mid stent stenosis on the right.  With lack of ischemic symptoms, would recommend continuing medical management and surveillance.  Will repeat MARCUS and aortoiliac duplex in 1 year.  In regards to her celiac artery stenosis, she denies any symptoms of mesenteric ischemia.  Duplex shows greater than 70% stenosis of the celiac artery, no  hemodynamically significant stenosis of the SMA.  She remains asymptomatic and imaging remains stable compared to CT scan performed earlier this year.  Repeat duplex in 1 year.  In regards to renal artery stenosis, duplex shows significant stenosis of the bilateral renal arteries.  This remains stable compared to CT scan imaging earlier this year.  She decline in her renal function and her blood pressures controlled on current regimen.  Recommend repeat duplex in 1 year.  She is maintained on appropriate antiplatelet, statin, and antihypertensive medications which I recommend she continue. She was instructed to call our office if she had any questions or concerns.     Follow Up     Return in about 1 year (around 11/20/2025) for MARCUS, Carotid duplex, Iliac artery duplex, Mesenteric duplex.    Patient was given instructions and counseling regarding her condition or for health maintenance advice. Please see specific information pulled into the AVS if appropriate.     Terri Johnson, APRN

## 2024-11-20 ENCOUNTER — OFFICE VISIT (OUTPATIENT)
Age: 69
End: 2024-11-20
Payer: MEDICARE

## 2024-11-20 VITALS
HEIGHT: 63 IN | SYSTOLIC BLOOD PRESSURE: 150 MMHG | DIASTOLIC BLOOD PRESSURE: 74 MMHG | HEART RATE: 78 BPM | BODY MASS INDEX: 20.91 KG/M2 | WEIGHT: 118 LBS

## 2024-11-20 DIAGNOSIS — I70.1 RENAL ARTERY STENOSIS: Primary | ICD-10-CM

## 2024-11-20 DIAGNOSIS — I77.4 CELIAC ARTERY STENOSIS: ICD-10-CM

## 2024-11-20 DIAGNOSIS — I73.9 PAD (PERIPHERAL ARTERY DISEASE): ICD-10-CM

## 2024-11-20 DIAGNOSIS — Z95.828 S/P INSERTION OF ILIAC ARTERY STENT: ICD-10-CM

## 2024-11-22 DIAGNOSIS — R41.3 MEMORY LOSS: Primary | ICD-10-CM

## 2024-11-22 DIAGNOSIS — C80.1: ICD-10-CM

## 2024-11-22 DIAGNOSIS — I67.9: ICD-10-CM

## 2024-12-02 ENCOUNTER — APPOINTMENT (OUTPATIENT)
Dept: GENERAL RADIOLOGY | Facility: HOSPITAL | Age: 69
DRG: 689 | End: 2024-12-02
Payer: MEDICARE

## 2024-12-02 ENCOUNTER — APPOINTMENT (OUTPATIENT)
Dept: CT IMAGING | Facility: HOSPITAL | Age: 69
DRG: 689 | End: 2024-12-02
Payer: MEDICARE

## 2024-12-02 ENCOUNTER — HOSPITAL ENCOUNTER (INPATIENT)
Facility: HOSPITAL | Age: 69
LOS: 4 days | Discharge: NURSING, REHAB OR LONG-TERM CARE | DRG: 689 | End: 2024-12-06
Attending: EMERGENCY MEDICINE | Admitting: STUDENT IN AN ORGANIZED HEALTH CARE EDUCATION/TRAINING PROGRAM
Payer: COMMERCIAL

## 2024-12-02 DIAGNOSIS — N39.0 URINARY TRACT INFECTION WITHOUT HEMATURIA, SITE UNSPECIFIED: Primary | ICD-10-CM

## 2024-12-02 DIAGNOSIS — H81.03 MENIERE DISEASE, BILATERAL: ICD-10-CM

## 2024-12-02 DIAGNOSIS — M51.369 DDD (DEGENERATIVE DISC DISEASE), LUMBAR: ICD-10-CM

## 2024-12-02 DIAGNOSIS — R53.1 WEAKNESS: ICD-10-CM

## 2024-12-02 LAB
ALBUMIN SERPL-MCNC: 4.4 G/DL (ref 3.5–5.2)
ALBUMIN/GLOB SERPL: 1.3 G/DL
ALP SERPL-CCNC: 108 U/L (ref 39–117)
ALT SERPL W P-5'-P-CCNC: 11 U/L (ref 1–33)
AMORPH URATE CRY URNS QL MICRO: ABNORMAL /HPF
AMPHET+METHAMPHET UR QL: NEGATIVE
AMPHETAMINES UR QL: NEGATIVE
ANION GAP SERPL CALCULATED.3IONS-SCNC: 14.7 MMOL/L (ref 5–15)
AST SERPL-CCNC: 18 U/L (ref 1–32)
BACTERIA UR QL AUTO: ABNORMAL /HPF
BARBITURATES UR QL SCN: NEGATIVE
BASOPHILS # BLD AUTO: 0.02 10*3/MM3 (ref 0–0.2)
BASOPHILS NFR BLD AUTO: 0.2 % (ref 0–1.5)
BENZODIAZ UR QL SCN: NEGATIVE
BILIRUB SERPL-MCNC: 0.4 MG/DL (ref 0–1.2)
BILIRUB UR QL STRIP: NEGATIVE
BUN SERPL-MCNC: 33 MG/DL (ref 8–23)
BUN/CREAT SERPL: 34 (ref 7–25)
BUPRENORPHINE SERPL-MCNC: NEGATIVE NG/ML
CALCIUM SPEC-SCNC: 10.3 MG/DL (ref 8.6–10.5)
CANNABINOIDS SERPL QL: NEGATIVE
CHLORIDE SERPL-SCNC: 105 MMOL/L (ref 98–107)
CLARITY UR: ABNORMAL
CO2 SERPL-SCNC: 27.3 MMOL/L (ref 22–29)
COCAINE UR QL: NEGATIVE
COHGB MFR BLD: 2.9 % (ref 0–3)
COLOR UR: YELLOW
CREAT SERPL-MCNC: 0.97 MG/DL (ref 0.57–1)
D-LACTATE SERPL-SCNC: 1.9 MMOL/L (ref 0.3–2)
DEPRECATED RDW RBC AUTO: 54.8 FL (ref 37–54)
EGFRCR SERPLBLD CKD-EPI 2021: 64.2 ML/MIN/1.73
EOSINOPHIL # BLD AUTO: 0.01 10*3/MM3 (ref 0–0.4)
EOSINOPHIL NFR BLD AUTO: 0.1 % (ref 0.3–6.2)
ERYTHROCYTE [DISTWIDTH] IN BLOOD BY AUTOMATED COUNT: 15.2 % (ref 12.3–15.4)
ETHANOL UR QL: <0.01 %
GLOBULIN UR ELPH-MCNC: 3.3 GM/DL
GLUCOSE SERPL-MCNC: 133 MG/DL (ref 65–99)
GLUCOSE UR STRIP-MCNC: NEGATIVE MG/DL
HCT VFR BLD AUTO: 38.5 % (ref 34–46.6)
HGB BLD-MCNC: 11.9 G/DL (ref 12–15.9)
HGB UR QL STRIP.AUTO: ABNORMAL
HOLD SPECIMEN: NORMAL
HYALINE CASTS UR QL AUTO: ABNORMAL /LPF
IMM GRANULOCYTES # BLD AUTO: 0.03 10*3/MM3 (ref 0–0.05)
IMM GRANULOCYTES NFR BLD AUTO: 0.3 % (ref 0–0.5)
KETONES UR QL STRIP: NEGATIVE
LEUKOCYTE ESTERASE UR QL STRIP.AUTO: NEGATIVE
LIPASE SERPL-CCNC: 18 U/L (ref 13–60)
LYMPHOCYTES # BLD AUTO: 0.49 10*3/MM3 (ref 0.7–3.1)
LYMPHOCYTES NFR BLD AUTO: 5.7 % (ref 19.6–45.3)
MAGNESIUM SERPL-MCNC: 2.2 MG/DL (ref 1.6–2.4)
MCH RBC QN AUTO: 30.3 PG (ref 26.6–33)
MCHC RBC AUTO-ENTMCNC: 30.9 G/DL (ref 31.5–35.7)
MCV RBC AUTO: 98 FL (ref 79–97)
METHADONE UR QL SCN: NEGATIVE
MONOCYTES # BLD AUTO: 0.41 10*3/MM3 (ref 0.1–0.9)
MONOCYTES NFR BLD AUTO: 4.8 % (ref 5–12)
NEUTROPHILS NFR BLD AUTO: 7.63 10*3/MM3 (ref 1.7–7)
NEUTROPHILS NFR BLD AUTO: 88.9 % (ref 42.7–76)
NITRITE UR QL STRIP: POSITIVE
NRBC BLD AUTO-RTO: 0 /100 WBC (ref 0–0.2)
OPIATES UR QL: NEGATIVE
OXYCODONE UR QL SCN: NEGATIVE
PCP UR QL SCN: NEGATIVE
PH UR STRIP.AUTO: 5.5 [PH] (ref 5–8)
PLATELET # BLD AUTO: 280 10*3/MM3 (ref 140–450)
PMV BLD AUTO: 9.9 FL (ref 6–12)
POTASSIUM SERPL-SCNC: 3.8 MMOL/L (ref 3.5–5.2)
PROCALCITONIN SERPL-MCNC: 0.02 NG/ML (ref 0–0.25)
PROT SERPL-MCNC: 7.7 G/DL (ref 6–8.5)
PROT UR QL STRIP: ABNORMAL
RBC # BLD AUTO: 3.93 10*6/MM3 (ref 3.77–5.28)
RBC # UR STRIP: ABNORMAL /HPF
REF LAB TEST METHOD: ABNORMAL
SODIUM SERPL-SCNC: 147 MMOL/L (ref 136–145)
SP GR UR STRIP: 1.03 (ref 1–1.03)
SQUAMOUS #/AREA URNS HPF: ABNORMAL /HPF
TRICYCLICS UR QL SCN: NEGATIVE
TSH SERPL DL<=0.05 MIU/L-ACNC: 1.89 UIU/ML (ref 0.27–4.2)
UROBILINOGEN UR QL STRIP: ABNORMAL
WBC # UR STRIP: ABNORMAL /HPF
WBC CLUMPS # UR AUTO: ABNORMAL /HPF
WBC NRBC COR # BLD AUTO: 8.59 10*3/MM3 (ref 3.4–10.8)
WHOLE BLOOD HOLD COAG: NORMAL

## 2024-12-02 PROCEDURE — 70450 CT HEAD/BRAIN W/O DYE: CPT

## 2024-12-02 PROCEDURE — 25010000002 CEFTRIAXONE PER 250 MG: Performed by: EMERGENCY MEDICINE

## 2024-12-02 PROCEDURE — 93005 ELECTROCARDIOGRAM TRACING: CPT | Performed by: EMERGENCY MEDICINE

## 2024-12-02 PROCEDURE — 93005 ELECTROCARDIOGRAM TRACING: CPT

## 2024-12-02 PROCEDURE — 87040 BLOOD CULTURE FOR BACTERIA: CPT | Performed by: EMERGENCY MEDICINE

## 2024-12-02 PROCEDURE — 84145 PROCALCITONIN (PCT): CPT | Performed by: EMERGENCY MEDICINE

## 2024-12-02 PROCEDURE — 83605 ASSAY OF LACTIC ACID: CPT

## 2024-12-02 PROCEDURE — 81001 URINALYSIS AUTO W/SCOPE: CPT | Performed by: EMERGENCY MEDICINE

## 2024-12-02 PROCEDURE — 87088 URINE BACTERIA CULTURE: CPT | Performed by: EMERGENCY MEDICINE

## 2024-12-02 PROCEDURE — 99285 EMERGENCY DEPT VISIT HI MDM: CPT

## 2024-12-02 PROCEDURE — 82077 ASSAY SPEC XCP UR&BREATH IA: CPT | Performed by: EMERGENCY MEDICINE

## 2024-12-02 PROCEDURE — 85025 COMPLETE CBC W/AUTO DIFF WBC: CPT | Performed by: EMERGENCY MEDICINE

## 2024-12-02 PROCEDURE — 36415 COLL VENOUS BLD VENIPUNCTURE: CPT

## 2024-12-02 PROCEDURE — 82375 ASSAY CARBOXYHB QUANT: CPT | Performed by: EMERGENCY MEDICINE

## 2024-12-02 PROCEDURE — 71045 X-RAY EXAM CHEST 1 VIEW: CPT

## 2024-12-02 PROCEDURE — 87186 SC STD MICRODIL/AGAR DIL: CPT | Performed by: EMERGENCY MEDICINE

## 2024-12-02 PROCEDURE — 83690 ASSAY OF LIPASE: CPT | Performed by: EMERGENCY MEDICINE

## 2024-12-02 PROCEDURE — 84443 ASSAY THYROID STIM HORMONE: CPT | Performed by: EMERGENCY MEDICINE

## 2024-12-02 PROCEDURE — 80053 COMPREHEN METABOLIC PANEL: CPT | Performed by: EMERGENCY MEDICINE

## 2024-12-02 PROCEDURE — 87086 URINE CULTURE/COLONY COUNT: CPT | Performed by: EMERGENCY MEDICINE

## 2024-12-02 PROCEDURE — P9612 CATHETERIZE FOR URINE SPEC: HCPCS

## 2024-12-02 PROCEDURE — 80306 DRUG TEST PRSMV INSTRMNT: CPT | Performed by: EMERGENCY MEDICINE

## 2024-12-02 PROCEDURE — 83735 ASSAY OF MAGNESIUM: CPT | Performed by: EMERGENCY MEDICINE

## 2024-12-02 RX ORDER — MONTELUKAST SODIUM 10 MG/1
10 TABLET ORAL NIGHTLY
Status: DISCONTINUED | OUTPATIENT
Start: 2024-12-02 | End: 2024-12-06 | Stop reason: HOSPADM

## 2024-12-02 RX ORDER — ATORVASTATIN CALCIUM 40 MG/1
40 TABLET, FILM COATED ORAL DAILY
Status: DISCONTINUED | OUTPATIENT
Start: 2024-12-03 | End: 2024-12-06 | Stop reason: HOSPADM

## 2024-12-02 RX ORDER — POLYETHYLENE GLYCOL 3350 17 G/17G
17 POWDER, FOR SOLUTION ORAL DAILY PRN
Status: DISCONTINUED | OUTPATIENT
Start: 2024-12-02 | End: 2024-12-06 | Stop reason: HOSPADM

## 2024-12-02 RX ORDER — CHOLECALCIFEROL (VITAMIN D3) 25 MCG
2000 TABLET ORAL DAILY
Status: DISCONTINUED | OUTPATIENT
Start: 2024-12-03 | End: 2024-12-06 | Stop reason: HOSPADM

## 2024-12-02 RX ORDER — AMOXICILLIN 250 MG
2 CAPSULE ORAL 2 TIMES DAILY PRN
Status: DISCONTINUED | OUTPATIENT
Start: 2024-12-02 | End: 2024-12-06 | Stop reason: HOSPADM

## 2024-12-02 RX ORDER — MEMANTINE HYDROCHLORIDE 5 MG/1
5 TABLET ORAL 2 TIMES DAILY
COMMUNITY

## 2024-12-02 RX ORDER — ACETAMINOPHEN 160 MG/5ML
650 SOLUTION ORAL EVERY 4 HOURS PRN
Status: DISCONTINUED | OUTPATIENT
Start: 2024-12-02 | End: 2024-12-06 | Stop reason: HOSPADM

## 2024-12-02 RX ORDER — BISACODYL 5 MG/1
5 TABLET, DELAYED RELEASE ORAL DAILY PRN
Status: DISCONTINUED | OUTPATIENT
Start: 2024-12-02 | End: 2024-12-06 | Stop reason: HOSPADM

## 2024-12-02 RX ORDER — ACETAMINOPHEN 650 MG/1
650 SUPPOSITORY RECTAL EVERY 4 HOURS PRN
Status: DISCONTINUED | OUTPATIENT
Start: 2024-12-02 | End: 2024-12-06 | Stop reason: HOSPADM

## 2024-12-02 RX ORDER — ASPIRIN 81 MG/1
81 TABLET ORAL DAILY
Status: DISCONTINUED | OUTPATIENT
Start: 2024-12-03 | End: 2024-12-06 | Stop reason: HOSPADM

## 2024-12-02 RX ORDER — ACETAMINOPHEN 325 MG/1
650 TABLET ORAL EVERY 4 HOURS PRN
Status: DISCONTINUED | OUTPATIENT
Start: 2024-12-02 | End: 2024-12-06 | Stop reason: HOSPADM

## 2024-12-02 RX ORDER — FUROSEMIDE 20 MG/1
10 TABLET ORAL DAILY PRN
Status: DISCONTINUED | OUTPATIENT
Start: 2024-12-02 | End: 2024-12-06 | Stop reason: HOSPADM

## 2024-12-02 RX ORDER — PANTOPRAZOLE SODIUM 40 MG/1
40 TABLET, DELAYED RELEASE ORAL DAILY
Status: DISCONTINUED | OUTPATIENT
Start: 2024-12-03 | End: 2024-12-06 | Stop reason: HOSPADM

## 2024-12-02 RX ORDER — MEMANTINE HYDROCHLORIDE 5 MG/1
5 TABLET ORAL EVERY 12 HOURS SCHEDULED
Status: DISCONTINUED | OUTPATIENT
Start: 2024-12-02 | End: 2024-12-06 | Stop reason: HOSPADM

## 2024-12-02 RX ORDER — BISACODYL 10 MG
10 SUPPOSITORY, RECTAL RECTAL DAILY PRN
Status: DISCONTINUED | OUTPATIENT
Start: 2024-12-02 | End: 2024-12-06 | Stop reason: HOSPADM

## 2024-12-02 RX ORDER — UREA 10 %
1000 LOTION (ML) TOPICAL DAILY
Status: DISCONTINUED | OUTPATIENT
Start: 2024-12-03 | End: 2024-12-06 | Stop reason: HOSPADM

## 2024-12-02 RX ORDER — SODIUM CHLORIDE 0.9 % (FLUSH) 0.9 %
10 SYRINGE (ML) INJECTION AS NEEDED
Status: DISCONTINUED | OUTPATIENT
Start: 2024-12-02 | End: 2024-12-06 | Stop reason: HOSPADM

## 2024-12-02 RX ADMIN — MEMANTINE 5 MG: 5 TABLET ORAL at 20:43

## 2024-12-02 RX ADMIN — TICAGRELOR 60 MG: 60 TABLET ORAL at 20:43

## 2024-12-02 RX ADMIN — MONTELUKAST 10 MG: 10 TABLET, FILM COATED ORAL at 20:43

## 2024-12-02 RX ADMIN — CEFTRIAXONE 2000 MG: 2 INJECTION, POWDER, FOR SOLUTION INTRAMUSCULAR; INTRAVENOUS at 12:49

## 2024-12-02 NOTE — Clinical Note
Level of Care: Med/Surg [1]  Diagnosis: UTI (urinary tract infection) [469495]  Certification: I Certify That Inpatient Hospital Services Are Medically Necessary For Greater Than 2 Midnights

## 2024-12-02 NOTE — CASE MANAGEMENT/SOCIAL WORK
Discharge Planning Assessment   Oh     Patient Name: Miryam Fernández  MRN: [id number]  Today's Date: 12/2/2024    Admit Date: 12/2/2024    Plan: D/c Plan: Return home with . Family transport.   Discharge Needs Assessment       Row Name 12/02/24 1428       Living Environment    People in Home spouse    Current Living Arrangements other (see comments)  [place]    Potentially Unsafe Housing Conditions none    In the past 12 months has the electric, gas, oil, or water company threatened to shut off services in your home? No    Primary Care Provided by self    Provides Primary Care For no one    Family Caregiver if Needed spouse;child(tram), adult    Family Caregiver Names Daughter - Neva, Francisco J - spouse.    Quality of Family Relationships helpful;involved;supportive    Able to Return to Prior Arrangements yes       Resource/Environmental Concerns    Resource/Environmental Concerns none    Transportation Concerns none       Transportation Needs    In the past 12 months, has lack of transportation kept you from medical appointments or from getting medications? no    In the past 12 months, has lack of transportation kept you from meetings, work, or from getting things needed for daily living? No       Food Insecurity    Within the past 12 months, you worried that your food would run out before you got the money to buy more. Never true    Within the past 12 months, the food you bought just didn't last and you didn't have money to get more. Never true       Transition Planning    Patient/Family Anticipates Transition to home with family    Patient/Family Anticipated Services at Transition none    Transportation Anticipated family or friend will provide       Discharge Needs Assessment    Readmission Within the Last 30 Days no previous admission in last 30 days    Equipment Currently Used at Home walker, rolling    Concerns to be Addressed denies needs/concerns at this time    Anticipated Changes Related to  Illness none    Equipment Needed After Discharge none    Provided Post Acute Provider List? N/A    Provided Post Acute Provider Quality & Resource List? N/A    Current Discharge Risk dependent with mobility/activities of daily living                   Discharge Plan       Row Name 12/02/24 1430       Plan    Plan D/c Plan: Return home with . Family transport.    Provided Post Acute Provider List? N/A    Provided Post Acute Provider Quality & Resource List? N/A    Plan Comments CM spoke with patient at bedside to discuss admission assessment and discharge planning. Patient confirms PCP and pharmacy. Patient has declined meds to bed program at this time. Patient denies any difficulty affording medications at this time. Patient denies any additional needs for services or DME at this time. D/C Barriers: PT/OT eval pending, IV antibiotics.             Expected Discharge Date and Time       Expected Discharge Date Expected Discharge Time    Dec 3, 2024            Demographic Summary       Row Name 12/02/24 1413       General Information    Admission Type inpatient    Arrived From home    Referral Source admission list    Reason for Consult discharge planning    Preferred Language English       Contact Information    Permission Granted to Share Info With                    Functional Status       Row Name 12/02/24 1428       Functional Status    Usual Activity Tolerance fair    Current Activity Tolerance fair       Functional Status, IADL    Medications assistive person    Meal Preparation assistive person    Housekeeping assistive person    Laundry assistive person    Shopping assistive person       Mental Status    General Appearance WDL WDL       Mental Status Summary    Recent Changes in Mental Status/Cognitive Functioning no changes       Employment/    Employment Status disabled;, previous service           Current or Previous  Service none              Patient Forms        Row Name 12/02/24 1427       Patient Forms    Important Message from Medicare (IMM) Delivered  IMM delivered by BRYON Mendoza RN     [address] [address]  Phone: [phone]  Fax: [phone]

## 2024-12-02 NOTE — ED PROVIDER NOTES
Subjective   History of Present Illness  69-year-old female presents with family stating that she has had some increased confusion some increased lethargy and decreased oral intake over the last 4 days.  They are unsure of fever.  She reports no dysuria.  She has had ongoing nausea.  She reports no diarrhea or chest or abdominal pain or headache or focal numbness or weakness.  Reports no cough or shortness of breath.  Review of Systems    Past Medical History:   Diagnosis Date    Cancer     lung cancer    Carotid stenosis     COPD (chronic obstructive pulmonary disease)     Coronary artery disease     Esophageal stricture     Dr Domingo    Hyperlipidemia     Metabolic encephalopathy 06/28/2024    2nd to UTI    Pulmonary embolism     seen at [facility], fluid around her heart at the time-right lung    UTI (urinary tract infection) 06/28/2024       Allergies   Allergen Reactions    Albuterol Shortness Of Breath     Pt states she has sensitivity to albuterol.  She states it causes her to be SOA.    Hydrocodone Palpitations    Sulfa Antibiotics Swelling    Bacitracin Other (See Comments)    Benzalkonium Chloride Hives    Codeine Other (See Comments)    Neomycin Other (See Comments)    Nickel Rash    Penicillins Rash    Polymyxin B Other (See Comments)       Past Surgical History:   Procedure Laterality Date    ANGIOPLASTY / STENTING FEMORAL      CARDIAC CATHETERIZATION      CAROTID STENT      CORONARY STENT PLACEMENT      ESOPHAGOSCOPY / EGD      HYSTERECTOMY      TOTAL HIP ARTHROPLASTY Left 8/1/2024    Procedure: TOTAL HIP ARTHROPLASTY;  Surgeon: Olu Joshua II, MD;  Location: [facility] MAIN OR;  Service: Orthopedics;  Laterality: Left;       Family History   Problem Relation Age of Onset    Heart disease Mother     Hypertension Mother     Dementia Father     Hyperlipidemia Father     Lung cancer Father     Diabetes Paternal Uncle     Leukemia Paternal Grandmother        Social History     Socioeconomic History     Marital status:    Tobacco Use    Smoking status: Every Day     Current packs/day: 1.00     Average packs/day: 2.0 packs/day for 40.3 years (80.3 ttl pk-yrs)     Types: Cigarettes     Start date: 09/1984     Passive exposure: Current    Smokeless tobacco: Never   Vaping Use    Vaping status: Never Used   Substance and Sexual Activity    Alcohol use: Not Currently    Drug use: Never    Sexual activity: Defer       Prior to Admission medications    Medication Sig Start Date End Date Taking? Authorizing Provider   aspirin 81 MG EC tablet Take 1 tablet by mouth Daily. 7/24/24   Wilver Arechiga MD   atorvastatin (LIPITOR) 40 MG tablet TAKE 1 TABLET BY MOUTH EVERY DAY 10/14/24   Lashell De La Torre APRN   benzonatate (Tessalon Perles) 100 MG capsule Take 1 capsule by mouth 3 (Three) Times a Day As Needed for Cough. 9/24/24   Lashell De La Torre APRN   Cholecalciferol 50 MCG (2000 UT) tablet Take 1 tablet by mouth Daily. Indications: Vitamin D Deficiency 1/1/24   Lashell De La Torre APRN   doxycycline (VIBRAMYCIN) 100 MG capsule Take 1 capsule by mouth 2 (Two) Times a Day. Indications: Upper Respiratory Tract Infection 10/23/24   Lashell De La Torre APRN   Fluticasone-Umeclidin-Vilant (Trelegy Ellipta) 100-62.5-25 MCG/ACT inhaler Inhale 1 puff Daily for 30 days. 11/6/24 12/6/24  Jose Manuel Payne MD   furosemide (LASIX) 20 MG tablet TAKE 0.5 TABLETS BY MOUTH DAILY AS NEEDED (SWELLING) FOR UP TO 30 DAYS. 8/22/24   Lashell De La Torre APRN   memantine (NAMENDA) 10 MG tablet Take 1 tablet by mouth 2 (Two) Times a Day. Indications: Memory loss  Patient taking differently: Take 1 tablet by mouth Every Night. Indications: Memory loss 10/23/24   Kandice Tomlinson APRN   memantine (NAMENDA) 5 MG tablet Take 1 tablet by mouth Every 12 (Twelve) Hours.  Patient taking differently: Take 1 tablet by mouth Every Morning. 10/25/24   Provider, MD Miguel   metoprolol succinate XL (TOPROL-XL) 25 MG 24 hr tablet Take 1 tablet  "by mouth Daily. Indications: High Blood Pressure 5/3/22   Ted Langford MD   montelukast (Singulair) 10 MG tablet Take 1 tablet by mouth Every Night. 9/24/24   Lsahell De La Torre APRN   oxyCODONE-acetaminophen (PERCOCET) 5-325 MG per tablet Take 1 tablet by mouth Every 8 (Eight) Hours As Needed for Severe Pain. 9/4/24   Lashell De La Torre APRN   pantoprazole (PROTONIX) 40 MG EC tablet Take 1 tablet by mouth Daily. Indications: Gastroesophageal Reflux Disease 10/14/24   Lashell De La Torre APRN   ticagrelor (Brilinta) 60 MG tablet tablet Take 1 tablet by mouth 2 (Two) Times a Day. 9/30/24   Nai Lorenzana MD   vitamin B-12 (CYANOCOBALAMIN) 1000 MCG tablet Take 1 tablet by mouth Daily. Indications: Inadequate Vitamin B12 10/23/24   Kandice Tomlinson APRN     /77   Pulse 80   Temp 97.4 °F (36.3 °C) (Oral)   Resp 17   Ht 160 cm (63\")   SpO2 98%   BMI 20.90 kg/m²       Objective   Physical Exam  General: Chronically ill-appearing thin female awake and alert, no acute distress  Eyes: Pupils round and reactive, sclera nonicteric  HEENT: Mucous membranes somewhat dry,, no mucosal swelling  Neck: Supple, no nuchal rigidity, no JVD  Respirations: Respirations nonlabored, equal breath sounds bilaterally, clear lungs  Heart regular rate and rhythm, no murmurs rubs or gallops,   Abdomen soft nontender nondistended, no hepatosplenomegaly, no hernia, no mass, normal bowel sounds, no CVA tenderness  Extremities no clubbing cyanosis or edema, calves are symmetric and nontender  Neuro cranial nerves grossly intact, no focal limb deficits, generally weak  Psych oriented to person and place, cooperative  Skin no rash, brisk cap refill  Procedures           ED Course      Results for orders placed or performed during the hospital encounter of 12/02/24   ECG 12 Lead Other; weakness    Collection Time: 12/02/24  9:40 AM   Result Value Ref Range    QT Interval 404 ms    QTC Interval 459 ms "   Comprehensive Metabolic Panel    Collection Time: 12/02/24  9:53 AM    Specimen: Blood   Result Value Ref Range    Glucose 133 (H) 65 - 99 mg/dL    BUN 33 (H) 8 - 23 mg/dL    Creatinine 0.97 0.57 - 1.00 mg/dL    Sodium 147 (H) 136 - 145 mmol/L    Potassium 3.8 3.5 - 5.2 mmol/L    Chloride 105 98 - 107 mmol/L    CO2 27.3 22.0 - 29.0 mmol/L    Calcium 10.3 8.6 - 10.5 mg/dL    Total Protein 7.7 6.0 - 8.5 g/dL    Albumin 4.4 3.5 - 5.2 g/dL    ALT (SGPT) 11 1 - 33 U/L    AST (SGOT) 18 1 - 32 U/L    Alkaline Phosphatase 108 39 - 117 U/L    Total Bilirubin 0.4 0.0 - 1.2 mg/dL    Globulin 3.3 gm/dL    A/G Ratio 1.3 g/dL    BUN/Creatinine Ratio 34.0 (H) 7.0 - 25.0    Anion Gap 14.7 5.0 - 15.0 mmol/L    eGFR 64.2 >60.0 mL/min/1.73   Lipase    Collection Time: 12/02/24  9:53 AM    Specimen: Blood   Result Value Ref Range    Lipase 18 13 - 60 U/L   TSH Rfx On Abnormal To Free T4    Collection Time: 12/02/24  9:53 AM    Specimen: Blood   Result Value Ref Range    TSH 1.890 0.270 - 4.200 uIU/mL   Magnesium    Collection Time: 12/02/24  9:53 AM    Specimen: Blood   Result Value Ref Range    Magnesium 2.2 1.6 - 2.4 mg/dL   CBC Auto Differential    Collection Time: 12/02/24  9:53 AM    Specimen: Blood   Result Value Ref Range    WBC 8.59 3.40 - 10.80 10*3/mm3    RBC 3.93 3.77 - 5.28 10*6/mm3    Hemoglobin 11.9 (L) 12.0 - 15.9 g/dL    Hematocrit 38.5 34.0 - 46.6 %    MCV 98.0 (H) 79.0 - 97.0 fL    MCH 30.3 26.6 - 33.0 pg    MCHC 30.9 (L) 31.5 - 35.7 g/dL    RDW 15.2 12.3 - 15.4 %    RDW-SD 54.8 (H) 37.0 - 54.0 fl    MPV 9.9 6.0 - 12.0 fL    Platelets 280 140 - 450 10*3/mm3    Neutrophil % 88.9 (H) 42.7 - 76.0 %    Lymphocyte % 5.7 (L) 19.6 - 45.3 %    Monocyte % 4.8 (L) 5.0 - 12.0 %    Eosinophil % 0.1 (L) 0.3 - 6.2 %    Basophil % 0.2 0.0 - 1.5 %    Immature Grans % 0.3 0.0 - 0.5 %    Neutrophils, Absolute 7.63 (H) 1.70 - 7.00 10*3/mm3    Lymphocytes, Absolute 0.49 (L) 0.70 - 3.10 10*3/mm3    Monocytes, Absolute 0.41 0.10 - 0.90  10*3/mm3    Eosinophils, Absolute 0.01 0.00 - 0.40 10*3/mm3    Basophils, Absolute 0.02 0.00 - 0.20 10*3/mm3    Immature Grans, Absolute 0.03 0.00 - 0.05 10*3/mm3    nRBC 0.0 0.0 - 0.2 /100 WBC   Procalcitonin    Collection Time: 12/02/24  9:53 AM    Specimen: Blood   Result Value Ref Range    Procalcitonin 0.02 0.00 - 0.25 ng/mL   Gold Top - SST    Collection Time: 12/02/24  9:53 AM   Result Value Ref Range    Extra Tube Hold for add-ons.    Light Blue Top    Collection Time: 12/02/24  9:53 AM   Result Value Ref Range    Extra Tube Hold for add-ons.    Ethanol    Collection Time: 12/02/24 11:08 AM    Specimen: Blood   Result Value Ref Range    Ethanol % <0.010 %   Carbon Monoxide, Blood    Collection Time: 12/02/24 11:08 AM    Specimen: Blood   Result Value Ref Range    Carbon Monoxide, Blood 2.9 0 - 3 %   Urinalysis With Culture If Indicated - Straight Cath    Collection Time: 12/02/24 11:15 AM    Specimen: Straight Cath; Urine   Result Value Ref Range    Color, UA Yellow Yellow, Straw    Appearance, UA Slightly Cloudy (A) Clear    pH, UA 5.5 5.0 - 8.0    Specific Gravity, UA 1.035 (H) 1.005 - 1.030    Glucose, UA Negative Negative    Ketones, UA Negative Negative    Bilirubin, UA Negative Negative    Blood, UA Small (1+) (A) Negative    Protein, UA 30 mg/dL (1+) (A) Negative    Leuk Esterase, UA Negative Negative    Nitrite, UA Positive (A) Negative    Urobilinogen, UA 0.2 E.U./dL 0.2 - 1.0 E.U./dL   Urine Drug Screen - Straight Cath    Collection Time: 12/02/24 11:15 AM    Specimen: Straight Cath; Urine   Result Value Ref Range    THC, Screen, Urine Negative Negative    Phencyclidine (PCP), Urine Negative Negative    Cocaine Screen, Urine Negative Negative    Methamphetamine, Ur Negative Negative    Opiate Screen Negative Negative    Amphetamine Screen, Urine Negative Negative    Benzodiazepine Screen, Urine Negative Negative    Tricyclic Antidepressants Screen Negative Negative    Methadone Screen, Urine  Negative Negative    Barbiturates Screen, Urine Negative Negative    Oxycodone Screen, Urine Negative Negative    Buprenorphine, Screen, Urine Negative Negative   Urinalysis, Microscopic Only - Straight Cath    Collection Time: 12/02/24 11:15 AM    Specimen: Straight Cath; Urine   Result Value Ref Range    RBC, UA 3-5 (A) None Seen, 0-2 /HPF    WBC, UA 21-50 (A) None Seen, 0-2 /HPF    Bacteria, UA 4+ (A) None Seen /HPF    Squamous Epithelial Cells, UA 3-6 (A) None Seen, 0-2 /HPF    Hyaline Casts, UA None Seen None Seen /LPF    Amorphous Crystals, UA Moderate/2+ None Seen /HPF    WBC Clumps, UA Small/1+ None Seen /HPF    Methodology Manual Light Microscopy    POC Lactate    Collection Time: 12/02/24 11:19 AM    Specimen: Blood   Result Value Ref Range    Lactate 1.9 0.3 - 2.0 mmol/L     CT Head Without Contrast    Result Date: 12/2/2024  Impression: 1.Volume loss secondary to cerebral atrophy. 2.Chronic ischemic changes. 3.No acute findings. Electronically Signed: Evangelista Obrien MD  12/2/2024 10:23 AM EST  Workstation ID: [id number]    XR Chest 1 View    Result Date: 12/2/2024  Impression: Similar chronic findings without evidence of acute cardiopulmonary disease. Electronically Signed: Dionicio Ayala MD  12/2/2024 10:20 AM EST  Workstation ID: [id number]                                                    Medical Decision Making  Differential diagnosis including CVA, sepsis, metabolic encephalopathy, dehydration    Patient was found to have urinary tract infection, she was ordered IV Rocephin and some IV fluids.  She does appear dehydrated.  She has no focal deficit to suggest CVA.  Patient and daughter advised the findings and agreeable plan of admission.  Case discussed with Terri with the hospitalist service who is agreeable plan of admission    Problems Addressed:  Urinary tract infection without hematuria, site unspecified: complicated acute illness or injury  Weakness: complicated acute illness or injury    Amount  and/or Complexity of Data Reviewed  Labs: ordered. Decision-making details documented in ED Course.     Details: Lactate normal, urinalysis positive for bacteria consistent UTI, procalcitonin normal, CBC shows no leukocytosis, some mild anemia, TSH normal, lipase normal, comprehensive metabolic panel shows some elevated BUN likely dehydration  Radiology: ordered and independent interpretation performed.     Details: My independent interpretation chest x-ray image no apparent pneumonia  ECG/medicine tests: ordered and independent interpretation performed.     Details: My EKG interpretation sinus rhythm, rate of 78, no acute ST or T wave abnormality    Risk  Prescription drug management.  Decision regarding hospitalization.        Final diagnoses:   Urinary tract infection without hematuria, site unspecified   Weakness       ED Disposition  ED Disposition       ED Disposition   Decision to Admit    Condition   --    Comment   Level of Care: Telemetry [5]   Admitting Physician: COLETTE FINN [[id number]]   Attending Physician: COLETTE FINN [[id number]]                 No follow-up provider specified.       Medication List      No changes were made to your prescriptions during this visit.            Faheem Anaya MD  12/02/24 [id number]

## 2024-12-02 NOTE — H&P
[facility] Medicine Services  History & Physical    Patient Name: Miryam Fernández  : 1955  MRN: [id number]  Primary Care Physician:  Lashell De La Torre APRN  Date of admission: 2024  Date and Time of Service: 2024 at 1300    Subjective      Chief Complaint: confusion     History of Present Illness: Miryam Fernández is a 69 y.o. female with a CMH of COPD, CAD, hyperlipidemia, and frequent UTIs who presented to [facility] on 2024 with increased confusion and decreased oral intake that has been ongoing for the past 4 days.  Patient states that she generally does not feel well. She says that sometimes she feels nauseated, denies fever, dysuria, shortness of breath or chest pain.  On ED evaluation, glucose is 133, TSH 1.890, carbon monoxide level 2.9, lactic 1.9, lipase 18, procal 0.02, WBC 8.59. UA positive for lood, positive nitrites, protein, RBC, WBC and 4+ bacteria. UA also shows contamination with squamous cells however will treat due to positive nitrites, 4+ bacteria, patient history and presentation. Urine tox screen completely negative. CXR shows no acute findings. CT head shows volume loss secondary to cerebral atrophy, and chronic ischemic changes, no acute findings. EKG shows NSR. Hospitalist team to admit for further medical management.       Review of Systems   Constitutional:  Positive for appetite change and fatigue. Negative for chills and fever.   Gastrointestinal:  Positive for nausea. Negative for abdominal pain.   Genitourinary:  Negative for dysuria, flank pain, frequency and urgency.       Personal History     Past Medical History:   Diagnosis Date    Cancer     lung cancer    Carotid stenosis     COPD (chronic obstructive pulmonary disease)     Coronary artery disease     Esophageal stricture     Dr Domingo    Hyperlipidemia     Metabolic encephalopathy 2024    2nd to UTI    Pulmonary embolism     seen at [facility], fluid around her heart at the  time-right lung    UTI (urinary tract infection) 06/28/2024       Past Surgical History:   Procedure Laterality Date    ANGIOPLASTY / STENTING FEMORAL      CARDIAC CATHETERIZATION      CAROTID STENT      CORONARY STENT PLACEMENT      ESOPHAGOSCOPY / EGD      HYSTERECTOMY      TOTAL HIP ARTHROPLASTY Left 8/1/2024    Procedure: TOTAL HIP ARTHROPLASTY;  Surgeon: Olu Joshua II, MD;  Location: [facility] MAIN OR;  Service: Orthopedics;  Laterality: Left;       Family History: family history includes Dementia in her father; Diabetes in her paternal uncle; Heart disease in her mother; Hyperlipidemia in her father; Hypertension in her mother; Leukemia in her paternal grandmother; Lung cancer in her father. Otherwise pertinent FHx was reviewed and not pertinent to current issue.    Social History:  reports that she has been smoking cigarettes. She started smoking about 40 years ago. She has a 80.3 pack-year smoking history. She has been exposed to tobacco smoke. She has never used smokeless tobacco. She reports that she does not currently use alcohol. She reports that she does not use drugs.    Home Medications:  Prior to Admission Medications       Prescriptions Last Dose Informant Patient Reported? Taking?    aspirin 81 MG EC tablet   No No    Take 1 tablet by mouth Daily.    atorvastatin (LIPITOR) 40 MG tablet   No No    TAKE 1 TABLET BY MOUTH EVERY DAY    benzonatate (Tessalon Perles) 100 MG capsule   No No    Take 1 capsule by mouth 3 (Three) Times a Day As Needed for Cough.    Cholecalciferol 50 MCG (2000 UT) tablet   Yes No    Take 1 tablet by mouth Daily. Indications: Vitamin D Deficiency    doxycycline (VIBRAMYCIN) 100 MG capsule   No No    Take 1 capsule by mouth 2 (Two) Times a Day. Indications: Upper Respiratory Tract Infection    Fluticasone-Umeclidin-Vilant (Trelegy Ellipta) 100-62.5-25 MCG/ACT inhaler   No No    Inhale 1 puff Daily for 30 days.    furosemide (LASIX) 20 MG tablet   No No    TAKE 0.5  TABLETS BY MOUTH DAILY AS NEEDED (SWELLING) FOR UP TO 30 DAYS.    memantine (NAMENDA) 10 MG tablet   No No    Take 1 tablet by mouth 2 (Two) Times a Day. Indications: Memory loss    Patient taking differently:  Take 1 tablet by mouth Every Night. Indications: Memory loss    memantine (NAMENDA) 5 MG tablet   Yes No    Take 1 tablet by mouth Every 12 (Twelve) Hours.    Patient taking differently:  Take 1 tablet by mouth Every Morning.    metoprolol succinate XL (TOPROL-XL) 25 MG 24 hr tablet   Yes No    Take 1 tablet by mouth Daily. Indications: High Blood Pressure    montelukast (Singulair) 10 MG tablet   No No    Take 1 tablet by mouth Every Night.    oxyCODONE-acetaminophen (PERCOCET) 5-325 MG per tablet   No No    Take 1 tablet by mouth Every 8 (Eight) Hours As Needed for Severe Pain.    pantoprazole (PROTONIX) 40 MG EC tablet   No No    Take 1 tablet by mouth Daily. Indications: Gastroesophageal Reflux Disease    ticagrelor (Brilinta) 60 MG tablet tablet   No No    Take 1 tablet by mouth 2 (Two) Times a Day.    vitamin B-12 (CYANOCOBALAMIN) 1000 MCG tablet   No No    Take 1 tablet by mouth Daily. Indications: Inadequate Vitamin B12              Allergies:  Allergies   Allergen Reactions    Albuterol Shortness Of Breath     Pt states she has sensitivity to albuterol.  She states it causes her to be SOA.    Hydrocodone Palpitations    Sulfa Antibiotics Swelling    Bacitracin Other (See Comments)    Benzalkonium Chloride Hives    Codeine Other (See Comments)    Neomycin Other (See Comments)    Nickel Rash    Penicillins Rash    Polymyxin B Other (See Comments)       Objective      Vitals:   Temp:  [97.4 °F (36.3 °C)] 97.4 °F (36.3 °C)  Heart Rate:  [80] 80  Resp:  [17] 17  BP: (161)/(77) 161/77  Body mass index is 20.9 kg/m².  Physical Exam  Vitals reviewed.   Constitutional:       Appearance: She is ill-appearing.   HENT:      Head: Normocephalic and atraumatic.      Nose: Nose normal.      Mouth/Throat:       Mouth: Mucous membranes are moist.   Eyes:      Extraocular Movements: Extraocular movements intact.      Pupils: Pupils are equal, round, and reactive to light.   Cardiovascular:      Rate and Rhythm: Normal rate and regular rhythm.      Pulses: Normal pulses.   Pulmonary:      Effort: Pulmonary effort is normal.   Abdominal:      General: Bowel sounds are normal.      Palpations: Abdomen is soft.   Musculoskeletal:         General: Normal range of motion.      Cervical back: Normal range of motion and neck supple.   Skin:     General: Skin is warm.      Capillary Refill: Capillary refill takes less than 2 seconds.      Coloration: Skin is pale.   Neurological:      Mental Status: She is alert.      Comments: Patient oriented to self and place only         Diagnostic Data:  Lab Results (last 24 hours)       Procedure Component Value Units Date/Time    Ethanol [[id number]] Collected: 12/02/24 1108    Specimen: Blood Updated: 12/02/24 1149     Ethanol % <0.010 %     Narrative:      Plasma Ethanol Clinical Symptoms:    ETOH (%)               Clinical Symptom  .01-.05              No apparent influence  .03-.12              Euphoria, Diminished judgment and attention   .09-.25              Impaired comprehension, Muscle incoordination  .18-.30              Confusion, Staggered gait, Slurred speech  .25-.40              Markedly decreased response to stimuli, unable to stand or                        walk, vomitting, sleep or stupor  .35-.50              Comatose, Anesthesia, Subnormal body temperature        Urinalysis, Microscopic Only - Straight Cath [[id number]]  (Abnormal) Collected: 12/02/24 1115    Specimen: Urine from Straight Cath Updated: 12/02/24 1144     RBC, UA 3-5 /HPF      WBC, UA 21-50 /HPF      Bacteria, UA 4+ /HPF      Squamous Epithelial Cells, UA 3-6 /HPF      Hyaline Casts, UA None Seen /LPF      Amorphous Crystals, UA Moderate/2+ /HPF      WBC Clumps, UA Small/1+ /HPF      Methodology Manual Light  Microscopy    Urine Culture - Urine, Straight Cath [[id number]] Collected: 12/02/24 1115    Specimen: Urine from Straight Cath Updated: 12/02/24 1144    Urine Drug Screen - Straight Cath [[id number]]  (Normal) Collected: 12/02/24 1115    Specimen: Urine from Straight Cath Updated: 12/02/24 1137     THC, Screen, Urine Negative     Phencyclidine (PCP), Urine Negative     Cocaine Screen, Urine Negative     Methamphetamine, Ur Negative     Opiate Screen Negative     Amphetamine Screen, Urine Negative     Benzodiazepine Screen, Urine Negative     Tricyclic Antidepressants Screen Negative     Methadone Screen, Urine Negative     Barbiturates Screen, Urine Negative     Oxycodone Screen, Urine Negative     Buprenorphine, Screen, Urine Negative    Narrative:      Cutoff For Drugs Screened:    Amphetamines               500 ng/ml  Barbiturates               200 ng/ml  Benzodiazepines            150 ng/ml  Cocaine                    150 ng/ml  Methadone                  200 ng/ml  Opiates                    100 ng/ml  Phencyclidine               25 ng/ml  THC                         50 ng/ml  Methamphetamine            500 ng/ml  Tricyclic Antidepressants  300 ng/ml  Oxycodone                  100 ng/ml  Buprenorphine               10 ng/ml    The normal value for all drugs tested is negative. This report includes unconfirmed screening results, with the cutoff values listed, to be used for medical treatment purposes only.  Unconfirmed results must not be used for non-medical purposes such as employment or legal testing.  Clinical consideration should be applied to any drug of abuse test, particularly when unconfirmed results are used.    All urine drugs of abuse requests without chain of custody are for medical screening purposes only.  False positives are possible.      Urinalysis With Culture If Indicated - Straight Cath [[id number]]  (Abnormal) Collected: 12/02/24 1115    Specimen: Urine from Straight Cath Updated: 12/02/24  1128     Color, UA Yellow     Appearance, UA Slightly Cloudy     pH, UA 5.5     Specific [place], UA 1.035     Comment: Result obtained by Refractometer  Appended report. These results have been appended to a previously final verified report.        Glucose, UA Negative     Ketones, UA Negative     Bilirubin, UA Negative     Blood, UA Small (1+)     Protein, UA 30 mg/dL (1+)     Leuk Esterase, UA Negative     Nitrite, UA Positive     Urobilinogen, UA 0.2 E.U./dL    Narrative:      In absence of clinical symptoms, the presence of pyuria, bacteria, and/or nitrites on the urinalysis result does not correlate with infection.    POC Lactate [[id number]]  (Normal) Collected: 12/02/24 1119    Specimen: Blood Updated: 12/02/24 1122     Lactate 1.9 mmol/L      Comment: Serial Number: [id number]:  [id number]       Carbon Monoxide, Blood [[id number]]  (Normal) Collected: 12/02/24 1108    Specimen: Blood Updated: 12/02/24 1122     Carbon Monoxide, Blood 2.9 %     Blood Culture - Blood, Arm, Left [[id number]] Collected: 12/02/24 1108    Specimen: Blood from Arm, Left Updated: 12/02/24 1119    Blood Culture - Blood, Arm, Right [[id number]] Collected: 12/02/24 1056    Specimen: Blood from Arm, Right Updated: 12/02/24 1119    Lipase [[id number]]  (Normal) Collected: 12/02/24 0953    Specimen: Blood Updated: 12/02/24 1031     Lipase 18 U/L     TSH Rfx On Abnormal To Free T4 [[id number]]  (Normal) Collected: 12/02/24 0953    Specimen: Blood Updated: 12/02/24 1031     TSH 1.890 uIU/mL     Procalcitonin [[id number]]  (Normal) Collected: 12/02/24 0953    Specimen: Blood Updated: 12/02/24 1031     Procalcitonin 0.02 ng/mL     Narrative:      As a Marker for Sepsis (Non-Neonates):    1. <0.5 ng/mL represents a low risk of severe sepsis and/or septic shock.  2. >2 ng/mL represents a high risk of severe sepsis and/or septic shock.    As a Marker for Lower Respiratory Tract Infections that require antibiotic therapy:    PCT on Admission     "Antibiotic Therapy       6-12 Hrs later    >0.5                Strongly Recommended  >0.25 - <0.5        Recommended   0.1 - 0.25          Discouraged              Remeasure/reassess PCT  <0.1                Strongly Discouraged     Remeasure/reassess PCT    As 28 day mortality risk marker: \"Change in Procalcitonin Result\" (>80% or <=80%) if Day 0 (or Day 1) and Day 4 values are available. Refer to http://www.[email or web address] [email or web address]-pct-calculator.com    Change in PCT <=80%  A decrease of PCT levels below or equal to 80% defines a positive change in PCT test result representing a higher risk for 28-day all-cause mortality of patients diagnosed with severe sepsis for septic shock.    Change in PCT >80%  A decrease of PCT levels of more than 80% defines a negative change in PCT result representing a lower risk for 28-day all-cause mortality of patients diagnosed with severe sepsis or septic shock.       Comprehensive Metabolic Panel [[id number]]  (Abnormal) Collected: 12/02/24 0953    Specimen: Blood Updated: 12/02/24 1031     Glucose 133 mg/dL      BUN 33 mg/dL      Creatinine 0.97 mg/dL      Sodium 147 mmol/L      Potassium 3.8 mmol/L      Chloride 105 mmol/L      CO2 27.3 mmol/L      Calcium 10.3 mg/dL      Total Protein 7.7 g/dL      Albumin 4.4 g/dL      ALT (SGPT) 11 U/L      AST (SGOT) 18 U/L      Alkaline Phosphatase 108 U/L      Total Bilirubin 0.4 mg/dL      Globulin 3.3 gm/dL      A/G Ratio 1.3 g/dL      BUN/Creatinine Ratio 34.0     Anion Gap 14.7 mmol/L      eGFR 64.2 mL/min/1.73     Narrative:      GFR Normal >60  Chronic Kidney Disease <60  Kidney Failure <15      Magnesium [[id number]]  (Normal) Collected: 12/02/24 0953    Specimen: Blood Updated: 12/02/24 1031     Magnesium 2.2 mg/dL     Extra Tubes [[id number]] Collected: 12/02/24 0953    Specimen: Blood, Venous Line Updated: 12/02/24 1001    Narrative:      The following orders were created for panel order Extra Tubes.  Procedure                               " Abnormality         Status                     ---------                               -----------         ------                     Gold Top - SST[[id number]]                                   Final result               Light Blue Top[[id number]]                                   Final result                 Please view results for these tests on the individual orders.    [facility] - SST [[id number]] Collected: 12/02/24 0953    Specimen: Blood Updated: 12/02/24 1001     Extra Tube Hold for add-ons.     Comment: Auto resulted.       Light Blue Top [[id number]] Collected: 12/02/24 0953    Specimen: Blood Updated: 12/02/24 1001     Extra Tube Hold for add-ons.     Comment: Auto resulted       CBC & Differential [[id number]]  (Abnormal) Collected: 12/02/24 0953    Specimen: Blood Updated: 12/02/24 1001    Narrative:      The following orders were created for panel order CBC & Differential.  Procedure                               Abnormality         Status                     ---------                               -----------         ------                     CBC Auto Differential[[id number]]        Abnormal            Final result                 Please view results for these tests on the individual orders.    CBC Auto Differential [[id number]]  (Abnormal) Collected: 12/02/24 0953    Specimen: Blood Updated: 12/02/24 1001     WBC 8.59 10*3/mm3      RBC 3.93 10*6/mm3      Hemoglobin 11.9 g/dL      Hematocrit 38.5 %      MCV 98.0 fL      MCH 30.3 pg      MCHC 30.9 g/dL      RDW 15.2 %      RDW-SD 54.8 fl      MPV 9.9 fL      Platelets 280 10*3/mm3      Neutrophil % 88.9 %      Lymphocyte % 5.7 %      Monocyte % 4.8 %      Eosinophil % 0.1 %      Basophil % 0.2 %      Immature Grans % 0.3 %      Neutrophils, Absolute 7.63 10*3/mm3      Lymphocytes, Absolute 0.49 10*3/mm3      Monocytes, Absolute 0.41 10*3/mm3      Eosinophils, Absolute 0.01 10*3/mm3      Basophils, Absolute 0.02 10*3/mm3      Immature Grans, Absolute 0.03  10*3/mm3      nRBC 0.0 /100 WBC              Imaging Results (Last 24 Hours)       Procedure Component Value Units Date/Time    CT Head Without Contrast [[id number]] Collected: 12/02/24 1019     Updated: 12/02/24 1026    Narrative:      CT HEAD WO CONTRAST    Date of Exam: 12/2/2024 10:11 AM EST    Indication: Confusion.    Comparison: 7/19/2024.    Technique: Axial CT images were obtained of the head without contrast administration.  Coronal reconstructions were performed.  Automated exposure control and iterative reconstruction methods were used.      Findings:  There is enlargement of the sulci, fissures, ventricles, and basal cisterns indicating volume loss secondary to cerebral atrophy. There are areas of decreased density in the periventricular and subcortical white matter consistent with chronic   microvascular ischemia. There are also a few old lacunar infarcts within the basal ganglia and adjacent white matter, right greater than left side. There is no acute hemorrhage, midline shift, or suspicious extra-axial fluid collections. There are   atherosclerotic vascular calcifications in the cavernous carotid arteries and the distal vertebral arteries. There is thinning of the lenses indicating previous cataract surgery. There is mucosal thickening in the left sphenoid sinus consistent with   chronic sinus disease. The remaining paranasal and mastoid sinuses are clear. The calvarium is unremarkable.      Impression:      Impression:  1.Volume loss secondary to cerebral atrophy.  2.Chronic ischemic changes.  3.No acute findings.        Electronically Signed: Evangelista Obrien MD    12/2/2024 10:23 AM EST    Workstation ID: [id number]    XR Chest 1 View [[id number]] Collected: 12/02/24 1017     Updated: 12/02/24 1022    Narrative:      XR CHEST 1 VW    Date of Exam: 12/2/2024 10:15 AM EST    Indication: weakness    Comparison: Chest radiograph 7/19/2024.    Findings:  Cardiac silhouette is unchanged. Unchanged left  suprahilar scarlike opacity, suggestive of posttreatment change as described on prior CT. No focal consolidation otherwise. No pleural effusion or pneumothorax. Osseous structures are unchanged.      Impression:      Impression:  Similar chronic findings without evidence of acute cardiopulmonary disease.      Electronically Signed: Dionicio Ayala MD    12/2/2024 10:20 AM EST    Workstation ID: [id number]              Assessment & Plan        This is a 69 y.o. female with:    Active and Resolved Problems  Active Hospital Problems    Diagnosis  POA    **UTI (urinary tract infection) [N39.0]  Yes      Resolved Hospital Problems   No resolved problems to display.       Confusion   Decreased oral intake   Likely 2/2 UTI  - CT head shows volume loss secondary to cerebral atrophy and chronic ischemic changes  - UA + for blood, positive nitrites, protein, RBC, WBC and 4+ bacteria. Also shows contamination with squamous cells however will treat due to positive nitrites, 4+ bacteria, patient history and presentation  - Continue Ceftriaxone  - Culture and sensitivity pending  - Consider nutritional consult  - PT/OT eval     Hypertension   - BP stable  - Resume home medications once reconciled     Hyperlipidemia  - continue home statin     COPD  - Not in acute exacerbation  - Continue home inhalers once reconciled         VTE Prophylaxis:  Mechanical VTE prophylaxis orders are present.        The patient desires to be as follows:    CODE STATUS:    Code Status (Patient has no pulse and is not breathing): CPR (Attempt to Resuscitate)  Medical Interventions (Patient has pulse or is breathing): Full Support        Neva Burroughs, who can be contacted at [phone], is the designated person to make medical decisions on the patient's behalf if She is incapable of doing so. This was clarified with patient and/or next of kin on 12/2/2024 during the course of this H&P.    Admission Status:  I believe this patient meets inpatient  status.    Expected Length of Stay: > 48 hours     PDMP and Medication Dispenses via Sidebar reviewed and consistent with patient reported medications.    I discussed the patient's findings and my recommendations with patient.      Signature:     This document has been electronically signed by KENNY Ac on December 2, 2024 13:04 [facility] Hospitalist Team

## 2024-12-02 NOTE — SIGNIFICANT NOTE
12/02/24 1544   OTHER   Discipline occupational therapist   Rehab Time/Intention   Session Not Performed other (see comments)  (Pt finally resting. Family request OT wait until next date to allow patient to rest.)   Recommendation   OT - Next Appointment 12/03/24

## 2024-12-03 LAB
ANION GAP SERPL CALCULATED.3IONS-SCNC: 8.9 MMOL/L (ref 5–15)
BASOPHILS # BLD AUTO: 0.03 10*3/MM3 (ref 0–0.2)
BASOPHILS NFR BLD AUTO: 0.4 % (ref 0–1.5)
BUN SERPL-MCNC: 29 MG/DL (ref 8–23)
BUN/CREAT SERPL: 31.9 (ref 7–25)
CALCIUM SPEC-SCNC: 9.8 MG/DL (ref 8.6–10.5)
CHLORIDE SERPL-SCNC: 106 MMOL/L (ref 98–107)
CO2 SERPL-SCNC: 29.1 MMOL/L (ref 22–29)
CREAT SERPL-MCNC: 0.91 MG/DL (ref 0.57–1)
DEPRECATED RDW RBC AUTO: 53 FL (ref 37–54)
EGFRCR SERPLBLD CKD-EPI 2021: 69.3 ML/MIN/1.73
EOSINOPHIL # BLD AUTO: 0.05 10*3/MM3 (ref 0–0.4)
EOSINOPHIL NFR BLD AUTO: 0.6 % (ref 0.3–6.2)
ERYTHROCYTE [DISTWIDTH] IN BLOOD BY AUTOMATED COUNT: 15.3 % (ref 12.3–15.4)
GLUCOSE SERPL-MCNC: 101 MG/DL (ref 65–99)
HCT VFR BLD AUTO: 36.2 % (ref 34–46.6)
HGB BLD-MCNC: 11.5 G/DL (ref 12–15.9)
IMM GRANULOCYTES # BLD AUTO: 0.04 10*3/MM3 (ref 0–0.05)
IMM GRANULOCYTES NFR BLD AUTO: 0.5 % (ref 0–0.5)
LYMPHOCYTES # BLD AUTO: 0.63 10*3/MM3 (ref 0.7–3.1)
LYMPHOCYTES NFR BLD AUTO: 8 % (ref 19.6–45.3)
MCH RBC QN AUTO: 30.3 PG (ref 26.6–33)
MCHC RBC AUTO-ENTMCNC: 31.8 G/DL (ref 31.5–35.7)
MCV RBC AUTO: 95.3 FL (ref 79–97)
MONOCYTES # BLD AUTO: 0.59 10*3/MM3 (ref 0.1–0.9)
MONOCYTES NFR BLD AUTO: 7.5 % (ref 5–12)
NEUTROPHILS NFR BLD AUTO: 6.56 10*3/MM3 (ref 1.7–7)
NEUTROPHILS NFR BLD AUTO: 83 % (ref 42.7–76)
NRBC BLD AUTO-RTO: 0 /100 WBC (ref 0–0.2)
PLATELET # BLD AUTO: 256 10*3/MM3 (ref 140–450)
PMV BLD AUTO: 9.6 FL (ref 6–12)
POTASSIUM SERPL-SCNC: 3.5 MMOL/L (ref 3.5–5.2)
POTASSIUM SERPL-SCNC: 5 MMOL/L (ref 3.5–5.2)
QT INTERVAL: 404 MS
QTC INTERVAL: 459 MS
RBC # BLD AUTO: 3.8 10*6/MM3 (ref 3.77–5.28)
SODIUM SERPL-SCNC: 144 MMOL/L (ref 136–145)
WBC NRBC COR # BLD AUTO: 7.9 10*3/MM3 (ref 3.4–10.8)

## 2024-12-03 PROCEDURE — 63710000001 ONDANSETRON ODT 4 MG TABLET DISPERSIBLE: Performed by: HOSPITALIST

## 2024-12-03 PROCEDURE — 84132 ASSAY OF SERUM POTASSIUM: CPT | Performed by: HOSPITALIST

## 2024-12-03 PROCEDURE — 97166 OT EVAL MOD COMPLEX 45 MIN: CPT

## 2024-12-03 PROCEDURE — 25010000002 POTASSIUM CHLORIDE 10 MEQ/100ML SOLUTION: Performed by: HOSPITALIST

## 2024-12-03 PROCEDURE — 80048 BASIC METABOLIC PNL TOTAL CA: CPT

## 2024-12-03 PROCEDURE — 97162 PT EVAL MOD COMPLEX 30 MIN: CPT

## 2024-12-03 PROCEDURE — 25010000002 CEFTRIAXONE PER 250 MG

## 2024-12-03 PROCEDURE — 85025 COMPLETE CBC W/AUTO DIFF WBC: CPT

## 2024-12-03 RX ORDER — BUDESONIDE AND FORMOTEROL FUMARATE DIHYDRATE 160; 4.5 UG/1; UG/1
2 AEROSOL RESPIRATORY (INHALATION)
Status: DISCONTINUED | OUTPATIENT
Start: 2024-12-03 | End: 2024-12-06 | Stop reason: HOSPADM

## 2024-12-03 RX ORDER — ONDANSETRON 4 MG/1
4 TABLET, ORALLY DISINTEGRATING ORAL EVERY 6 HOURS PRN
Status: DISCONTINUED | OUTPATIENT
Start: 2024-12-03 | End: 2024-12-06 | Stop reason: HOSPADM

## 2024-12-03 RX ORDER — POTASSIUM CHLORIDE 7.45 MG/ML
10 INJECTION INTRAVENOUS
Status: DISCONTINUED | OUTPATIENT
Start: 2024-12-03 | End: 2024-12-03

## 2024-12-03 RX ORDER — ONDANSETRON 2 MG/ML
4 INJECTION INTRAMUSCULAR; INTRAVENOUS EVERY 6 HOURS PRN
Status: DISCONTINUED | OUTPATIENT
Start: 2024-12-03 | End: 2024-12-06 | Stop reason: HOSPADM

## 2024-12-03 RX ORDER — POTASSIUM CHLORIDE 1.5 G/1.58G
40 POWDER, FOR SOLUTION ORAL ONCE
Status: COMPLETED | OUTPATIENT
Start: 2024-12-03 | End: 2024-12-03

## 2024-12-03 RX ADMIN — PANTOPRAZOLE SODIUM 40 MG: 40 TABLET, DELAYED RELEASE ORAL at 08:20

## 2024-12-03 RX ADMIN — MONTELUKAST 10 MG: 10 TABLET, FILM COATED ORAL at 21:32

## 2024-12-03 RX ADMIN — TICAGRELOR 60 MG: 60 TABLET ORAL at 08:20

## 2024-12-03 RX ADMIN — ASPIRIN 81 MG: 81 TABLET, COATED ORAL at 08:20

## 2024-12-03 RX ADMIN — Medication 2000 UNITS: at 08:20

## 2024-12-03 RX ADMIN — Medication 10 ML: at 08:20

## 2024-12-03 RX ADMIN — ONDANSETRON 4 MG: 4 TABLET, ORALLY DISINTEGRATING ORAL at 08:41

## 2024-12-03 RX ADMIN — MEMANTINE 5 MG: 5 TABLET ORAL at 08:20

## 2024-12-03 RX ADMIN — CEFTRIAXONE 2000 MG: 2 INJECTION, POWDER, FOR SOLUTION INTRAMUSCULAR; INTRAVENOUS at 13:02

## 2024-12-03 RX ADMIN — ATORVASTATIN CALCIUM 40 MG: 40 TABLET, FILM COATED ORAL at 08:20

## 2024-12-03 RX ADMIN — POTASSIUM CHLORIDE 10 MEQ: 10 INJECTION, SOLUTION INTRAVENOUS at 07:28

## 2024-12-03 RX ADMIN — POTASSIUM CHLORIDE 10 MEQ: 10 INJECTION, SOLUTION INTRAVENOUS at 05:50

## 2024-12-03 RX ADMIN — POTASSIUM CHLORIDE 40 MEQ: 1.5 POWDER, FOR SOLUTION ORAL at 09:48

## 2024-12-03 RX ADMIN — MEMANTINE 5 MG: 5 TABLET ORAL at 21:32

## 2024-12-03 RX ADMIN — TICAGRELOR 60 MG: 60 TABLET ORAL at 21:32

## 2024-12-03 RX ADMIN — Medication 10 ML: at 21:32

## 2024-12-03 RX ADMIN — CYANOCOBALAMIN TAB 500 MCG 1000 MCG: 500 TAB at 08:20

## 2024-12-03 NOTE — PLAN OF CARE
Goal Outcome Evaluation:  Plan of Care Reviewed With: patient           Outcome Evaluation: 69 y.o. female with a CMH of COPD, CAD, hyperlipidemia, and frequent UTIs who presented to [facility] on 12/2/2024 with increased confusion and decreased oral intake that has been ongoing for the past 4 days. Pt dx with UTI. Pt lives at home with her spouse who works during the day. Pt has 24/7 care when her spouse is not home via her daughter. She is typically independent with functional mobility with rollator. This date, pt able to get EOB without assistance. Upon sitting EOB pt reports dizziness. She was able to don shoes independently and min A to transfer to chair with noted BP drop from sitting to standing. Pt would benefit from continued 24/7 care and HHOT    Anticipated Discharge Disposition (OT): home with 24/7 care, home with home health

## 2024-12-03 NOTE — PLAN OF CARE
Problem: Adult Inpatient Plan of Care  Goal: Plan of Care Review  Outcome: Progressing  Flowsheets (Taken 12/3/2024 0525)  Progress: no change  Plan of Care Reviewed With: patient  Goal: Patient-Specific Goal (Individualized)  Outcome: Progressing  Goal: Absence of Hospital-Acquired Illness or Injury  Outcome: Progressing  Intervention: Identify and Manage Fall Risk  Recent Flowsheet Documentation  Taken 12/3/2024 0400 by Simin Hunt RN  Safety Promotion/Fall Prevention: safety round/check completed  Taken 12/3/2024 0200 by Simin Hunt RN  Safety Promotion/Fall Prevention: safety round/check completed  Taken 12/3/2024 0000 by Simin Hunt RN  Safety Promotion/Fall Prevention: safety round/check completed  Taken 12/2/2024 2200 by Simin Hunt RN  Safety Promotion/Fall Prevention: safety round/check completed  Taken 12/2/2024 1945 by Simin Hunt RN  Safety Promotion/Fall Prevention: safety round/check completed  Intervention: Prevent Skin Injury  Recent Flowsheet Documentation  Taken 12/3/2024 0400 by Simin Hunt RN  Body Position: position changed independently  Taken 12/3/2024 0200 by Simin Hunt RN  Body Position: position changed independently  Taken 12/3/2024 0000 by Simin Hunt RN  Body Position: position changed independently  Taken 12/2/2024 2200 by Simin Hunt RN  Body Position: position changed independently  Taken 12/2/2024 1945 by Simin Hunt RN  Body Position: 30 degrees lateral  Intervention: Prevent Infection  Recent Flowsheet Documentation  Taken 12/3/2024 0400 by Simin Hunt RN  Infection Prevention: hand hygiene promoted  Taken 12/3/2024 0200 by Simin Hunt RN  Infection Prevention: hand hygiene promoted  Taken 12/3/2024 0000 by Simin Hunt RN  Infection Prevention: hand hygiene promoted  Taken 12/2/2024 2200 by Simin Hunt RN  Infection Prevention: hand hygiene promoted  Taken 12/2/2024 1945 by Simin Hunt RN  Infection Prevention: hand  hygiene promoted  Goal: Optimal Comfort and Wellbeing  Outcome: Progressing  Intervention: Provide Person-Centered Care  Recent Flowsheet Documentation  Taken 12/3/2024 0400 by Simin Hunt RN  Trust Relationship/Rapport:   care explained   choices provided   emotional support provided   empathic listening provided   questions answered   questions encouraged   reassurance provided   thoughts/feelings acknowledged  Taken 12/3/2024 0000 by Simin Hunt RN  Trust Relationship/Rapport:   care explained   choices provided   emotional support provided   empathic listening provided   questions answered   questions encouraged   reassurance provided   thoughts/feelings acknowledged  Taken 12/2/2024 1945 by Simin Hunt RN  Trust Relationship/Rapport:   care explained   choices provided   emotional support provided   empathic listening provided   questions answered   questions encouraged   reassurance provided   thoughts/feelings acknowledged  Goal: Readiness for Transition of Care  Outcome: Progressing     Problem: Fall Injury Risk  Goal: Absence of Fall and Fall-Related Injury  Outcome: Progressing  Intervention: Identify and Manage Contributors  Recent Flowsheet Documentation  Taken 12/3/2024 0400 by Simin Hunt RN  Medication Review/Management: medications reviewed  Self-Care Promotion: independence encouraged  Taken 12/3/2024 0200 by Simin Hunt RN  Medication Review/Management: medications reviewed  Self-Care Promotion: independence encouraged  Taken 12/3/2024 0000 by Simin Hunt RN  Medication Review/Management: medications reviewed  Self-Care Promotion: independence encouraged  Taken 12/2/2024 2200 by Simin Hunt RN  Medication Review/Management: medications reviewed  Self-Care Promotion: independence encouraged  Taken 12/2/2024 1945 by Simin Hunt RN  Medication Review/Management: medications reviewed  Self-Care Promotion: independence encouraged  Intervention: Promote Injury-Free  Environment  Recent Flowsheet Documentation  Taken 12/3/2024 0400 by Simin Hunt, RN  Safety Promotion/Fall Prevention: safety round/check completed  Taken 12/3/2024 0200 by Simin Hunt RN  Safety Promotion/Fall Prevention: safety round/check completed  Taken 12/3/2024 0000 by Simin Hunt RN  Safety Promotion/Fall Prevention: safety round/check completed  Taken 12/2/2024 2200 by Simin Hunt RN  Safety Promotion/Fall Prevention: safety round/check completed  Taken 12/2/2024 1945 by Simin Hunt RN  Safety Promotion/Fall Prevention: safety round/check completed   Goal Outcome Evaluation:  Plan of Care Reviewed With: patient        Progress: no change

## 2024-12-03 NOTE — THERAPY EVALUATION
Patient Name: Miryam Fernández  : 1955    MRN: [id number]                              Today's Date: 12/3/2024       Admit Date: 2024    Visit Dx:     ICD-10-CM ICD-9-CM   1. Urinary tract infection without hematuria, site unspecified  N39.0 599.0   2. Weakness  R53.1 780.79     Patient Active Problem List   Diagnosis    Obstructive sleep apnea, adult    Tobacco use    Normocytic anemia    Meniere's disease    Lung nodule    Hilar lymphadenopathy    Hyperlipidemia    Heartburn    Gastroesophageal reflux disease    Dysphagia    Chronic obstructive pulmonary disease    CAD (coronary artery disease)    Bruises easily    Chronic pain    Small cell lung cancer    Carotid stenosis    Coronary artery disease    Primary hypertension    Long term (current) use of opiate analgesic    DDD (degenerative disc disease), lumbar    Screening mammogram for breast cancer    Preventative health care    Vitamin D deficiency    Need for influenza vaccination    Moderate episode of recurrent major depressive disorder    Need for vaccination against Streptococcus pneumoniae    Frequent falls    Weakness    Nasal congestion    Memory loss    Localized swelling of both lower legs    Meniere disease, bilateral    Abdominal aortic aneurysm (AAA) without rupture    Thoracic aortic aneurysm without rupture    Plantar wart    Screening for osteoporosis    Asymptomatic menopause    Screening for malignant neoplasm of colon    Skin tear of left forearm without complication    Encounter for subsequent annual wellness visit (AWV) in Medicare patient    Acute cystitis    Altered mental status    Left displaced femoral neck fracture    Closed fracture of left hip    Moderate malnutrition    PAD (peripheral artery disease)    S/P insertion of iliac artery stent    Celiac artery stenosis    Renal artery stenosis    UTI (urinary tract infection)     Past Medical History:   Diagnosis Date    Cancer     lung cancer    Carotid stenosis     COPD  (chronic obstructive pulmonary disease)     Coronary artery disease     Esophageal stricture     Dr Domingo    Hyperlipidemia     Metabolic encephalopathy 06/28/2024    2nd to UTI    Pulmonary embolism     seen at [facility], fluid around her heart at the time-right lung    UTI (urinary tract infection) 06/28/2024     Past Surgical History:   Procedure Laterality Date    ANGIOPLASTY / STENTING FEMORAL      CARDIAC CATHETERIZATION      CAROTID STENT      CORONARY STENT PLACEMENT      ESOPHAGOSCOPY / EGD      HYSTERECTOMY      TOTAL HIP ARTHROPLASTY Left 8/1/2024    Procedure: TOTAL HIP ARTHROPLASTY;  Surgeon: Olu Joshua II, MD;  Location: [facility] MAIN OR;  Service: Orthopedics;  Laterality: Left;      General Information       Row Name 12/03/24 0915          OT Time and Intention    Document Type evaluation  -BL     Mode of Treatment occupational therapy  -BL       Row Name 12/03/24 0915          General Information    Patient Profile Reviewed yes  -BL     Prior Level of Function independent:;all household mobility;ADL's;dependent:;bathing;cooking;cleaning;driving  Rollator at all times; A with bathing  -       Row Name 12/03/24 0915          Occupational Profile    Environmental Supports and Barriers (Occupational Profile) 1 fall in Aug that resulted in a hip fx  -       Row Name 12/03/24 0915          Living Environment    People in Home spouse  has 24/7 care; when  works (he is a ) her daughter stays with her  -BL       Row Name 12/03/24 0915          Home Main Entrance    Number of Stairs, Main Entrance other (see comments)  Ramp to enter  -       Row Name 12/03/24 0915          Stairs Within Home, Primary    Number of Stairs, Within Home, Primary none  -BL     Stair Railings, Within Home, Primary none  -BL       Row Name 12/03/24 0915          Cognition    Orientation Status (Cognition) oriented to;person;place;situation;disoriented to;time  -BL       Row Name 12/03/24 0915           Safety Issues/Impairments Affecting Functional Mobility    Safety Issues Affecting Function (Mobility) awareness of need for assistance;insight into deficits/self-awareness  -     Impairments Affecting Function (Mobility) endurance/activity tolerance  -               User Key  (r) = Recorded By, (t) = Taken By, (c) = Cosigned By      Initials Name Provider Type     Mireille Zavala OT Occupational Therapist                     Mobility/ADL's       Row Name 12/03/24 1458          Bed Mobility    Bed Mobility supine-sit  -     Supine-Sit [place] (Bed Mobility) modified independence  -     Assistive Device (Bed Mobility) head of bed elevated  -       Row Name 12/03/24 1458          Bed-Chair Transfer    Bed-Chair [place] (Transfers) minimum assist (75% patient effort);1 person assist  -     Assistive Device (Bed-Chair Transfers) walker, front-wheeled  -[facility] Name 12/03/24 1458          Sit-Stand Transfer    Sit-Stand [place] (Transfers) contact guard;minimum assist (75% patient effort)  -     Assistive Device (Sit-Stand Transfers) walker, front-wheeled  -[facility] Name 12/03/24 1458          Activities of Daily Living    BADL Assessment/Intervention lower body dressing  -[facility] Name 12/03/24 1458          Lower Body Dressing Assessment/Training    [place] Level (Lower Body Dressing) don;shoes/slippers;independent  -               User Key  (r) = Recorded By, (t) = Taken By, (c) = Cosigned By      Initials Name Provider Type     Mireille Zavala OT Occupational Therapist                   Obj/Interventions       Row Name 12/03/24 1500          Sensory Assessment (Somatosensory)    Sensory Assessment (Somatosensory) sensation intact  -[facility] Name 12/03/24 1500          Vision Assessment/Intervention    Visual Impairment/Limitations WFL  -[facility] Name 12/03/24 1500          Range of Motion Comprehensive    General Range of Motion bilateral upper  extremity ROM WFL  -BL       Row Name 12/03/24 1500          Strength Comprehensive (MMT)    Comment, General Manual Muscle Testing (MMT) Assessment BUEs grossly 4/5  -BL       Row Name 12/03/24 1500          Balance    Balance Assessment sitting static balance;sitting dynamic balance;standing static balance;standing dynamic balance  -BL     Static Sitting Balance standby assist  -BL     Dynamic Sitting Balance contact guard  -BL     Position, Sitting Balance unsupported  -BL     Static Standing Balance minimal assist;contact guard  -BL     Dynamic Standing Balance minimal assist  -BL     Position/Device Used, Standing Balance walker, front-wheeled  -BL               User Key  (r) = Recorded By, (t) = Taken By, (c) = Cosigned By      Initials Name Provider Type    BL Mireille Zavala, OT Occupational Therapist                   Goals/Plan       Row Name 12/03/24 1509          Transfer Goal 1 (OT)    Activity/Assistive Device (Transfer Goal 1, OT) sit-to-stand/stand-to-sit;toilet  -BL     [place] Level/Cues Needed (Transfer Goal 1, OT) contact guard required  -BL     Time Frame (Transfer Goal 1, OT) 2 weeks  -       Row Name 12/03/24 1509          Dressing Goal 1 (OT)    Activity/Device (Dressing Goal 1, OT) dressing skills, all  -BL     [place]/Cues Needed (Dressing Goal 1, OT) independent  -BL     Time Frame (Dressing Goal 1, OT) 2 weeks  -       Row Name 12/03/24 1509          Toileting Goal 1 (OT)    Activity/Device (Toileting Goal 1, OT) toileting skills, all  -BL     [place] Level/Cues Needed (Toileting Goal 1, OT) independent  -BL     Time Frame (Toileting Goal 1, OT) 2 weeks  -       Row Name 12/03/24 1509          Therapy Assessment/Plan (OT)    Planned Therapy Interventions (OT) activity tolerance training;adaptive equipment training;BADL retraining;edema control/reduction;functional balance retraining;IADL retraining;neuromuscular control/coordination retraining;occupation/activity  based interventions;patient/caregiver education/training;ROM/therapeutic exercise;transfer/mobility retraining;strengthening exercise  -               User Key  (r) = Recorded By, (t) = Taken By, (c) = Cosigned By      Initials Name Provider Type    BL Mireille Zavala OT Occupational Therapist                   Clinical Impression       Row Name 12/03/24 1506          Pain Assessment    Pretreatment Pain Rating 0/10 - no pain  -BL     Posttreatment Pain Rating 0/10 - no pain  -BL       Row Name 12/03/24 1503          Pain Scale: FACES Pre/Post-Treatment    Pain: FACES Scale, Pretreatment 0-->no hurt  -BL     Posttreatment Pain Rating 0-->no hurt  -BL       Row Name 12/03/24 1503          Plan of Care Review    Plan of Care Reviewed With patient  -BL     Outcome Evaluation 69 y.o. female with a CMH of COPD, CAD, hyperlipidemia, and frequent UTIs who presented [facility] on [facility] with increased confusion and decreased oral intake that has been ongoing for the past 4 days. Pt dx with UTI. Pt lives at home with her spouse who works during the day. Pt has 24/7 care when her spouse is not home via her daughter. She is typically independent with functional mobility with rollator. This date, pt able to get EOB without assistance. Upon sitting EOB pt reports dizziness. She was able to don shoes independently and min A to transfer to chair with noted BP drop from sitting to standing. Pt would benefit from continued 24/7 care and HHOT  -BL       Row Name 12/03/24 1503          Therapy Assessment/Plan (OT)    Criteria for Skilled Therapeutic Interventions Met (OT) yes;skilled treatment is necessary  -BL     Therapy Frequency (OT) 5 times/wk  -BL       Row Name 12/03/24 1503          Therapy Plan Review/Discharge Plan (OT)    Anticipated Discharge Disposition (OT) home with 24/7 care;home with home health  -       Row Name 12/03/24 1503          Vital Signs    Pre Systolic BP Rehab 156  -BL     Pre  Treatment Diastolic BP 91  -BL     Intra Systolic BP Rehab 125  -BL     Intra Treatment Diastolic BP 56  -BL     Intratreatment Heart Rate (beats/min) 131  -BL     Posttreatment Heart Rate (beats/min) 118  -BL     Pre Patient Position Sitting  -BL     Intra Patient Position Standing  -BL       Row Name 12/03/24 1503          Positioning and Restraints    Pre-Treatment Position in bed  -BL     Post Treatment Position chair  -BL     In Chair notified nsg;call light within reach;encouraged to call for assist;exit alarm on  -BL               User Key  (r) = Recorded By, (t) = Taken By, (c) = Cosigned By      Initials Name Provider Type     Mireille Zavala OT Occupational Therapist                   Outcome Measures       Row Name 12/03/24 1053 12/03/24 0800       How much help from another person do you currently need...    Turning from your back to your side while in flat bed without using bedrails? 4  -JH 4  -GUNNAR    Moving from lying on back to sitting on the side of a flat bed without bedrails? 4  -JH 4  -GUNNAR    Moving to and from a bed to a chair (including a wheelchair)? 3  -JH 4  -GUNNAR    Standing up from a chair using your arms (e.g., wheelchair, bedside chair)? 3  -JH 4  -GUNNAR    Climbing 3-5 steps with a railing? 2  -JH 3  -GUNNAR    To walk in hospital room? 3  -JH 3  -GUNNAR    AM-formerly [facility] 6 Clicks Score (PT) 19  -JH 22  -GUNNAR    Highest Level of Mobility Goal 6 --> Walk 10 steps or more  - 7 --> Walk 25 feet or more  -[facility]      Row Name 12/03/24 1053          Functional Assessment    Outcome Measure Options AM-PAC 6 Clicks Basic Mobility (PT)  -               User Key  (r) = Recorded By, (t) = Taken By, (c) = Cosigned By      Initials Name Provider Type    Puja Trujillo, PT Physical Therapist    Jennifer Cartagena, RN Registered Nurse                    Occupational Therapy Education       Title: PT OT SLP Therapies (Done)       Topic: Occupational Therapy (Done)       Point: ADL training (Done)        Description:   Instruct learner(s) on proper safety adaptation and remediation techniques during self care or transfers.   Instruct in proper use of assistive devices.                  Learning Progress Summary            Patient Acceptance, E,TB, VU by  at 12/3/2024 1512                                      User Key       Initials Effective Dates Name Provider Type Discipline     09/22/22 -  Mireille Zavala OT Occupational Therapist OT                  OT Recommendation and Plan  Planned Therapy Interventions (OT): activity tolerance training, adaptive equipment training, BADL retraining, edema control/reduction, functional balance retraining, IADL retraining, neuromuscular control/coordination retraining, occupation/activity based interventions, patient/caregiver education/training, ROM/therapeutic exercise, transfer/mobility retraining, strengthening exercise  Therapy Frequency (OT): 5 times/wk  Plan of Care Review  Plan of Care Reviewed With: patient  Outcome Evaluation: 69 y.o. female with a CMH of COPD, CAD, hyperlipidemia, and frequent UTIs who presented to [facility] on 12/2/2024 with increased confusion and decreased oral intake that has been ongoing for the past 4 days. Pt dx with UTI. Pt lives at home with her spouse who works during the day. Pt has 24/7 care when her spouse is not home via her daughter. She is typically independent with functional mobility with rollator. This date, pt able to get EOB without assistance. Upon sitting EOB pt reports dizziness. She was able to don shoes independently and min A to transfer to chair with noted BP drop from sitting to standing. Pt would benefit from continued 24/7 care and HHOT     Time Calculation:         Time Calculation- OT       Row Name 12/03/24 1512             Time Calculation- OT    OT Start Time 0910  -      OT Stop Time 0929  -      OT Time Calculation (min) 19 min  -      OT Received On 12/03/24  -      OT - Next Appointment  12/04/24  -      OT Goal Re-Cert Due Date 12/17/24  -                User Key  (r) = Recorded By, (t) = Taken By, (c) = Cosigned By      Initials Name Provider Type    Mireille Slater OT Occupational Therapist                  Therapy Charges for Today       Code Description Service Date Service Provider Modifiers Qty    [id number] HC OT EVAL MOD COMPLEXITY 4 12/3/2024 Mireille Zavala OT GO 1                 Mireille Zavala OT  12/3/2024

## 2024-12-03 NOTE — THERAPY EVALUATION
Patient Name: Miryam Fernández  : 1955    MRN: [id number]                              Today's Date: 12/3/2024       Admit Date: 2024    Visit Dx:     ICD-10-CM ICD-9-CM   1. Urinary tract infection without hematuria, site unspecified  N39.0 599.0   2. Weakness  R53.1 780.79     Patient Active Problem List   Diagnosis    Obstructive sleep apnea, adult    Tobacco use    Normocytic anemia    Meniere's disease    Lung nodule    Hilar lymphadenopathy    Hyperlipidemia    Heartburn    Gastroesophageal reflux disease    Dysphagia    Chronic obstructive pulmonary disease    CAD (coronary artery disease)    Bruises easily    Chronic pain    Small cell lung cancer    Carotid stenosis    Coronary artery disease    Primary hypertension    Long term (current) use of opiate analgesic    DDD (degenerative disc disease), lumbar    Screening mammogram for breast cancer    Preventative health care    Vitamin D deficiency    Need for influenza vaccination    Moderate episode of recurrent major depressive disorder    Need for vaccination against Streptococcus pneumoniae    Frequent falls    Weakness    Nasal congestion    Memory loss    Localized swelling of both lower legs    Meniere disease, bilateral    Abdominal aortic aneurysm (AAA) without rupture    Thoracic aortic aneurysm without rupture    Plantar wart    Screening for osteoporosis    Asymptomatic menopause    Screening for malignant neoplasm of colon    Skin tear of left forearm without complication    Encounter for subsequent annual wellness visit (AWV) in Medicare patient    Acute cystitis    Altered mental status    Left displaced femoral neck fracture    Closed fracture of left hip    Moderate malnutrition    PAD (peripheral artery disease)    S/P insertion of iliac artery stent    Celiac artery stenosis    Renal artery stenosis    UTI (urinary tract infection)     Past Medical History:   Diagnosis Date    Cancer     lung cancer    Carotid stenosis     COPD  (chronic obstructive pulmonary disease)     Coronary artery disease     Esophageal stricture     Dr Domingo    Hyperlipidemia     Metabolic encephalopathy 06/28/2024    2nd to UTI    Pulmonary embolism     seen at [facility], fluid around her heart at the time-right lung    UTI (urinary tract infection) 06/28/2024     Past Surgical History:   Procedure Laterality Date    ANGIOPLASTY / STENTING FEMORAL      CARDIAC CATHETERIZATION      CAROTID STENT      CORONARY STENT PLACEMENT      ESOPHAGOSCOPY / EGD      HYSTERECTOMY      TOTAL HIP ARTHROPLASTY Left 8/1/2024    Procedure: TOTAL HIP ARTHROPLASTY;  Surgeon: Olu Joshua II, MD;  Location: [facility] OR;  Service: Orthopedics;  Laterality: Left;      General Information       [facility] Name 12/03/24 1045          Physical Therapy Time and Intention    Document Type evaluation  -     Mode of Treatment physical therapy  -[facility] Name 12/03/24 1045          General Information    Patient Profile Reviewed yes  -     Prior Level of Function independent:;ADL's;all household mobility  uses rollator  -     Existing Precautions/Restrictions fall;orthostatic hypotension  -     Barriers to Rehab cognitive status;previous functional deficit;medically complex  -[facility] Name 12/03/24 1045          Living Environment    People in Home spouse  he works, dtr stays with her when he works, reports has 24 hr supervision from family  -[facility] Name 12/03/24 1045          Home Main Entrance    Number of Stairs, Main Entrance other (see comments)  ramp entry  -[facility] Name 12/03/24 1045          Stairs Within Home, Primary    Number of Stairs, Within Home, Primary none  -[facility] Name 12/03/24 1045          Cognition    Orientation Status (Cognition) oriented to;person;place;situation;disoriented to;time;verbal cues/prompts needed for orientation  -[facility] Name 12/03/24 1045          Safety Issues/Impairments Affecting Functional Mobility    Safety  Issues Affecting Function (Mobility) insight into deficits/self-awareness;judgment;problem-solving;safety precaution awareness  -     Impairments Affecting Function (Mobility) endurance/activity tolerance;balance;strength  -               User Key  (r) = Recorded By, (t) = Taken By, (c) = Cosigned By      Initials Name Provider Type    Puja Trujillo PT Physical Therapist                   Mobility       Row Name 12/03/24 1047          Bed Mobility    Bed Mobility supine-sit  -     Supine-Sit [place] (Bed Mobility) modified independence  -     Assistive Device (Bed Mobility) head of bed elevated  -       Row Name 12/03/24 1047          Bed-Chair Transfer    Bed-Chair [place] (Transfers) minimum assist (75% patient effort);1 person assist  -     Assistive Device (Bed-Chair Transfers) walker, front-wheeled  -       Row Name 12/03/24 1047          Sit-Stand Transfer    Sit-Stand [place] (Transfers) contact guard;minimum assist (75% patient effort)  -     Assistive Device (Sit-Stand Transfers) walker, front-wheeled  -       Row Name 12/03/24 1047          Gait/Stairs (Locomotion)    [place] Level (Gait) unable to assess  -     Comment, (Gait/Stairs) pt c/o dizziness in sitting/standing, orthostatic in standing  -               User Key  (r) = Recorded By, (t) = Taken By, (c) = Cosigned By      Initials Name Provider Type    Puja Trujillo PT Physical Therapist                   Obj/Interventions       [facility] Name 12/03/24 1048          Range of Motion Comprehensive    General Range of Motion bilateral lower extremity ROM WFL  -[facility] Name 12/03/24 1048          Strength Comprehensive (MMT)    Comment, General Manual Muscle Testing (MMT) Assessment gross strength BLEs 4/5  -       Row Name 12/03/24 1048          Balance    Balance Assessment sitting static balance;sitting dynamic balance;standing static balance;standing dynamic balance  -     Static Sitting Balance  standby assist  -     Dynamic Sitting Balance contact guard  -     Position, Sitting Balance unsupported;sitting edge of bed  -     Static Standing Balance minimal assist;contact guard  -JH     Dynamic Standing Balance minimal assist  -     Position/Device Used, Standing Balance supported;walker, rolling  -[facility] Name 12/03/24 1048          Sensory Assessment (Somatosensory)    Sensory Assessment (Somatosensory) sensation intact  -               User Key  (r) = Recorded By, (t) = Taken By, (c) = Cosigned By      Initials Name Provider Type    Puja Trujillo, PT Physical Therapist                   Goals/Plan       [facility] Name 12/03/24 1053          Bed Mobility Goal 1 (PT)    Activity/Assistive Device (Bed Mobility Goal 1, PT) bed mobility activities, all  -     [place] Level/Cues Needed (Bed Mobility Goal 1, PT) independent  -     Time Frame (Bed Mobility Goal 1, PT) 1 week  -[facility] Name 12/03/24 1053          Transfer Goal 1 (PT)    Activity/Assistive Device (Transfer Goal 1, PT) sit-to-stand/stand-to-sit;bed-to-chair/chair-to-bed;walker, rolling  -     [place] Level/Cues Needed (Transfer Goal 1, PT) standby assist  -     Time Frame (Transfer Goal 1, PT) 2 weeks  -[facility] Name 12/03/24 1053          Gait Training Goal 1 (PT)    Activity/Assistive Device (Gait Training Goal 1, PT) gait (walking locomotion);assistive device use;walker, rolling  -     [place] Level (Gait Training Goal 1, PT) standby assist  -     Distance (Gait Training Goal 1, PT) 150'  -     Time Frame (Gait Training Goal 1, PT) 2 weeks  -[facility] Name 12/03/24 1053          Therapy Assessment/Plan (PT)    Planned Therapy Interventions (PT) balance training;bed mobility training;gait training;transfer training;strengthening;patient/family education  -               User Key  (r) = Recorded By, (t) = Taken By, (c) = Cosigned By      Initials Name Provider Type    Puja Trujillo, PT  Physical Therapist                   Clinical Impression       Row Name 12/03/24 1049          Pain    Additional Documentation Pain Scale: FACES Pre/Post-Treatment (Group)  -       Row Name 12/03/24 1049          Pain Scale: FACES Pre/Post-Treatment    Pain: FACES Scale, Pretreatment 0-->no hurt  -     Posttreatment Pain Rating 0-->no hurt  -       Row Name 12/03/24 1049          Plan of Care Review    Plan of Care Reviewed With patient;family  -     Outcome Evaluation 69 y.o. female with a CMH of COPD, CAD, hyperlipidemia, and frequent UTIs who presented to [facility] on 12/[facility] with increased confusion and decreased oral intake that has been ongoing for the past 4 days.  Patient states that she generally does not feel well.  d/o UTI.  At baseline, pt lives at home with her spouse, has 24 hr supervision from her family.  Pt uses a rollator at home.  This date, pt with mild confusion but pleasant and cooperative with therapy.  Pt was able to transfer to EOB with supervision.  c/o dizziness wtih sitting and standing.  Pt with seated /91 and standing dropped to 125/56. Pt with mild balance deficits in standing putting her at risk for falls.  Will follow pt 5x/week and recommend home with 24 hr A from family and HHPT.  -       Row Name 12/03/24 1049          Therapy Assessment/Plan (PT)    Rehab Potential (PT) good  -     Criteria for Skilled Interventions Met (PT) yes;skilled treatment is necessary  -     Therapy Frequency (PT) 5 times/wk  -       Row Name 12/03/24 1049          Vital Signs    Pre Systolic BP Rehab 156  -     Pre Treatment Diastolic BP 91  sitting  -     Intra Systolic BP Rehab 125  -     Intra Treatment Diastolic BP 56  standing, c/o dizziness  -     Intratreatment Heart Rate (beats/min) 131  with standing activity  -     Posttreatment Heart Rate (beats/min) 118  -     O2 Delivery Pre Treatment room air  -     O2 Delivery Intra Treatment room air  -      O2 Delivery Post Treatment room air  -       Row Name 12/03/24 1049          Positioning and Restraints    Pre-Treatment Position in bed  -     Post Treatment Position chair  -     In Chair notified nsg;sitting;call light within reach;encouraged to call for assist;exit alarm on;with family/caregiver  -               User Key  (r) = Recorded By, (t) = Taken By, (c) = Cosigned By      Initials Name Provider Type     Puja Negrete, PT Physical Therapist                   Outcome Measures       Row Name 12/03/24 1053 12/03/24 0800       How much help from another person do you currently need...    Turning from your back to your side while in flat bed without using bedrails? 4  - 4  -GUNNAR    Moving from lying on back to sitting on the side of a flat bed without bedrails? 4  - 4  -GUNNAR    Moving to and from a bed to a chair (including a wheelchair)? 3  - 4  -GUNNAR    Standing up from a chair using your arms (e.g., wheelchair, bedside chair)? 3  - 4  -GUNNAR    Climbing 3-5 steps with a railing? 2  - 3  -GUNNAR    To walk in hospital room? 3  - 3  -GUNNAR    AM-PAC 6 Clicks Score (PT) 19  - 22  -[facility]    Highest Level of Mobility Goal 6 --> Walk 10 steps or more  - 7 --> Walk 25 feet or more  -[facility]      Row Name 12/03/24 1053          Functional Assessment    Outcome Measure Options AM-PAC 6 Clicks Basic Mobility (PT)  -               User Key  (r) = Recorded By, (t) = Taken By, (c) = Cosigned By      Initials Name Provider Type    Puja Trujillo, JANNET Physical Therapist    Jennifer Cartagena, RN Registered Nurse                                 Physical Therapy Education       Title: PT OT SLP Therapies (Done)       Topic: Physical Therapy (Done)       Point: Mobility training (Done)       Learning Progress Summary            Patient Acceptance, E,TB, VU by  at 12/3/2024 1053   Family Acceptance, E,TB, VU by  at 12/3/2024 1053                                      User Key       Initials Effective Dates  Name Provider Type Discipline     06/16/21 -  Puja Negrete PT Physical Therapist PT                  PT Recommendation and Plan  Planned Therapy Interventions (PT): balance training, bed mobility training, gait training, transfer training, strengthening, patient/family education  Outcome Evaluation: 69 y.o. female with a CMH of COPD, CAD, hyperlipidemia, and frequent UTIs who presented to [facility] on 12/2/2024 with increased confusion and decreased oral intake that has been ongoing for the past 4 days.  Patient states that she generally does not feel well.  d/o UTI.  At baseline, pt lives at home with her spouse, has 24 hr supervision from her family.  Pt uses a rollator at home.  This date, pt with mild confusion but pleasant and cooperative with therapy.  Pt was able to transfer to EOB with supervision.  c/o dizziness wtih sitting and standing.  Pt with seated /91 and standing dropped to 125/56. Pt with mild balance deficits in standing putting her at risk for falls.  Will follow pt 5x/week and recommend home with 24 hr A from family and HHPT.     Time Calculation:         PT Charges       Row Name 12/03/24 1054             Time Calculation    Start Time 0911  -      Stop Time 0929  -      Time Calculation (min) 18 min  -      PT Received On 12/03/24  -      PT - Next Appointment 12/04/24  -      PT Goal Re-Cert Due Date 12/17/24  -         Time Calculation- PT    Total Timed Code Minutes- PT 0 minute(s)  -                User Key  (r) = Recorded By, (t) = Taken By, (c) = Cosigned By      Initials Name Provider Type     Puja Negrete PT Physical Therapist                  Therapy Charges for Today       Code Description Service Date Service Provider Modifiers Qty    [id number]  PT EVAL MOD COMPLEXITY 3 12/3/2024 Puja Negrete, PT GP 1            PT G-Codes  Outcome Measure Options: AM-PAC 6 Clicks Basic Mobility (PT)  AM-PAC 6 Clicks Score (PT): 19  PT Discharge  Summary  Anticipated Discharge Disposition (PT): home with 24/7 care, home with home health    Pjua Negrete, PT  12/3/2024

## 2024-12-03 NOTE — PROGRESS NOTES
[facility] MEDICINE SERVICE  DAILY PROGRESS NOTE    NAME: Miryam Fernández  : 1955  MRN: [id number]      LOS: 1 day     PROVIDER OF SERVICE: Alberto Ochoa MD    Chief Complaint: UTI (urinary tract infection)    Subjective:     Interval History:  History taken from: patient chart family RN    Intermittent nausea no abdominal pain        Review of Systems:   Review of Systems  All negative except as above  Objective:     Vital Signs  Temp:  [97.4 °F (36.3 °C)-98.1 °F (36.7 °C)] 97.6 °F (36.4 °C)  Heart Rate:  [] 93  Resp:  [10-17] 15  BP: (114-161)/(65-88) 156/86   Body mass index is 20.77 kg/m².    Physical Exam  Physical Exam  AOx3 NAD  RRR S1-S2 audible  Lungs with fair air entry  Abdomen soft nontender nondistended     Diagnostic Data    Results from last 7 days   Lab Units 24  0423 24  0953   WBC 10*3/mm3 7.90 8.59   HEMOGLOBIN g/dL 11.5* 11.9*   HEMATOCRIT % 36.2 38.5   PLATELETS 10*3/mm3 256 280   GLUCOSE mg/dL 101* 133*   CREATININE mg/dL 0.91 0.97   BUN mg/dL 29* 33*   SODIUM mmol/L 144 147*   POTASSIUM mmol/L 3.5 3.8   AST (SGOT) U/L  --  18   ALT (SGPT) U/L  --  11   ALK PHOS U/L  --  108   BILIRUBIN mg/dL  --  0.4   ANION GAP mmol/L 8.9 14.7       CT Head Without Contrast    Result Date: 2024  Impression: 1.Volume loss secondary to cerebral atrophy. 2.Chronic ischemic changes. 3.No acute findings. Electronically Signed: Evangelista Obrien MD  2024 10:23 AM EST  Workstation ID: [id number]    XR Chest 1 View    Result Date: 2024  Impression: Similar chronic findings without evidence of acute cardiopulmonary disease. Electronically Signed: Dionicio Ayala MD  2024 10:20 AM EST  Workstation ID: [id number]       I reviewed the patient's new clinical results.  I reviewed the patient's new imaging results and agree with the interpretation.    Assessment/Plan:     Active and Resolved Problems  Active Hospital Problems    Diagnosis  POA    **UTI (urinary tract  infection) [N39.0]  Yes      Resolved Hospital Problems   No resolved problems to display.       69-year-old female history of COPD, CAD, hyperlipidemia, frequent UTIs [facility] with increased confusion and decreased oral intake    #Acute metabolic encephalopathy suspect secondary to UTI  CT head no acute findings  UA with pyuria urine culture growing E. coli more than 90,000 colonies  Continue ceftriaxone  Follow-up on report of urine culture  Follow blood cultures    #CAD-stable  Details unclear  Continue aspirin, Brilinta along with statins      #Hypertension  Does not appear to be on any antihypertensives at home  Monitor blood pressure    #Hyperlipidemia    #COPD not in exacerbation  Uses trilogy at home substituted with Symbicort and Spiriva      VTE Prophylaxis:  Mechanical VTE prophylaxis orders are present.             Disposition Planning:     Barriers to Discharge: Medical workup pending urine culture  Anticipated Date of Discharge: 12/4  Place of Discharge: Home with services       Time: 40 minutes     Code Status and Medical Interventions: CPR (Attempt to Resuscitate); Full Support   Ordered at: 12/02/24 1302     Code Status (Patient has no pulse and is not breathing):    CPR (Attempt to Resuscitate)     Medical Interventions (Patient has pulse or is breathing):    Full Support       Signature: Electronically signed by Alberto Ochoa MD, 12/03/24, 07:08 EST.  [facility] Hospitalist Team

## 2024-12-03 NOTE — PLAN OF CARE
Goal Outcome Evaluation:  Plan of Care Reviewed With: patient, family           Outcome Evaluation: 69 y.o. female with a CMH of COPD, CAD, hyperlipidemia, and frequent UTIs who presented to [facility] on 12/2/2024 with increased confusion and decreased oral intake that has been ongoing for the past 4 days.  Patient states that she generally does not feel well.  d/o UTI.  At baseline, pt lives at home with her spouse, has 24 hr supervision from her family.  Pt uses a rollator at home.  This date, pt with mild confusion but pleasant and cooperative with therapy.  Pt was able to transfer to EOB with supervision.  c/o dizziness wtih sitting and standing.  Pt with seated /91 and standing dropped to 125/56. Pt with mild balance deficits in standing putting her at risk for falls.  Will follow pt 5x/week and recommend home with 24 hr A from family and HHPT.    Anticipated Discharge Disposition (PT): home with 24/7 care, home with home health

## 2024-12-04 ENCOUNTER — APPOINTMENT (OUTPATIENT)
Dept: GENERAL RADIOLOGY | Facility: HOSPITAL | Age: 69
DRG: 689 | End: 2024-12-04
Payer: COMMERCIAL

## 2024-12-04 LAB
ANION GAP SERPL CALCULATED.3IONS-SCNC: 11.4 MMOL/L (ref 5–15)
BACTERIA SPEC AEROBE CULT: ABNORMAL
BASOPHILS # BLD AUTO: 0.04 10*3/MM3 (ref 0–0.2)
BASOPHILS NFR BLD AUTO: 0.6 % (ref 0–1.5)
BUN SERPL-MCNC: 25 MG/DL (ref 8–23)
BUN/CREAT SERPL: 27.2 (ref 7–25)
CALCIUM SPEC-SCNC: 9.7 MG/DL (ref 8.6–10.5)
CHLORIDE SERPL-SCNC: 106 MMOL/L (ref 98–107)
CO2 SERPL-SCNC: 26.6 MMOL/L (ref 22–29)
CREAT SERPL-MCNC: 0.92 MG/DL (ref 0.57–1)
DEPRECATED RDW RBC AUTO: 53.6 FL (ref 37–54)
EGFRCR SERPLBLD CKD-EPI 2021: 68.4 ML/MIN/1.73
EOSINOPHIL # BLD AUTO: 0.11 10*3/MM3 (ref 0–0.4)
EOSINOPHIL NFR BLD AUTO: 1.5 % (ref 0.3–6.2)
ERYTHROCYTE [DISTWIDTH] IN BLOOD BY AUTOMATED COUNT: 15.2 % (ref 12.3–15.4)
GLUCOSE SERPL-MCNC: 106 MG/DL (ref 65–99)
HCT VFR BLD AUTO: 38 % (ref 34–46.6)
HGB BLD-MCNC: 12.2 G/DL (ref 12–15.9)
IMM GRANULOCYTES # BLD AUTO: 0.03 10*3/MM3 (ref 0–0.05)
IMM GRANULOCYTES NFR BLD AUTO: 0.4 % (ref 0–0.5)
LYMPHOCYTES # BLD AUTO: 0.74 10*3/MM3 (ref 0.7–3.1)
LYMPHOCYTES NFR BLD AUTO: 10.3 % (ref 19.6–45.3)
MCH RBC QN AUTO: 31.1 PG (ref 26.6–33)
MCHC RBC AUTO-ENTMCNC: 32.1 G/DL (ref 31.5–35.7)
MCV RBC AUTO: 96.9 FL (ref 79–97)
MONOCYTES # BLD AUTO: 0.69 10*3/MM3 (ref 0.1–0.9)
MONOCYTES NFR BLD AUTO: 9.6 % (ref 5–12)
NEUTROPHILS NFR BLD AUTO: 5.6 10*3/MM3 (ref 1.7–7)
NEUTROPHILS NFR BLD AUTO: 77.6 % (ref 42.7–76)
NRBC BLD AUTO-RTO: 0 /100 WBC (ref 0–0.2)
PLATELET # BLD AUTO: 238 10*3/MM3 (ref 140–450)
PMV BLD AUTO: 10 FL (ref 6–12)
POTASSIUM SERPL-SCNC: 4.1 MMOL/L (ref 3.5–5.2)
RBC # BLD AUTO: 3.92 10*6/MM3 (ref 3.77–5.28)
SODIUM SERPL-SCNC: 144 MMOL/L (ref 136–145)
WBC NRBC COR # BLD AUTO: 7.21 10*3/MM3 (ref 3.4–10.8)

## 2024-12-04 PROCEDURE — 97112 NEUROMUSCULAR REEDUCATION: CPT

## 2024-12-04 PROCEDURE — 95992 CANALITH REPOSITIONING PROC: CPT

## 2024-12-04 PROCEDURE — 94640 AIRWAY INHALATION TREATMENT: CPT

## 2024-12-04 PROCEDURE — 25010000002 CEFTRIAXONE PER 250 MG

## 2024-12-04 PROCEDURE — 80048 BASIC METABOLIC PNL TOTAL CA: CPT

## 2024-12-04 PROCEDURE — 74018 RADEX ABDOMEN 1 VIEW: CPT

## 2024-12-04 PROCEDURE — 94761 N-INVAS EAR/PLS OXIMETRY MLT: CPT

## 2024-12-04 PROCEDURE — 97530 THERAPEUTIC ACTIVITIES: CPT

## 2024-12-04 PROCEDURE — 63710000001 PREDNISONE PER 1 MG: Performed by: HOSPITALIST

## 2024-12-04 PROCEDURE — 94664 DEMO&/EVAL PT USE INHALER: CPT

## 2024-12-04 PROCEDURE — 85025 COMPLETE CBC W/AUTO DIFF WBC: CPT

## 2024-12-04 PROCEDURE — 94799 UNLISTED PULMONARY SVC/PX: CPT

## 2024-12-04 PROCEDURE — 97110 THERAPEUTIC EXERCISES: CPT

## 2024-12-04 RX ORDER — PREDNISONE 20 MG/1
60 TABLET ORAL
Status: DISCONTINUED | OUTPATIENT
Start: 2024-12-04 | End: 2024-12-06 | Stop reason: HOSPADM

## 2024-12-04 RX ORDER — BISACODYL 10 MG
10 SUPPOSITORY, RECTAL RECTAL ONCE
Status: COMPLETED | OUTPATIENT
Start: 2024-12-04 | End: 2024-12-04

## 2024-12-04 RX ADMIN — CEFTRIAXONE 2000 MG: 2 INJECTION, POWDER, FOR SOLUTION INTRAMUSCULAR; INTRAVENOUS at 13:11

## 2024-12-04 RX ADMIN — BUDESONIDE AND FORMOTEROL FUMARATE DIHYDRATE 2 PUFF: 160; 4.5 AEROSOL RESPIRATORY (INHALATION) at 19:43

## 2024-12-04 RX ADMIN — TICAGRELOR 60 MG: 60 TABLET ORAL at 20:14

## 2024-12-04 RX ADMIN — BISACODYL 10 MG: 10 SUPPOSITORY RECTAL at 13:11

## 2024-12-04 RX ADMIN — MEMANTINE 5 MG: 5 TABLET ORAL at 20:14

## 2024-12-04 RX ADMIN — Medication 2000 UNITS: at 08:29

## 2024-12-04 RX ADMIN — MEMANTINE 5 MG: 5 TABLET ORAL at 08:30

## 2024-12-04 RX ADMIN — PREDNISONE 60 MG: 20 TABLET ORAL at 16:28

## 2024-12-04 RX ADMIN — CYANOCOBALAMIN TAB 500 MCG 1000 MCG: 500 TAB at 08:30

## 2024-12-04 RX ADMIN — ASPIRIN 81 MG: 81 TABLET, COATED ORAL at 08:30

## 2024-12-04 RX ADMIN — TICAGRELOR 60 MG: 60 TABLET ORAL at 08:30

## 2024-12-04 RX ADMIN — MONTELUKAST 10 MG: 10 TABLET, FILM COATED ORAL at 20:14

## 2024-12-04 RX ADMIN — PANTOPRAZOLE SODIUM 40 MG: 40 TABLET, DELAYED RELEASE ORAL at 08:29

## 2024-12-04 RX ADMIN — BUDESONIDE AND FORMOTEROL FUMARATE DIHYDRATE 2 PUFF: 160; 4.5 AEROSOL RESPIRATORY (INHALATION) at 08:03

## 2024-12-04 RX ADMIN — ATORVASTATIN CALCIUM 40 MG: 40 TABLET, FILM COATED ORAL at 08:30

## 2024-12-04 RX ADMIN — TIOTROPIUM BROMIDE INHALATION SPRAY 2 PUFF: 3.12 SPRAY, METERED RESPIRATORY (INHALATION) at 08:00

## 2024-12-04 NOTE — PLAN OF CARE
Problem: Adult Inpatient Plan of Care  Goal: Plan of Care Review  Outcome: Progressing  Flowsheets (Taken 12/4/2024 1651)  Progress: improving  Plan of Care Reviewed With: patient  Goal: Patient-Specific Goal (Individualized)  Outcome: Progressing  Goal: Absence of Hospital-Acquired Illness or Injury  Outcome: Progressing  Intervention: Identify and Manage Fall Risk  Recent Flowsheet Documentation  Taken 12/4/2024 1600 by Valdo Waterman RN  Safety Promotion/Fall Prevention: safety round/check completed  Taken 12/4/2024 1500 by Valdo Waterman RN  Safety Promotion/Fall Prevention: safety round/check completed  Taken 12/4/2024 1400 by Valdo Waterman RN  Safety Promotion/Fall Prevention: safety round/check completed  Taken 12/4/2024 1300 by Valdo Waterman RN  Safety Promotion/Fall Prevention: safety round/check completed  Taken 12/4/2024 1200 by Valdo Waterman RN  Safety Promotion/Fall Prevention: safety round/check completed  Taken 12/4/2024 1000 by Valdo Waterman RN  Safety Promotion/Fall Prevention: safety round/check completed  Taken 12/4/2024 0900 by Valdo Waterman RN  Safety Promotion/Fall Prevention: safety round/check completed  Taken 12/4/2024 0800 by Valdo Waterman RN  Safety Promotion/Fall Prevention: safety round/check completed  Taken 12/4/2024 0700 by Valdo Waterman RN  Safety Promotion/Fall Prevention: safety round/check completed  Intervention: Prevent Skin Injury  Recent Flowsheet Documentation  Taken 12/4/2024 1630 by Valdo Waterman RN  Skin Protection:   incontinence pads utilized   transparent dressing maintained  Taken 12/4/2024 1500 by Valdo Waterman RN  Body Position: position changed independently  Taken 12/4/2024 1300 by Valdo Waterman RN  Body Position: position changed independently  Taken 12/4/2024 1245 by Valdo Waterman RN  Skin Protection: incontinence pads utilized  Taken 12/4/2024 1119 by Valdo Waterman RN  Body Position: position changed  independently  Taken 12/4/2024 0900 by Valdo Waterman RN  Body Position: position changed independently  Taken 12/4/2024 0830 by Valdo Waterman RN  Skin Protection:   incontinence pads utilized   pulse oximeter probe site changed   transparent dressing maintained  Taken 12/4/2024 0700 by Valdo Waterman RN  Body Position: position changed independently  Intervention: Prevent and Manage VTE (Venous Thromboembolism) Risk  Recent Flowsheet Documentation  Taken 12/4/2024 1630 by Valdo Waterman RN  VTE Prevention/Management:   bilateral   SCDs (sequential compression devices) off  Taken 12/4/2024 1245 by Valdo Waterman RN  VTE Prevention/Management:   bilateral   SCDs (sequential compression devices) off  Taken 12/4/2024 0830 by Valdo Waterman RN  VTE Prevention/Management:   bilateral   SCDs (sequential compression devices) off  Intervention: Prevent Infection  Recent Flowsheet Documentation  Taken 12/4/2024 1630 by Valdo Waterman RN  Infection Prevention:   environmental surveillance performed   equipment surfaces disinfected   personal protective equipment utilized   hand hygiene promoted  Taken 12/4/2024 1245 by Valdo Waterman RN  Infection Prevention:   environmental surveillance performed   equipment surfaces disinfected   hand hygiene promoted   personal protective equipment utilized   single patient room provided  Taken 12/4/2024 0830 by Valdo Waterman RN  Infection Prevention:   equipment surfaces disinfected   hand hygiene promoted   personal protective equipment utilized  Goal: Optimal Comfort and Wellbeing  Outcome: Progressing  Intervention: Monitor Pain and Promote Comfort  Recent Flowsheet Documentation  Taken 12/4/2024 1630 by Valdo Waterman RN  Pain Management Interventions:   unnecessary movement minimized   care clustered  Taken 12/4/2024 1245 by Valdo Waterman RN  Pain Management Interventions:   unnecessary movement minimized   position adjusted   pillow support  provided  Taken 12/4/2024 0830 by Valdo Waterman, RN  Pain Management Interventions:   care clustered   unnecessary movement minimized   position adjusted   pillow support provided  Intervention: Provide Person-Centered Care  Recent Flowsheet Documentation  Taken 12/4/2024 1630 by Valdo Waterman, RN  Trust Relationship/Rapport:   care explained   reassurance provided  Taken 12/4/2024 1245 by Valdo Waterman, RN  Trust Relationship/Rapport:   care explained   reassurance provided  Taken 12/4/2024 0830 by Valdo Waterman, RN  Trust Relationship/Rapport:   care explained   reassurance provided  Goal: Readiness for Transition of Care  Outcome: Progressing   Goal Outcome Evaluation:  Plan of Care Reviewed With: patient        Progress: improving

## 2024-12-04 NOTE — THERAPY TREATMENT NOTE
Subjective: Pt agreeable to therapeutic plan of care. Pt reports significant dizziness. Notes she has Meniere's disease.    Objective:     Precautions - falls    Bed mobility - CGA for supine>sit EOB. CGA for rolling during mod-epley. Ephraim for sidelying>sit.    Transfers - Min-A for STS and stand-pivot to recliner.    Ambulation - N/A - dizziness and poor balance    Vestibular:  Horizontal roll test: torsional upbeat nystagmus to the R, seen in L cervical rotation.  Sania-hallpike: ageotropic nystagmus seen in L cervical rotation.  - inconclusive, likely skewed if pt in exacerbation of Meniere's disease.    Vitals: Hypertensive: /90    Pain: no pain; persistent dizziness  Intervention for pain: N/A    Education: Provided education on the importance of mobility in the acute care setting, Verbal/Tactile Cues, Transfer Training, and Energy conservation strategies. Anatomy and processing of inner, Menieres, and BPPV.    Assessment: Miryam Fernández presents with functional mobility impairments which indicate the need for skilled intervention. Pt reporting persistent dizziness in supine upon arrival, not able to describe if room-spinning vs lightheaded. Quick vestibular assessment inconclusive for BPPV due to differing nystagmus noted (R upbeat torsional in horizontal roll test, and ageotropic nystagmus in sania-hallpike). Pt reports having h/o Meniere's disease. Treated pt with mod-epley x 1 to get out of the sania-hallpike positioning, however will defer further treatments as they are contraindicated if in exacerbation. Edu pt regarding how exacerbation of this could cause her symptoms, but that other vestibular diagnoses can sometimes overlap. Provided pt and dtr with hand-outs regarding Menieres disease and BPPV, but she would benefit from referral to ENT and follow up with Outpatient PT for vestibular evaluation when indicated. Tolerating session today without incident. Will continue to follow and progress as  "tolerated. Plan to see this afternoon for follow-up questions regarding diagnosis to better help lead her treatment.    Plan/Recommendations:   If medically appropriate, Low Intensity Therapy recommended post-acute care - This is recommended as therapy feels this patient would require 2-3 visits per week. OP or HH would be the best option depending on patient's home bound status. Consider, if the patient has other  \"skilled\" needs such as wounds, IV antibiotics, etc. Combined with \"low intensity\" could also equate to a SNF. If patient is medically complex, consider LTAC. Pt requires no DME at discharge.     Pt desires Home with family assist and Outpatient therapy at discharge. Pt cooperative; agreeable to therapeutic recommendations and plan of care.         Basic Mobility 6-click:  Rollin = Total, A lot = 2, A little = 3; 4 = None  Supine>Sit:   1 = Total, A lot = 2, A little = 3; 4 = None   Sit>Stand with arms:  1 = Total, A lot = 2, A little = 3; 4 = None  Bed>Chair:   1 = Total, A lot = 2, A little = 3; 4 = None  Ambulate in room:  1 = Total, A lot = 2, A little = 3; 4 = None  3-5 Steps with railin = Total, A lot = 2, A little = 3; 4 = None  Score: 19    Modified [place]: N/A = No pre-op stroke/TIA    Post-Tx Position: Up in Chair, Alarms activated, and Call light and personal items within reach  PPE: gloves    Therapy Charges for Today       Code Description Service Date Service Provider Modifiers Qty    93919003894  PT NEUROMUSC RE EDUCATION EA 15 MIN 2024 Marissa Acosta, PT GP 1    [id number]  PT THERAPEUTIC ACT EA 15 MIN 2024 Marissa Acosta, PT GP 1    [id number]  PT THER PROC EA 15 MIN 2024 Marissa Acosta, PT GP 1    [id number]  PT CANALITH REPOSITIONING PER DAY 2024 Marissa Acosta, PT GP 1           PT Charges       Row Name 24 1133             Time Calculation    Start Time 0938  -OD      Stop Time 1021  -OD      Time Calculation (min) 43 min  -OD      " PT Received On 12/04/24  -OD      PT - Next Appointment 12/05/24  -OD         Time Calculation- PT    Total Timed Code Minutes- PT 43 minute(s)  -OD                User Key  (r) = Recorded By, (t) = Taken By, (c) = Cosigned By      Initials Name Provider Type    OD Marissa Acosta, PT Physical Therapist

## 2024-12-04 NOTE — PLAN OF CARE
Problem: Adult Inpatient Plan of Care  Goal: Plan of Care Review  Outcome: Progressing  Flowsheets (Taken 12/4/2024 0220)  Progress: improving  Plan of Care Reviewed With: patient  Goal: Patient-Specific Goal (Individualized)  Outcome: Progressing  Goal: Absence of Hospital-Acquired Illness or Injury  Outcome: Progressing  Intervention: Identify and Manage Fall Risk  Recent Flowsheet Documentation  Taken 12/4/2024 0200 by Simin Hunt RN  Safety Promotion/Fall Prevention: safety round/check completed  Taken 12/4/2024 0000 by Simin Hunt RN  Safety Promotion/Fall Prevention: safety round/check completed  Taken 12/3/2024 2200 by Simin Hunt RN  Safety Promotion/Fall Prevention: safety round/check completed  Taken 12/3/2024 2000 by Simin Hunt RN  Safety Promotion/Fall Prevention: safety round/check completed  Intervention: Prevent Skin Injury  Recent Flowsheet Documentation  Taken 12/4/2024 0200 by Simin Hunt RN  Body Position: position changed independently  Taken 12/4/2024 0000 by Simin Hunt RN  Body Position: position changed independently  Skin Protection: incontinence pads utilized  Taken 12/3/2024 2200 by Simin Hunt RN  Body Position: position changed independently  Taken 12/3/2024 2000 by Simin Hunt RN  Body Position: position changed independently  Skin Protection: incontinence pads utilized  Intervention: Prevent Infection  Recent Flowsheet Documentation  Taken 12/4/2024 0200 by Simin Hunt RN  Infection Prevention: hand hygiene promoted  Taken 12/4/2024 0000 by Simin Hunt RN  Infection Prevention: hand hygiene promoted  Taken 12/3/2024 2200 by Simin Hunt RN  Infection Prevention: hand hygiene promoted  Taken 12/3/2024 2000 by Simin Hunt RN  Infection Prevention: hand hygiene promoted  Goal: Optimal Comfort and Wellbeing  Outcome: Progressing  Intervention: Provide Person-Centered Care  Recent Flowsheet Documentation  Taken 12/4/2024 0000 by Gilbert  VIKKI Duval  Trust Relationship/Rapport:   care explained   choices provided   emotional support provided   empathic listening provided   questions answered   questions encouraged   reassurance provided   thoughts/feelings acknowledged  Taken 12/3/2024 2000 by Simin Hunt RN  Trust Relationship/Rapport:   care explained   choices provided   emotional support provided   empathic listening provided   questions answered   reassurance provided   questions encouraged   thoughts/feelings acknowledged  Goal: Readiness for Transition of Care  Outcome: Progressing     Problem: Fall Injury Risk  Goal: Absence of Fall and Fall-Related Injury  Outcome: Progressing  Intervention: Identify and Manage Contributors  Recent Flowsheet Documentation  Taken 12/4/2024 0200 by Simin Hunt RN  Medication Review/Management: medications reviewed  Self-Care Promotion: independence encouraged  Taken 12/4/2024 0000 by Simin Hunt RN  Medication Review/Management: medications reviewed  Self-Care Promotion: independence encouraged  Taken 12/3/2024 2200 by Simin Hunt RN  Medication Review/Management: medications reviewed  Self-Care Promotion: independence encouraged  Taken 12/3/2024 2000 by Simin Hunt RN  Medication Review/Management: medications reviewed  Self-Care Promotion: independence encouraged  Intervention: Promote Injury-Free Environment  Recent Flowsheet Documentation  Taken 12/4/2024 0200 by Simin Hunt RN  Safety Promotion/Fall Prevention: safety round/check completed  Taken 12/4/2024 0000 by Simin Hunt RN  Safety Promotion/Fall Prevention: safety round/check completed  Taken 12/3/2024 2200 by Simin Hunt RN  Safety Promotion/Fall Prevention: safety round/check completed  Taken 12/3/2024 2000 by Simin Hunt RN  Safety Promotion/Fall Prevention: safety round/check completed   Goal Outcome Evaluation:  Plan of Care Reviewed With: patient        Progress: improving

## 2024-12-04 NOTE — PLAN OF CARE
Assessment: Miryam Fernández presents with functional mobility impairments which indicate the need for skilled intervention. Pt reporting persistent dizziness in supine upon arrival, not able to describe if room-spinning vs lightheaded. Quick vestibular assessment inconclusive for BPPV due to differing nystagmus noted (R upbeat torsional in horizontal roll test, and ageotropic nystagmus in sania-hallpike). Pt reports having h/o Meniere's disease. Treated pt with mod-epley x 1 to get out of the sania-hallpike positioning, however will defer further treatments as they are contraindicated if in exacerbation. Edu pt regarding how exacerbation of this could cause her symptoms, but that other vestibular diagnoses can sometimes overlap. Provided pt and dtr with hand-outs regarding Menieres disease and BPPV, but she would benefit from referral to ENT and follow up with Outpatient PT for vestibular evaluation when indicated. Tolerating session today without incident. Will continue to follow and progress as tolerated. Plan to see this afternoon for follow-up questions regarding diagnosis to better help lead her treatment.

## 2024-12-05 LAB
ANION GAP SERPL CALCULATED.3IONS-SCNC: 13.8 MMOL/L (ref 5–15)
BASOPHILS # BLD AUTO: 0.01 10*3/MM3 (ref 0–0.2)
BASOPHILS NFR BLD AUTO: 0.2 % (ref 0–1.5)
BUN SERPL-MCNC: 28 MG/DL (ref 8–23)
BUN/CREAT SERPL: 30.8 (ref 7–25)
CALCIUM SPEC-SCNC: 9.7 MG/DL (ref 8.6–10.5)
CHLORIDE SERPL-SCNC: 103 MMOL/L (ref 98–107)
CO2 SERPL-SCNC: 24.2 MMOL/L (ref 22–29)
CREAT SERPL-MCNC: 0.91 MG/DL (ref 0.57–1)
DEPRECATED RDW RBC AUTO: 50.5 FL (ref 37–54)
EGFRCR SERPLBLD CKD-EPI 2021: 69.3 ML/MIN/1.73
EOSINOPHIL # BLD AUTO: 0 10*3/MM3 (ref 0–0.4)
EOSINOPHIL NFR BLD AUTO: 0 % (ref 0.3–6.2)
ERYTHROCYTE [DISTWIDTH] IN BLOOD BY AUTOMATED COUNT: 14.6 % (ref 12.3–15.4)
GLUCOSE SERPL-MCNC: 137 MG/DL (ref 65–99)
HCT VFR BLD AUTO: 38.2 % (ref 34–46.6)
HGB BLD-MCNC: 12.2 G/DL (ref 12–15.9)
IMM GRANULOCYTES # BLD AUTO: 0.03 10*3/MM3 (ref 0–0.05)
IMM GRANULOCYTES NFR BLD AUTO: 0.5 % (ref 0–0.5)
LYMPHOCYTES # BLD AUTO: 0.34 10*3/MM3 (ref 0.7–3.1)
LYMPHOCYTES NFR BLD AUTO: 6 % (ref 19.6–45.3)
MCH RBC QN AUTO: 30 PG (ref 26.6–33)
MCHC RBC AUTO-ENTMCNC: 31.9 G/DL (ref 31.5–35.7)
MCV RBC AUTO: 94.1 FL (ref 79–97)
MONOCYTES # BLD AUTO: 0.13 10*3/MM3 (ref 0.1–0.9)
MONOCYTES NFR BLD AUTO: 2.3 % (ref 5–12)
NEUTROPHILS NFR BLD AUTO: 5.2 10*3/MM3 (ref 1.7–7)
NEUTROPHILS NFR BLD AUTO: 91 % (ref 42.7–76)
NRBC BLD AUTO-RTO: 0 /100 WBC (ref 0–0.2)
PLATELET # BLD AUTO: 251 10*3/MM3 (ref 140–450)
PMV BLD AUTO: 9.8 FL (ref 6–12)
POTASSIUM SERPL-SCNC: 4.3 MMOL/L (ref 3.5–5.2)
RBC # BLD AUTO: 4.06 10*6/MM3 (ref 3.77–5.28)
SODIUM SERPL-SCNC: 141 MMOL/L (ref 136–145)
WBC NRBC COR # BLD AUTO: 5.71 10*3/MM3 (ref 3.4–10.8)

## 2024-12-05 PROCEDURE — 94664 DEMO&/EVAL PT USE INHALER: CPT

## 2024-12-05 PROCEDURE — 97535 SELF CARE MNGMENT TRAINING: CPT

## 2024-12-05 PROCEDURE — 94799 UNLISTED PULMONARY SVC/PX: CPT

## 2024-12-05 PROCEDURE — 85025 COMPLETE CBC W/AUTO DIFF WBC: CPT

## 2024-12-05 PROCEDURE — 80048 BASIC METABOLIC PNL TOTAL CA: CPT

## 2024-12-05 PROCEDURE — 94761 N-INVAS EAR/PLS OXIMETRY MLT: CPT

## 2024-12-05 PROCEDURE — 97530 THERAPEUTIC ACTIVITIES: CPT

## 2024-12-05 PROCEDURE — 63710000001 PREDNISONE PER 1 MG: Performed by: HOSPITALIST

## 2024-12-05 RX ORDER — PREDNISONE 20 MG/1
60 TABLET ORAL
Qty: 12 TABLET | Refills: 0 | Status: SHIPPED | OUTPATIENT
Start: 2024-12-06 | End: 2024-12-06

## 2024-12-05 RX ORDER — MECLIZINE HYDROCHLORIDE 25 MG/1
25 TABLET ORAL 3 TIMES DAILY PRN
Status: DISCONTINUED | OUTPATIENT
Start: 2024-12-05 | End: 2024-12-06 | Stop reason: HOSPADM

## 2024-12-05 RX ADMIN — PREDNISONE 60 MG: 20 TABLET ORAL at 08:33

## 2024-12-05 RX ADMIN — ATORVASTATIN CALCIUM 40 MG: 40 TABLET, FILM COATED ORAL at 08:33

## 2024-12-05 RX ADMIN — CYANOCOBALAMIN TAB 500 MCG 1000 MCG: 500 TAB at 08:33

## 2024-12-05 RX ADMIN — BUDESONIDE AND FORMOTEROL FUMARATE DIHYDRATE 2 PUFF: 160; 4.5 AEROSOL RESPIRATORY (INHALATION) at 20:50

## 2024-12-05 RX ADMIN — MONTELUKAST 10 MG: 10 TABLET, FILM COATED ORAL at 21:29

## 2024-12-05 RX ADMIN — PANTOPRAZOLE SODIUM 40 MG: 40 TABLET, DELAYED RELEASE ORAL at 08:33

## 2024-12-05 RX ADMIN — MECLIZINE HYDROCHLORIDE 25 MG: 25 TABLET ORAL at 17:42

## 2024-12-05 RX ADMIN — Medication 2000 UNITS: at 08:33

## 2024-12-05 RX ADMIN — TIOTROPIUM BROMIDE INHALATION SPRAY 2 PUFF: 3.12 SPRAY, METERED RESPIRATORY (INHALATION) at 06:53

## 2024-12-05 RX ADMIN — TICAGRELOR 60 MG: 60 TABLET ORAL at 08:33

## 2024-12-05 RX ADMIN — MEMANTINE 5 MG: 5 TABLET ORAL at 08:33

## 2024-12-05 RX ADMIN — BUDESONIDE AND FORMOTEROL FUMARATE DIHYDRATE 2 PUFF: 160; 4.5 AEROSOL RESPIRATORY (INHALATION) at 06:55

## 2024-12-05 RX ADMIN — ASPIRIN 81 MG: 81 TABLET, COATED ORAL at 08:33

## 2024-12-05 RX ADMIN — TICAGRELOR 60 MG: 60 TABLET ORAL at 21:29

## 2024-12-05 RX ADMIN — MEMANTINE 5 MG: 5 TABLET ORAL at 21:29

## 2024-12-05 NOTE — THERAPY TREATMENT NOTE
"Subjective: Pt agreeable to therapeutic plan of care.  Feels weak, dtr feels like pt is getting weaker.     Objective:   Pt sleeping in bed, easily awakened and A&O x4.     Precautions - falls, orthostatic    Bed mobility - Min-A and Mod-A  Transfers - Min-A and Mod-A, sit to stand x 3 from EOB, very weak, dizzy.  BP drops in standing.  Unable to tolerate t/f to chair.   Ambulation -  feet N/A or Not attempted.    Vitals: Tachycardic  BP sitting 152/85  Standing 139/96  -137 with sitting EOB/standing    Pain: 0 VAS   Location:   Intervention for pain: N/A    Education: Provided education on the importance of mobility in the acute care setting, change in d/c recommendations    Assessment: Miryam Fernández presents with functional mobility impairments which indicate the need for skilled intervention. Pt is weaker today with overall functional decline since therapy eval.  Pt tachy with standing activity and veyr weak in standing.  RN aware and dtr present.  Pt not safe for home in current condition, changing d/c recommendation to SNF for rehab. Tolerating session today without incident. Will continue to follow and progress as tolerated.     Plan/Recommendations:   If medically appropriate, Moderate Intensity Therapy recommended post-acute care. This is recommended as therapy feels the patient would require 3-4 days per week and wouldn't tolerate \"3 hour daily\" rehab intensity. SNF would be the preferred choice. If the patient does not agree to SNF, arrange HH or OP depending on home bound status. If patient is medically complex, consider LTACH. Pt requires no DME at discharge.     Pt desires Skilled Rehab placement at discharge. Pt cooperative; agreeable to therapeutic recommendations and plan of care.         Basic Mobility 6-click:  Rollin = Total, A lot = 2, A little = 3; 4 = None  Supine>Sit:   1 = Total, A lot = 2, A little = 3; 4 = None   Sit>Stand with arms:  1 = Total, A lot = 2, A little = 3; " 4 = None  Bed>Chair:   1 = Total, A lot = 2, A little = 3; 4 = None  Ambulate in room:  1 = Total, A lot = 2, A little = 3; 4 = None  3-5 Steps with railin = Total, A lot = 2, A little = 3; 4 = None  Score: 15    Modified [place]: N/A = No pre-op stroke/TIA    Post-Tx Position: Supine with HOB Elevated, Alarms activated, and Call light and personal items within reach  PPE: gloves    Therapy Charges for Today       Code Description Service Date Service Provider Modifiers Qty    [id number]  PT THERAPEUTIC ACT EA 15 MIN 2024 Puja Negrete, PT GP 1           PT Charges       Row Name 24 1143             Time Calculation    Start Time 0934  -      Stop Time 0948  -      Time Calculation (min) 14 min  -      PT Received On 24  -      PT - Next Appointment 24  -         Time Calculation- PT    Total Timed Code Minutes- PT 14 minute(s)  -                User Key  (r) = Recorded By, (t) = Taken By, (c) = Cosigned By      Initials Name Provider Type    Puja Trujillo, PT Physical Therapist

## 2024-12-05 NOTE — PROGRESS NOTES
[facility] MEDICINE SERVICE  DAILY PROGRESS NOTE    NAME: Miryam Fernández  : 1955  MRN: [id number]      LOS: 3 days     PROVIDER OF SERVICE: Alberto Ochoa MD    Chief Complaint: UTI (urinary tract infection)    Subjective:     Interval History:  History taken from: patient chart family RN    She feels more weak today along with ataxia        Review of Systems:   Review of Systems  All negative except as above  Objective:     Vital Signs  Temp:  [97.5 °F (36.4 °C)-98.8 °F (37.1 °C)] 97.6 °F (36.4 °C)  Heart Rate:  [77-98] 88  Resp:  [12-17] 17  BP: (139-166)/(79-96) 139/96   Body mass index is 20.77 kg/m².    Physical Exam  Physical Exam  AOx3 NAD  RRR S1-S2 audible  Lungs with fair air entry  Abdomen soft nontender nondistended        Diagnostic Data    Results from last 7 days   Lab Units 24  0258 24  0423 24  0953   WBC 10*3/mm3 5.71   < > 8.59   HEMOGLOBIN g/dL 12.2   < > 11.9*   HEMATOCRIT % 38.2   < > 38.5   PLATELETS 10*3/mm3 251   < > 280   GLUCOSE mg/dL 137*   < > 133*   CREATININE mg/dL 0.91   < > 0.97   BUN mg/dL 28*   < > 33*   SODIUM mmol/L 141   < > 147*   POTASSIUM mmol/L 4.3   < > 3.8   AST (SGOT) U/L  --   --  18   ALT (SGPT) U/L  --   --  11   ALK PHOS U/L  --   --  108   BILIRUBIN mg/dL  --   --  0.4   ANION GAP mmol/L 13.8   < > 14.7    < > = values in this interval not displayed.       XR Abdomen KUB    Result Date: 2024  Impression: 1. Nonspecific, nonobstructive bowel gas pattern. 2. Moderate amount of stool in the colon. Electronically Signed: Elbert Daniel MD  2024 11:14 AM EST  Workstation ID: [id number]       I reviewed the patient's new clinical results.  I reviewed the patient's new imaging results and agree with the interpretation.    Assessment/Plan:     Active and Resolved Problems  Active Hospital Problems    Diagnosis  POA    **UTI (urinary tract infection) [N39.0]  Yes      Resolved Hospital Problems   No resolved problems to display.        69-year-old female history of COPD, CAD, hyperlipidemia, frequent UTIs admitted to [facility] 12/2 with increased confusion and decreased oral intake     #Acute metabolic encephalopathy suspect secondary to UTI  CT head no acute findings  UA with pyuria urine culture growing E. coli sensitivities noted  Status post 3 days of ceftriaxone  Follow blood cultures        #Ménière's disease  Diagnosed many years ago follows with ENT as outpatient has responded to steroids in the past.  Appears to be an acute flare  Continue Prednisone 60 mg daily day 2/5  meclizine as needed  Outpatient follow-up with ENT  Reevaluated by PT/OT will benefit from rehab    #Constipation  X-ray KUB noted no obstruction.   Continue aggressive bowel regimen     #CAD-stable  Details unclear  Continue aspirin, Brilinta along with statins        #Hypertension  Does not appear to be on any antihypertensives at home  Monitor blood pressure     #Hyperlipidemia     #COPD not in exacerbation  Uses trilogy at home substituted with Symbicort and Spiriva    VTE Prophylaxis:  Mechanical VTE prophylaxis orders are present.             Disposition Planning:     Barriers to Discharge: Placement  Anticipated Date of Discharge: 12/6  Place of Discharge: Rehab      Time: 45 minutes     Code Status and Medical Interventions: CPR (Attempt to Resuscitate); Full Support   Ordered at: 12/02/24 1302     Code Status (Patient has no pulse and is not breathing):    CPR (Attempt to Resuscitate)     Medical Interventions (Patient has pulse or is breathing):    Full Support       Signature: Electronically signed by Alberto Ochoa MD, 12/05/24, 12:40 EST.  [facility] Hospitalist Team

## 2024-12-05 NOTE — CASE MANAGEMENT/SOCIAL WORK
Social Work Assessment  [facility]     Patient Name: Miryam Fernández  MRN: [id number]  Today's Date: 12/5/2024    Admit Date: 12/2/2024     Discharge Plan       Row Name 12/05/24 1109       Plan    Plan [facility] (accepted). PASRR approved. Precert required    Plan Comments CM updated on PASRR status.                  MIRIAM Montiel, LSW, [facility]    Phone: [phone]  Cell: [phone]  Fax: [phone]  [email or web address]

## 2024-12-05 NOTE — PLAN OF CARE
Goal Outcome Evaluation:      Pt rested throughout the shift. VSS. No issues overnight. Pt ready to go home. All questions and concerns addressed.

## 2024-12-05 NOTE — PLAN OF CARE
"Assessment: Miryam Fernández presents with ADL impairments affecting function including balance, cognition, endurance / activity tolerance, and strength. Pt oriented to name/ and place this date, no reports of pain. Min assist to sit EOB, pt exhibits R lateral lean with reports of dizziness that increased with exertion. Pt fatigues quickly, tachy HR in 120s with exertion. Demonstrated functioning below baseline abilities indicate the need for continued skilled intervention while inpatient. Tolerating session today without incident. Will continue to follow and progress as tolerated.      Plan/Recommendations:   Moderate Intensity Therapy recommended post-acute care. This is recommended as therapy feels the patient would require 3-4 days per week and wouldn't tolerate \"3 hour daily\" rehab intensity. SNF would be the preferred choice. If the patient does not agree to SNF, arrange HH or OP depending on home bound status. If patient is medically complex, consider LTACH.  "

## 2024-12-05 NOTE — DISCHARGE PLACEMENT REQUEST
"Miryam Wood (69 y.o. Female)       Date of Birth   1955    Social Security Number       Address   [address] [place] [address] [place] [facility]    Home Phone   [phone]    MRN   [id number]       Rastafari   None    Marital Status                               Admission Date   12/2/24    Admission Type   Emergency    Admitting Provider   Darrel Rivero MD    Attending Provider   Alberto Ochoa MD    Department, Room/Bed   [facility] 2F, 2204/1       Discharge Date       Discharge Disposition       Discharge Destination                                 Attending Provider: Alberto Ochoa MD    Allergies: Albuterol, Hydrocodone, Sulfa Antibiotics, Bacitracin, Benzalkonium Chloride, Codeine, Neomycin, Nickel, Penicillins, Polymyxin B    Isolation: None   Infection: None   Code Status: CPR    Ht: 162 cm (63.78\")   Wt: 54.5 kg (120 lb 2.4 oz)    Admission Cmt: None   Principal Problem: UTI (urinary tract infection) [N39.0]                   Active Insurance as of 12/2/2024       Primary Coverage       Payor Plan Insurance Group Employer/Plan Group    ANTHEM MEDICARE REPLACEMENT ANTHEM MEDICARE ADVANTAGE [id number]       Payor Plan Address Payor Plan Phone Number Payor Plan Fax Number Effective Dates    [id number] [phone]  1/1/2020 - None Entered    [facility] [address]         Subscriber Name Subscriber Birth Date Member ID       MIRYAM WOOD 1955 [id number]                     Emergency Contacts        (Rel.) Home Phone Work Phone Mobile Phone    SADACHENTETOMMY (Daughter) [phone] -- [phone]    FANTA WOOD (Spouse) -- -- [phone]            "

## 2024-12-05 NOTE — PROGRESS NOTES
[facility] MEDICINE SERVICE  DAILY PROGRESS NOTE    NAME: Miryam Fernández  : 1955  MRN: [id number]      LOS: 3 days     PROVIDER OF SERVICE: Alberto Ochoa MD    Chief Complaint: UTI (urinary tract infection)    Subjective:     Interval History:  History taken from: patient chart family RN    Nausea persists unclear when last bowel movement  Vertigo with tendinitis relates to prior episodes of minutes  Daughter at bedside care plan discussed in detail      Review of Systems:   Review of Systems  All negative except as above  Objective:     Vital Signs  Temp:  [97.5 °F (36.4 °C)-98.8 °F (37.1 °C)] 98.8 °F (37.1 °C)  Heart Rate:  [77-98] 86  Resp:  [12-18] 16  BP: (135-166)/(80-85) 163/80   Body mass index is 20.77 kg/m².    Physical Exam  Physical Exam  AOx3 NAD  RRR S1-S2 audible  Lungs with fair air entry  Abdomen soft nontender nondistended       Diagnostic Data    Results from last 7 days   Lab Units 24  0258 24  0423 24  0953   WBC 10*3/mm3 5.71   < > 8.59   HEMOGLOBIN g/dL 12.2   < > 11.9*   HEMATOCRIT % 38.2   < > 38.5   PLATELETS 10*3/mm3 251   < > 280   GLUCOSE mg/dL 137*   < > 133*   CREATININE mg/dL 0.91   < > 0.97   BUN mg/dL 28*   < > 33*   SODIUM mmol/L 141   < > 147*   POTASSIUM mmol/L 4.3   < > 3.8   AST (SGOT) U/L  --   --  18   ALT (SGPT) U/L  --   --  11   ALK PHOS U/L  --   --  108   BILIRUBIN mg/dL  --   --  0.4   ANION GAP mmol/L 13.8   < > 14.7    < > = values in this interval not displayed.       XR Abdomen KUB    Result Date: 2024  Impression: 1. Nonspecific, nonobstructive bowel gas pattern. 2. Moderate amount of stool in the colon. Electronically Signed: Elbert Daniel MD  2024 11:14 AM EST  Workstation ID: [id number]       I reviewed the patient's new clinical results.  I reviewed the patient's new imaging results and agree with the interpretation.    Assessment/Plan:     Active and Resolved Problems  Active Hospital Problems    Diagnosis  POA     **UTI (urinary tract infection) [N39.0]  Yes      Resolved Hospital Problems   No resolved problems to display.       69-year-old female history of COPD, CAD, hyperlipidemia, frequent UTIs admitted to [facility] 12/2 with increased confusion and decreased oral intake     #Acute metabolic encephalopathy suspect secondary to UTI  CT head no acute findings  UA with pyuria urine culture growing E. coli sensitivities noted  Continue ceftriaxone day 3  Follow blood cultures       #Ménière's disease  Diagnosed many years ago follows with ENT as outpatient has responded to steroids in the past.  Appears to be an acute flare  Start prednisone 60 mg daily for 5 days  Outpatient follow-up with ENT    #Constipation  X-ray KUB noted no obstruction.  Dulcolax suppository continue bowel regimen    #CAD-stable  Details unclear  Continue aspirin, Brilinta along with statins        #Hypertension  Does not appear to be on any antihypertensives at home  Monitor blood pressure     #Hyperlipidemia     #COPD not in exacerbation  Uses trilogy at home substituted with Symbicort and Spiriva    VTE Prophylaxis:  Mechanical VTE prophylaxis orders are present.             Disposition Planning:     Barriers to Discharge: Medical workup   Anticipated Date of Discharge: 12/5  Place of Discharge: Home      Time: 45 minutes     Code Status and Medical Interventions: CPR (Attempt to Resuscitate); Full Support   Ordered at: 12/02/24 1302     Code Status (Patient has no pulse and is not breathing):    CPR (Attempt to Resuscitate)     Medical Interventions (Patient has pulse or is breathing):    Full Support       Signature: Electronically signed by Alberto Ochoa MD, 12/05/24, 07:49 EST.  [facility] Hospitalist Team

## 2024-12-05 NOTE — PLAN OF CARE
Problem: Adult Inpatient Plan of Care  Goal: Plan of Care Review  Outcome: Progressing  Goal: Patient-Specific Goal (Individualized)  Outcome: Progressing  Goal: Absence of Hospital-Acquired Illness or Injury  Outcome: Progressing  Intervention: Identify and Manage Fall Risk  Recent Flowsheet Documentation  Taken 12/5/2024 1605 by Starr Smith RN  Safety Promotion/Fall Prevention: safety round/check completed  Taken 12/5/2024 1400 by Starr Smith RN  Safety Promotion/Fall Prevention: safety round/check completed  Taken 12/5/2024 1205 by Starr Smith RN  Safety Promotion/Fall Prevention: safety round/check completed  Taken 12/5/2024 1000 by Starr Smith RN  Safety Promotion/Fall Prevention: safety round/check completed  Taken 12/5/2024 0833 by Starr Smith RN  Safety Promotion/Fall Prevention: safety round/check completed  Intervention: Prevent Skin Injury  Recent Flowsheet Documentation  Taken 12/5/2024 0833 by Starr Smith RN  Body Position: position changed independently  Skin Protection: incontinence pads utilized  Intervention: Prevent and Manage VTE (Venous Thromboembolism) Risk  Recent Flowsheet Documentation  Taken 12/5/2024 0833 by Starr Smith RN  VTE Prevention/Management:   bilateral   SCDs (sequential compression devices) off   patient refused intervention  Goal: Optimal Comfort and Wellbeing  Outcome: Progressing  Intervention: Provide Person-Centered Care  Recent Flowsheet Documentation  Taken 12/5/2024 0833 by Starr Smith RN  Trust Relationship/Rapport:   care explained   choices provided  Goal: Readiness for Transition of Care  Outcome: Progressing     Problem: Fall Injury Risk  Goal: Absence of Fall and Fall-Related Injury  Outcome: Progressing  Intervention: Promote Injury-Free Environment  Recent Flowsheet Documentation  Taken 12/5/2024 1605 by Starr Smith RN  Safety Promotion/Fall Prevention: safety round/check completed  Taken 12/5/2024 1400 by  Starr Smith, RN  Safety Promotion/Fall Prevention: safety round/check completed  Taken 12/5/2024 1205 by Starr Smith RN  Safety Promotion/Fall Prevention: safety round/check completed  Taken 12/5/2024 1000 by Starr Smith RN  Safety Promotion/Fall Prevention: safety round/check completed  Taken 12/5/2024 0833 by Starr Smith RN  Safety Promotion/Fall Prevention: safety round/check completed     Problem: Skin Injury Risk Increased  Goal: Skin Health and Integrity  Outcome: Progressing  Intervention: Optimize Skin Protection  Recent Flowsheet Documentation  Taken 12/5/2024 0833 by Starr Smith RN  Pressure Reduction Techniques: frequent weight shift encouraged  Head of Bed (HOB) Positioning: HOB at 20-30 degrees  Pressure Reduction Devices: positioning supports utilized  Skin Protection: incontinence pads utilized   Goal Outcome Evaluation:

## 2024-12-05 NOTE — CASE MANAGEMENT/SOCIAL WORK
Continued Stay Note  [facility]     Patient Name: Miryam Fernández  MRN: [id number]  Today's Date: 12/5/2024    Admit Date: 12/2/2024    Plan: [facility] (accepted). PASRR approved. Precert approved Valid 12/5-12/11   Discharge Plan       Row Name 12/05/24 1451       Plan    Plan [facility] (accepted). PASRR approved. Precert approved Valid 12/5-12/11    Plan Comments Precert approved Valid 12/5-12/11. [facility] liaamber Toussaint confirmed bed is ready. MD and primary nurse notified via secure chat.               Bernadette Galvez RN      [facility]  Office: [phone]  Cell: [phone]  Fax # [phone]

## 2024-12-05 NOTE — CASE MANAGEMENT/SOCIAL WORK
Continued Stay Note  [facility]     Patient Name: Miryam Fernández  MRN: [id number]  Today's Date: 12/5/2024    Admit Date: 12/2/2024    Plan: [facility] (pending). PASRR needed. Precert required   Discharge Plan       Row Name 12/05/24 0901       Plan    Plan Comments [facility] liaison Lashawn confirmed they do not have bed availability. CM spoke to dtr who requested referrals be sent to [facility] and [facility], CM sent inbaskets for both and notified liaamber Toussaint.      Row Name 12/05/24 0845       Plan    Plan [facility] (pending). PASRR needed. Precert required    Plan Comments CM spoke with patient, dtr, and primary nurse regarding OPPT vs HHC vs SNF. Patient/dtr concerned due to how weak she is with transfers that she may possibly need SNF. Patient/dtr wanted CM to send referral to [facility] as patient has been there for rehab before. CM sent inbasket and notified liaamber Hardin. Also notified PT and OT via secure chat for updated notes (patient will need precert). MD updated of need for precert.                Bernadette Galvez RN     [facility]  Office: [phone]  Cell: [phone]  Fax # [phone]

## 2024-12-05 NOTE — CASE MANAGEMENT/SOCIAL WORK
Continued Stay Note  [facility]     Patient Name: Miryam Fernández  MRN: [id number]  Today's Date: 12/5/2024    Admit Date: 12/2/2024    Plan: [facility] (accepted). PASRR needed. Precert required   Discharge Plan       Row Name 12/05/24 1016       Plan    Plan [facility] (accepted). PASRR needed. Precert required    Plan Comments [facility] and [facility] liaison Breana updated CM that both facilities have bed availability. CM spoke to patient's daughter and she discussed with her mom and they have chosen [facility]. CM requested PASRR. Notified PT/OT via secure chat, will start precert as soon as PT/OT have updated notes in chart.          Bernadette Galvez RN     [facility]  Office: [phone]  Cell: [phone]  Fax # [phone]

## 2024-12-05 NOTE — DISCHARGE PLACEMENT REQUEST
"Miryam Wood (69 y.o. Female)       Date of Birth   1955    Social Security Number       Address   [address] [place] [address] [place] [facility]    Home Phone   [phone]    MRN   [id number]       Temple   None    Marital Status                               Admission Date   24    Admission Type   Emergency    Admitting Provider   Darrel Rivero MD    Attending Provider   Alberto Ochoa MD    Department, Room/Bed   [facility] 2F,        Discharge Date       Discharge Disposition       Discharge Destination                                 Attending Provider: Alberto Ochoa MD    Allergies: Albuterol, Hydrocodone, Sulfa Antibiotics, Bacitracin, Benzalkonium Chloride, Codeine, Neomycin, Nickel, Penicillins, Polymyxin B    Isolation: None   Infection: None   Code Status: CPR    Ht: 162 cm (63.78\")   Wt: 54.5 kg (120 lb 2.4 oz)    Admission Cmt: None   Principal Problem: UTI (urinary tract infection) [N39.0]                   Active Insurance as of 2024       Primary Coverage       Payor Plan Insurance Group Employer/Plan Group    ANTHEM MEDICARE REPLACEMENT ANTHEM MEDICARE ADVANTAGE [id number]       Payor Plan Address Payor Plan Phone Number Payor Plan Fax Number Effective Dates    [address] [phone]  2020 - None Entered    [facility] [id number]         Subscriber Name Subscriber Birth Date Member ID       MIRYAM WOOD 1955 [id number]                     Emergency Contacts        (Rel.) Home Phone Work Phone Mobile Phone    TOMMY TOMLIN (Daughter) [phone] -- [phone]    FANTA WOOD (Spouse) -- -- [phone]                 History & Physical        Yoon Adams APRN at 24 [address] [place] [address] Medicine Services  History & Physical    Patient Name: Miryam Wood  : 1955  MRN: [id number]  Primary Care Physician:  Lashell De La Torre APRN  Date of admission: 2024  Date and Time of " Service: 12/2/2024 at 1300    Subjective      Chief Complaint: confusion     History of Present Illness: Miryam Fernández is a 69 y.o. female with a CMH of COPD, CAD, hyperlipidemia, and frequent UTIs who [facility] with increased confusion and decreased oral intake that has been ongoing for the past 4 days.  Patient states that she generally does not feel well. She says that sometimes she feels nauseated, denies fever, dysuria, shortness of breath or chest pain.  On ED evaluation, glucose is 133, TSH 1.890, carbon monoxide level 2.9, lactic 1.9, lipase 18, procal 0.02, WBC 8.59. UA positive for lood, positive nitrites, protein, RBC, WBC and 4+ bacteria. UA also shows contamination with squamous cells however will treat due to positive nitrites, 4+ bacteria, patient history and presentation. Urine tox screen completely negative. CXR shows no acute findings. CT head shows volume loss secondary to cerebral atrophy, and chronic ischemic changes, no acute findings. EKG shows NSR. Hospitalist team to admit for further medical management.       Review of Systems   Constitutional:  Positive for appetite change and fatigue. Negative for chills and fever.   Gastrointestinal:  Positive for nausea. Negative for abdominal pain.   Genitourinary:  Negative for dysuria, flank pain, frequency and urgency.       Personal History     Past Medical History:   Diagnosis Date    Cancer     lung cancer    Carotid stenosis     COPD (chronic obstructive pulmonary disease)     Coronary artery disease     Esophageal stricture     Dr Domingo    Hyperlipidemia     Metabolic encephalopathy 06/28/2024    2nd to UTI    Pulmonary embolism     seen at [facility], fluid around her heart at the time-right lung    UTI (urinary tract infection) 06/28/2024       Past Surgical History:   Procedure Laterality Date    ANGIOPLASTY / STENTING FEMORAL      CARDIAC CATHETERIZATION      CAROTID STENT      CORONARY STENT PLACEMENT       ESOPHAGOSCOPY / EGD      HYSTERECTOMY      TOTAL HIP ARTHROPLASTY Left 8/1/2024    Procedure: TOTAL HIP ARTHROPLASTY;  Surgeon: Olu Joshua II, MD;  Location: [facility] MAIN OR;  Service: Orthopedics;  Laterality: Left;       Family History: family history includes Dementia in her father; Diabetes in her paternal uncle; Heart disease in her mother; Hyperlipidemia in her father; Hypertension in her mother; Leukemia in her paternal grandmother; Lung cancer in her father. Otherwise pertinent FHx was reviewed and not pertinent to current issue.    Social History:  reports that she has been smoking cigarettes. She started smoking about 40 years ago. She has a 80.3 pack-year smoking history. She has been exposed to tobacco smoke. She has never used smokeless tobacco. She reports that she does not currently use alcohol. She reports that she does not use drugs.    Home Medications:  Prior to Admission Medications       Prescriptions Last Dose Informant Patient Reported? Taking?    aspirin 81 MG EC tablet   No No    Take 1 tablet by mouth Daily.    atorvastatin (LIPITOR) 40 MG tablet   No No    TAKE 1 TABLET BY MOUTH EVERY DAY    benzonatate (Tessalon Perles) 100 MG capsule   No No    Take 1 capsule by mouth 3 (Three) Times a Day As Needed for Cough.    Cholecalciferol 50 MCG (2000 UT) tablet   Yes No    Take 1 tablet by mouth Daily. Indications: Vitamin D Deficiency    doxycycline (VIBRAMYCIN) 100 MG capsule   No No    Take 1 capsule by mouth 2 (Two) Times a Day. Indications: Upper Respiratory Tract Infection    Fluticasone-Umeclidin-Vilant (Trelegy Ellipta) 100-62.5-25 MCG/ACT inhaler   No No    Inhale 1 puff Daily for 30 days.    furosemide (LASIX) 20 MG tablet   No No    TAKE 0.5 TABLETS BY MOUTH DAILY AS NEEDED (SWELLING) FOR UP TO 30 DAYS.    memantine (NAMENDA) 10 MG tablet   No No    Take 1 tablet by mouth 2 (Two) Times a Day. Indications: Memory loss    Patient taking differently:  Take 1 tablet by  mouth Every Night. Indications: Memory loss    memantine (NAMENDA) 5 MG tablet   Yes No    Take 1 tablet by mouth Every 12 (Twelve) Hours.    Patient taking differently:  Take 1 tablet by mouth Every Morning.    metoprolol succinate XL (TOPROL-XL) 25 MG 24 hr tablet   Yes No    Take 1 tablet by mouth Daily. Indications: High Blood Pressure    montelukast (Singulair) 10 MG tablet   No No    Take 1 tablet by mouth Every Night.    oxyCODONE-acetaminophen (PERCOCET) 5-325 MG per tablet   No No    Take 1 tablet by mouth Every 8 (Eight) Hours As Needed for Severe Pain.    pantoprazole (PROTONIX) 40 MG EC tablet   No No    Take 1 tablet by mouth Daily. Indications: Gastroesophageal Reflux Disease    ticagrelor (Brilinta) 60 MG tablet tablet   No No    Take 1 tablet by mouth 2 (Two) Times a Day.    vitamin B-12 (CYANOCOBALAMIN) 1000 MCG tablet   No No    Take 1 tablet by mouth Daily. Indications: Inadequate Vitamin B12              Allergies:  Allergies   Allergen Reactions    Albuterol Shortness Of Breath     Pt states she has sensitivity to albuterol.  She states it causes her to be SOA.    Hydrocodone Palpitations    Sulfa Antibiotics Swelling    Bacitracin Other (See Comments)    Benzalkonium Chloride Hives    Codeine Other (See Comments)    Neomycin Other (See Comments)    Nickel Rash    Penicillins Rash    Polymyxin B Other (See Comments)       Objective      Vitals:   Temp:  [97.4 °F (36.3 °C)] 97.4 °F (36.3 °C)  Heart Rate:  [80] 80  Resp:  [17] 17  BP: (161)/(77) 161/77  Body mass index is 20.9 kg/m².  Physical Exam  Vitals reviewed.   Constitutional:       Appearance: She is ill-appearing.   HENT:      Head: Normocephalic and atraumatic.      Nose: Nose normal.      Mouth/Throat:      Mouth: Mucous membranes are moist.   Eyes:      Extraocular Movements: Extraocular movements intact.      Pupils: Pupils are equal, round, and reactive to light.   Cardiovascular:      Rate and Rhythm: Normal rate and regular rhythm.       Pulses: Normal pulses.   Pulmonary:      Effort: Pulmonary effort is normal.   Abdominal:      General: Bowel sounds are normal.      Palpations: Abdomen is soft.   Musculoskeletal:         General: Normal range of motion.      Cervical back: Normal range of motion and neck supple.   Skin:     General: Skin is warm.      Capillary Refill: Capillary refill takes less than 2 seconds.      Coloration: Skin is pale.   Neurological:      Mental Status: She is alert.      Comments: Patient oriented to self and place only         Diagnostic Data:  Lab Results (last 24 hours)       Procedure Component Value Units Date/Time    Ethanol [[id number]] Collected: 12/02/24 1108    Specimen: Blood Updated: 12/02/24 1149     Ethanol % <0.010 %     Narrative:      Plasma Ethanol Clinical Symptoms:    ETOH (%)               Clinical Symptom  .01-.05              No apparent influence  .03-.12              Euphoria, Diminished judgment and attention   .09-.25              Impaired comprehension, Muscle incoordination  .18-.30              Confusion, Staggered gait, Slurred speech  .25-.40              Markedly decreased response to stimuli, unable to stand or                        walk, vomitting, sleep or stupor  .35-.50              Comatose, Anesthesia, Subnormal body temperature        Urinalysis, Microscopic Only - Straight Cath [[id number]]  (Abnormal) Collected: 12/02/24 1115    Specimen: Urine from Straight Cath Updated: 12/02/24 1144     RBC, UA 3-5 /HPF      WBC, UA 21-50 /HPF      Bacteria, UA 4+ /HPF      Squamous Epithelial Cells, UA 3-6 /HPF      Hyaline Casts, UA None Seen /LPF      Amorphous Crystals, UA Moderate/2+ /HPF      WBC Clumps, UA Small/1+ /HPF      Methodology Manual Light Microscopy    Urine Culture - Urine, Straight Cath [[id number]] Collected: 12/02/24 1115    Specimen: Urine from Straight Cath Updated: 12/02/24 1144    Urine Drug Screen - Straight Cath [[id number]]  (Normal) Collected: 12/02/24 1115     Specimen: Urine from Straight Cath Updated: 12/02/24 1137     THC, Screen, Urine Negative     Phencyclidine (PCP), Urine Negative     Cocaine Screen, Urine Negative     Methamphetamine, Ur Negative     Opiate Screen Negative     Amphetamine Screen, Urine Negative     Benzodiazepine Screen, Urine Negative     Tricyclic Antidepressants Screen Negative     Methadone Screen, Urine Negative     Barbiturates Screen, Urine Negative     Oxycodone Screen, Urine Negative     Buprenorphine, Screen, Urine Negative    Narrative:      Cutoff For Drugs Screened:    Amphetamines               500 ng/ml  Barbiturates               200 ng/ml  Benzodiazepines            150 ng/ml  Cocaine                    150 ng/ml  Methadone                  200 ng/ml  Opiates                    100 ng/ml  Phencyclidine               25 ng/ml  THC                         50 ng/ml  Methamphetamine            500 ng/ml  Tricyclic Antidepressants  300 ng/ml  Oxycodone                  100 ng/ml  Buprenorphine               10 ng/ml    The normal value for all drugs tested is negative. This report includes unconfirmed screening results, with the cutoff values listed, to be used for medical treatment purposes only.  Unconfirmed results must not be used for non-medical purposes such as employment or legal testing.  Clinical consideration should be applied to any drug of abuse test, particularly when unconfirmed results are used.    All urine drugs of abuse requests without chain of custody are for medical screening purposes only.  False positives are possible.      Urinalysis With Culture If Indicated - Straight Cath [[id number]]  (Abnormal) Collected: 12/02/24 1115    Specimen: Urine from Straight Cath Updated: 12/02/24 1128     Color, UA Yellow     Appearance, UA Slightly Cloudy     pH, UA 5.5     Specific [place], UA 1.035     Comment: Result obtained by Refractometer  Appended report. These results have been appended to a previously final verified  report.        Glucose, UA Negative     Ketones, UA Negative     Bilirubin, UA Negative     Blood, UA Small (1+)     Protein, UA 30 mg/dL (1+)     Leuk Esterase, UA Negative     Nitrite, UA Positive     Urobilinogen, UA 0.2 E.U./dL    Narrative:      In absence of clinical symptoms, the presence of pyuria, bacteria, and/or nitrites on the urinalysis result does not correlate with infection.    POC Lactate [[id number]]  (Normal) Collected: 12/02/24 1119    Specimen: Blood Updated: 12/02/24 1122     Lactate 1.9 mmol/L      Comment: Serial Number: [id number]:  [id number]       Carbon Monoxide, Blood [[id number]]  (Normal) Collected: 12/02/24 1108    Specimen: Blood Updated: 12/02/24 1122     Carbon Monoxide, Blood 2.9 %     Blood Culture - Blood, Arm, Left [[id number]] Collected: 12/02/24 1108    Specimen: Blood from Arm, Left Updated: 12/02/24 1119    Blood Culture - Blood, Arm, Right [[id number]] Collected: 12/02/24 1056    Specimen: Blood from Arm, Right Updated: 12/02/24 1119    Lipase [[id number]]  (Normal) Collected: 12/02/24 0953    Specimen: Blood Updated: 12/02/24 1031     Lipase 18 U/L     TSH Rfx On Abnormal To Free T4 [[id number]]  (Normal) Collected: 12/02/24 0953    Specimen: Blood Updated: 12/02/24 1031     TSH 1.890 uIU/mL     Procalcitonin [[id number]]  (Normal) Collected: 12/02/24 0953    Specimen: Blood Updated: 12/02/24 1031     Procalcitonin 0.02 ng/mL     Narrative:      As a Marker for Sepsis (Non-Neonates):    1. <0.5 ng/mL represents a low risk of severe sepsis and/or septic shock.  2. >2 ng/mL represents a high risk of severe sepsis and/or septic shock.    As a Marker for Lower Respiratory Tract Infections that require antibiotic therapy:    PCT on Admission    Antibiotic Therapy       6-12 Hrs later    >0.5                Strongly Recommended  >0.25 - <0.5        Recommended   0.1 - 0.25          Discouraged              Remeasure/reassess PCT  <0.1                Strongly Discouraged      "Remeasure/reassess PCT    As 28 day mortality risk marker: \"Change in Procalcitonin Result\" (>80% or <=80%) if Day 0 (or Day 1) and Day 4 values are available. Refer to http://www.[facility]-pct-calculator.com    Change in PCT <=80%  A decrease of PCT levels below or equal to 80% defines a positive change in PCT test result representing a higher risk for 28-day all-cause mortality of patients diagnosed with severe sepsis for septic shock.    Change in PCT >80%  A decrease of PCT levels of more than 80% defines a negative change in PCT result representing a lower risk for 28-day all-cause mortality of patients diagnosed with severe sepsis or septic shock.       Comprehensive Metabolic Panel [[id number]]  (Abnormal) Collected: 12/02/24 0953    Specimen: Blood Updated: 12/02/24 1031     Glucose 133 mg/dL      BUN 33 mg/dL      Creatinine 0.97 mg/dL      Sodium 147 mmol/L      Potassium 3.8 mmol/L      Chloride 105 mmol/L      CO2 27.3 mmol/L      Calcium 10.3 mg/dL      Total Protein 7.7 g/dL      Albumin 4.4 g/dL      ALT (SGPT) 11 U/L      AST (SGOT) 18 U/L      Alkaline Phosphatase 108 U/L      Total Bilirubin 0.4 mg/dL      Globulin 3.3 gm/dL      A/G Ratio 1.3 g/dL      BUN/Creatinine Ratio 34.0     Anion Gap 14.7 mmol/L      eGFR 64.2 mL/min/1.73     Narrative:      GFR Normal >60  Chronic Kidney Disease <60  Kidney Failure <15      Magnesium [[id number]]  (Normal) Collected: 12/02/24 0953    Specimen: Blood Updated: 12/02/24 1031     Magnesium 2.2 mg/dL     Extra Tubes [[id number]] Collected: 12/02/24 0953    Specimen: Blood, Venous Line Updated: 12/02/24 1001    Narrative:      The following orders were created for panel order Extra Tubes.  Procedure                               Abnormality         Status                     ---------                               -----------         ------                     Gold Top - [facility][[id number]]                                   Final result               Light Blue " Top[[id number]]                                   Final result                 Please view results for these tests on the individual orders.    Gold Top - SST [[id number]] Collected: 12/02/24 0953    Specimen: Blood Updated: 12/02/24 1001     Extra Tube Hold for add-ons.     Comment: Auto resulted.       Light Blue Top [[id number]] Collected: 12/02/24 0953    Specimen: Blood Updated: 12/02/24 1001     Extra Tube Hold for add-ons.     Comment: Auto resulted       CBC & Differential [[id number]]  (Abnormal) Collected: 12/02/24 0953    Specimen: Blood Updated: 12/02/24 1001    Narrative:      The following orders were created for panel order CBC & Differential.  Procedure                               Abnormality         Status                     ---------                               -----------         ------                     CBC Auto Differential[[id number]]        Abnormal            Final result                 Please view results for these tests on the individual orders.    CBC Auto Differential [[id number]]  (Abnormal) Collected: 12/02/24 0953    Specimen: Blood Updated: 12/02/24 1001     WBC 8.59 10*3/mm3      RBC 3.93 10*6/mm3      Hemoglobin 11.9 g/dL      Hematocrit 38.5 %      MCV 98.0 fL      MCH 30.3 pg      MCHC 30.9 g/dL      RDW 15.2 %      RDW-SD 54.8 fl      MPV 9.9 fL      Platelets 280 10*3/mm3      Neutrophil % 88.9 %      Lymphocyte % 5.7 %      Monocyte % 4.8 %      Eosinophil % 0.1 %      Basophil % 0.2 %      Immature Grans % 0.3 %      Neutrophils, Absolute 7.63 10*3/mm3      Lymphocytes, Absolute 0.49 10*3/mm3      Monocytes, Absolute 0.41 10*3/mm3      Eosinophils, Absolute 0.01 10*3/mm3      Basophils, Absolute 0.02 10*3/mm3      Immature Grans, Absolute 0.03 10*3/mm3      nRBC 0.0 /100 WBC              Imaging Results (Last 24 Hours)       Procedure Component Value Units Date/Time    CT Head Without Contrast [[id number]] Collected: 12/02/24 1019     Updated: 12/02/24 1026    Narrative:       CT HEAD WO CONTRAST    Date of Exam: 12/2/2024 10:11 AM EST    Indication: Confusion.    Comparison: 7/19/2024.    Technique: Axial CT images were obtained of the head without contrast administration.  Coronal reconstructions were performed.  Automated exposure control and iterative reconstruction methods were used.      Findings:  There is enlargement of the sulci, fissures, ventricles, and basal cisterns indicating volume loss secondary to cerebral atrophy. There are areas of decreased density in the periventricular and subcortical white matter consistent with chronic   microvascular ischemia. There are also a few old lacunar infarcts within the basal ganglia and adjacent white matter, right greater than left side. There is no acute hemorrhage, midline shift, or suspicious extra-axial fluid collections. There are   atherosclerotic vascular calcifications in the cavernous carotid arteries and the distal vertebral arteries. There is thinning of the lenses indicating previous cataract surgery. There is mucosal thickening in the left sphenoid sinus consistent with   chronic sinus disease. The remaining paranasal and mastoid sinuses are clear. The calvarium is unremarkable.      Impression:      Impression:  1.Volume loss secondary to cerebral atrophy.  2.Chronic ischemic changes.  3.No acute findings.        Electronically Signed: Evangelista Obrien MD    12/2/2024 10:23 AM EST    Workstation ID: [id number]    XR Chest 1 View [[id number]] Collected: 12/02/24 1017     Updated: 12/02/24 1022    Narrative:      XR CHEST 1 VW    Date of Exam: 12/2/2024 10:15 AM EST    Indication: weakness    Comparison: Chest radiograph 7/19/2024.    Findings:  Cardiac silhouette is unchanged. Unchanged left suprahilar scarlike opacity, suggestive of posttreatment change as described on prior CT. No focal consolidation otherwise. No pleural effusion or pneumothorax. Osseous structures are unchanged.      Impression:      Impression:  Similar  chronic findings without evidence of acute cardiopulmonary disease.      Electronically Signed: Dionicio Ayala MD    12/2/2024 10:20 AM EST    Workstation ID: [id number]              Assessment & Plan        This is a 69 y.o. female with:    Active and Resolved Problems  Active Hospital Problems    Diagnosis  POA    **UTI (urinary tract infection) [N39.0]  Yes      Resolved Hospital Problems   No resolved problems to display.       Confusion   Decreased oral intake   Likely 2/2 UTI  - CT head shows volume loss secondary to cerebral atrophy and chronic ischemic changes  - UA + for blood, positive nitrites, protein, RBC, WBC and 4+ bacteria. Also shows contamination with squamous cells however will treat due to positive nitrites, 4+ bacteria, patient history and presentation  - Continue Ceftriaxone  - Culture and sensitivity pending  - Consider nutritional consult  - PT/OT eval     Hypertension   - BP stable  - Resume home medications once reconciled     Hyperlipidemia  - continue home statin     COPD  - Not in acute exacerbation  - Continue home inhalers once reconciled         VTE Prophylaxis:  Mechanical VTE prophylaxis orders are present.        The patient desires to be as follows:    CODE STATUS:    Code Status (Patient has no pulse and is not breathing): CPR (Attempt to Resuscitate)  Medical Interventions (Patient has pulse or is breathing): Full Support        Neva Heike, who can be contacted at [phone], is the designated person to make medical decisions on the patient's behalf if She is incapable of doing so. This was clarified with patient and/or next of kin on 12/2/2024 during the course of this H&P.    Admission Status:  I believe this patient meets inpatient status.    Expected Length of Stay: > 48 hours     PDMP and Medication Dispenses via Sidebar reviewed and consistent with patient reported medications.    I discussed the patient's findings and my recommendations with patient.      Signature:      This document has been electronically signed by KENNY Ac on 2024 13:04 [facility]      Electronically signed by Yoon Adams APRN at 24 1323       Operative/Procedure Notes (all)    No notes of this type exist for this encounter.          Physician Progress Notes (last 48 hours)        Alberto Ochoa MD at 24 1240              [facility] MEDICINE SERVICE  DAILY PROGRESS NOTE    NAME: Miryam Fernández  : 1955  MRN: [id number]      LOS: 3 days     PROVIDER OF SERVICE: Alberto Ochoa MD    Chief Complaint: UTI (urinary tract infection)    Subjective:     Interval History:  History taken from: patient chart family RN    She feels more weak today along with ataxia        Review of Systems:   Review of Systems  All negative except as above  Objective:     Vital Signs  Temp:  [97.5 °F (36.4 °C)-98.8 °F (37.1 °C)] 97.6 °F (36.4 °C)  Heart Rate:  [77-98] 88  Resp:  [12-17] 17  BP: (139-166)/(79-96) 139/96   Body mass index is 20.77 kg/m².    Physical Exam  Physical Exam  AOx3 NAD  RRR S1-S2 audible  Lungs with fair air entry  Abdomen soft nontender nondistended        Diagnostic Data    Results from last 7 days   Lab Units 24  0258 24  0423 24  0953   WBC 10*3/mm3 5.71   < > 8.59   HEMOGLOBIN g/dL 12.2   < > 11.9*   HEMATOCRIT % 38.2   < > 38.5   PLATELETS 10*3/mm3 251   < > 280   GLUCOSE mg/dL 137*   < > 133*   CREATININE mg/dL 0.91   < > 0.97   BUN mg/dL 28*   < > 33*   SODIUM mmol/L 141   < > 147*   POTASSIUM mmol/L 4.3   < > 3.8   AST (SGOT) U/L  --   --  18   ALT (SGPT) U/L  --   --  11   ALK PHOS U/L  --   --  108   BILIRUBIN mg/dL  --   --  0.4   ANION GAP mmol/L 13.8   < > 14.7    < > = values in this interval not displayed.       XR Abdomen KUB    Result Date: 2024  Impression: 1. Nonspecific, nonobstructive bowel gas pattern. 2. Moderate amount of stool in the colon. Electronically Signed: Elbert Daniel MD   12/4/2024 11:14 AM EST  Workstation ID: [id number]       I reviewed the patient's new clinical results.  I reviewed the patient's new imaging results and agree with the interpretation.    Assessment/Plan:     Active and Resolved Problems  Active Hospital Problems    Diagnosis  POA    **UTI (urinary tract infection) [N39.0]  Yes      Resolved Hospital Problems   No resolved problems to display.       69-year-old female history of COPD, CAD, hyperlipidemia, frequent UTIs admitted to [facility] 12/2 with increased confusion and decreased oral intake     #Acute metabolic encephalopathy suspect secondary to UTI  CT head no acute findings  UA with pyuria urine culture growing E. coli sensitivities noted  Status post 3 days of ceftriaxone  Follow blood cultures        #Ménière's disease  Diagnosed many years ago follows with ENT as outpatient has responded to steroids in the past.  Appears to be an acute flare  Continue Prednisone 60 mg daily day 2/5  meclizine as needed  Outpatient follow-up with ENT  Reevaluated by PT/OT will benefit from rehab    #Constipation  X-ray KUB noted no obstruction.   Continue aggressive bowel regimen     #CAD-stable  Details unclear  Continue aspirin, Brilinta along with statins        #Hypertension  Does not appear to be on any antihypertensives at home  Monitor blood pressure     #Hyperlipidemia     #COPD not in exacerbation  Uses trilogy at home substituted with Symbicort and Spiriva    VTE Prophylaxis:  Mechanical VTE prophylaxis orders are present.             Disposition Planning:     Barriers to Discharge: Placement  Anticipated Date of Discharge: 12/6  Place of Discharge: Rehab      Time: 45 minutes     Code Status and Medical Interventions: CPR (Attempt to Resuscitate); Full Support   Ordered at: 12/02/24 1302     Code Status (Patient has no pulse and is not breathing):    CPR (Attempt to Resuscitate)     Medical Interventions (Patient has pulse or is breathing):    Full Support        Signature: Electronically signed by Alberto Ochoa MD, 24, 12:40 EST.  [facility] Hospitalist Team      Electronically signed by Alberto Ochoa MD at 24 1243       Alberto Ochoa MD at 24 0749              [facility] MEDICINE SERVICE  DAILY PROGRESS NOTE    NAME: Miryam Fernández  : 1955  MRN: [id number]      LOS: 3 days     PROVIDER OF SERVICE: Alberto Ochoa MD    Chief Complaint: UTI (urinary tract infection)    Subjective:     Interval History:  History taken from: patient chart family RN    Nausea persists unclear when last bowel movement  Vertigo with tendinitis relates to prior episodes of minutes  Daughter at bedside care plan discussed in detail      Review of Systems:   Review of Systems  All negative except as above  Objective:     Vital Signs  Temp:  [97.5 °F (36.4 °C)-98.8 °F (37.1 °C)] 98.8 °F (37.1 °C)  Heart Rate:  [77-98] 86  Resp:  [12-18] 16  BP: (135-166)/(80-85) 163/80   Body mass index is 20.77 kg/m².    Physical Exam  Physical Exam  AOx3 NAD  RRR S1-S2 audible  Lungs with fair air entry  Abdomen soft nontender nondistended       Diagnostic Data    Results from last 7 days   Lab Units 24  0258 24  0423 24  0953   WBC 10*3/mm3 5.71   < > 8.59   HEMOGLOBIN g/dL 12.2   < > 11.9*   HEMATOCRIT % 38.2   < > 38.5   PLATELETS 10*3/mm3 251   < > 280   GLUCOSE mg/dL 137*   < > 133*   CREATININE mg/dL 0.91   < > 0.97   BUN mg/dL 28*   < > 33*   SODIUM mmol/L 141   < > 147*   POTASSIUM mmol/L 4.3   < > 3.8   AST (SGOT) U/L  --   --  18   ALT (SGPT) U/L  --   --  11   ALK PHOS U/L  --   --  108   BILIRUBIN mg/dL  --   --  0.4   ANION GAP mmol/L 13.8   < > 14.7    < > = values in this interval not displayed.       XR Abdomen KUB    Result Date: 2024  Impression: 1. Nonspecific, nonobstructive bowel gas pattern. 2. Moderate amount of stool in the colon. Electronically Signed: Elbert Daniel MD  2024 11:14 AM EST  Workstation  ID: [id number]       I reviewed the patient's new clinical results.  I reviewed the patient's new imaging results and agree with the interpretation.    Assessment/Plan:     Active and Resolved Problems  Active Hospital Problems    Diagnosis  POA    **UTI (urinary tract infection) [N39.0]  Yes      Resolved Hospital Problems   No resolved problems to display.       69-year-old female history of COPD, CAD, hyperlipidemia, frequent UTIs [facility] with increased confusion and decreased oral intake     #Acute metabolic encephalopathy suspect secondary to UTI  CT head no acute findings  UA with pyuria urine culture growing E. coli sensitivities noted  Continue ceftriaxone day 3  Follow blood cultures       #Ménière's disease  Diagnosed many years ago follows with ENT as outpatient has responded to steroids in the past.  Appears to be an acute flare  Start prednisone 60 mg daily for 5 days  Outpatient follow-up with ENT    #Constipation  X-ray KUB noted no obstruction.  Dulcolax suppository continue bowel regimen    #CAD-stable  Details unclear  Continue aspirin, Brilinta along with statins        #Hypertension  Does not appear to be on any antihypertensives at home  Monitor blood pressure     #Hyperlipidemia     #COPD not in exacerbation  Uses trilogy at home substituted with Symbicort and Spiriva    VTE Prophylaxis:  Mechanical VTE prophylaxis orders are present.             Disposition Planning:     Barriers to Discharge: Medical workup   Anticipated Date of Discharge: 12/5  Place of Discharge: Home      Time: 45 minutes     Code Status and Medical Interventions: CPR (Attempt to Resuscitate); Full Support   Ordered at: 12/02/24 1302     Code Status (Patient has no pulse and is not breathing):    CPR (Attempt to Resuscitate)     Medical Interventions (Patient has pulse or is breathing):    Full Support       Signature: Electronically signed by Alberto Ochoa MD, 12/05/24, 07:49 EST.  [facility]  Hospitalist Team      Electronically signed by Alberto Ochoa MD at 24 0752       Consult Notes (last 48 hours)  Notes from 24 1442 through 24 1442   No notes of this type exist for this encounter.       Nutrition Notes (most recent note)    No notes exist for this encounter.       Speech Language Pathology Notes (most recent note)    No notes exist for this encounter.       Puja Negrete, PT   Physical Therapist  Physical Therapy  Therapy Evaluation     Signed  Date of Service:  24  Creation Time:  24     Signed        Expand All Collapse All  Patient Name: Miryam Fernández                 : 1955                        MRN: [id number]                              Today's Date: 12/3/2024                                   Admit Date: 2024                        Visit Dx:   Visit Diagnosis       ICD-10-CM ICD-9-CM   1. Urinary tract infection without hematuria, site unspecified  N39.0 599.0   2. Weakness  R53.1 780.79         Problem List       Patient Active Problem List   Diagnosis    Obstructive sleep apnea, adult    Tobacco use    Normocytic anemia    Meniere's disease    Lung nodule    Hilar lymphadenopathy    Hyperlipidemia    Heartburn    Gastroesophageal reflux disease    Dysphagia    Chronic obstructive pulmonary disease    CAD (coronary artery disease)    Bruises easily    Chronic pain    Small cell lung cancer    Carotid stenosis    Coronary artery disease    Primary hypertension    Long term (current) use of opiate analgesic    DDD (degenerative disc disease), lumbar    Screening mammogram for breast cancer    Preventative health care    Vitamin D deficiency    Need for influenza vaccination    Moderate episode of recurrent major depressive disorder    Need for vaccination against Streptococcus pneumoniae    Frequent falls    Weakness    Nasal congestion    Memory loss    Localized swelling of both lower legs    Meniere disease, bilateral     Abdominal aortic aneurysm (AAA) without rupture    Thoracic aortic aneurysm without rupture    Plantar wart    Screening for osteoporosis    Asymptomatic menopause    Screening for malignant neoplasm of colon    Skin tear of left forearm without complication    Encounter for subsequent annual wellness visit (AWV) in Medicare patient    Acute cystitis    Altered mental status    Left displaced femoral neck fracture    Closed fracture of left hip    Moderate malnutrition    PAD (peripheral artery disease)    S/P insertion of iliac artery stent    Celiac artery stenosis    Renal artery stenosis    UTI (urinary tract infection)         Medical History        Past Medical History:   Diagnosis Date    Cancer       lung cancer    Carotid stenosis      COPD (chronic obstructive pulmonary disease)      Coronary artery disease      Esophageal stricture       Dr Domingo    Hyperlipidemia      Metabolic encephalopathy 06/28/2024     2nd to UTI    Pulmonary embolism       seen at [facility], fluid around her heart at the time-right lung    UTI (urinary tract infection) 06/28/2024         Surgical History         Past Surgical History:   Procedure Laterality Date    ANGIOPLASTY / STENTING FEMORAL        CARDIAC CATHETERIZATION        CAROTID STENT        CORONARY STENT PLACEMENT        ESOPHAGOSCOPY / EGD        HYSTERECTOMY        TOTAL HIP ARTHROPLASTY Left 8/1/2024     Procedure: TOTAL HIP ARTHROPLASTY;  Surgeon: Olu Joshua II, MD;  Location: [facility];  Service: Orthopedics;  Laterality: Left;           General Information         Row Name 12/03/24 1045                 Physical Therapy Time and Intention     Document Type evaluation  -       Mode of Treatment physical therapy  -          Row Name 12/03/24 1045                 General Information     Patient Profile Reviewed yes  -JH       Prior Level of Function independent:;ADL's;all household mobility  uses rollator  -       Existing  Precautions/Restrictions fall;orthostatic hypotension  -       Barriers to Rehab cognitive status;previous functional deficit;medically complex  -[facility] Name 12/03/24 1045                 Living Environment     People in Home spouse  he works, dtr stays with her when he works, reports has 24 hr supervision from family  -[facility] Name 12/03/24 1045                 Home Main Entrance     Number of Stairs, Main Entrance other (see comments)  ramp entry  -[facility] Name 12/03/24 1045                 Stairs Within Home, Primary     Number of Stairs, Within Home, Primary none  -[facility] Name 12/03/24 1045                 Cognition     Orientation Status (Cognition) oriented to;person;place;situation;disoriented to;time;verbal cues/prompts needed for orientation  -[facility] Name 12/03/24 1045                 Safety Issues/Impairments Affecting Functional Mobility     Safety Issues Affecting Function (Mobility) insight into deficits/self-awareness;judgment;problem-solving;safety precaution awareness  -       Impairments Affecting Function (Mobility) endurance/activity tolerance;balance;strength  -                       User Key  (r) = Recorded By, (t) = Taken By, (c) = Cosigned By        Initials Name Provider Type      Puja Negrete, PT Physical Therapist                            Mobility         [facility] Name 12/03/24 1047                 Bed Mobility     Bed Mobility supine-sit  -       Supine-Sit [place] (Bed Mobility) modified independence  -       Assistive Device (Bed Mobility) head of bed elevated  -[facility] Name 12/03/24 1047                 Bed-Chair Transfer     Bed-Chair [place] (Transfers) minimum assist (75% patient effort);1 person assist  -       Assistive Device (Bed-Chair Transfers) walker, front-wheeled  -[facility] Name 12/03/24 1047                 Sit-Stand Transfer     Sit-Stand [place] (Transfers) contact guard;minimum assist  (75% patient effort)  -       Assistive Device (Sit-Stand Transfers) walker, front-wheeled  -          Row Name 12/03/24 1047                 Gait/Stairs (Locomotion)     [place] Level (Gait) unable to assess  -       Comment, (Gait/Stairs) pt c/o dizziness in sitting/standing, orthostatic in standing  -                       User Key  (r) = Recorded By, (t) = Taken By, (c) = Cosigned By        Initials Name Provider Type      Puja Negrete PT Physical Therapist                            Obj/Interventions         [facility] Name 12/03/24 1048                 Range of Motion Comprehensive     General Range of Motion bilateral lower extremity ROM WFL  -[facility] Name 12/03/24 1048                 Strength Comprehensive (MMT)     Comment, General Manual Muscle Testing (MMT) Assessment gross strength BLEs 4/5  -[facility] Name 12/03/24 1048                 Balance     Balance Assessment sitting static balance;sitting dynamic balance;standing static balance;standing dynamic balance  -       Static Sitting Balance standby assist  -       Dynamic Sitting Balance contact guard  -       Position, Sitting Balance unsupported;sitting edge of bed  -       Static Standing Balance minimal assist;contact guard  -       Dynamic Standing Balance minimal assist  -       Position/Device Used, Standing Balance supported;walker, rolling  -[facility] Name 12/03/24 1048                 Sensory Assessment (Somatosensory)     Sensory Assessment (Somatosensory) sensation intact  -                       User Key  (r) = Recorded By, (t) = Taken By, (c) = Cosigned By        Initials Name Provider Type      Puja Negrete, JANNET Physical Therapist                            Goals/Plan         [facility] Name 12/03/24 1053                 Bed Mobility Goal 1 (PT)     Activity/Assistive Device (Bed Mobility Goal 1, PT) bed mobility activities, all  -       [place] Level/Cues Needed (Bed Mobility Goal 1, PT)  independent  -       Time Frame (Bed Mobility Goal 1, PT) 1 week  -[facility] Name 12/03/24 1053                 Transfer Goal 1 (PT)     Activity/Assistive Device (Transfer Goal 1, PT) sit-to-stand/stand-to-sit;bed-to-chair/chair-to-bed;walker, rolling  -       [place] Level/Cues Needed (Transfer Goal 1, PT) standby assist  -       Time Frame (Transfer Goal 1, PT) 2 weeks  -[facility] Name 12/03/24 1053                 Gait Training Goal 1 (PT)     Activity/Assistive Device (Gait Training Goal 1, PT) gait (walking locomotion);assistive device use;walker, rolling  -       [place] Level (Gait Training Goal 1, PT) standby assist  -       Distance (Gait Training Goal 1, PT) 150'  -       Time Frame (Gait Training Goal 1, PT) 2 weeks  -[facility] Name 12/03/24 1053                 Therapy Assessment/Plan (PT)     Planned Therapy Interventions (PT) balance training;bed mobility training;gait training;transfer training;strengthening;patient/family education  [facility]                    User Key  (r) = Recorded By, (t) = Taken By, (c) = Cosigned By        Initials Name Provider Type      Puja Negrete, PT Physical Therapist                         Clinical Impression         [facility] Name 12/03/24 1049                 Pain     Additional Documentation Pain Scale: FACES Pre/Post-Treatment (Group)  -[facility] Name 12/03/24 1049                 Pain Scale: FACES Pre/Post-Treatment     Pain: FACES Scale, Pretreatment 0-->no hurt  -       Posttreatment Pain Rating 0-->no hurt  -[facility] Name 12/03/24 1049                 Plan of Care Review     Plan of Care Reviewed With patient;family  -       Outcome Evaluation 69 y.o. female with a CMH of COPD, CAD, hyperlipidemia, and frequent UTIs who presented to [facility] on 12/2/2024 with increased confusion and decreased oral intake that has been ongoing for the past 4 days.  Patient states that she generally does not feel well.   d/o UTI.  At baseline, pt lives at home with her spouse, has 24 hr supervision from her family.  Pt uses a rollator at home.  This date, pt with mild confusion but pleasant and cooperative with therapy.  Pt was able to transfer to EOB with supervision.  c/o dizziness wtih sitting and standing.  Pt with seated /91 and standing dropped to 125/56. Pt with mild balance deficits in standing putting her at risk for falls.  Will follow pt 5x/week and recommend home with 24 hr A from family and HHPT.  -          Row Name 12/03/24 1049                 Therapy Assessment/Plan (PT)     Rehab Potential (PT) good  -       Criteria for Skilled Interventions Met (PT) yes;skilled treatment is necessary  -       Therapy Frequency (PT) 5 times/wk  -[facility] Name 12/03/24 1049                 Vital Signs     Pre Systolic BP Rehab 156  -       Pre Treatment Diastolic BP 91  sitting  -       Intra Systolic BP Rehab 125  -       Intra Treatment Diastolic BP 56  standing, c/o dizziness  -       Intratreatment Heart Rate (beats/min) 131  with standing activity  -       Posttreatment Heart Rate (beats/min) 118  -       O2 Delivery Pre Treatment room air  -       O2 Delivery Intra Treatment room air  -       O2 Delivery Post Treatment room air  -          Row Name 12/03/24 1049                 Positioning and Restraints     Pre-Treatment Position in bed  -       Post Treatment Position chair  -       In Chair notified nsg;sitting;call light within reach;encouraged to call for assist;exit alarm on;with family/caregiver  -                       User Key  (r) = Recorded By, (t) = Taken By, (c) = Cosigned By        Initials Name Provider Type      Puja Negrete, PT Physical Therapist                            Outcome Measures         Row Name 12/03/24 1053 12/03/24 0800            How much help from another person do you currently need...     Turning from your back to your side while in flat bed  without using bedrails? 4  - 4  -GUNNAR     Moving from lying on back to sitting on the side of a flat bed without bedrails? 4  - 4  -GUNNAR     Moving to and from a bed to a chair (including a wheelchair)? 3  -JH 4  -GUNNAR     Standing up from a chair using your arms (e.g., wheelchair, bedside chair)? 3  -JH 4  -GUNNAR     Climbing 3-5 steps with a railing? 2  - 3  -GUNNAR     To walk in hospital room? 3  - 3  -GUNNAR     AM-PAC 6 Clicks Score (PT) 19  - 22  -GUNNAR     Highest Level of Mobility Goal 6 --> Walk 10 steps or more  - 7 --> Walk 25 feet or more  -[facility]        Row Name 12/03/24 1053                 Functional Assessment     Outcome Measure Options AM-PAC 6 Clicks Basic Mobility (PT)  -                       User Key  (r) = Recorded By, (t) = Taken By, (c) = Cosigned By        Initials Name Provider Type      Puja Negrete, JANNET Physical Therapist     Jennifer Cartagena, RN Registered Nurse                          Physical Therapy Education            Title: PT OT SLP Therapies (Done)         Topic: Physical Therapy (Done)         Point: Mobility training (Done)         Learning Progress Summary             Patient Acceptance, E,TB, VU by  at 12/3/2024 1053   Family Acceptance, E,TB, VU by  at 12/3/2024 1053                                                User Key         Initials Effective Dates Name Provider Type [facility] 06/16/21 -  Puja Negrete, PT Physical Therapist PT                          PT Recommendation and Plan  Planned Therapy Interventions (PT): balance training, bed mobility training, gait training, transfer training, strengthening, patient/family education  Outcome Evaluation: 69 y.o. female with a CMH of COPD, CAD, hyperlipidemia, and frequent UTIs who presented to [facility] on [facility] with increased confusion and decreased oral intake that has been ongoing for the past 4 days.  Patient states that she generally does not feel well.  d/o UTI.  At baseline, pt lives at  home with her spouse, has 24 hr supervision from her family.  Pt uses a rollator at home.  This date, pt with mild confusion but pleasant and cooperative with therapy.  Pt was able to transfer to EOB with supervision.  c/o dizziness wtih sitting and standing.  Pt with seated /91 and standing dropped to 125/56. Pt with mild balance deficits in standing putting her at risk for falls.  Will follow pt 5x/week and recommend home with 24 hr A from family and HHPT.      Time Calculation:        PT Charges         Row Name 12/03/24 1054                       Time Calculation     Start Time 0911  -         Stop Time 0929  -         Time Calculation (min) 18 min  -         PT Received On 12/03/24  -         PT - Next Appointment 12/04/24  -         PT Goal Re-Cert Due Date 12/17/24  -                 Time Calculation- PT     Total Timed Code Minutes- PT 0 minute(s)  -                      User Key  (r) = Recorded By, (t) = Taken By, (c) = Cosigned By        Initials Name Provider Type      Puja Negrete PT Physical Therapist                       Therapy Charges for Today         Code Description Service Date Service Provider Modifiers Qty     68590031698 HC PT EVAL MOD COMPLEXITY 3 12/3/2024 Puja Negrete, JANNET GP 1                PT G-Codes  Outcome Measure Options: AM-PAC 6 Clicks Basic Mobility (PT)  AM-PAC 6 Clicks Score (PT): 19  PT Discharge Summary  Anticipated Discharge Disposition (PT): home with 24/7 care, home with home health     Puja Negrete PT                      12/3/2024                                    Puja Negrete PT   Physical Therapist  Physical Therapy  Therapy Treatment Note     Signed  Date of Service:  12/05/24 0948  Creation Time:  12/05/24 1143     Signed        Subjective: Pt agreeable to therapeutic plan of care.  Feels weak, dtr feels like pt is getting weaker.      Objective:   Pt sleeping in bed, easily awakened and A&O x4.      Precautions - falls, orthostatic    "  Bed mobility - Min-A and Mod-A  Transfers - Min-A and Mod-A, sit to stand x 3 from EOB, very weak, dizzy.  BP drops in standing.  Unable to tolerate t/f to chair.   Ambulation -  feet N/A or Not attempted.     Vitals: Tachycardic  BP sitting 152/85  Standing 139/96  -137 with sitting EOB/standing     Pain: 0 VAS   Location:   Intervention for pain: N/A     Education: Provided education on the importance of mobility in the acute care setting, change in d/c recommendations     Assessment: Miryam Fernández presents with functional mobility impairments which indicate the need for skilled intervention. Pt is weaker today with overall functional decline since therapy eval.  Pt tachy with standing activity and veyr weak in standing.  RN aware and dtr present.  Pt not safe for home in current condition, changing d/c recommendation to SNF for rehab. Tolerating session today without incident. Will continue to follow and progress as tolerated.      Plan/Recommendations:   If medically appropriate, Moderate Intensity Therapy recommended post-acute care. This is recommended as therapy feels the patient would require 3-4 days per week and wouldn't tolerate \"3 hour daily\" rehab intensity. SNF would be the preferred choice. If the patient does not agree to SNF, arrange HH or OP depending on home bound status. If patient is medically complex, consider LTACH. Pt requires no DME at discharge.      Pt desires Skilled Rehab placement at discharge. Pt cooperative; agreeable to therapeutic recommendations and plan of care.            Basic Mobility 6-click:  Rollin = Total, A lot = 2, A little = 3; 4 = None  Supine>Sit:                      1 = Total, A lot = 2, A little = 3; 4 = None   Sit>Stand with arms:       1 = Total, A lot = 2, A little = 3; 4 = None  Bed>Chair:                      1 = Total, A lot = 2, A little = 3; 4 = None  Ambulate in room:           1 = Total, A lot = 2, A little = 3; 4 = " None  3-5 Steps with railin = Total, A lot = 2, A little = 3; 4 = None  Score: 15     Modified [place]: N/A = No pre-op stroke/TIA     Post-Tx Position: Supine with HOB Elevated, Alarms activated, and Call light and personal items within reach  PPE: gloves     Therapy Charges for Today         Code Description Service Date Service Provider Modifiers Qty     52094281566 HC PT THERAPEUTIC ACT EA 15 MIN 2024 Puja Negrete, PT GP 1               PT Charges         Row Name 24 1143                       Time Calculation     Start Time 934  -         Stop Time 948  -         Time Calculation (min) 14 min  -         PT Received On 24  -         PT - Next Appointment 24  -                 Time Calculation- PT     Total Timed Code Minutes- PT 14 minute(s)  -                      User Key  (r) = Recorded By, (t) = Taken By, (c) = Cosigned By        Initials Name Provider Type      Puja Negrete, JANNET Physical Therapist                                                               Kenneth Morrow OT   Occupational Therapist  Specialty:  Occupational Therapy  Therapy Treatment Note     Signed  Date of Service:  24 1400  Creation Time:  24 1400     Signed        Subjective: Pt agreeable to therapeutic plan of care.  Cognition: oriented to Person and Place, disoriented to current month/year.     Objective:      Precautions - High Fall Risk, Orthostatic     Bed Mobility: Min-A with bed rails  Functional Transfers: Min-A  Balance: sitting EOB CGA and Min-A pt with R lateral lean  Functional Ambulation: N/A or Not attempted. Stand pivot from bed to chair, pt unable to ambulate this date d/t excessive dizziness and fatigue.      Grooming: Independent with set-up  ADL Position: supported sitting in chair  ADL Comments: Brushed hair     Vitals: Tachycardic 120 with min exertion  BP as follows:  Supine: 171/80  EOB: 164/81  T/f bed<>chair: 159/89     Pain: 0 VAS  Location:  "N/A  Interventions for pain: N/A  Education: Provided education on the importance of mobility in the acute care setting and Verbal/Tactile Cues     Assessment: Miryam Fernández presents with ADL impairments affecting function including balance, cognition, endurance / activity tolerance, and strength. Pt oriented to name/ and place this date, no reports of pain. Min assist to sit EOB, pt exhibits R lateral lean with reports of dizziness that increased with exertion. Pt fatigues quickly, tachy HR in 120s with exertion. Demonstrated functioning below baseline abilities indicate the need for continued skilled intervention while inpatient. Tolerating session today without incident. Will continue to follow and progress as tolerated.      Plan/Recommendations:   Moderate Intensity Therapy recommended post-acute care. This is recommended as therapy feels the patient would require 3-4 days per week and wouldn't tolerate \"3 hour daily\" rehab intensity. SNF would be the preferred choice. If the patient does not agree to SNF, arrange HH or OP depending on home bound status. If patient is medically complex, consider LTACH.     Pt desires Skilled Rehab placement at discharge. Pt cooperative; agreeable to therapeutic recommendations and plan of care.      Modified [place]: N/A = No pre-op stroke/TIA     Post-Tx Position: Up in Chair, Alarms activated, and Call light and personal items within reach  PPE: gloves, family in room     Therapy Charges for Today         Code Description Service Date Service Provider Modifiers Qty     08826409349  OT SELF CARE/MGMT/TRAIN EA 15 MIN 2024 Kenneth Morrow OT GO 1     11015841710 HC OT THERAPEUTIC ACT EA 15 MIN 2024 Kenneth Morrow OT GO 1               Time Calculation- OT         Row Name 24 1400                       Time Calculation- OT     OT Start Time 1340  -SP         OT Stop Time 1359  -SP         OT Time Calculation (min) 19 min  -SP         Total Timed Code " Minutes- OT 19 minute(s)  -SP         OT Received On 12/05/24  -SP         OT - Next Appointment 12/06/24  -SP                      User Key  (r) = Recorded By, (t) = Taken By, (c) = Cosigned By        Initials Name Provider Type     Kenneth Guthrie, OT Occupational Therapist

## 2024-12-05 NOTE — SIGNIFICANT NOTE
Case Management/Social Work    Patient Name:  Miryam Fernández  YOB: 1955  MRN: [id number]  Admit Date:  12/2/2024 12/05/24 1444   Post Acute Pre-Cert Documentation   Request Submitted by Facility - Type: Hospital   Post-Acute Authorization Type Submitted: SNF   Date Post Acute Pre-Cert Inititated per Facility 12/05/24   Verification from Payer Yes   Date Post Acute Pre-Cert Completed 12/05/24   Accepting Facility [facility] Discharge Date Requested 12/05/24   All Clinicals Submitted? Yes   Had Accepting Facility at Time of Submission Yes   Response Received from Insurance? Approval   Response Communicated to:    Authorization Number: [id number]   Post Acute Pre-Cert Initiated Comment MARIANNE submitted SNF precert for [facility] via [employer] portal. Auth ID [id number]. SNF precert auto approved. Valid 12/5-12/11. Approval letter indexed to media. CM made aware.           Electronically signed by:  Jules Thakkar CMA  12/05/24 14:48 EST    Jules Thakkar  Case Management Associate  [address] [address]  P: [phone]  F: [phone]

## 2024-12-05 NOTE — THERAPY TREATMENT NOTE
"Subjective: Pt agreeable to therapeutic plan of care.  Cognition: oriented to Person and Place, disoriented to current month/year.    Objective:     Precautions - High Fall Risk, Orthostatic    Bed Mobility: Min-A with bed rails  Functional Transfers: Min-A  Balance: sitting EOB CGA and Min-A pt with R lateral lean  Functional Ambulation: N/A or Not attempted. Stand pivot from bed to chair, pt unable to ambulate this date d/t excessive dizziness and fatigue.     Grooming: Independent with set-up  ADL Position: supported sitting in chair  ADL Comments: Brushed hair    Vitals: Tachycardic 120 with min exertion  BP as follows:  Supine: 171/80  EOB: 164/81  T/f bed<>chair: 159/89    Pain: 0 VAS  Location: N/A  Interventions for pain: N/A  Education: Provided education on the importance of mobility in the acute care setting and Verbal/Tactile Cues    Assessment: Miryam Fernández presents with ADL impairments affecting function including balance, cognition, endurance / activity tolerance, and strength. Pt oriented to name/ and place this date, no reports of pain. Min assist to sit EOB, pt exhibits R lateral lean with reports of dizziness that increased with exertion. Pt fatigues quickly, tachy HR in 120s with exertion. Demonstrated functioning below baseline abilities indicate the need for continued skilled intervention while inpatient. Tolerating session today without incident. Will continue to follow and progress as tolerated.     Plan/Recommendations:   Moderate Intensity Therapy recommended post-acute care. This is recommended as therapy feels the patient would require 3-4 days per week and wouldn't tolerate \"3 hour daily\" rehab intensity. SNF would be the preferred choice. If the patient does not agree to SNF, arrange HH or OP depending on home bound status. If patient is medically complex, consider LTACH.    Pt desires Skilled Rehab placement at discharge. Pt cooperative; agreeable to therapeutic recommendations " and plan of care.     Modified [place]: N/A = No pre-op stroke/TIA    Post-Tx Position: Up in Chair, Alarms activated, and Call light and personal items within reach  PPE: gloves, family in room    Therapy Charges for Today       Code Description Service Date Service Provider Modifiers Qty    71657189685 HC OT SELF CARE/MGMT/TRAIN EA 15 MIN 12/5/2024 Kenneth Morrow OT GO 1    20196232458 HC OT THERAPEUTIC ACT EA 15 MIN 12/5/2024 Kenneth Morrow OT GO 1           Time Calculation- OT       Row Name 12/05/24 1400             Time Calculation- OT    OT Start Time 1340  -SP      OT Stop Time 1359  -SP      OT Time Calculation (min) 19 min  -SP      Total Timed Code Minutes- OT 19 minute(s)  -SP      OT Received On 12/05/24  -SP      OT - Next Appointment 12/06/24  -SP                User Key  (r) = Recorded By, (t) = Taken By, (c) = Cosigned By      Initials Name Provider Type    SP Kenneth Morrow, OT Occupational Therapist

## 2024-12-05 NOTE — PLAN OF CARE
Goal Outcome Evaluation:  Plan of Care Reviewed With: patient, family      Miryam Fernández presents with functional mobility impairments which indicate the need for skilled intervention. Pt is weaker today with overall functional decline since therapy eval.  Pt tachy with standing activity and veyr weak in standing.  RN aware and dtr present.  Pt not safe for home in current condition, changing d/c recommendation to SNF for rehab. Tolerating session today without incident. Will continue to follow and progress as tolerated.            Anticipated Discharge Disposition (PT): skilled nursing facility

## 2024-12-06 VITALS
BODY MASS INDEX: 20.51 KG/M2 | HEIGHT: 64 IN | HEART RATE: 81 BPM | SYSTOLIC BLOOD PRESSURE: 128 MMHG | TEMPERATURE: 98.4 F | DIASTOLIC BLOOD PRESSURE: 74 MMHG | OXYGEN SATURATION: 97 % | RESPIRATION RATE: 11 BRPM | WEIGHT: 120.15 LBS

## 2024-12-06 PROCEDURE — 94761 N-INVAS EAR/PLS OXIMETRY MLT: CPT

## 2024-12-06 PROCEDURE — 94664 DEMO&/EVAL PT USE INHALER: CPT

## 2024-12-06 PROCEDURE — 63710000001 PREDNISONE PER 1 MG: Performed by: HOSPITALIST

## 2024-12-06 PROCEDURE — 94799 UNLISTED PULMONARY SVC/PX: CPT

## 2024-12-06 RX ORDER — OXYCODONE AND ACETAMINOPHEN 5; 325 MG/1; MG/1
1 TABLET ORAL EVERY 8 HOURS PRN
Qty: 9 TABLET | Refills: 0 | Status: SHIPPED | OUTPATIENT
Start: 2024-12-06 | End: 2024-12-09

## 2024-12-06 RX ORDER — PREDNISONE 20 MG/1
60 TABLET ORAL
Start: 2024-12-06 | End: 2024-12-09

## 2024-12-06 RX ORDER — MECLIZINE HYDROCHLORIDE 25 MG/1
25 TABLET ORAL 3 TIMES DAILY PRN
Start: 2024-12-06

## 2024-12-06 RX ADMIN — Medication 2000 UNITS: at 09:01

## 2024-12-06 RX ADMIN — ATORVASTATIN CALCIUM 40 MG: 40 TABLET, FILM COATED ORAL at 09:01

## 2024-12-06 RX ADMIN — CYANOCOBALAMIN TAB 500 MCG 1000 MCG: 500 TAB at 09:01

## 2024-12-06 RX ADMIN — BUDESONIDE AND FORMOTEROL FUMARATE DIHYDRATE 2 PUFF: 160; 4.5 AEROSOL RESPIRATORY (INHALATION) at 07:57

## 2024-12-06 RX ADMIN — TIOTROPIUM BROMIDE INHALATION SPRAY 2 PUFF: 3.12 SPRAY, METERED RESPIRATORY (INHALATION) at 07:57

## 2024-12-06 RX ADMIN — MEMANTINE 5 MG: 5 TABLET ORAL at 09:01

## 2024-12-06 RX ADMIN — PREDNISONE 60 MG: 20 TABLET ORAL at 09:01

## 2024-12-06 RX ADMIN — PANTOPRAZOLE SODIUM 40 MG: 40 TABLET, DELAYED RELEASE ORAL at 09:01

## 2024-12-06 RX ADMIN — TICAGRELOR 60 MG: 60 TABLET ORAL at 09:01

## 2024-12-06 RX ADMIN — ASPIRIN 81 MG: 81 TABLET, COATED ORAL at 09:01

## 2024-12-06 NOTE — DISCHARGE SUMMARY
[facility] Medicine Services  Discharge Summary    Date of Service: 2024  Patient Name: Miryam Fernández  : 1955  MRN: [id number]    Date of Admission: 2024  Discharge Diagnosis: #Acute metabolic encephalopathy suspect secondary to UTI  #Ménière's disease    Date of Discharge: 2024  Primary Care Physician: Lashell De La Torre APRN      Presenting Problem:   UTI (urinary tract infection) [N39.0]  Weakness [R53.1]  Urinary tract infection without hematuria, site unspecified [N39.0]    Active and Resolved Hospital Problems:  Active Hospital Problems    Diagnosis POA    **UTI (urinary tract infection) [N39.0] Yes      Resolved Hospital Problems   No resolved problems to display.         Hospital Course     HPI:  Admitting team HPI   Miryam Fernández is a 69 y.o. female with a CMH of COPD, CAD, hyperlipidemia, and frequent UTIs who presented to [facility] on 2024 with increased confusion and decreased oral intake that has been ongoing for the past 4 days.  Patient states that she generally does not feel well. She says that sometimes she feels nauseated, denies fever, dysuria, shortness of breath or chest pain.     Hospital Course:  69-year-old female history of COPD, CAD, hyperlipidemia, frequent UTIs admitted to [facility]  with increased confusion and decreased oral intake     #Acute metabolic encephalopathy suspect secondary to UTI  CT head no acute findings  UA with pyuria urine culture growing E. coli sensitivities noted  Status post 3 days of ceftriaxone. No further antibiotics   Bcx remain negative         #Ménière's disease  Diagnosed many years ago follows with ENT as outpatient has responded to steroids in the past.  Appears to be an acute flare  Continue Prednisone 60 mg daily day 3/5  meclizine as needed  Outpatient follow-up with ENT  Reevaluated by PT/OT will benefit from rehab     #Constipation  X-ray KUB noted no obstruction.   Continue bowel  regimen     #CAD-stable  Details unclear  Continue aspirin, Brilinta along with statins        #Hypertension  Does not appear to be on any antihypertensives at home  BP stable      #Hyperlipidemia     #COPD not in exacerbation  Continue home nebz        DISCHARGE Follow Up Recommendations for labs and diagnostics:   Follow up with ENT for menieres disease         Day of Discharge     Vital Signs:  Temp:  [97.4 °F (36.3 °C)-98.4 °F (36.9 °C)] 98.4 °F (36.9 °C)  Heart Rate:  [] 81  Resp:  [11-17] 11  BP: (120-156)/(74-83) 128/74    Physical Exam:  Physical Exam AOx3 NAD  RRR S1-S2 audible  Lungs with fair air entry  Abdomen soft nontender nondistended          Pertinent  and/or Most Recent Results     LAB RESULTS:      Lab 12/05/24  0258 12/04/24  0456 12/03/24  0423 12/02/24  1119 12/02/24  0953   WBC 5.71 7.21 7.90  --  8.59   HEMOGLOBIN 12.2 12.2 11.5*  --  11.9*   HEMATOCRIT 38.2 38.0 36.2  --  38.5   PLATELETS 251 238 256  --  280   NEUTROS ABS 5.20 5.60 6.56  --  7.63*   IMMATURE GRANS (ABS) 0.03 0.03 0.04  --  0.03   LYMPHS ABS 0.34* 0.74 0.63*  --  0.49*   MONOS ABS 0.13 0.69 0.59  --  0.41   EOS ABS 0.00 0.11 0.05  --  0.01   MCV 94.1 96.9 95.3  --  98.0*   PROCALCITONIN  --   --   --   --  0.02   LACTATE  --   --   --  1.9  --          Lab 12/05/24  0258 12/04/24  0456 12/03/24  1450 12/03/24  0423 12/02/24  0953   SODIUM 141 144  --  144 147*   POTASSIUM 4.3 4.1 5.0 3.5 3.8   CHLORIDE 103 106  --  106 105   CO2 24.2 26.6  --  29.1* 27.3   ANION GAP 13.8 11.4  --  8.9 14.7   BUN 28* 25*  --  29* 33*   CREATININE 0.91 0.92  --  0.91 0.97   EGFR 69.3 68.4  --  69.3 64.2   GLUCOSE 137* 106*  --  101* 133*   CALCIUM 9.7 9.7  --  9.8 10.3   MAGNESIUM  --   --   --   --  2.2   TSH  --   --   --   --  1.890         Lab 12/02/24  0953   TOTAL PROTEIN 7.7   ALBUMIN 4.4   GLOBULIN 3.3   ALT (SGPT) 11   AST (SGOT) 18   BILIRUBIN 0.4   ALK PHOS 108   LIPASE 18                     Brief Urine Lab Results  (Last  result in the past 365 days)        Color   Clarity   Blood   Leuk Est   Nitrite   Protein   CREAT   Urine HCG        12/02/24 1115 Yellow   Slightly Cloudy   Small (1+)   Negative   Positive   30 mg/dL (1+)                 Microbiology Results (last 10 days)       Procedure Component Value - Date/Time    Urine Culture - Urine, Straight Cath [[id number]]  (Abnormal)  (Susceptibility) Collected: 12/02/24 1115    Lab Status: Final result Specimen: Urine from Straight Cath Updated: 12/04/24 1147     Urine Culture >100,000 CFU/mL Escherichia coli    Narrative:      Colonization of the urinary tract without infection is common. Treatment is discouraged unless the patient is symptomatic, pregnant, or undergoing an invasive urologic procedure.    Susceptibility        Escherichia coli      AARON      Amikacin Susceptible      Amoxicillin + Clavulanate Susceptible      Ampicillin Resistant      Ampicillin + Sulbactam Resistant      Cefazolin (Urine) Susceptible      Cefepime Susceptible      Ceftazidime Susceptible      Ceftriaxone Susceptible      Gentamicin Resistant      Levofloxacin Intermediate      Nitrofurantoin Susceptible      Piperacillin + Tazobactam Susceptible      Tobramycin Intermediate      Trimethoprim + Sulfamethoxazole Resistant                           Blood Culture - Blood, Arm, Left [[id number]]  (Normal) Collected: 12/02/24 1108    Lab Status: Preliminary result Specimen: Blood from Arm, Left Updated: 12/05/24 1130     Blood Culture No growth at 3 days    Blood Culture - Blood, Arm, Right [[id number]]  (Normal) Collected: 12/02/24 1056    Lab Status: Preliminary result Specimen: Blood from Arm, Right Updated: 12/05/24 1130     Blood Culture No growth at 3 days    Narrative:      Less than seven (7) mL's of blood was collected.  Insufficient quantity may yield false negative results.            XR Abdomen KUB    Result Date: 12/4/2024  Impression: Impression: 1. Nonspecific, nonobstructive bowel gas  pattern. 2. Moderate amount of stool in the colon. Electronically Signed: Elbert Daniel MD  12/4/2024 11:14 AM EST  Workstation ID: [id number]    CT Head Without Contrast    Result Date: 12/2/2024  Impression: Impression: 1.Volume loss secondary to cerebral atrophy. 2.Chronic ischemic changes. 3.No acute findings. Electronically Signed: Evangelista Obrien MD  12/2/2024 10:23 AM EST  Workstation ID: [id number]    XR Chest 1 View    Result Date: 12/2/2024  Impression: Impression: Similar chronic findings without evidence of acute cardiopulmonary disease. Electronically Signed: Dionicio Ayala MD  12/2/2024 10:20 AM EST  Workstation ID: [id number]    MRI Brain With & Without Contrast    Result Date: 11/19/2024  Impression: Impression: Punctate tiny area of probable acute lacunar infarct noted within the subcortical white matter of the right posterior frontal lobe. Given reported history of small cell lung cancer, tiny metastatic lesion would be a less likely consideration. Otherwise redemonstrated chronic findings including relatively extensive confluent bilateral white matter changes potentially reflecting a combination of posttreatment effects and usual chronic microvascular change. No acute or unexpected findings otherwise. Electronically Signed: Jeison Soriano MD  11/19/2024 5:47 AM EST  Workstation ID: [id number]     Results for orders placed during the hospital encounter of 11/15/24    Duplex Aorta IVC Iliac Graft Complete CAR    Interpretation Summary    Patent bilateral iliac stents with mild to moderate mid stent stenosis noted on the right side.      Results for orders placed during the hospital encounter of 11/15/24    Duplex Aorta IVC Iliac Graft Complete CAR    Interpretation Summary    Patent bilateral iliac stents with mild to moderate mid stent stenosis noted on the right side.      Results for orders placed during the hospital encounter of 07/19/24    Adult Transthoracic Echo Complete W/ Cont if Necessary Per  Protocol    Interpretation Summary    Left ventricular systolic function is normal. Calculated left ventricular EF = 61.7% Left ventricular ejection fraction appears to be 61 - 65%.    Left ventricular diastolic function is consistent with (grade I) impaired relaxation.    Saline test results are negative for right to left atrial level shunt.    Estimated right ventricular systolic pressure from tricuspid regurgitation is normal (<35 mmHg).      Labs Pending at Discharge:  Pending Results       None            Procedures Performed           Consults:   Consults       No orders found from 11/3/2024 to 12/3/2024.              Discharge Details        Discharge Medications        New Medications        Instructions Start Date   meclizine 25 MG tablet  Commonly known as: ANTIVERT   25 mg, Oral, 3 Times Daily PRN      predniSONE 20 MG tablet  Commonly known as: DELTASONE   60 mg, Oral, Daily With Breakfast             Continue These Medications        Instructions Start Date   aspirin 81 MG EC tablet   81 mg, Oral, Daily      atorvastatin 40 MG tablet  Commonly known as: LIPITOR   40 mg, Oral, Daily      Cholecalciferol 50 MCG (2000 UT) tablet   50 mcg, Daily      furosemide 20 MG tablet  Commonly known as: LASIX   TAKE 0.5 TABLETS BY MOUTH DAILY AS NEEDED (SWELLING) FOR UP TO 30 DAYS.      memantine 5 MG tablet  Commonly known as: NAMENDA   5 mg, Oral, 2 Times Daily      montelukast 10 MG tablet  Commonly known as: Singulair   10 mg, Oral, Nightly      oxyCODONE-acetaminophen 5-325 MG per tablet  Commonly known as: PERCOCET   1 tablet, Oral, Every 8 Hours PRN      pantoprazole 40 MG EC tablet  Commonly known as: PROTONIX   40 mg, Oral, Daily      ticagrelor 60 MG tablet tablet  Commonly known as: Brilinta   60 mg, Oral, 2 Times Daily      Trelegy Ellipta 100-62.5-25 MCG/ACT inhaler  Generic drug: Fluticasone-Umeclidin-Vilant   1 puff, Inhalation, Daily - RT      vitamin B-12 1000 MCG tablet  Commonly known as:  CYANOCOBALAMIN   1,000 mcg, Oral, Daily               Allergies   Allergen Reactions    Albuterol Shortness Of Breath     Pt states she has sensitivity to albuterol.  She states it causes her to be SOA.    Hydrocodone Palpitations    Sulfa Antibiotics Swelling    Bacitracin Other (See Comments)    Benzalkonium Chloride Hives    Codeine Other (See Comments)    Neomycin Other (See Comments)    Nickel Rash    Penicillins Rash    Polymyxin B Other (See Comments)         Discharge Disposition:   Skilled Nursing Facility (DC - External)    Diet:  Hospital:  Diet Order   Procedures    Diet: Regular/House; Fluid Consistency: Thin (IDDSI 0)         Discharge Activity:         CODE STATUS:  Code Status and Medical Interventions: CPR (Attempt to Resuscitate); Full Support   Ordered at: 12/02/24 1302     Code Status (Patient has no pulse and is not breathing):    CPR (Attempt to Resuscitate)     Medical Interventions (Patient has pulse or is breathing):    Full Support         Future Appointments   Date Time Provider Department Center   12/10/2024  8:55 AM LAB  [facility] LAB NA  [facility]   12/10/2024  9:00 AM Annamaria Chung PA-C [facility] ONC NA [facility]   12/10/2024 10:15 AM Sonia Colvin APRN [facility] POD NA [facility]   1/8/2025  9:15 AM [facility] MRI 2  [facility] MRI [facility]   1/8/2025 10:15 AM [facility] MRI 2  [facility] MRI [facility]   1/27/2025 10:15 AM Lashell De La Torre APRN [facility] PC SB [facility]   3/10/2025 10:10 AM LAB  [facility] ON[facility] LAB NA  [facility]   3/10/2025 10:15 AM Gunnar Moon MD [facility] ONC NA [facility]   3/17/2025 10:45 AM Nai Lorenzana MD [facility] CAR JFSC [facility]   4/23/2025 10:30 AM Kandice Tomlinson APRN [facility] NEURO NA [facility]   5/14/2025  9:30 AM Jose Manuel Payne MD NEK [facility] PLC None   11/24/2025 10:00 AM [facility] VASC MACHINE 4  [facility] CARDI [facility]   11/24/2025 10:45 AM [facility] VASC MACHINE 4  [facility] CARDI [facility]   11/26/2025 10:00 AM [facility] VASC MACHINE 1  [facility] CARDI [facility]   12/3/2025 10:00 AM [facility] VASC MACHINE 1  [facility] CARDI [facility]   12/3/2025 10:45 AM Terri Johnson  KENNY Hardin [facility] VS [facility]       Additional Instructions for the Follow-ups that You Need to Schedule       Ambulatory Referral to Physical Therapy for Evaluation & Treatment   As directed      Specialty needed: Evaluate and treat (Outpatient PT for vestibular evaluation) Vestibular   Follow-up needed: Yes                Time spent on Discharge including face to face service:  45 minutes    Signature: Electronically signed by Alberto Ochoa MD, 12/06/24, 10:47 EST.  [facility] Hospitalist Team

## 2024-12-06 NOTE — NURSING NOTE
Spoke with patients daughter thu regarding transportation to the facility today. She is aware that family will need to transport the patient. She is going to make some phone calls and notify us once she knows what time they will be able to transport her.

## 2024-12-06 NOTE — CASE MANAGEMENT/SOCIAL WORK
Case Management Discharge Note      Final Note: [facility] skilled    Provided Post Acute Provider List?: N/A  Provided Post Acute Provider Quality & Resource List?: N/A    Selected Continued Care - Discharged on 12/6/2024 Admission date: 12/2/2024 - Discharge disposition: Skilled Nursing Facility (DC - External)      Destination Coordination complete.      Service Provider Services Address Phone Fax Patient Preferred    [facility] [address] [facility] [address] [phone] --               Transportation Services  Private: Car    Final Discharge Disposition Code: 03 - skilled nursing facility (SNF)

## 2024-12-06 NOTE — PLAN OF CARE
Goal Outcome Evaluation:               Pt resting abed Aox3-4 forgetful on RA. X1 assist to BSC. No s/s of distress noted.

## 2024-12-06 NOTE — SIGNIFICANT NOTE
12/06/24 1017   OTHER   Discipline physical therapist   Rehab Time/Intention   Session Not Performed other (see comments)  (hospital dc pending; will f/u if pt remains admitted)   Therapy Assessment/Plan (PT)   Criteria for Skilled Interventions Met (PT) yes;skilled treatment is necessary

## 2024-12-06 NOTE — CASE MANAGEMENT/SOCIAL WORK
Continued Stay Note  [facility]     Patient Name: Miryam Fernández  MRN: [id number]  Today's Date: 12/6/2024    Admit Date: 12/2/2024    Plan: [facility] (accepted). PASRR approved. Precert approved Valid 12/5-12/11   Discharge Plan       Row Name 12/06/24 1005       Plan    Plan Comments DC orders in, [facility] w/c van is down today and facility does not have available transportation. Dtr called to update CM family is on their way with a jeep that patient will be able to get into for transport to SNF, primary nurse updated.             Bernadette Galvez RN     [facility]  Office: [phone]  Cell: [phone]  Fax # [phone]

## 2024-12-07 LAB
BACTERIA SPEC AEROBE CULT: NORMAL
BACTERIA SPEC AEROBE CULT: NORMAL

## 2024-12-10 ENCOUNTER — TELEPHONE (OUTPATIENT)
Dept: NEUROLOGY | Facility: CLINIC | Age: 69
End: 2024-12-10
Payer: MEDICARE

## 2024-12-10 ENCOUNTER — TELEPHONE (OUTPATIENT)
Dept: ONCOLOGY | Facility: CLINIC | Age: 69
End: 2024-12-10

## 2024-12-10 NOTE — TELEPHONE ENCOUNTER
Patient came in for her appointment today which was canceled by interface. She was unaware that it was canceled but Annamaria's schedule was full. Please call to reschedule.

## 2024-12-10 NOTE — TELEPHONE ENCOUNTER
Called and spoke with daughter regarding HST results/message because it had not been read or reviewed. Daughter stated she is currently in rehab and she will call us once patient is back home to proceed.

## 2024-12-12 ENCOUNTER — TELEPHONE (OUTPATIENT)
Dept: ONCOLOGY | Facility: CLINIC | Age: 69
End: 2024-12-12

## 2024-12-12 NOTE — TELEPHONE ENCOUNTER
Caller: CLAIR TOVAR    [facility] [place]    [facility] call back number: [phone]    Chief complaint: APRN AT [facility] IS WANTING PATIENT TO BE SEEN SOONER THEN MARCH     Type of visit: LAB AND FOLLOW UP 1     Requested date: REQUESTING SOONER APPT THEN 03/10/25    If rescheduling, when is the original appointment: 03/10/25

## 2024-12-27 NOTE — PROGRESS NOTES
HEMATOLOGY ONCOLOGY OUTPATIENT FOLLOWUP       Patient name: Miryam Fernández  : 1955  MRN: [id number]  Primary Care Physician: Lashell De La Torre APRN  Referring Physician: Lashell De La Torre AP*  Reason For Consult: limited stage small cell lung cancer      History of Present Illness:  Patient is a 69 y.o. female diagnosed and treated for limited stage small cell lung cancer with chemoradiation currently undergoing surveillance.     She was diagnosed in   with small cell lung cancer after presenting with dyspnea, chest pain, nausea and vomiting. CT chest was suspicious for lung     20 PET CT -  showed hypermetabolic lower cervical, upper and middle mediastinal lymphadenopathy and left perihilar lung nodule. There was also intense focal uptake in the right thyroid lobe.   MRI brain was negative for metastases.      20  biopsy of the left supraclavicular node that showed small cell lung  cancer. She was diagnosed with limited stage disease and was treated with definitive chemoradiation.     20 - C1 of cisplatin/etoposide with radiation  2020 C4     2020 - PCI     CT chest on 20 showed significant interval reduction in mediastinal masses though there was a new PE for which she was started on Eliquis. She continued following with Dr Castillo and has had stable disease on imaging.      CT C/A/P on 21 showed a more conspicuous 5 mm LLL nodule compared to previous scans with numerous additional bilateral nodules that are stable and unchanged since . There was an increasing moderate pericardial effusion and stable perihilar scarring as well as mediastinal soft tissue density thought to be treated carcinoma. There was no clear evidence of progressive or recurrence malignancy in the chest.    She transferred care to [facility] in 2021 and had repeat imaging in December including MRI brain that did not show disease progression. However, the pericardial  effusion had increased. 2D echo on 12/1/21 showed hemodynamic compromise and concerns  for cardiac tamponade. She was subsequently admitted to the cardiology service and had a pericardiocentesis on 12/10/21 with the fluid negative for malignancy.    No further recurrence of disease.     Last CT imaging in 5/2023 with no evidence of recurrence. No recurrence of effusion.     3/26/2024 CT imaging with suggestive of of posttreatment fibrosis in the paramediastinal perihilar left upper lobe with no significant change as compared to October 2023.  Multiple pulmonary nodules are unchanged from 2021 hence benign.  Moderate emphysema, severe stenosis of the distal left external iliac artery which is new suspected severe renal artery ostial stenosis, thoracic aorta fusiform aneurysm.  No convincing evidence of recurrence of disease  After this she was referred to vascular surgery recommendation was medical management without any surgical intervention given no symptoms.    3/26/2024 MRI brain with confluent foci of T2 flair signal hyperintensity within the bilateral hemispheric white matter.  Related to chronic microvascular ischemic change.    6/28/24: CT Abdomen/Pelvis:  Impression:  1. Heterogeneous diminished enhancement in the right kidney. Correlate for symptoms of nephritis.  2. 2 mm nonobstructing stone in the right upper renal pole.  3. Stable 5 mm right lower lobe and 3 mm left lower lobe noncalcified nodules. According to previous report, these nodules have been stable since 2021, suggesting benign etiology.  4. 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 Suspected stenosis of the right renal artery ostium again noted.  5. 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 Small less than 1 cm areas of increased signal on diffusion imaging with associated FLAIR and T2 signal  abnormality within the right basal ganglia, corona radiata suggesting evolution of small subacute infarcts. No convincing evidence for acute ischemic changes otherwise noted.   2. Mild increased T1 signal and questionable enhancement in this region may represent sequela of evolving small infarct. Metastatic lesion is thought to be less likely although this will need to be followed per patient's oncologic protocol  3. No abnormal enhancement otherwise noted within the brain parenchyma.  4. Extensive confluent white matter changes again noted which can be seen with small vessel ischemic disease as well as represent sequela of prior therapy    7/23/24: CT Abdomen/Pelvis:  Impression:  1.Questionable indistinctness of the right colon versus artifact from respiratory motion. Right-sided colitis may be considered in the appropriate clinical setting.  2.Right punctate nonobstructive nephrolithiasis. No hydronephrosis is seen. There is excreted contrast throughout the urinary tract.  3.Ectasia of the distal abdominal aorta measuring 2.7 cm in caliber.  4.Several subcentimeter bilateral lung base nodules measuring up to approximately 5 mm. Small right pneumothorax is partially imaged. This was also seen on the neck CTA from 7/21/2024.      7/31/24-8/6/24: INTERVAL HOSPITALIZATION UPDATE:  Patient presented after a fall, found on CT of the pelvis to have a minimal nondisplaced fracture of the left lateral femoral head and neck junction.    -Orthopedics consulted, Patient is status post left total hip arthroplasty due to fracture     9/10/24: Patient seen for initial evaluation by me. She was previously being seen by Dr. Morales in our practice with underlying history of small cell lung cancer and is status post ChemoRadiation. She has excellent treatment response and continues to be in remission.  She is Status post Recent hospitalization as above for a fall with Left hip fracture requiring her to have Total Hip arthroplasty.  She is gradually recovering from this and is undergoing outpatient PT. 11/18/2024: MRI brain with and without contrast:    11/18/2024: MRI brain with without contrast:  Impression:  Punctate tiny area of probable acute lacunar infarct noted within the subcortical white matter of the right posterior frontal lobe. Given reported history of small cell lung cancer, tiny metastatic lesion would be a less likely consideration.      Subjective:  1.2.25: Patient seen today for follow-up visit.  Appears clinically stable, reported having some sinus congestion but otherwise at baseline.  She was recently admitted to [facility] for complaint of acute metabolic encephalopathy and UTI, she was treated with IV antibiotics.    Past Medical History:   Diagnosis Date    Cancer     lung cancer    Carotid stenosis     COPD (chronic obstructive pulmonary disease)     Coronary artery disease     Esophageal stricture     Dr Domingo    Hyperlipidemia     Metabolic encephalopathy 06/28/2024    2nd to UTI    Pulmonary embolism     seen at [facility], fluid around her heart at the time-right lung    UTI (urinary tract infection) 06/28/2024       Past Surgical History:   Procedure Laterality Date    ANGIOPLASTY / STENTING FEMORAL      CARDIAC CATHETERIZATION      CAROTID STENT      CORONARY STENT PLACEMENT      ESOPHAGOSCOPY / EGD      HYSTERECTOMY      TOTAL HIP ARTHROPLASTY Left 8/1/2024    Procedure: TOTAL HIP ARTHROPLASTY;  Surgeon: Olu Joshua II, MD;  Location: [facility] MAIN OR;  Service: Orthopedics;  Laterality: Left;         Current Outpatient Medications:     aspirin 81 MG EC tablet, Take 1 tablet by mouth Daily., Disp: 30 tablet, Rfl: 2    atorvastatin (LIPITOR) 40 MG tablet, TAKE 1 TABLET BY MOUTH EVERY DAY, Disp: 90 tablet, Rfl: 1    Cholecalciferol 50 MCG (2000 UT) tablet, Take 1 tablet by mouth Daily. Indications: Vitamin D Deficiency, Disp: , Rfl:     furosemide (LASIX) 20 MG tablet, TAKE 0.5 TABLETS BY MOUTH DAILY AS  NEEDED (SWELLING) FOR UP TO 30 DAYS., Disp: 45 tablet, Rfl: 1    meclizine (ANTIVERT) 25 MG tablet, Take 1 tablet by mouth 3 (Three) Times a Day As Needed for Dizziness., Disp: , Rfl:     memantine (NAMENDA) 5 MG tablet, Take 1 tablet by mouth 2 (Two) Times a Day., Disp: , Rfl:     montelukast (Singulair) 10 MG tablet, Take 1 tablet by mouth Every Night., Disp: 30 tablet, Rfl: 1    pantoprazole (PROTONIX) 40 MG EC tablet, Take 1 tablet by mouth Daily. Indications: Gastroesophageal Reflux Disease, Disp: 90 tablet, Rfl: 1    ticagrelor (Brilinta) 60 MG tablet tablet, Take 1 tablet by mouth 2 (Two) Times a Day., Disp: 180 tablet, Rfl: 3    vitamin B-12 (CYANOCOBALAMIN) 1000 MCG tablet, Take 1 tablet by mouth Daily. Indications: Inadequate Vitamin B12, Disp: 30 tablet, Rfl: 5    Allergies   Allergen Reactions    Albuterol Shortness Of Breath     Pt states she has sensitivity to albuterol.  She states it causes her to be SOA.    Hydrocodone Palpitations    Sulfa Antibiotics Swelling    Bacitracin Other (See Comments)    Benzalkonium Chloride Hives    Codeine Other (See Comments)    Neomycin Other (See Comments)    Nickel Rash    Penicillins Rash    Polymyxin B Other (See Comments)       Family History   Problem Relation Age of Onset    Heart disease Mother     Hypertension Mother     Dementia Father     Hyperlipidemia Father     Lung cancer Father     Diabetes Paternal Uncle     Leukemia Paternal Grandmother        Cancer-related family history includes Lung cancer in her father.      Social History     Tobacco Use    Smoking status: Every Day     Current packs/day: 1.00     Average packs/day: 2.0 packs/day for 40.3 years (80.3 ttl pk-yrs)     Types: Cigarettes     Start date: 09/1984     Passive exposure: Current    Smokeless tobacco: Never   Vaping Use    Vaping status: Never Used   Substance Use Topics    Alcohol use: Not Currently    Drug use: Never     Social History     Social History Narrative    Not on file  "      ROS:   Review of Systems   Constitutional:  Negative for fatigue and fever.   HENT:  Negative for congestion and nosebleeds.    Eyes:  Negative for pain.   Respiratory:  Negative for cough and shortness of breath.    Cardiovascular:  Negative for chest pain.   Gastrointestinal:  Negative for abdominal pain, blood in stool, diarrhea, nausea and vomiting.   Endocrine: Negative for cold intolerance and heat intolerance.   Genitourinary:  Negative for difficulty urinating.   Musculoskeletal:  Negative for arthralgias.   Skin:  Negative for rash.   Neurological:  Negative for dizziness and headaches.   Hematological:  Does not bruise/bleed easily.   Psychiatric/Behavioral:  Negative for behavioral problems.      Objective:    Vital Signs:  Vitals:    01/02/25 1105   BP: 128/74   Pulse: 107   Resp: 18   Temp: 98.2 °F (36.8 °C)   SpO2: 100%   Weight: 54.4 kg (120 lb)  Comment: Verbal   Height: 160 cm (63\")   PainSc: 0-No pain       Body mass index is 21.26 kg/m².    ECOG  (1) Restricted in physically strenuous activity, ambulatory and able to do work of light nature    Physical Exam  Constitutional:       Appearance: Normal appearance.   HENT:      Head: Normocephalic and atraumatic.   Eyes:      Pupils: Pupils are equal, round, and reactive to light.   Cardiovascular:      Rate and Rhythm: Normal rate and regular rhythm.      Pulses: Normal pulses.      Heart sounds: No murmur heard.  Pulmonary:      Effort: Pulmonary effort is normal.      Breath sounds: Rhonchi present.   Abdominal:      General: There is no distension.      Palpations: Abdomen is soft. There is no mass.      Tenderness: There is no abdominal tenderness.   Musculoskeletal:         General: Normal range of motion.      Cervical back: Normal range of motion and neck supple.   Skin:     General: Skin is warm.   Neurological:      General: No focal deficit present.      Mental Status: She is alert.   Psychiatric:         Mood and Affect: Mood normal. " "        Lab Results - Last 18 Months   Lab Units 12/05/24  0258 12/04/24 0456 12/03/24  0423   WBC 10*3/mm3 5.71 7.21 7.90   HEMOGLOBIN g/dL 12.2 12.2 11.5*   HEMATOCRIT % 38.2 38.0 36.2   PLATELETS 10*3/mm3 251 238 256   MCV fL 94.1 96.9 95.3     Lab Results - Last 18 Months   Lab Units 12/05/24  0258 12/04/24 0456 12/03/24  1450 12/03/24  0423 12/02/24  0953 08/03/24  2339 08/01/24  0235 07/31/24  1551   SODIUM mmol/L 141 144  --  144 147* 138   < > 140   POTASSIUM mmol/L 4.3 4.1 5.0 3.5 3.8 4.2   < > 4.3   CHLORIDE mmol/L 103 106  --  106 105 107   < > 101   CO2 mmol/L 24.2 26.6  --  29.1* 27.3 22.0   < > 28.7   BUN mg/dL 28* 25*  --  29* 33* 17   < > 14   CREATININE mg/dL 0.91 0.92  --  0.91 0.97 0.79   < > 0.92   CALCIUM mg/dL 9.7 9.7  --  9.8 10.3 8.6   < > 9.7   BILIRUBIN mg/dL  --   --   --   --  0.4 0.2  --  0.7   ALK PHOS U/L  --   --   --   --  108 89  --  127*   ALT (SGPT) U/L  --   --   --   --  11 <5  --  14   AST (SGOT) U/L  --   --   --   --  18 22  --  18   GLUCOSE mg/dL 137* 106*  --  101* 133* 108*   < > 94    < > = values in this interval not displayed.       Lab Results   Component Value Date    GLUCOSE 137 (H) 12/05/2024    BUN 28 (H) 12/05/2024    CREATININE 0.91 12/05/2024    EGFRIFAFRI >60 09/09/2021    BCR 30.8 (H) 12/05/2024    K 4.3 12/05/2024    CO2 24.2 12/05/2024    CALCIUM 9.7 12/05/2024    PROTENTOTREF 6.8 09/10/2024    ALBUMIN 4.4 12/02/2024    LABIL2 1.2 09/10/2024    AST 18 12/02/2024    ALT 11 12/02/2024       Lab Results - Last 18 Months   Lab Units 08/01/24  0235 07/31/24  1551   INR  0.95 <0.93*   APTT seconds  --  23.1*       Lab Results   Component Value Date    IRON 38 09/10/2024    TIBC 395 09/10/2024    FERRITIN 216.00 (H) 09/10/2024       Lab Results   Component Value Date    FOLATE 9.77 09/10/2024       No results found for: \"OCCULTBLD\"    Lab Results   Component Value Date    RETICCTPCT 2.53 (H) 09/10/2024     Lab Results   Component Value Date    [id number] 402 " "09/10/2024     No results found for: \"SPEP\", \"UPEP\"  No results found for: \"LDH\", \"URICACID\"  No results found for: \"YOLANDA\", \"RF\", \"SEDRATE\"  No results found for: \"FIBRINOGEN\", \"HAPTOGLOBIN\"  Lab Results   Component Value Date    PTT 23.1 (L) 07/31/2024    INR 0.95 08/01/2024     No results found for: \"\"  No results found for: \"CEA\"  No components found for: \"CA-19-9\"  No results found for: \"PSA\"  No results found for: \"SEDRATE\"       Assessment & Plan     Patient is a 69 y.o. female with limited stage small cell carcinoma s/p chemoradiation and PCI now on surveillance with no recurrence.    Limited stage small cell carcinoma  Good response to treatment. S/p chemo radiation with cisplatin/etoposide   5/2023 CT imaging with no evidence of recurrence  Repeat imaging as above in March 2024 with no concerns for recurrence MRI brain reviewed as well  Restaging Scans from July 2024 are АНДРЕЙ, no concern for disease recurrence/progression.  Plan for restaging scans in late February 2025.  MRI brain reviewed from November 2024, not consistent with metastatic disease.  Continue MRI brain every 6 months.    History of PE   Now off Eliquis, she took herself off eliquis after 6 months  No signs or symptoms of recurrent VTE continue monitor.  Continue to monitor for signs of recurrent VTEs.    History of pericardial effusion  S/p pericardiocentesis negative for cytology  No recurrence  Monitor for now     IgA MGUS:  SPEP and KHUSHBOO showed IgA Kappa M Oz (0.2g./dl). FLC ratio 2.12  -This is likely low risk MGUS, per iSTOPMM risk model, The predicted risk of having >=10% bone marrow plasma cells is 7.4%., Will continue to monitor Myeloma labs,   -Her anemia has improved after recent hospitalization, Hb improved from 7.5 to 11.4. Remainder of CBC is WNL. Renal function and S. Ca levels WNL.   -Will recheck these labs on follow-up.      Left hip Fracture:  S/p CLINTON. She is gradually recovering.    Forgetfulness/cognitive " impairment:  -Unclear etiology, continue to follow with neurology/vascular surgery.    Smoking   Continue to smoking, Strongly counseled on quitting smokign.      2-month follow-up with restaging scans, sooner as needed..    Thank you very much for providing the opportunity to participate in this patient’s care. Please do not hesitate to call if there are any other questions.

## 2025-01-02 ENCOUNTER — OFFICE VISIT (OUTPATIENT)
Dept: ONCOLOGY | Facility: CLINIC | Age: 70
End: 2025-01-02
Payer: MEDICARE

## 2025-01-02 VITALS
DIASTOLIC BLOOD PRESSURE: 74 MMHG | BODY MASS INDEX: 21.26 KG/M2 | OXYGEN SATURATION: 100 % | HEIGHT: 63 IN | WEIGHT: 120 LBS | HEART RATE: 107 BPM | SYSTOLIC BLOOD PRESSURE: 128 MMHG | RESPIRATION RATE: 18 BRPM | TEMPERATURE: 98.2 F

## 2025-01-02 DIAGNOSIS — D47.2 MGUS (MONOCLONAL GAMMOPATHY OF UNKNOWN SIGNIFICANCE): ICD-10-CM

## 2025-01-02 DIAGNOSIS — D64.9 ANEMIA, UNSPECIFIED TYPE: ICD-10-CM

## 2025-01-02 DIAGNOSIS — C34.90 SMALL CELL CARCINOMA OF LUNG, UNSPECIFIED LATERALITY, UNSPECIFIED PART OF LUNG: Primary | ICD-10-CM

## 2025-01-02 RX ORDER — OXYCODONE AND ACETAMINOPHEN 5; 325 MG/1; MG/1
1 TABLET ORAL EVERY 8 HOURS
COMMUNITY
Start: 2024-12-16

## 2025-01-02 RX ORDER — LEVOTHYROXINE SODIUM 25 UG/1
25 TABLET ORAL
COMMUNITY

## 2025-01-28 RX ORDER — MEMANTINE HYDROCHLORIDE 5 MG/1
5 TABLET ORAL 2 TIMES DAILY
Qty: 180 TABLET | Refills: 1 | Status: SHIPPED | OUTPATIENT
Start: 2025-01-28

## 2025-01-31 ENCOUNTER — TELEPHONE (OUTPATIENT)
Dept: FAMILY MEDICINE CLINIC | Facility: CLINIC | Age: 70
End: 2025-01-31
Payer: MEDICARE

## 2025-01-31 NOTE — TELEPHONE ENCOUNTER
Caller: TOMMY TOMLIN    Relationship to patient: Emergency Contact    Best call back number: [phone]    New or established patient?  [] New  [x] Established    Date of discharge: 01/30/2025    Facility discharged from: [facility]     Diagnosis/Symptoms: WEAKNESS AND CONFUSION     Length of stay (If applicable): A COUPLE MONTHS. THEY DON'T KNOW EXACTLY WHICH DAY SHE WENT     Specialty Only: Did you see a [facility] health provider?    [x] Yes  [] No  If so, who? SANDRO ESCOTO

## 2025-01-31 NOTE — TELEPHONE ENCOUNTER
Neva called, Rehab put her mom on Levothyroxine 25?  They did not get home with any. Will you call in some, Miryam has an appt on 2/6/25.

## 2025-02-03 RX ORDER — LEVOTHYROXINE SODIUM 25 UG/1
25 TABLET ORAL DAILY
Qty: 30 TABLET | Refills: 1 | Status: SHIPPED | OUTPATIENT
Start: 2025-02-03 | End: 2025-02-07

## 2025-02-03 NOTE — TELEPHONE ENCOUNTER
Left message for daughter to let her know this was sent to the pharmacy. Advised to call the office with questions.  Release on file.

## 2025-02-06 ENCOUNTER — OFFICE VISIT (OUTPATIENT)
Dept: FAMILY MEDICINE CLINIC | Facility: CLINIC | Age: 70
End: 2025-02-06
Payer: MEDICARE

## 2025-02-06 ENCOUNTER — LAB (OUTPATIENT)
Dept: FAMILY MEDICINE CLINIC | Facility: CLINIC | Age: 70
End: 2025-02-06
Payer: MEDICARE

## 2025-02-06 ENCOUNTER — HOME HEALTH ADMISSION (OUTPATIENT)
Dept: HOME HEALTH SERVICES | Facility: HOME HEALTHCARE | Age: 70
End: 2025-02-06
Payer: COMMERCIAL

## 2025-02-06 VITALS
HEIGHT: 63 IN | BODY MASS INDEX: 21.44 KG/M2 | SYSTOLIC BLOOD PRESSURE: 122 MMHG | WEIGHT: 121 LBS | OXYGEN SATURATION: 98 % | HEART RATE: 81 BPM | DIASTOLIC BLOOD PRESSURE: 70 MMHG

## 2025-02-06 DIAGNOSIS — R41.3 MEMORY LOSS: ICD-10-CM

## 2025-02-06 DIAGNOSIS — I71.40 ABDOMINAL AORTIC ANEURYSM (AAA) WITHOUT RUPTURE, UNSPECIFIED PART: ICD-10-CM

## 2025-02-06 DIAGNOSIS — E78.2 MIXED HYPERLIPIDEMIA: Primary | ICD-10-CM

## 2025-02-06 DIAGNOSIS — H81.09 MENIERE'S DISEASE, UNSPECIFIED LATERALITY: ICD-10-CM

## 2025-02-06 DIAGNOSIS — E03.9 HYPOTHYROIDISM, UNSPECIFIED TYPE: ICD-10-CM

## 2025-02-06 DIAGNOSIS — I10 PRIMARY HYPERTENSION: ICD-10-CM

## 2025-02-06 DIAGNOSIS — J44.9 CHRONIC OBSTRUCTIVE PULMONARY DISEASE, UNSPECIFIED COPD TYPE: ICD-10-CM

## 2025-02-06 DIAGNOSIS — Z23 NEED FOR INFLUENZA VACCINATION: ICD-10-CM

## 2025-02-06 DIAGNOSIS — M51.360 DEGENERATION OF INTERVERTEBRAL DISC OF LUMBAR REGION WITH DISCOGENIC BACK PAIN: ICD-10-CM

## 2025-02-06 LAB
DEPRECATED RDW RBC AUTO: 46.8 FL (ref 37–54)
ERYTHROCYTE [DISTWIDTH] IN BLOOD BY AUTOMATED COUNT: 13.5 % (ref 12.3–15.4)
HCT VFR BLD AUTO: 35.9 % (ref 34–46.6)
HGB BLD-MCNC: 12 G/DL (ref 12–15.9)
MCH RBC QN AUTO: 31.8 PG (ref 26.6–33)
MCHC RBC AUTO-ENTMCNC: 33.4 G/DL (ref 31.5–35.7)
MCV RBC AUTO: 95.2 FL (ref 79–97)
PLATELET # BLD AUTO: 340 10*3/MM3 (ref 140–450)
PMV BLD AUTO: 10.3 FL (ref 6–12)
RBC # BLD AUTO: 3.77 10*6/MM3 (ref 3.77–5.28)
WBC NRBC COR # BLD AUTO: 6 10*3/MM3 (ref 3.4–10.8)

## 2025-02-06 PROCEDURE — 85027 COMPLETE CBC AUTOMATED: CPT | Performed by: NURSE PRACTITIONER

## 2025-02-06 PROCEDURE — G2211 COMPLEX E/M VISIT ADD ON: HCPCS | Performed by: NURSE PRACTITIONER

## 2025-02-06 PROCEDURE — 36415 COLL VENOUS BLD VENIPUNCTURE: CPT | Performed by: NURSE PRACTITIONER

## 2025-02-06 PROCEDURE — 90662 IIV NO PRSV INCREASED AG IM: CPT | Performed by: NURSE PRACTITIONER

## 2025-02-06 PROCEDURE — G0008 ADMIN INFLUENZA VIRUS VAC: HCPCS | Performed by: NURSE PRACTITIONER

## 2025-02-06 PROCEDURE — 99214 OFFICE O/P EST MOD 30 MIN: CPT | Performed by: NURSE PRACTITIONER

## 2025-02-06 PROCEDURE — 80053 COMPREHEN METABOLIC PANEL: CPT | Performed by: NURSE PRACTITIONER

## 2025-02-06 PROCEDURE — 1160F RVW MEDS BY RX/DR IN RCRD: CPT | Performed by: NURSE PRACTITIONER

## 2025-02-06 PROCEDURE — 1126F AMNT PAIN NOTED NONE PRSNT: CPT | Performed by: NURSE PRACTITIONER

## 2025-02-06 PROCEDURE — 3074F SYST BP LT 130 MM HG: CPT | Performed by: NURSE PRACTITIONER

## 2025-02-06 PROCEDURE — 1159F MED LIST DOCD IN RCRD: CPT | Performed by: NURSE PRACTITIONER

## 2025-02-06 PROCEDURE — 84443 ASSAY THYROID STIM HORMONE: CPT | Performed by: NURSE PRACTITIONER

## 2025-02-06 PROCEDURE — 3078F DIAST BP <80 MM HG: CPT | Performed by: NURSE PRACTITIONER

## 2025-02-06 NOTE — ASSESSMENT & PLAN NOTE
COPD is stable.    Plan:  Continue same medication/s without change.    Discussed medication dosage, use, side effects, and goals of treatment in detail.    Warning signs of respiratory distress were reviewed with the patient. .    Patient Treatment Goals:   symptom prevention, minimizing limitation in activity, prevention of exacerbations and use of ER/inpatient care, maintenance of optimal pulmonary function, and minimization of adverse effects of treatment    Followup at the next regular appointment.

## 2025-02-06 NOTE — PROGRESS NOTES
Chief Complaint  Hospital Follow Up Visit    Subjective        Miryam Fernández presents to [facility] MEDICAL [facility] PRIMARY CARE  History of Present Illness    Patient presents for follow-up visit regarding hospitalization.  Hospitalized from 12/2-12/6 at [facility]. Hospital Course:     69-year-old female history of COPD, CAD, hyperlipidemia, frequent UTIs admitted to [facility] 12/2 with increased confusion and decreased oral intake     #Acute metabolic encephalopathy suspect secondary to UTI  CT head no acute findings  UA with pyuria urine culture growing E. coli sensitivities noted  Status post 3 days of ceftriaxone. No further antibiotics   Bcx remain negative        #Ménière's disease  Diagnosed many years ago follows with ENT as outpatient has responded to steroids in the past.  Appears to be an acute flare  Continue Prednisone 60 mg daily day 3/5  meclizine as needed  Outpatient follow-up with ENT  Reevaluated by PT/OT will benefit from rehab     #Constipation  X-ray KUB noted no obstruction.   Continue bowel regimen     #CAD-stable  Details unclear  Continue aspirin, Brilinta along with statins        #Hypertension  Does not appear to be on any antihypertensives at home  BP stable      #Hyperlipidemia     #COPD not in exacerbation  Continue home neb        Patient is here today following a rehab stay at [facility] in [place], [place]. She is now at home, family staying with her during the week. Patient is ambulating with walker.      Hypertension, prescribed metoprolol XL 25 mg daily but is not taking.  She also takes Brilinta 60 mg twice daily per cardiology, followed by Dr. Lorenzana. Patient is taking Lipitor 20 mg daily for hyperlipidemia.     Patient is prescribed Percocet 5/325 mg PRN for chronic low back pain. She has LESI in 2019 without relief. MRI in 2019 showed mild degenerative changes at L4-5. Also hx spondylosis.       Cognitive impairment, prescribed Namenda 5 mg twice  "daily. MMSE 12/30.      AAA, followed by Vascular surgery.     Objective   Vital Signs:  /70 (BP Location: Left arm, Patient Position: Sitting, Cuff Size: Adult)   Pulse 81   Ht 160 cm (63\")   Wt 54.9 kg (121 lb)   SpO2 98%   BMI 21.43 kg/m²   Estimated body mass index is 21.43 kg/m² as calculated from the following:    Height as of this encounter: 160 cm (63\").    Weight as of this encounter: 54.9 kg (121 lb).    BMI is within normal parameters. No other follow-up for BMI required.      Physical Exam  Constitutional:       Appearance: Normal appearance.   HENT:      Head: Normocephalic.   Cardiovascular:      Rate and Rhythm: Normal rate and regular rhythm.   Pulmonary:      Effort: Pulmonary effort is normal.      Breath sounds: Normal breath sounds.   Abdominal:      General: Abdomen is flat. Bowel sounds are normal.      Palpations: Abdomen is soft.   Musculoskeletal:         General: Normal range of motion.      Cervical back: Neck supple.      Right lower leg: No edema.      Left lower leg: No edema.   Skin:     General: Skin is warm and dry.   Neurological:      Mental Status: She is alert and oriented to person, place, and time.      Gait: Gait is intact.   Psychiatric:         Attention and Perception: Attention normal.         Mood and Affect: Mood normal.         Speech: Speech normal.        Result Review :    CMP          12/3/2024    04:23 12/3/2024    14:50 12/4/2024    04:56 12/5/2024    02:58   CMP   Glucose 101   106  137    BUN 29   25  28    Creatinine 0.91   0.92  0.91    EGFR 69.3   68.4  69.3    Sodium 144   144  141    Potassium 3.5  5.0  4.1  4.3    Chloride 106   106  103    Calcium 9.8   9.7  9.7    BUN/Creatinine Ratio 31.9   27.2  30.8    Anion Gap 8.9   11.4  13.8      CBC          12/3/2024    04:23 12/4/2024    04:56 12/5/2024    02:58   CBC   WBC 7.90  7.21  5.71    RBC 3.80  3.92  4.06    Hemoglobin 11.5  12.2  12.2    Hematocrit 36.2  38.0  38.2    MCV 95.3  96.9  94.1  "   MCH 30.3  31.1  30.0    MCHC 31.8  32.1  31.9    RDW 15.3  15.2  14.6    Platelets 256  238  251      Lipid Panel          7/22/2024    05:28   Lipid Panel   Total Cholesterol 207    Triglycerides 125    HDL Cholesterol 38    VLDL Cholesterol 23    LDL Cholesterol  146    LDL/HDL Ratio 3.79      TSH          7/19/2024    23:23 12/2/2024    09:53   TSH   TSH 3.060  1.890      Most Recent A1C          7/22/2024    12:09   HGBA1C Most Recent   Hemoglobin A1C 5.44      Data reviewed : Recent hospitalization notes [facility]           Assessment and Plan   Diagnoses and all orders for this visit:    1. Mixed hyperlipidemia (Primary)  Assessment & Plan:   Lipid abnormalities are stable    Plan:  Continue same medication/s without change.      Discussed medication dosage, use, side effects, and goals of treatment in detail.    Counseled patient on lifestyle modifications to help control hyperlipidemia.   Advised patient to exercise for 150 minutes weekly. (30 minute brisk walk, 5 days a week for example)    Patient Treatment Goals:   LDL goal is under 100    Followup at the next regular appointment.      2. Chronic obstructive pulmonary disease, unspecified COPD type  Assessment & Plan:  COPD is stable.    Plan:  Continue same medication/s without change.    Discussed medication dosage, use, side effects, and goals of treatment in detail.    Warning signs of respiratory distress were reviewed with the patient. .    Patient Treatment Goals:   symptom prevention, minimizing limitation in activity, prevention of exacerbations and use of ER/inpatient care, maintenance of optimal pulmonary function, and minimization of adverse effects of treatment    Followup at the next regular appointment.    Orders:  -     Ambulatory Referral to Home Health    3. Degeneration of intervertebral disc of lumbar region with discogenic back pain  Assessment & Plan:  Percocet PRN for severe pain    Orders:  -     Ambulatory Referral to  Home Health    4. Primary hypertension  Assessment & Plan:  Hypertension is stable and controlled  Stop smoking.  Ambulatory blood pressure monitoring.  Blood pressure will be reassessed in 3 months.    Orders:  -     CBC (No Diff)  -     Comprehensive Metabolic Panel    5. Meniere's disease, unspecified laterality  Assessment & Plan:  Reviewed hospitalization notes  Dizziness improved  Refer to  for PT/OT/SN  May take Meclizine PRN  CBC/CMP today    Orders:  -     Ambulatory Referral to Home Health    6. Abdominal aortic aneurysm (AAA) without rupture, unspecified part  Assessment & Plan:  Follow up with Vascular Surgery      7. Memory loss  Assessment & Plan:  Continue Namenda as prescribed    Orders:  -     Ambulatory Referral to Home Health    8. Hypothyroidism, unspecified type  Assessment & Plan:  Continue Synthroid as prescribed    Orders:  -     TSH    9. Need for influenza vaccination  -     Fluzone High-Dose 65+yrs ([id number])           I spent 30 minutes caring for Miryam on this date of service. This time includes time spent by me in the following activities:preparing for the visit, reviewing tests, obtaining and/or reviewing a separately obtained history, performing a medically appropriate examination and/or evaluation , counseling and educating the patient/family/caregiver, ordering medications, tests, or procedures, referring and communicating with other health care professionals , documenting information in the medical record, and care coordination  Follow Up   Return in about 4 months (around 6/6/2025) for Medicare Wellness.  Patient was given instructions and counseling regarding her condition or for health maintenance advice. Please see specific information pulled into the AVS if appropriate.

## 2025-02-06 NOTE — ASSESSMENT & PLAN NOTE
Lipid abnormalities are stable    Plan:  Continue same medication/s without change.      Discussed medication dosage, use, side effects, and goals of treatment in detail.    Counseled patient on lifestyle modifications to help control hyperlipidemia.   Advised patient to exercise for 150 minutes weekly. (30 minute brisk walk, 5 days a week for example)    Patient Treatment Goals:   LDL goal is under 100    Followup at the next regular appointment.

## 2025-02-06 NOTE — ASSESSMENT & PLAN NOTE
Reviewed hospitalization notes  Dizziness improved  Refer to HH for PT/OT/SN  May take Meclizine PRN  CBC/CMP today

## 2025-02-06 NOTE — ASSESSMENT & PLAN NOTE
Hypertension is stable and controlled  Stop smoking.  Ambulatory blood pressure monitoring.  Blood pressure will be reassessed in 3 months.

## 2025-02-07 ENCOUNTER — HOME CARE VISIT (OUTPATIENT)
Dept: HOME HEALTH SERVICES | Facility: HOME HEALTHCARE | Age: 70
End: 2025-02-07
Payer: COMMERCIAL

## 2025-02-07 DIAGNOSIS — N18.31 STAGE 3A CHRONIC KIDNEY DISEASE: Primary | ICD-10-CM

## 2025-02-07 LAB
ALBUMIN SERPL-MCNC: 3.6 G/DL (ref 3.5–5.2)
ALBUMIN/GLOB SERPL: 0.9 G/DL
ALP SERPL-CCNC: 145 U/L (ref 39–117)
ALT SERPL W P-5'-P-CCNC: 8 U/L (ref 1–33)
ANION GAP SERPL CALCULATED.3IONS-SCNC: 9.5 MMOL/L (ref 5–15)
AST SERPL-CCNC: 11 U/L (ref 1–32)
BILIRUB SERPL-MCNC: 0.4 MG/DL (ref 0–1.2)
BUN SERPL-MCNC: 17 MG/DL (ref 8–23)
BUN/CREAT SERPL: 15 (ref 7–25)
CALCIUM SPEC-SCNC: 10.1 MG/DL (ref 8.6–10.5)
CHLORIDE SERPL-SCNC: 106 MMOL/L (ref 98–107)
CO2 SERPL-SCNC: 28.5 MMOL/L (ref 22–29)
CREAT SERPL-MCNC: 1.13 MG/DL (ref 0.57–1)
EGFRCR SERPLBLD CKD-EPI 2021: 53.1 ML/MIN/1.73
GLOBULIN UR ELPH-MCNC: 3.9 GM/DL
GLUCOSE SERPL-MCNC: 91 MG/DL (ref 65–99)
POTASSIUM SERPL-SCNC: 4.8 MMOL/L (ref 3.5–5.2)
PROT SERPL-MCNC: 7.5 G/DL (ref 6–8.5)
SODIUM SERPL-SCNC: 144 MMOL/L (ref 136–145)
TSH SERPL DL<=0.05 MIU/L-ACNC: 1.55 UIU/ML (ref 0.27–4.2)

## 2025-02-07 PROCEDURE — G0299 HHS/HOSPICE OF RN EA 15 MIN: HCPCS

## 2025-02-07 NOTE — Clinical Note
"SOC Note: [facility] for confusion and decreased oral intake. She was found to have a metabolic encephalopathy related to UTI. She was discharged to [facility] in [place] for rehab after a course of abx and monitoring. She was released from [facility] a couple of days ago and referred to  for strength training, CP assessments and medication education. Pt states hx of Meneiers disease and has been having a flare up these past couple of weeks. It has made it difficult to stand and walk safely. She currently uses a walker and standby assist of family, but prior to this hospitalization she was strong and independent. SN reviewed medications and DME with pt. Assessed for medical needs and educated on medications. Discussed admission consent with pt and family and they are agreeable to admission at this time.     Home Health ordered for: SN 1W1, 2W2, 1W5/PT evaluate and treat/OT evaluate and treat    Reason for Hosp/Primary Dx/Co-morbidities: Metabolic enchepalopathy related to UTI/UTI in hospital, DDD of lumbar region at discharge of [facility]/COPD, CAD, HCL, frequent UTIs, Menieres disease and constipation    Focus of Care: DDD of lumbar region    Patient's goal(s): \"I want to be independent again.\"    Current Functional status/mobility/DME: Ambulates in the home with use of walker and standby assist    HB status/Living Arrangements: lives in single family modular home with spouse    Skin Integrity/wound status: Intact    Code Status: CPR    Fall Risk/Safety concerns: High fals risk    Educated on Emergency Plan, steps to take prior to going to the ER and when to Call Home Health First:  yes     Medication issues/Concerns: None    Additional Problems/Concerns: None    SDOH Barriers (i.e. caregiver concerns, social isolation, transportation, food insecurity, environment, income etc.)/Need for MSW: No"

## 2025-02-07 NOTE — Clinical Note
"[facility]   SOC Note:     Home Health ordered for: SN 1W1, 2W2, 1W5/PT evaluate and treat/OT evaluate and treat    Reason for Hosp/Primary Dx/Co-morbidities:     Focus of Care: Other intervertebral disc degeneration, lumbar region with discogenic back pain only     Patient's goal(s): \"I want to be independent again.\"    Current Functional status/mobility/DME: ambulates in the home with use of walker and standby assist    HB status/Living Arrangements: lives in single family modular home with spouse    Skin Integrity/wound status: intact    Code Status: CPR    Fall Risk/Safety concerns: High falls risk    Educated on Emergency Plan, steps to take prior to going to the ER and when to Call Home Health First:  yes    Medication issues/Concerns: case communication to MD    Additional Problems/Concerns: No    SDOH Barriers (i.e. caregiver con cerns, social isolation, transportation, food insecurity, environment, income etc.)/Need for MSW: none"

## 2025-02-07 NOTE — Clinical Note
Per CMS guidelines, home health is required to communicate major drug to drug interactions with providers.    Drug-Drug: ticagrelor and aspirin    Additive antiplatelet effect may occur when aspirin is coadministered with ticagrelor. Additionally, aspirin may diminish the therapeutic effect of ticagrelor.    Details

## 2025-02-08 VITALS
SYSTOLIC BLOOD PRESSURE: 128 MMHG | OXYGEN SATURATION: 99 % | HEIGHT: 72 IN | HEART RATE: 90 BPM | WEIGHT: 122 LBS | BODY MASS INDEX: 16.52 KG/M2 | TEMPERATURE: 98.6 F | DIASTOLIC BLOOD PRESSURE: 76 MMHG | RESPIRATION RATE: 16 BRPM

## 2025-02-08 NOTE — HOME HEALTH
"SOC Note: Pt recently hospitalized at [facility] 12/2-12/6 for confusion and decreased oral intake. She was found to have a metabolic encephalopathy related to UTI. She was discharged to [facility] in [place] for rehab after a course of abx and monitoring. She was released from [facility] a couple of days ago and referred to  for strength training, CP assessments and medication education. Pt states hx of Meneiers disease and has been having a flare up these past couple of weeks. It has made it difficult to stand and walk safely. She currently uses a walker and standby assist of family, but prior to this hospitalization she was strong and independent. SN reviewed medications and DME with pt. Assessed for medical needs and educated on medications. Discussed admission consent with pt and family and they are agreeable to admission at this time.     Home Health ordered for: SN 1W1, 2W2, 1W5/PT evaluate and treat/OT evaluate and treat    Reason for Hosp/Primary Dx/Co-morbidities: Metabolic enchepalopathy related to UTI/UTI in hospital, DDD of lumbar region at discharge of [facility]/COPD, CAD, HCL, frequent UTIs, Menieres disease and constipation    Focus of Care: DDD of lumbar region    Patient's goal(s): \"I want to be independent again.\"    Current Functional status/mobility/DME: Ambulates in the home with use of walker and standby assist    HB status/Living Arrangements: lives in single family modular home with spouse    Skin Integrity/wound status: Intact    Code Status: CPR    Fall Risk/Safety concerns: High fals risk    Educated on Emergency Plan, steps to take prior to going to the ER and when to Call Home Health First:  yes     Medication issues/Concerns: None    Additional Problems/Concerns: None    SDOH Barriers (i.e. caregiver concerns, social isolation, transportation, food insecurity, environment, income etc.)/Need for MSW: No"

## 2025-02-10 ENCOUNTER — HOME CARE VISIT (OUTPATIENT)
Dept: HOME HEALTH SERVICES | Facility: HOME HEALTHCARE | Age: 70
End: 2025-02-10
Payer: COMMERCIAL

## 2025-02-10 VITALS
OXYGEN SATURATION: 96 % | DIASTOLIC BLOOD PRESSURE: 80 MMHG | SYSTOLIC BLOOD PRESSURE: 115 MMHG | RESPIRATION RATE: 18 BRPM | TEMPERATURE: 98.3 F | HEART RATE: 97 BPM

## 2025-02-10 PROCEDURE — G0151 HHCP-SERV OF PT,EA 15 MIN: HCPCS

## 2025-02-10 NOTE — HOME HEALTH
"Assessment/Referral:  Pt is a 71 y/o female pt of [facility] S[facility].  Pt was recently hospitalized for UTI and pt's daughter states pt has experienced persistent UTIs for which her PCP is aware and seeking alternate treatment options.  Pt states she had a fall on Wednesday 02/05 prior to home care admission.  Pt states her fall was related to attempting ambulation without rollator.  Pt states she has since been compliant with use of rollator.  PT completed comprehensive evaluation of pt's mobility and noted pt to have guarded and rigid posture due to dizziness and fear of falls.  Pt's gait distance limited by dizziness and quick fatigue.  Pt will benefit from continued skilled home care PT for overall improvement and safety with functional mobility and increased independence for decreased caregiver burden.  Pt is currently being provided 24 hr S/A from family.     Primary Focus of Care:  impaired mobility and weakness related to UTI and Meneire's Disease    PLOF/Environment:  Pt was previously independent with use of rollator.  Pt lives with her  in single story home with ramped entry.  Pt's daughter available for daily needs when  at work    Pt's Personal Goal:  Pt states her primary goal for home care PT is to \"get over all this stuff and feel better\"    Homebound reason(s):  Pt is homebound due to considerable taxing effort to leave home including:  fatigue, dizziness, falls risk, weakness    Plan for Next Visit:  Pt will benefit from continued skilled home care PT services for strengthening, gait/balance training, progression of mobility and safety education"

## 2025-02-11 ENCOUNTER — HOME CARE VISIT (OUTPATIENT)
Dept: HOME HEALTH SERVICES | Facility: HOME HEALTHCARE | Age: 70
End: 2025-02-11
Payer: COMMERCIAL

## 2025-02-11 PROCEDURE — G0152 HHCP-SERV OF OT,EA 15 MIN: HCPCS

## 2025-02-11 PROCEDURE — G0299 HHS/HOSPICE OF RN EA 15 MIN: HCPCS

## 2025-02-12 VITALS
SYSTOLIC BLOOD PRESSURE: 126 MMHG | RESPIRATION RATE: 14 BRPM | DIASTOLIC BLOOD PRESSURE: 78 MMHG | OXYGEN SATURATION: 98 % | HEART RATE: 81 BPM | TEMPERATURE: 98.8 F

## 2025-02-12 NOTE — HOME HEALTH
Pt is a 69 yo female who lives in a trailer home with .  Pts dtr assists pt with adls as needed.    Pt recently hospitalized secondary to UTI      PLOF pt independent with all self care      CLOF pt independent with feeding grooming and toileting.   Pt requires MIN to MOD assist with bathing and dressing.   Total assist with all IADLS      Skilled OT for ther ex and adl training.   Pt and therapist in agreement with goals and poc.

## 2025-02-15 ENCOUNTER — HOME CARE VISIT (OUTPATIENT)
Dept: HOME HEALTH SERVICES | Facility: HOME HEALTHCARE | Age: 70
End: 2025-02-15
Payer: COMMERCIAL

## 2025-02-15 PROCEDURE — G0299 HHS/HOSPICE OF RN EA 15 MIN: HCPCS

## 2025-02-16 VITALS
OXYGEN SATURATION: 99 % | SYSTOLIC BLOOD PRESSURE: 100 MMHG | RESPIRATION RATE: 16 BRPM | HEART RATE: 85 BPM | DIASTOLIC BLOOD PRESSURE: 70 MMHG | TEMPERATURE: 98.7 F

## 2025-02-16 NOTE — HOME HEALTH
Discharge Decision: All goals met    Plan for discharge:  D/C to home with outpatient follow up    Barriers to discharge: None    Ongoing needs: N/A    Any referrals needed: No    Any declines in outcomes: No     Teaching needed prior to discharge: outpatient follow up appointments and medication education     Assign DC notice responsibility: SN    Assign OASIS discharge responsibility:

## 2025-02-26 RX ORDER — LEVOTHYROXINE SODIUM 25 UG/1
25 TABLET ORAL
Qty: 90 TABLET | Refills: 0 | Status: SHIPPED | OUTPATIENT
Start: 2025-02-26

## 2025-03-03 ENCOUNTER — HOSPITAL ENCOUNTER (OUTPATIENT)
Dept: MRI IMAGING | Facility: HOSPITAL | Age: 70
Discharge: HOME | End: 2025-03-03
Payer: MEDICARE

## 2025-03-03 ENCOUNTER — HOSPITAL ENCOUNTER (OUTPATIENT)
Dept: CT IMAGING | Facility: HOSPITAL | Age: 70
Discharge: HOME | End: 2025-03-03
Payer: MEDICARE

## 2025-03-03 DIAGNOSIS — C34.90 SMALL CELL LUNG CANCER: ICD-10-CM

## 2025-03-03 PROCEDURE — 25010000002 GADOTERIDOL PER 1 ML: Performed by: STUDENT IN AN ORGANIZED HEALTH CARE EDUCATION/TRAINING PROGRAM

## 2025-03-03 PROCEDURE — A9579 GAD-BASE MR CONTRAST NOS,1ML: HCPCS | Performed by: STUDENT IN AN ORGANIZED HEALTH CARE EDUCATION/TRAINING PROGRAM

## 2025-03-03 PROCEDURE — 74177 CT ABD & PELVIS W/CONTRAST: CPT

## 2025-03-03 PROCEDURE — 70553 MRI BRAIN STEM W/O & W/DYE: CPT

## 2025-03-03 PROCEDURE — 25510000001 IOPAMIDOL PER 1 ML: Performed by: STUDENT IN AN ORGANIZED HEALTH CARE EDUCATION/TRAINING PROGRAM

## 2025-03-03 PROCEDURE — 71260 CT THORAX DX C+: CPT

## 2025-03-03 RX ORDER — IOPAMIDOL 755 MG/ML
100 INJECTION, SOLUTION INTRAVASCULAR
Status: COMPLETED | OUTPATIENT
Start: 2025-03-03 | End: 2025-03-03

## 2025-03-03 RX ADMIN — GADOTERIDOL 12 ML: 279.3 INJECTION, SOLUTION INTRAVENOUS at 13:48

## 2025-03-03 RX ADMIN — IOPAMIDOL 100 ML: 755 INJECTION, SOLUTION INTRAVENOUS at 14:42

## 2025-03-07 DIAGNOSIS — K82.8 GALLBLADDER MASS: Primary | ICD-10-CM

## 2025-03-10 ENCOUNTER — LAB (OUTPATIENT)
Dept: LAB | Facility: HOSPITAL | Age: 70
End: 2025-03-10
Payer: MEDICARE

## 2025-03-10 ENCOUNTER — OFFICE VISIT (OUTPATIENT)
Dept: ONCOLOGY | Facility: CLINIC | Age: 70
End: 2025-03-10
Payer: MEDICARE

## 2025-03-10 VITALS
WEIGHT: 132.4 LBS | BODY MASS INDEX: 22.61 KG/M2 | OXYGEN SATURATION: 96 % | SYSTOLIC BLOOD PRESSURE: 128 MMHG | HEIGHT: 64 IN | HEART RATE: 83 BPM | DIASTOLIC BLOOD PRESSURE: 74 MMHG

## 2025-03-10 DIAGNOSIS — D47.2 MGUS (MONOCLONAL GAMMOPATHY OF UNKNOWN SIGNIFICANCE): Primary | ICD-10-CM

## 2025-03-10 DIAGNOSIS — K82.8 GALLBLADDER MASS: ICD-10-CM

## 2025-03-10 DIAGNOSIS — C34.90 SMALL CELL LUNG CANCER: ICD-10-CM

## 2025-03-10 DIAGNOSIS — R29.898 BILATERAL LEG WEAKNESS: ICD-10-CM

## 2025-03-10 DIAGNOSIS — D64.9 ANEMIA, UNSPECIFIED TYPE: ICD-10-CM

## 2025-03-10 DIAGNOSIS — D47.2 MGUS (MONOCLONAL GAMMOPATHY OF UNKNOWN SIGNIFICANCE): ICD-10-CM

## 2025-03-10 DIAGNOSIS — C34.90 SMALL CELL CARCINOMA OF LUNG, UNSPECIFIED LATERALITY, UNSPECIFIED PART OF LUNG: ICD-10-CM

## 2025-03-10 LAB
ALBUMIN SERPL-MCNC: 4.1 G/DL (ref 3.5–5.2)
ALBUMIN/GLOB SERPL: 1.6 G/DL
ALP SERPL-CCNC: 115 U/L (ref 39–117)
ALT SERPL W P-5'-P-CCNC: 13 U/L (ref 1–33)
ANION GAP SERPL CALCULATED.3IONS-SCNC: 11.2 MMOL/L (ref 5–15)
AST SERPL-CCNC: 20 U/L (ref 1–32)
BASOPHILS # BLD AUTO: 0.04 10*3/MM3 (ref 0–0.2)
BASOPHILS NFR BLD AUTO: 0.8 % (ref 0–1.5)
BILIRUB SERPL-MCNC: 0.2 MG/DL (ref 0–1.2)
BUN SERPL-MCNC: 20 MG/DL (ref 8–23)
BUN/CREAT SERPL: 18.9 (ref 7–25)
CALCIUM SPEC-SCNC: 9.8 MG/DL (ref 8.6–10.5)
CHLORIDE SERPL-SCNC: 104 MMOL/L (ref 98–107)
CO2 SERPL-SCNC: 24.8 MMOL/L (ref 22–29)
CREAT SERPL-MCNC: 1.06 MG/DL (ref 0.57–1)
DEPRECATED RDW RBC AUTO: 51.8 FL (ref 37–54)
EGFRCR SERPLBLD CKD-EPI 2021: 57.3 ML/MIN/1.73
EOSINOPHIL # BLD AUTO: 0.57 10*3/MM3 (ref 0–0.4)
EOSINOPHIL NFR BLD AUTO: 10.8 % (ref 0.3–6.2)
ERYTHROCYTE [DISTWIDTH] IN BLOOD BY AUTOMATED COUNT: 14.7 % (ref 12.3–15.4)
FERRITIN SERPL-MCNC: 50.2 NG/ML (ref 13–150)
GLOBULIN UR ELPH-MCNC: 2.6 GM/DL
GLUCOSE SERPL-MCNC: 152 MG/DL (ref 65–99)
HCT VFR BLD AUTO: 35.9 % (ref 34–46.6)
HGB BLD-MCNC: 11.3 G/DL (ref 12–15.9)
IRON 24H UR-MRATE: 56 MCG/DL (ref 37–145)
IRON SATN MFR SERPL: 13 % (ref 20–50)
LYMPHOCYTES # BLD AUTO: 0.59 10*3/MM3 (ref 0.7–3.1)
LYMPHOCYTES NFR BLD AUTO: 11.2 % (ref 19.6–45.3)
MCH RBC QN AUTO: 31.2 PG (ref 26.6–33)
MCHC RBC AUTO-ENTMCNC: 31.5 G/DL (ref 31.5–35.7)
MCV RBC AUTO: 99.2 FL (ref 79–97)
MONOCYTES # BLD AUTO: 0.47 10*3/MM3 (ref 0.1–0.9)
MONOCYTES NFR BLD AUTO: 8.9 % (ref 5–12)
NEUTROPHILS NFR BLD AUTO: 3.59 10*3/MM3 (ref 1.7–7)
NEUTROPHILS NFR BLD AUTO: 68.3 % (ref 42.7–76)
PLATELET # BLD AUTO: 277 10*3/MM3 (ref 140–450)
PMV BLD AUTO: 9.5 FL (ref 6–12)
POTASSIUM SERPL-SCNC: 3.9 MMOL/L (ref 3.5–5.2)
PROT SERPL-MCNC: 6.7 G/DL (ref 6–8.5)
RBC # BLD AUTO: 3.62 10*6/MM3 (ref 3.77–5.28)
RETICS # AUTO: 0.07 10*6/MM3 (ref 0.02–0.13)
RETICS/RBC NFR AUTO: 1.8 % (ref 0.7–1.9)
SODIUM SERPL-SCNC: 140 MMOL/L (ref 136–145)
TIBC SERPL-MCNC: 444 MCG/DL (ref 298–536)
TRANSFERRIN SERPL-MCNC: 298 MG/DL (ref 200–360)
WBC NRBC COR # BLD AUTO: 5.26 10*3/MM3 (ref 3.4–10.8)

## 2025-03-10 PROCEDURE — 3074F SYST BP LT 130 MM HG: CPT | Performed by: STUDENT IN AN ORGANIZED HEALTH CARE EDUCATION/TRAINING PROGRAM

## 2025-03-10 PROCEDURE — 84466 ASSAY OF TRANSFERRIN: CPT | Performed by: STUDENT IN AN ORGANIZED HEALTH CARE EDUCATION/TRAINING PROGRAM

## 2025-03-10 PROCEDURE — 83540 ASSAY OF IRON: CPT | Performed by: STUDENT IN AN ORGANIZED HEALTH CARE EDUCATION/TRAINING PROGRAM

## 2025-03-10 PROCEDURE — 80053 COMPREHEN METABOLIC PANEL: CPT | Performed by: STUDENT IN AN ORGANIZED HEALTH CARE EDUCATION/TRAINING PROGRAM

## 2025-03-10 PROCEDURE — 99214 OFFICE O/P EST MOD 30 MIN: CPT | Performed by: STUDENT IN AN ORGANIZED HEALTH CARE EDUCATION/TRAINING PROGRAM

## 2025-03-10 PROCEDURE — 3078F DIAST BP <80 MM HG: CPT | Performed by: STUDENT IN AN ORGANIZED HEALTH CARE EDUCATION/TRAINING PROGRAM

## 2025-03-10 PROCEDURE — 85025 COMPLETE CBC W/AUTO DIFF WBC: CPT

## 2025-03-10 PROCEDURE — 85045 AUTOMATED RETICULOCYTE COUNT: CPT | Performed by: STUDENT IN AN ORGANIZED HEALTH CARE EDUCATION/TRAINING PROGRAM

## 2025-03-10 PROCEDURE — 82728 ASSAY OF FERRITIN: CPT | Performed by: STUDENT IN AN ORGANIZED HEALTH CARE EDUCATION/TRAINING PROGRAM

## 2025-03-10 PROCEDURE — 36415 COLL VENOUS BLD VENIPUNCTURE: CPT

## 2025-03-10 PROCEDURE — 1126F AMNT PAIN NOTED NONE PRSNT: CPT | Performed by: STUDENT IN AN ORGANIZED HEALTH CARE EDUCATION/TRAINING PROGRAM

## 2025-03-10 RX ORDER — NITROFURANTOIN MACROCRYSTALS 100 MG/1
100 CAPSULE ORAL 4 TIMES DAILY
COMMUNITY

## 2025-03-10 NOTE — PROGRESS NOTES
HEMATOLOGY ONCOLOGY OUTPATIENT FOLLOW UP       Patient name: Miryam Fernández  : 1955  MRN: [id number]  Primary Care Physician: Lashell De La Torre APRN  Referring Physician: No ref. provider found  Reason For Consult:         History of Present Illness:  Patient is a 69 y.o. female diagnosed and treated for limited stage small cell lung cancer with chemoradiation currently undergoing surveillance.      She was diagnosed in   with small cell lung cancer after presenting with dyspnea, chest pain, nausea and vomiting. CT chest was suspicious for lung      20 PET CT -  showed hypermetabolic lower cervical, upper and middle mediastinal lymphadenopathy and left perihilar lung nodule. There was also intense focal uptake in the right thyroid lobe.   MRI brain was negative for metastases.      20  biopsy of the left supraclavicular node that showed small cell lung  cancer. She was diagnosed with limited stage disease and was treated with definitive chemoradiation.      20 - C1 of cisplatin/etoposide with radiation  2020 C4     2020 - PCI      CT chest on 20 showed significant interval reduction in mediastinal masses though there was a new PE for which she was started on Eliquis. She continued following with Dr Castillo and has had stable disease on imaging.      CT C/A/P on 21 showed a more conspicuous 5 mm LLL nodule compared to previous scans with numerous additional bilateral nodules that are stable and unchanged since . There was an increasing moderate pericardial effusion and stable perihilar scarring as well as mediastinal soft tissue density thought to be treated carcinoma. There was no clear evidence of progressive or recurrence malignancy in the chest.     She transferred care to [facility] in 2021 and had repeat imaging in December including MRI brain that did not show disease progression. However, the pericardial effusion had increased. 2D echo on 21 showed  hemodynamic compromise and concerns  for cardiac tamponade. She was subsequently admitted to the cardiology service and had a pericardiocentesis on 12/10/21 with the fluid negative for malignancy.     No further recurrence of disease.      Last CT imaging in 5/2023 with no evidence of recurrence. No recurrence of effusion.      3/26/2024 CT imaging with suggestive of of posttreatment fibrosis in the paramediastinal perihilar left upper lobe with no significant change as compared to October 2023.  Multiple pulmonary nodules are unchanged from 2021 hence benign.  Moderate emphysema, severe stenosis of the distal left external iliac artery which is new suspected severe renal artery ostial stenosis, thoracic aorta fusiform aneurysm.  No convincing evidence of recurrence of disease  After this she was referred to vascular surgery recommendation was medical management without any surgical intervention given no symptoms.     3/26/2024 MRI brain with confluent foci of T2 flair signal hyperintensity within the bilateral hemispheric white matter.  Related to chronic microvascular ischemic change.     6/28/24: CT Abdomen/Pelvis:  Impression:  1. Heterogeneous diminished enhancement in the right kidney. Correlate for symptoms of nephritis.  2. 2 mm nonobstructing stone in the right upper renal pole.  3. Stable 5 mm right lower lobe and 3 mm left lower lobe noncalcified nodules. According to previous report, these nodules have been stable since 2021, suggesting benign etiology.  4. 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 Suspected stenosis of the right renal artery ostium again noted.  5. Fusiform aneurysm of the distal descending thoracic aorta and infrarenal abdominal aorta, unchanged     7/20/24: MRI Brain W WO contrast:  Impression:  1. Small less than 1 cm areas of increased signal on diffusion imaging with associated FLAIR and T2 signal abnormality within the right basal ganglia, corona  radiata suggesting evolution of small subacute infarcts. No convincing evidence for acute ischemic changes otherwise noted.   2. Mild increased T1 signal and questionable enhancement in this region may represent sequela of evolving small infarct. Metastatic lesion is thought to be less likely although this will need to be followed per patient's oncologic protocol  3. No abnormal enhancement otherwise noted within the brain parenchyma.  4. Extensive confluent white matter changes again noted which can be seen with small vessel ischemic disease as well as represent sequela of prior therapy     7/23/24: CT Abdomen/Pelvis:  Impression:  1.Questionable indistinctness of the right colon versus artifact from respiratory motion. Right-sided colitis may be considered in the appropriate clinical setting.  2.Right punctate nonobstructive nephrolithiasis. No hydronephrosis is seen. There is excreted contrast throughout the urinary tract.  3.Ectasia of the distal abdominal aorta measuring 2.7 cm in caliber.  4.Several subcentimeter bilateral lung base nodules measuring up to approximately 5 mm. Small right pneumothorax is partially imaged. This was also seen on the neck CTA from 7/21/2024.        7/31/24-8/6/24: INTERVAL HOSPITALIZATION UPDATE:  Patient presented after a fall, found on CT of the pelvis to have a minimal nondisplaced fracture of the left lateral femoral head and neck junction.    -Orthopedics consulted, Patient is status post left total hip arthroplasty due to fracture      9/10/24: Patient seen for initial evaluation by me. She was previously being seen by Dr. Morales in our practice with underlying history of small cell lung cancer and is status post ChemoRadiation. She has excellent treatment response and continues to be in remission.  She is Status post Recent hospitalization as above for a fall with Left hip fracture requiring her to have Total Hip arthroplasty. She is gradually recovering from this and is  undergoing outpatient PT. 11/18/2024: MRI brain with and without contrast:     11/18/2024: MRI brain with without contrast:  Impression:  Punctate tiny area of probable acute lacunar infarct noted within the subcortical white matter of the right posterior frontal lobe. Given reported history of small cell lung cancer, tiny metastatic lesion would be a less likely consideration.       1.2.25: Patient seen today for follow-up visit.  Appears clinically stable, reported having some sinus congestion but otherwise at baseline.  She was recently admitted to [facility] for complaint of acute metabolic encephalopathy and UTI, she was treated with IV antibiotics.     3/3/25: CT Chest/Abdomen/Pelvis W contrast:  Impression:   1.Stable low-density soft tissue within the left suprahilar region, left hilum, and subcarinal station. This likely relates to posttreatment changes.   2.No new or enlarging area of abnormal soft tissue within the mediastinum or hilum.   3.Multiple stable small pulmonary nodules.   4.Subacute healing right anterior fourth, fifth, and sixth rib fractures.   5.No evidence of metastatic disease within the abdomen or pelvis.   6.Rounded area of thickened enhancement at the fundus of the gallbladder measuring 10 mm x 5 mm. This could represent an abnormal gallbladder polyp, mass, or focal adenomyomatosis. This appears new from prior examination. Recommend further evaluation       Subjective:  3/9/25: Patient seen today for follow-up visit.  Overall clinically stable.  Continues to have issues with chronic deconditioning and has limited mobility, mostly wheelchair-bound.  She continues to smoke around 1-2 packs/day.    Past Medical History:   Diagnosis Date    Cancer     lung cancer    Carotid stenosis     COPD (chronic obstructive pulmonary disease)     Coronary artery disease     Esophageal stricture     Dr Domingo    Hyperlipidemia     Metabolic encephalopathy 06/28/2024    2nd to UTI    Pulmonary embolism      seen at [facility], fluid around her heart at the time-right lung    UTI (urinary tract infection) 06/28/2024       Past Surgical History:   Procedure Laterality Date    ANGIOPLASTY / STENTING FEMORAL      CARDIAC CATHETERIZATION      CAROTID STENT      CORONARY STENT PLACEMENT      ESOPHAGOSCOPY / EGD      HYSTERECTOMY      TOTAL HIP ARTHROPLASTY Left 8/1/2024    Procedure: TOTAL HIP ARTHROPLASTY;  Surgeon: Olu Joshua II, MD;  Location: [facility] MAIN OR;  Service: Orthopedics;  Laterality: Left;         Current Outpatient Medications:     aspirin 81 MG EC tablet, Take 1 tablet by mouth Daily., Disp: 30 tablet, Rfl: 2    atorvastatin (LIPITOR) 40 MG tablet, TAKE 1 TABLET BY MOUTH EVERY DAY, Disp: 90 tablet, Rfl: 1    Cholecalciferol 50 MCG (2000 UT) tablet, Take 1 tablet by mouth Daily. Indications: Vitamin D Deficiency, Disp: , Rfl:     furosemide (LASIX) 20 MG tablet, TAKE 0.5 TABLETS BY MOUTH DAILY AS NEEDED (SWELLING) FOR UP TO 30 DAYS., Disp: 45 tablet, Rfl: 1    levothyroxine (SYNTHROID, LEVOTHROID) 25 MCG tablet, Take 1 tablet by mouth Every Morning. Indications: Underactive Thyroid, Disp: 90 tablet, Rfl: 0    meclizine (ANTIVERT) 25 MG tablet, Take 1 tablet by mouth 3 (Three) Times a Day As Needed for Dizziness., Disp: , Rfl:     memantine (NAMENDA) 5 MG tablet, TAKE 1 TABLET BY MOUTH TWICE A DAY, Disp: 180 tablet, Rfl: 1    Metoprolol Succinate 25 MG capsule extended-release 24 hour sprinkle, Take 25 mg by mouth Daily. Indications: tachycardia, Disp: , Rfl:     montelukast (Singulair) 10 MG tablet, Take 1 tablet by mouth Every Night., Disp: 30 tablet, Rfl: 1    nitrofurantoin (MACRODANTIN) 100 MG capsule, Take 1 capsule by mouth 4 (Four) Times a Day., Disp: , Rfl:     oxyCODONE-acetaminophen (PERCOCET) 5-325 MG per tablet, Take 1 tablet by mouth Every 8 (Eight) Hours. Indications: Pain, Disp: , Rfl:     pantoprazole (PROTONIX) 40 MG EC tablet, Take 1 tablet by mouth Daily. Indications:  "Gastroesophageal Reflux Disease, Disp: 90 tablet, Rfl: 1    ticagrelor (Brilinta) 60 MG tablet tablet, Take 1 tablet by mouth 2 (Two) Times a Day., Disp: 180 tablet, Rfl: 3    vitamin B-12 (CYANOCOBALAMIN) 1000 MCG tablet, Take 1 tablet by mouth Daily. Indications: Inadequate Vitamin B12, Disp: 30 tablet, Rfl: 5    Allergies   Allergen Reactions    Albuterol Shortness Of Breath     Pt states she has sensitivity to albuterol.  She states it causes her to be SOA.    Hydrocodone Palpitations    Sulfa Antibiotics Swelling    Bacitracin Other (See Comments)    Benzalkonium Chloride Hives    Codeine Other (See Comments)    Neomycin Other (See Comments)    Nickel Rash    Penicillins Rash    Polymyxin B Other (See Comments)       Family History   Problem Relation Age of Onset    Heart disease Mother     Hypertension Mother     Dementia Father     Hyperlipidemia Father     Lung cancer Father     Diabetes Paternal Uncle     Leukemia Paternal Grandmother        Cancer-related family history includes Lung cancer in her father.    Social History     Tobacco Use    Smoking status: Every Day     Current packs/day: 1.00     Average packs/day: 2.0 packs/day for 40.5 years (80.5 ttl pk-yrs)     Types: Cigarettes     Start date: 09/1984     Passive exposure: Current    Smokeless tobacco: Never   Vaping Use    Vaping status: Never Used   Substance Use Topics    Alcohol use: Not Currently    Drug use: Never     Social History     Social History Narrative    Not on file        12/12/2024.    ROS:   Review of Systems   Constitutional:  Positive for fatigue.   Musculoskeletal:  Positive for back pain and gait problem.       Objective:  Vital signs:  Vitals:    03/10/25 1036   BP: 128/74   Pulse: 83   SpO2: 96%   Weight: 60.1 kg (132 lb 6.4 oz)   Height: 162 cm (63.78\")   PainSc: 0-No pain     Body mass index is 22.88 kg/m².  ECOG  (2) Ambulatory and capable of self care, unable to carry out work activity, up and about > 50% or waking " hours    Physical Exam:   Physical Exam  Constitutional:       Appearance: She is normal weight. She is ill-appearing.   HENT:      Head: Normocephalic and atraumatic.      Right Ear: External ear normal.      Left Ear: External ear normal.      Nose: Nose normal.      Mouth/Throat:      Mouth: Mucous membranes are moist.      Pharynx: Oropharynx is clear.   Eyes:      Extraocular Movements: Extraocular movements intact.      Conjunctiva/sclera: Conjunctivae normal.      Pupils: Pupils are equal, round, and reactive to light.   Cardiovascular:      Rate and Rhythm: Normal rate.      Pulses: Normal pulses.   Pulmonary:      Effort: Pulmonary effort is normal.   Abdominal:      General: Abdomen is flat.      Palpations: Abdomen is soft.   Musculoskeletal:         General: Normal range of motion.      Cervical back: Normal range of motion and neck supple.   Skin:     General: Skin is warm.   Neurological:      Mental Status: She is alert.   Psychiatric:         Mood and Affect: Mood normal.         Behavior: Behavior normal.         Thought Content: Thought content normal.         Judgment: Judgment normal.         Lab Results - Last 18 Months   Lab Units 03/10/25  1027 02/06/25  1005 12/05/24  0258   WBC 10*3/mm3 5.26 6.00 5.71   HEMOGLOBIN g/dL 11.3* 12.0 12.2   HEMATOCRIT % 35.9 35.9 38.2   PLATELETS 10*3/mm3 277 340 251   MCV fL 99.2* 95.2 94.1     Lab Results - Last 18 Months   Lab Units 02/06/25  1005 12/05/24  0258 12/04/24  0456 12/03/24  0423 12/02/24  0953 08/03/24  2339   SODIUM mmol/L 144 141 144   < > 147* 138   POTASSIUM mmol/L 4.8 4.3 4.1   < > 3.8 4.2   CHLORIDE mmol/L 106 103 106   < > 105 107   CO2 mmol/L 28.5 24.2 26.6   < > 27.3 22.0   BUN mg/dL 17 28* 25*   < > 33* 17   CREATININE mg/dL 1.13* 0.91 0.92   < > 0.97 0.79   CALCIUM mg/dL 10.1 9.7 9.7   < > 10.3 8.6   BILIRUBIN mg/dL 0.4  --   --   --  0.4 0.2   ALK PHOS U/L 145*  --   --   --  108 89   ALT (SGPT) U/L 8  --   --   --  11 <5   AST (SGOT)  "U/L 11  --   --   --  18 22   GLUCOSE mg/dL 91 137* 106*   < > 133* 108*    < > = values in this interval not displayed.       Lab Results   Component Value Date    GLUCOSE 91 02/06/2025    BUN 17 02/06/2025    CREATININE 1.13 (H) 02/06/2025    EGFRIFAFRI >60 09/09/2021    BCR 15.0 02/06/2025    K 4.8 02/06/2025    CO2 28.5 02/06/2025    CALCIUM 10.1 02/06/2025    ALBUMIN 3.6 02/06/2025    LABIL2 1.3 09/09/2021    AST 11 02/06/2025    ALT 8 02/06/2025       Lab Results - Last 18 Months   Lab Units 08/01/24  0235 07/31/24  1551   INR  0.95 <0.93*   APTT seconds  --  23.1*       Lab Results   Component Value Date    IRON 38 09/10/2024    TIBC 395 09/10/2024    FERRITIN 216.00 (H) 09/10/2024       Lab Results   Component Value Date    FOLATE 9.77 09/10/2024       No results found for: \"OCCULTBLD\"    Lab Results   Component Value Date    RETICCTPCT 2.53 (H) 09/10/2024     Lab Results   Component Value Date    LUUEUOCH63 402 09/10/2024     No results found for: \"SPEP\", \"UPEP\"  No results found for: \"LDH\", \"URICACID\"  No results found for: \"YOLANDA\", \"RF\", \"SEDRATE\"  No results found for: \"FIBRINOGEN\", \"HAPTOGLOBIN\"  Lab Results   Component Value Date    PTT 23.1 (L) 07/31/2024    INR 0.95 08/01/2024     No results found for: \"\"  No results found for: \"CEA\"  No components found for: \"CA-19-9\"  No results found for: \"PSA\"  No results found for: \"SEDRATE\"    Assessment & Plan     Patient is a 69 y.o. female with limited stage small cell carcinoma s/p chemoradiation and PCI now on surveillance with no recurrence.     Limited stage small cell carcinoma  Good response to treatment. S/p chemo radiation with cisplatin/etoposide   5/2023 CT imaging with no evidence of recurrence  Repeat imaging as above in March 2024 with no concerns for recurrence MRI brain reviewed as well  Restaging Scans from July 2024 are АНДРЕЙ, no concern for disease recurrence/progression.  Restaging scans reviewed from 3/3/2025, reported АНДРЕЙ without any " evidence of disease   progression.  Will recheck staging scans in 3 months   MRI brain not consistent with metastatic disease.  Continue MRI brain every 6 months.    Gallbladder polyp:  -Noted on CT scan, measuring about 1 centimeter in size.  Ordered ultrasound abdomen and will refer patient to surgery for further evaluation and to consider cholecystectomy.     History of PE   Now off Eliquis, she took herself off eliquis after 6 months  No signs or symptoms of recurrent VTE continue monitor.  Continue to monitor for signs of recurrent VTEs.     History of pericardial effusion  S/p pericardiocentesis negative for cytology  No recurrence  Monitor for now      IgA MGUS:  SPEP and KHUSHBOO showed IgA Kappa M Oz (0.2g./dl). FLC ratio 2.12  -This is likely low risk MGUS, per iSTOPMM risk model, The predicted risk of having >=10% bone marrow plasma cells is 7.4%., Will continue to monitor Myeloma labs,   -Her anemia has improved after recent hospitalization, Hb improved from 7.5 to 11.4. Remainder of CBC is WNL. Renal function and S. Ca levels WNL.   -Will recheck these labs on follow-up.        Left hip Fracture:  S/p CLINTON. She is gradually recovering.  Patient continues to be wheelchair-bound.  Encouraged to follow with physical therapy.  Referral placed     Forgetfulness/cognitive impairment:  -Unclear etiology, continue to follow with neurology/vascular surgery.  MRI brain suggestive of subacute infarct.  Patient is currently on dual antiplatelet therapy and statins.  Continue     Smoking   Continue to smoking, again counseled on quitting smoking.        3-month follow-up with restaging scans, sooner as needed..      Thank you very much for providing the opportunity to participate in this patient’s care. Please do not hesitate to call if there are any other questions.

## 2025-03-11 LAB
KAPPA LC FREE SER-MCNC: 22.3 MG/L (ref 3.3–19.4)
KAPPA LC FREE/LAMBDA FREE SER: 1.78 {RATIO} (ref 0.26–1.65)
LAMBDA LC FREE SERPL-MCNC: 12.5 MG/L (ref 5.7–26.3)

## 2025-03-17 ENCOUNTER — OFFICE VISIT (OUTPATIENT)
Dept: CARDIOLOGY | Facility: CLINIC | Age: 70
End: 2025-03-17
Payer: MEDICARE

## 2025-03-17 VITALS
BODY MASS INDEX: 23.63 KG/M2 | HEIGHT: 64 IN | OXYGEN SATURATION: 99 % | WEIGHT: 138.4 LBS | SYSTOLIC BLOOD PRESSURE: 114 MMHG | DIASTOLIC BLOOD PRESSURE: 76 MMHG | HEART RATE: 82 BPM

## 2025-03-17 DIAGNOSIS — I65.23 BILATERAL CAROTID ARTERY STENOSIS: Primary | ICD-10-CM

## 2025-03-17 DIAGNOSIS — Z95.828 S/P INSERTION OF ILIAC ARTERY STENT: ICD-10-CM

## 2025-03-17 DIAGNOSIS — I25.118 CORONARY ARTERY DISEASE OF NATIVE ARTERY OF NATIVE HEART WITH STABLE ANGINA PECTORIS: ICD-10-CM

## 2025-03-17 DIAGNOSIS — I73.9 PAD (PERIPHERAL ARTERY DISEASE): ICD-10-CM

## 2025-03-17 DIAGNOSIS — E78.2 MIXED HYPERLIPIDEMIA: ICD-10-CM

## 2025-03-17 PROCEDURE — 3078F DIAST BP <80 MM HG: CPT | Performed by: INTERNAL MEDICINE

## 2025-03-17 PROCEDURE — 1159F MED LIST DOCD IN RCRD: CPT | Performed by: INTERNAL MEDICINE

## 2025-03-17 PROCEDURE — 1160F RVW MEDS BY RX/DR IN RCRD: CPT | Performed by: INTERNAL MEDICINE

## 2025-03-17 PROCEDURE — 99214 OFFICE O/P EST MOD 30 MIN: CPT | Performed by: INTERNAL MEDICINE

## 2025-03-17 PROCEDURE — 3074F SYST BP LT 130 MM HG: CPT | Performed by: INTERNAL MEDICINE

## 2025-03-17 RX ORDER — MECLIZINE HYDROCHLORIDE 25 MG/1
25 TABLET ORAL 3 TIMES DAILY PRN
Qty: 30 TABLET | Refills: 0 | Status: SHIPPED | OUTPATIENT
Start: 2025-03-17

## 2025-03-17 NOTE — PROGRESS NOTES
Cardiology Office Visit      Encounter Date:  03/17/2025    Patient ID:   Miryam Fernández is a 70 y.o. female.    Reason For Followup:  Coronary artery disease  Peripheral vascular disease     Brief Clinical History:  Dear Dr. Langford, Ted Liu MD     I had the pleasure of seeing Miryam Fernández today. As you are well aware, this is a 70 y.o. female past medical history that is significant for history of coronary artery disease history of peripheral vascular disease prior coronary intervention prior peripheral intervention prior carotid endarterectomy prior PCI and endovascular intervention on the lower extremities was recently diagnosed with a non-small cell lung cancer currently being treated and evaluated by [facility] with oncology and patient underwent radiation and chemotherapy here for follow-up      Interval History:  No new complaints  No new cardiac symptoms  Denies any new cardiac symptoms  Denies any chest pain  Recent significant weight loss and malnutrition        Assessment & Plan     Impressions:  Coronary artery disease prior coronary intervention  Hypertension  Hyperlipidemia  Peripheral arterial disease with prior carotid endarterectomy prior PCI and stenting of the bilateral lower extremities  Non-small cell lung cancer  Prior history of subclavian steal syndrome  History of pulmonary embolism  echocardiogram with normal LV systolic function    Recommendations:  Prior workup and recent medical records reviewed and discussed with patient and family  Decrease the dose of Brilinta from 90 mg p.o. twice daily to 60 mg p.o. twice daily  Patient is currently not on any anticoagulation that was stopped 6 months back for prior history of pulmonary embolism being followed up with hematology and oncology  Stable from cardiac standpoint  Continue current medical therapy with aspirin 81 mg p.o. once a day Brilinta 60 mg p.o. twice daily Lipitor 40 mg p.o. once a day Toprol-XL 25 mg p.o.  "once a day  Recent labs and workup reviewed and discussed patient  Need for close monitoring and follow-up reviewed and discussed with patient  Follow-up in office in 6 months        Vitals:  Vitals:    03/17/25 1059   BP: 114/76   Pulse: 82   SpO2: 99%   Weight: 62.8 kg (138 lb 6.4 oz)   Height: 161.8 cm (63.7\")       Physical Exam:    General: Alert, cooperative, no distress, appears stated age  Head:  Normocephalic, atraumatic, mucous membranes moist  Eyes:  Conjunctiva/corneas clear, EOM's intact     Neck:  Supple,  no adenopathy;      Lungs: Clear to auscultation bilaterally, no wheezes rhonchi rales are noted  Chest wall: No tenderness  Heart::  Regular rate and rhythm, S1 and S2 normal, no murmur, rub or gallop  Abdomen: Soft, non-tender, nondistended bowel sounds active  Extremities: No cyanosis, clubbing, or edema  Pulses: 2+ and symmetric all extremities  Skin:  No rashes or lesions  Neuro/psych: A&O x3. CN II through XII are grossly intact with appropriate affect              Lab Results   Component Value Date    GLUCOSE 152 (H) 03/10/2025    BUN 20 03/10/2025    CREATININE 1.06 (H) 03/10/2025    EGFRRESULT >60 03/02/2022    EGFR 57.3 (L) 03/10/2025    BCR 18.9 03/10/2025    K 3.9 03/10/2025    CO2 24.8 03/10/2025    CALCIUM 9.8 03/10/2025    ALBUMIN 4.1 03/10/2025    BILITOT 0.2 03/10/2025    AST 20 03/10/2025    ALT 13 03/10/2025     Results for orders placed during the hospital encounter of 07/19/24    Adult Transthoracic Echo Complete W/ Cont if Necessary Per Protocol    Interpretation Summary    Left ventricular systolic function is normal. Calculated left ventricular EF = 61.7% Left ventricular ejection fraction appears to be 61 - 65%.    Left ventricular diastolic function is consistent with (grade I) impaired relaxation.    Saline test results are negative for right to left atrial level shunt.    Estimated right ventricular systolic pressure from tricuspid regurgitation is normal (<35 mmHg).   "   No results found for this or any previous visit.     Lab Results   Component Value Date    CHOL 207 (H) 07/22/2024    TRIG 125 07/22/2024    HDL 38 (L) 07/22/2024     (H) 07/22/2024      Results for orders placed during the hospital encounter of 06/30/22    Stress Test With Myocardial Perfusion One Day    Interpretation Summary  · Findings consistent with a normal ECG stress test.  · Left ventricular ejection fraction is hyperdynamic (Calculated EF > 70%). .  · Myocardial perfusion imaging indicates a normal myocardial perfusion study with no evidence of ischemia.  · Impressions are consistent with a low risk study.            Objective:          Allergies:  Allergies   Allergen Reactions    Albuterol Shortness Of Breath     Pt states she has sensitivity to albuterol.  She states it causes her to be SOA.    Hydrocodone Palpitations    Sulfa Antibiotics Swelling    Bacitracin Other (See Comments)    Benzalkonium Chloride Hives    Codeine Other (See Comments)    Neomycin Other (See Comments)    Nickel Rash    Penicillins Rash    Polymyxin B Other (See Comments)       Medication Review:     Current Outpatient Medications:     aspirin 81 MG EC tablet, Take 1 tablet by mouth Daily., Disp: 30 tablet, Rfl: 2    atorvastatin (LIPITOR) 40 MG tablet, TAKE 1 TABLET BY MOUTH EVERY DAY, Disp: 90 tablet, Rfl: 1    Cholecalciferol 50 MCG (2000 UT) tablet, Take 1 tablet by mouth Daily. Indications: Vitamin D Deficiency, Disp: , Rfl:     furosemide (LASIX) 20 MG tablet, TAKE 0.5 TABLETS BY MOUTH DAILY AS NEEDED (SWELLING) FOR UP TO 30 DAYS., Disp: 45 tablet, Rfl: 1    levothyroxine (SYNTHROID, LEVOTHROID) 25 MCG tablet, Take 1 tablet by mouth Every Morning. Indications: Underactive Thyroid, Disp: 90 tablet, Rfl: 0    meclizine (ANTIVERT) 25 MG tablet, Take 1 tablet by mouth 3 (Three) Times a Day As Needed for Dizziness. Indications: Dizzy, Disp: 30 tablet, Rfl: 0    memantine (NAMENDA) 5 MG tablet, TAKE 1 TABLET BY MOUTH  TWICE A DAY, Disp: 180 tablet, Rfl: 1    Metoprolol Succinate 25 MG capsule extended-release 24 hour sprinkle, Take 25 mg by mouth Daily. Indications: tachycardia, Disp: , Rfl:     montelukast (Singulair) 10 MG tablet, Take 1 tablet by mouth Every Night., Disp: 30 tablet, Rfl: 1    nitrofurantoin (MACRODANTIN) 100 MG capsule, Take 1 capsule by mouth 4 (Four) Times a Day., Disp: , Rfl:     oxyCODONE-acetaminophen (PERCOCET) 5-325 MG per tablet, Take 1 tablet by mouth Every 8 (Eight) Hours. Indications: Pain, Disp: , Rfl:     pantoprazole (PROTONIX) 40 MG EC tablet, Take 1 tablet by mouth Daily. Indications: Gastroesophageal Reflux Disease, Disp: 90 tablet, Rfl: 1    ticagrelor (Brilinta) 60 MG tablet tablet, Take 1 tablet by mouth 2 (Two) Times a Day., Disp: 180 tablet, Rfl: 3    vitamin B-12 (CYANOCOBALAMIN) 1000 MCG tablet, Take 1 tablet by mouth Daily. Indications: Inadequate Vitamin B12, Disp: 30 tablet, Rfl: 5    Family History:  Family History   Problem Relation Age of Onset    Heart disease Mother     Hypertension Mother     Dementia Father     Hyperlipidemia Father     Lung cancer Father     Diabetes Paternal Uncle     Leukemia Paternal Grandmother        Past Medical History:  Past Medical History:   Diagnosis Date    Cancer     lung cancer    Carotid stenosis     COPD (chronic obstructive pulmonary disease)     Coronary artery disease     Esophageal stricture     Dr Domingo    Hyperlipidemia     Metabolic encephalopathy 06/28/2024    2nd to UTI    Pulmonary embolism     seen at [facility], fluid around her heart at the time-right lung    UTI (urinary tract infection) 06/28/2024       Past surgical History:  Past Surgical History:   Procedure Laterality Date    ANGIOPLASTY / STENTING FEMORAL      CARDIAC CATHETERIZATION      CAROTID STENT      CORONARY STENT PLACEMENT      ESOPHAGOSCOPY / EGD      HYSTERECTOMY      TOTAL HIP ARTHROPLASTY Left 8/1/2024    Procedure: TOTAL HIP ARTHROPLASTY;  Surgeon: Olu Joshua  Jose Eduardo BRAND MD;  Location: [facility] MAIN OR;  Service: Orthopedics;  Laterality: Left;       Social History:  Social History     Socioeconomic History    Marital status:    Tobacco Use    Smoking status: Every Day     Current packs/day: 1.00     Average packs/day: 2.0 packs/day for 40.5 years (80.5 ttl pk-yrs)     Types: Cigarettes     Start date: 09/1984     Passive exposure: Current    Smokeless tobacco: Never   Vaping Use    Vaping status: Never Used   Substance and Sexual Activity    Alcohol use: Not Currently    Drug use: Never    Sexual activity: Defer       Review of Systems:  The following systems were reviewed as they relate to the cardiovascular system: Constitutional, Eyes, ENT, Cardiovascular, Respiratory, Gastrointestinal, Integumentary, Neurological, Psychiatric, Hematologic, Endocrine, Musculoskeletal, and Genitourinary. The pertinent cardiovascular findings are reported above with all other cardiovascular points within those systems being negative.    Diagnostic Study Review:     Current Electrocardiogram:  Procedures          NOTE: The following portions of the patient's history were reviewed and updated this visit as appropriate: allergies, current medications, past family history, past medical history, past social history, past surgical history and problem list.

## 2025-03-20 ENCOUNTER — HOSPITAL ENCOUNTER (OUTPATIENT)
Dept: ULTRASOUND IMAGING | Facility: HOSPITAL | Age: 70
Discharge: HOME | End: 2025-03-20
Admitting: NURSE PRACTITIONER
Payer: MEDICARE

## 2025-03-20 DIAGNOSIS — K82.8 GALLBLADDER MASS: ICD-10-CM

## 2025-03-20 PROCEDURE — 76705 ECHO EXAM OF ABDOMEN: CPT

## 2025-03-25 DIAGNOSIS — E03.9 HYPOTHYROIDISM, UNSPECIFIED TYPE: Primary | ICD-10-CM

## 2025-03-25 RX ORDER — LEVOTHYROXINE SODIUM 25 UG/1
25 TABLET ORAL
Qty: 90 TABLET | Refills: 0 | Status: SHIPPED | OUTPATIENT
Start: 2025-03-25

## 2025-03-28 ENCOUNTER — TREATMENT (OUTPATIENT)
Dept: PHYSICAL THERAPY | Facility: CLINIC | Age: 70
End: 2025-03-28
Payer: MEDICARE

## 2025-03-28 DIAGNOSIS — Z74.09 DECREASED MOBILITY AND ENDURANCE: ICD-10-CM

## 2025-03-28 DIAGNOSIS — R29.898 BILATERAL LEG WEAKNESS: Primary | ICD-10-CM

## 2025-03-28 DIAGNOSIS — C34.90 SMALL CELL CARCINOMA OF LUNG, UNSPECIFIED LATERALITY, UNSPECIFIED PART OF LUNG: ICD-10-CM

## 2025-03-28 PROCEDURE — 97162 PT EVAL MOD COMPLEX 30 MIN: CPT | Performed by: PHYSICAL THERAPIST

## 2025-03-28 PROCEDURE — 97110 THERAPEUTIC EXERCISES: CPT | Performed by: PHYSICAL THERAPIST

## 2025-03-28 NOTE — PROGRESS NOTES
Physical Therapy Initial Evaluation and Plan of Care  Office: [address] [facility] [address], [place] [address]  P: [phone]  F: [phone]    Patient: Miryam Fernández   : 1955  Diagnosis/ICD-10 Code:  Bilateral leg weakness [R29.898]  Referring practitioner: Gunnar Moon MD  Date of Initial Visit: 3/28/2025  Today's Date: 3/28/2025  Patient seen for 1 sessions           Subjective Questionnaire: ABC: 6% confidence       Subjective Evaluation    History of Present Illness  Mechanism of injury: Pt reports she is having trouble walking. She was diagnosed with lung cancer ~3 years ago. Pt reports she is in remission now. She feels like health has declined since then and she has a lot of weakness in her legs. She did have chemo treatment a while ago. She has episodes of dizziness and then she feels like she can't walk. Doctors are telling her that her BP is okay but she has had BP issues in the past. BP will go too low. Not sure of the cause. She does think she has high BP sometimes as well. She has been using w/c for the past 3 years. She uses walker and w/c at home.  is at home to help her out. No falls in the last 6 months. No dizziness today. She does have a few steps on deck and has to have help to do them. No pain.     Social Support  Lives in: one-story house  Lives with: spouse    Patient Goals  Patient goals for therapy: improved balance, increased motion, return to sport/leisure activities, independence with ADLs/IADLs and increased strength  Patient goal: be able to walk better         Past Medical History:   Diagnosis Date    Cancer     lung cancer    Carotid stenosis     COPD (chronic obstructive pulmonary disease)     Coronary artery disease     Esophageal stricture     Dr Domingo    Hyperlipidemia     Metabolic encephalopathy 2024    2nd to UTI    Pulmonary embolism     seen at [facility], fluid around her heart at the time-right lung    UTI (urinary tract infection) 2024         Past Surgical History:   Procedure Laterality Date    ANGIOPLASTY / STENTING FEMORAL      CARDIAC CATHETERIZATION      CAROTID STENT      CORONARY STENT PLACEMENT      ESOPHAGOSCOPY / EGD      HYSTERECTOMY      TOTAL HIP ARTHROPLASTY Left 2024    Procedure: TOTAL HIP ARTHROPLASTY;  Surgeon: Olu Joshua II, MD;  Location: [facility] MAIN OR;  Service: Orthopedics;  Laterality: Left;        Objective          Strength/Myotome Testing     Left Hip   Planes of Motion   Flexion: 4  Extension: 4-  Abduction: 4-  Adduction: 4-    Right Hip   Planes of Motion   Flexion: 4  Extension: 4-  Abduction: 4-  Adduction: 4-    Left Knee   Flexion: 4+  Extension: 4+    Right Knee   Flexion: 4+  Extension: 4+    Left Ankle/Foot   Dorsiflexion: 4  Plantar flexion: 4    Right Ankle/Foot   Dorsiflexion: 4  Plantar flexion: 4    Muscle Activation     Additional Muscle Activation Details  Decreased activation of deep core stabilizers     Ambulation   Weight-Bearing Status   Assistive device used: front-wheeled walker    Additional Weight-Bearing Status Details  Pt entered clinic in w/c pushed by      Ambulation: Level Surfaces   Ambulation with assistive device: independent (SBA)    Additional Level Surfaces Ambulation Details  Min-mod unsteadiness with RW     Ambulation: Stairs   Ascend stairs: minimum assist  Pattern: non-reciprocal  Descend stairs: minimum assist  Pattern: non-reciprocal    Observational Gait   Decreased walking speed and stride length.   Base of support: increased    Functional Assessment     Comments  - Independent with transfers, however requires cuing to reach for chair when sitting down and keep hand on chair rather than just using walker when standing up  - Independent with supine<>sit   -TU seconds using RW and SBA       Vitals: BP: 130/70 mmHG, HR: 79 bpm, O2: 96%        Assessment & Plan       Assessment  Impairments: abnormal coordination, abnormal gait, abnormal muscle firing,  abnormal muscle tone, activity intolerance, impaired balance, impaired physical strength, lacks appropriate home exercise program, safety issue and weight-bearing intolerance   Functional limitations: carrying objects, lifting, walking, pulling, pushing, standing, stooping and reaching overhead   Assessment details: Pt is a 70 year old with c/o decreased LE strength, balance impairment and decreased endurance/mobility with h/o lung CA. Pt demonstrates difficulty with ambulation and requires use of RW. Can only walk short distances and is unable to amb well in the community. Pt displays difficulty with transfers and requires use of UE's to assist, however is able to perform independently. Decreased timed score on the TUG which indicates pt is at increased risk for falls. Pt displays decreased strength in LE's as well as decreased endurance. Difficulty with ADL's, daily tasks and other functional tasks due to weakness, balance and endurance impairments.     Functional limitations are listed above. Pt will benefit from PT in order to address impairments and improve overall function.     Prognosis: good    Goals  Plan Goals: STG (3 weeks):  Pt will be independent with home exercises to assist with improved strength and continue to improve function.   Pt will demonstrate increased score on the ABC confidence scale to 60% to demonstrate decreased overall impairment.   Pt will demonstrate improved timed score on the TUG to <25 seconds to demonstrate decreased risk for falls.      LTG (6 weeks):  Pt will be able to step up onto curb or step with use of AD and no assist required to assist with community amb and getting into/out of home.   Pt will demonstrate improved LE strength to 4+/5 overall to assist with function.    Pt will demonstrate increased endurance and be able to perform 45 min session of exercises/activity with few rest breaks required.   Pt will be able to perform transfers with little to no use of UE's to  assist.   Pt will demonstrate improved steadiness with ambulation with use of AD and be able to amb >30 mins with no dizziness and min fatigue.       Plan  Therapy options: will be seen for skilled therapy services  Planned modality interventions: TENS, thermotherapy (hydrocollator packs) and cryotherapy  Planned therapy interventions: abdominal trunk stabilization, ADL retraining, balance/weight-bearing training, body mechanics training, fine motor coordination training, flexibility, functional ROM exercises, gait training, home exercise program, joint mobilization, manual therapy, motor coordination training, neuromuscular re-education, postural training, soft tissue mobilization, spinal/joint mobilization, strengthening, stretching, therapeutic activities and transfer training  Frequency: 2x week  Duration in weeks: 12  Treatment plan discussed with: patient      HEP:   Access Code: [email or web address]  URL: [email or web address]  Date: 03/28/2025  Prepared by: Zonia Adamson    Exercises  - Seated Hip Adduction Isometrics with Ball  - 2 x daily - 10 reps - 5 sec hold  - Seated March  - 2 x daily - 10 reps - 5 sec hold  - Seated Long Arc Quad  - 2 x daily - 10 reps - 5 sec hold  - Active Straight Leg Raise with Quad Set  - 2 x daily - 10 reps - 2 sec hold  - Supine Bridge  - 2 x daily - 10 reps - 2 sec hold    History # of Personal Factors and/or Comorbidities: HIGH (3+)  Examination of Body System(s): # of elements: MODERATE (3)  Clinical Presentation: EVOLVING  Clinical Decision Making: MODERATE      Eval:  Low Eval          Mins  83728  Mod Eval     25     Mins  29751  High Eval                            Mins  42007    Timed:         Manual Therapy:         mins  44170;     Therapeutic Exercise:    10     mins  31593;     Neuromuscular Eleanor:        mins  94671;    Therapeutic Activity:          mins  17466;     Gait Training:           mins  62829;     Self Care                            mins    24828    Un-Timed:  Electrical Stimulation:         mins  39981 ( );  Traction          mins 62628      Timed Treatment:   10   mins   Total Treatment:     40   mins    PT SIGNATURE: Zonia Adamson PT, DPT, OCS           [place] License # [id number]              DATE TREATMENT INITIATED: 3/28/2025    Initial Certification  Certification Period: 6/25/2025  I certify that the therapy services are furnished while this patient is under my care.  The services outlined above are required by this patient, and will be reviewed every 90 days.     PHYSICIAN: Gunnar Moon MD      DATE:     Please sign and return via fax to [phone].. Thank you, [facility].

## 2025-03-31 ENCOUNTER — RESULTS FOLLOW-UP (OUTPATIENT)
Dept: ONCOLOGY | Facility: CLINIC | Age: 70
End: 2025-03-31
Payer: MEDICARE

## 2025-03-31 DIAGNOSIS — K82.8 GALLBLADDER MASS: Primary | ICD-10-CM

## 2025-03-31 RX ORDER — PANTOPRAZOLE SODIUM 40 MG/1
40 TABLET, DELAYED RELEASE ORAL DAILY
Qty: 90 TABLET | Refills: 1 | Status: SHIPPED | OUTPATIENT
Start: 2025-03-31

## 2025-03-31 RX ORDER — MECLIZINE HYDROCHLORIDE 25 MG/1
25 TABLET ORAL 3 TIMES DAILY PRN
Qty: 30 TABLET | Refills: 0 | Status: SHIPPED | OUTPATIENT
Start: 2025-03-31

## 2025-03-31 NOTE — TELEPHONE ENCOUNTER
Spoke with patient's daughter, Neva regarding unable to visualize the gallbladder on the ultrasound and the need for a MRI. They are agreeable. Orders placed for MRI Abdomen

## 2025-04-08 DIAGNOSIS — K82.8 GALLBLADDER MASS: Primary | ICD-10-CM

## 2025-04-11 ENCOUNTER — OFFICE VISIT (OUTPATIENT)
Dept: SURGERY | Facility: CLINIC | Age: 70
End: 2025-04-11
Payer: MEDICARE

## 2025-04-11 ENCOUNTER — TELEPHONE (OUTPATIENT)
Dept: PHYSICAL THERAPY | Facility: CLINIC | Age: 70
End: 2025-04-11

## 2025-04-11 VITALS
OXYGEN SATURATION: 98 % | TEMPERATURE: 98 F | DIASTOLIC BLOOD PRESSURE: 84 MMHG | HEIGHT: 63 IN | WEIGHT: 142 LBS | BODY MASS INDEX: 25.16 KG/M2 | SYSTOLIC BLOOD PRESSURE: 154 MMHG | HEART RATE: 90 BPM

## 2025-04-11 DIAGNOSIS — K82.8 GALLBLADDER MASS: Primary | ICD-10-CM

## 2025-04-11 RX ORDER — BENZONATATE 100 MG/1
100 CAPSULE ORAL 3 TIMES DAILY PRN
COMMUNITY

## 2025-04-11 NOTE — TELEPHONE ENCOUNTER
Caller: Miryam Fernández    Relationship: Self    What was the call regarding: ANOTHER APPOINTMENT

## 2025-04-11 NOTE — PROGRESS NOTES
General Surgery Clinic Note  Subjective:  Miryam Fernández is a 70 y.o. female who presents today for evaluation of gallbladder mass. Referred by Scarlet Cheatham A* and was last evaluated for this issue on 3/10/25. 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 She was then referred to my practice for further details. Most recent CMP on 2/6/25 was only remarkable for slightly elevated ALP (145), but o/w rest of the LFTs were normal.     Today, patient has no complaints.  Denies any abdominal pain, nausea, vomiting, jaundice, dark urine's, light stools, fevers, chills, night sweats, or unintentional weight loss.  In fact, patient reports actually gaining weight due to an improvement in her appetite.    PMH: lung SCC s/p chemoXRT, PE (no AC), CAD, PAD/carotid stenosis, COPD, HLD  PSH: SFA stent, carotid stent, coronary stent   current smoker  +ASA 81/brillinta; no anticoagulants  RCRI 1 (CAD), METS < 4 (ECOG 2)    Previous work-up:  CTCAP (3/3/25): Rounded area of thickened enhancement at the fundus of the gallbladder measuring 10 mm x 5 mm.   RUQ US (3/20/25):   1.No sonographic abnormality in the gallbladder. In view of the recent CT findings further evaluation with an MRI of the abdomen with contrast could be considered  2.No biliary ductal dilatation.  3.No sonographic abnormalities in the right upper quadrant    Past Medical History:   Diagnosis Date    Cancer     lung cancer    Carotid stenosis     COPD (chronic obstructive pulmonary disease)     Coronary artery disease     Esophageal stricture     Dr Domingo    Hyperlipidemia     Metabolic encephalopathy 06/28/2024    2nd to UTI    Pulmonary embolism     seen at [facility], fluid around her heart at the time-right lung    UTI (urinary tract infection) 06/28/2024     Family History   Problem Relation Age of Onset    Heart disease  Mother     Hypertension Mother     Dementia Father     Hyperlipidemia Father     Lung cancer Father     Diabetes Paternal Uncle     Leukemia Paternal Grandmother      Social History     Socioeconomic History    Marital status:    Tobacco Use    Smoking status: Every Day     Current packs/day: 1.00     Average packs/day: 2.0 packs/day for 40.6 years (80.6 ttl pk-yrs)     Types: Cigarettes     Start date: 09/1984     Passive exposure: Current    Smokeless tobacco: Never   Vaping Use    Vaping status: Never Used   Substance and Sexual Activity    Alcohol use: Not Currently    Drug use: Never    Sexual activity: Defer     Past Surgical History:   Procedure Laterality Date    ANGIOPLASTY / STENTING FEMORAL      CARDIAC CATHETERIZATION      CAROTID STENT      CORONARY STENT PLACEMENT      ESOPHAGOSCOPY / EGD      HYSTERECTOMY      TOTAL HIP ARTHROPLASTY Left 8/1/2024    Procedure: TOTAL HIP ARTHROPLASTY;  Surgeon: Olu Joshua II, MD;  Location: [facility] MAIN OR;  Service: Orthopedics;  Laterality: Left;       Current Outpatient Medications:     aspirin 81 MG EC tablet, Take 1 tablet by mouth Daily., Disp: 30 tablet, Rfl: 2    atorvastatin (LIPITOR) 40 MG tablet, TAKE 1 TABLET BY MOUTH EVERY DAY, Disp: 90 tablet, Rfl: 1    Cholecalciferol 50 MCG (2000 UT) tablet, Take 1 tablet by mouth Daily. Indications: Vitamin D Deficiency, Disp: , Rfl:     furosemide (LASIX) 20 MG tablet, TAKE 0.5 TABLETS BY MOUTH DAILY AS NEEDED (SWELLING) FOR UP TO 30 DAYS., Disp: 45 tablet, Rfl: 1    levothyroxine (SYNTHROID, LEVOTHROID) 25 MCG tablet, Take 1 tablet by mouth Every Morning. Indications: Underactive Thyroid, Disp: 90 tablet, Rfl: 0    meclizine (ANTIVERT) 25 MG tablet, Take 1 tablet by mouth 3 (Three) Times a Day As Needed for Dizziness. Indications: Dizzy, Disp: 30 tablet, Rfl: 0    Metoprolol Succinate 25 MG capsule extended-release 24 hour sprinkle, Take 25 mg by mouth Daily. Indications: tachycardia, Disp: , Rfl:      montelukast (Singulair) 10 MG tablet, Take 1 tablet by mouth Every Night., Disp: 30 tablet, Rfl: 1    nitrofurantoin (MACRODANTIN) 100 MG capsule, Take 1 capsule by mouth 4 (Four) Times a Day., Disp: , Rfl:     oxyCODONE-acetaminophen (PERCOCET) 5-325 MG per tablet, Take 1 tablet by mouth Every 8 (Eight) Hours. Indications: Pain, Disp: , Rfl:     pantoprazole (PROTONIX) 40 MG EC tablet, Take 1 tablet by mouth Daily. Indications: Gastroesophageal Reflux Disease, Disp: 90 tablet, Rfl: 1    ticagrelor (Brilinta) 60 MG tablet tablet, Take 1 tablet by mouth 2 (Two) Times a Day., Disp: 180 tablet, Rfl: 3    vitamin B-12 (CYANOCOBALAMIN) 1000 MCG tablet, Take 1 tablet by mouth Daily. Indications: Inadequate Vitamin B12, Disp: 30 tablet, Rfl: 5    Review of Systems:  All other systems reviewed and are negative.    Objective:     Vitals:    04/11/25 0938   BP: 154/84   Pulse:    Temp:    SpO2:       Body mass index is 25.15 kg/m².   Physical Exam  Constitutional:       Appearance: Normal appearance.      Comments: frail, chronically ill-appearing   Eyes:      General: No scleral icterus.  Pulmonary:      Effort: Pulmonary effort is normal.   Abdominal:      General: Abdomen is flat. There is no distension.      Palpations: Abdomen is soft. There is no mass.      Tenderness: There is no abdominal tenderness. There is no guarding or rebound.      Hernia: No hernia is present.   Skin:     Coloration: Skin is not jaundiced.   Neurological:      Mental Status: She is alert and oriented to person, place, and time.   Psychiatric:         Mood and Affect: Mood normal.        Assessment and Plan:  Diagnoses and all orders for this visit:    1. Gallbladder mass (Primary)      70 y.o. female, h/o multiple comorbidities, with incidentally found 1 cm gallbladder mass during staging CTCAP for SCLC. This mass is only appreciated on CT, RUQ US was o/w normal. Pt is asymptomatic.     I had a long conversation with the patient and her   regarding the nature of the gallbladder masses.  I described that a cholecystectomy should be considered for any mass greater than or equal to 1 cm.  However, given the discordance of the patient's prior imaging, that more definitive imaging would be warranted prior to consideration of surgical intervention.  I described that a MRCP would give us a higher or lower suspicion of gallbladder malignancy.  In particular, I am interested if there is any hepatic invasion of the mass, as this would be better handled by a hepatobiliary surgery or surgical oncology.  If this is an otherwise benign mass without any hepatic invasion, then I would be comfortable with offering the patient a cholecystectomy.  Patient and  voiced understanding and agree with plan.    F/u MRCP    Garry Grace MD   General Surgery  04/11/25   09:40 EDT      Much of this encounter note is an electronic transcription/translation of spoken language to printed text.  The electronic translation of spoken language may permit erroneous, or at times, nonsensical words or phrases to be inadvertently transcribed.  Although I have reviewed the note for such errors, some may still exist.

## 2025-04-15 ENCOUNTER — TREATMENT (OUTPATIENT)
Dept: PHYSICAL THERAPY | Facility: CLINIC | Age: 70
End: 2025-04-15
Payer: MEDICARE

## 2025-04-15 DIAGNOSIS — Z74.09 DECREASED MOBILITY AND ENDURANCE: ICD-10-CM

## 2025-04-15 DIAGNOSIS — C34.90 SMALL CELL CARCINOMA OF LUNG, UNSPECIFIED LATERALITY, UNSPECIFIED PART OF LUNG: ICD-10-CM

## 2025-04-15 DIAGNOSIS — R29.898 BILATERAL LEG WEAKNESS: Primary | ICD-10-CM

## 2025-04-15 PROCEDURE — 97110 THERAPEUTIC EXERCISES: CPT | Performed by: PHYSICAL THERAPIST

## 2025-04-15 PROCEDURE — 97112 NEUROMUSCULAR REEDUCATION: CPT | Performed by: PHYSICAL THERAPIST

## 2025-04-15 NOTE — PROGRESS NOTES
Physical Therapy Daily Note  Office: [address], [place] [address]  P: [phone]  F: [phone]    Patient: Miryam Fernández   : 1955  Diagnosis/ICD-10 Code:  Bilateral leg weakness [R29.898]  Referring practitioner: Gunnar Moon MD  Today's Date: 4/15/2025  Patient seen for 2 sessions      ICD-10-CM ICD-9-CM   1. Bilateral leg weakness  R29.898 729.89   2. Small cell carcinoma of lung, unspecified laterality, unspecified part of lung  C34.90 162.9   3. Decreased mobility and endurance  Z74.09 780.99                                                                                                                                                                                                                                                                                                                                   VISIT#:  sessions (PN due: 2025/Recert due 2025 )     Precautions/Restrictions: Increased fall risk     Subjective  Miryam Fernández reports she fell yesterday. Just lost her balance. Hurt her L hand and it's really bruised but everything else seems to be fine.       Objective  Amb with mod unsteadiness with rollator walker     See Exercise, Manual, and Modality Logs for complete treatment.     Home Exercises:  [id number]     Assessment/Plan   Pt able to do exercises HL and in seated well with no c/o pain. Min-mod fatigue with exercises this date. Instructed pt to continue with same exercises at home that she was given at initial eval.       Progress per Plan of Care and Progress strengthening /stabilization /functional activity            Timed:         Manual Therapy:         mins  63709;     Therapeutic Exercise:    15     mins  05103;     Neuromuscular Eleanor:    8    mins  90597;    Therapeutic Activity:          mins  11603;     Gait Training:           mins  57054;     Ultrasound:          mins  22746;    Ionto                                   mins    06796  Self Care                            mins   27945    Un-Timed:  Electrical Stimulation:         mins  56377 ( );  Traction          mins 28476    Timed Treatment:   23   mins   Total Treatment:     23   mins (pt in clinic for ~40 mins total, however only charged for one-on-one care)       Zonia Adamson, PT, DPT, OCS     [place] License # [id number]

## 2025-04-16 DIAGNOSIS — K82.8 GALLBLADDER MASS: Primary | ICD-10-CM

## 2025-04-18 ENCOUNTER — TREATMENT (OUTPATIENT)
Dept: PHYSICAL THERAPY | Facility: CLINIC | Age: 70
End: 2025-04-18
Payer: MEDICARE

## 2025-04-18 DIAGNOSIS — C34.90 SMALL CELL CARCINOMA OF LUNG, UNSPECIFIED LATERALITY, UNSPECIFIED PART OF LUNG: ICD-10-CM

## 2025-04-18 DIAGNOSIS — R29.898 BILATERAL LEG WEAKNESS: Primary | ICD-10-CM

## 2025-04-18 DIAGNOSIS — Z74.09 DECREASED MOBILITY AND ENDURANCE: ICD-10-CM

## 2025-04-18 PROCEDURE — 97116 GAIT TRAINING THERAPY: CPT | Performed by: PHYSICAL THERAPIST

## 2025-04-18 PROCEDURE — 97110 THERAPEUTIC EXERCISES: CPT | Performed by: PHYSICAL THERAPIST

## 2025-04-18 NOTE — PROGRESS NOTES
Physical Therapy Daily Treatment Note  [facility] [place]  [address]  [place]. [address]  P: [phone] F: [phone]    Patient: Miryam Fernández   : 1955  Referring practitioner: Gunnar Moon MD  Date of Initial Visit: Type: THERAPY  Noted: 3/28/2025  Today's Date: 2025  Patient seen for 3 sessions       Visit Diagnoses:    ICD-10-CM ICD-9-CM   1. Bilateral leg weakness  R29.898 729.89   2. Small cell carcinoma of lung, unspecified laterality, unspecified part of lung  C34.90 162.9   3. Decreased mobility and endurance  Z74.09 780.99         Subjective:  Miryam Fernández reports: she is fine when asked.  Does not report any pain at this time.       Objective   See Exercise, Manual, and Modality Logs for complete treatment.  Started with supine there ex followed by seated and standing there ex.  Addressed stairs and added gait training today to help improve functional mob.  During ambulation had pt focus on one foot passing the other in order to increase step and stride length-no shuffling. During stairs pt on the first trial ascending with a reciprocal pattern and descended with a step-to pattern. On the second trial pt ascended and descended with a step-to pattern with use of bilateral rails. CGA used. Education given to pt and pts caregiver today regarding what we worked on and to focus on these things at home as well.       Assessment/Plan:  Several v/c's given throughout session today regarding ex and ambulation technique.  Once cued during ambulation pt was able to improve step and stride length and ambulate at a faster pace. Will continue to address gait on level ground and stairs as well as strengthening in order to improve overall functional mobility.             Timed:         Manual Therapy:         mins  25150;     Therapeutic Exercise:    20     mins  94943;     Neuromuscular Eleanor:        mins  80723;    Therapeutic Activity:      ___mins  81479;     Gait Training:      10     mins  22833;     Ultrasound:          mins  32076;    Self Care                            mins  16963      Un-Timed:  Electrical Stimulation:         mins  19940 ( );  Traction          mins 25502        Timed Treatment:   30   mins   Total Treatment:     30   mins    Eve Schmitt PTA  [place] License #: [id number]    Physical Therapist Assistant

## 2025-04-21 NOTE — PROGRESS NOTES
HEMATOLOGY ONCOLOGY OUTPATIENT FOLLOW UP       Patient name: Miryam Fernández  : 1955  MRN: [id number]  Primary Care Physician: Lashell De La Torre APRN  Referring Physician: Lashell De La Torre AP*  Reason For Consult:         History of Present Illness:  Patient is a 69 y.o. female diagnosed and treated for limited stage small cell lung cancer with chemoradiation currently undergoing surveillance.      She was diagnosed in   with small cell lung cancer after presenting with dyspnea, chest pain, nausea and vomiting. CT chest was suspicious for lung      20 PET CT -  showed hypermetabolic lower cervical, upper and middle mediastinal lymphadenopathy and left perihilar lung nodule. There was also intense focal uptake in the right thyroid lobe.   MRI brain was negative for metastases.      20  biopsy of the left supraclavicular node that showed small cell lung  cancer. She was diagnosed with limited stage disease and was treated with definitive chemoradiation.      20 - C1 of cisplatin/etoposide with radiation  2020 C4     2020 - PCI      CT chest on 20 showed significant interval reduction in mediastinal masses though there was a new PE for which she was started on Eliquis. She continued following with Dr Castillo and has had stable disease on imaging.      CT C/A/P on 21 showed a more conspicuous 5 mm LLL nodule compared to previous scans with numerous additional bilateral nodules that are stable and unchanged since . There was an increasing moderate pericardial effusion and stable perihilar scarring as well as mediastinal soft tissue density thought to be treated carcinoma. There was no clear evidence of progressive or recurrence malignancy in the chest.     She transferred care to [facility] in 2021 and had repeat imaging in December including MRI brain that did not show disease progression. However, the pericardial effusion had increased. 2D echo on 21 showed  hemodynamic compromise and concerns  for cardiac tamponade. She was subsequently admitted to the cardiology service and had a pericardiocentesis on 12/10/21 with the fluid negative for malignancy.     No further recurrence of disease.      Last CT imaging in 5/2023 with no evidence of recurrence. No recurrence of effusion.      3/26/2024 CT imaging with suggestive of of posttreatment fibrosis in the paramediastinal perihilar left upper lobe with no significant change as compared to October 2023.  Multiple pulmonary nodules are unchanged from 2021 hence benign.  Moderate emphysema, severe stenosis of the distal left external iliac artery which is new suspected severe renal artery ostial stenosis, thoracic aorta fusiform aneurysm.  No convincing evidence of recurrence of disease  After this she was referred to vascular surgery recommendation was medical management without any surgical intervention given no symptoms.     3/26/2024 MRI brain with confluent foci of T2 flair signal hyperintensity within the bilateral hemispheric white matter.  Related to chronic microvascular ischemic change.     6/28/24: CT Abdomen/Pelvis:  Impression:  1. Heterogeneous diminished enhancement in the right kidney. Correlate for symptoms of nephritis.  2. 2 mm nonobstructing stone in the right upper renal pole.  3. Stable 5 mm right lower lobe and 3 mm left lower lobe noncalcified nodules. According to previous report, these nodules have been stable since 2021, suggesting benign etiology.  4. 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 Suspected stenosis of the right renal artery ostium again noted.  5. Fusiform aneurysm of the distal descending thoracic aorta and infrarenal abdominal aorta, unchanged     7/20/24: MRI Brain W WO contrast:  Impression:  1. Small less than 1 cm areas of increased signal on diffusion imaging with associated FLAIR and T2 signal abnormality within the right basal ganglia, corona  radiata suggesting evolution of small subacute infarcts. No convincing evidence for acute ischemic changes otherwise noted.   2. Mild increased T1 signal and questionable enhancement in this region may represent sequela of evolving small infarct. Metastatic lesion is thought to be less likely although this will need to be followed per patient's oncologic protocol  3. No abnormal enhancement otherwise noted within the brain parenchyma.  4. Extensive confluent white matter changes again noted which can be seen with small vessel ischemic disease as well as represent sequela of prior therapy     7/23/24: CT Abdomen/Pelvis:  Impression:  1.Questionable indistinctness of the right colon versus artifact from respiratory motion. Right-sided colitis may be considered in the appropriate clinical setting.  2.Right punctate nonobstructive nephrolithiasis. No hydronephrosis is seen. There is excreted contrast throughout the urinary tract.  3.Ectasia of the distal abdominal aorta measuring 2.7 cm in caliber.  4.Several subcentimeter bilateral lung base nodules measuring up to approximately 5 mm. Small right pneumothorax is partially imaged. This was also seen on the neck CTA from 7/21/2024.        7/31/24-8/6/24: INTERVAL HOSPITALIZATION UPDATE:  Patient presented after a fall, found on CT of the pelvis to have a minimal nondisplaced fracture of the left lateral femoral head and neck junction.    -Orthopedics consulted, Patient is status post left total hip arthroplasty due to fracture      9/10/24: Patient seen for initial evaluation by me. She was previously being seen by Dr. Morales in our practice with underlying history of small cell lung cancer and is status post ChemoRadiation. She has excellent treatment response and continues to be in remission.  She is Status post Recent hospitalization as above for a fall with Left hip fracture requiring her to have Total Hip arthroplasty. She is gradually recovering from this and is  undergoing outpatient PT. 11/18/2024: MRI brain with and without contrast:     11/18/2024: MRI brain with without contrast:  Impression:  Punctate tiny area of probable acute lacunar infarct noted within the subcortical white matter of the right posterior frontal lobe. Given reported history of small cell lung cancer, tiny metastatic lesion would be a less likely consideration.       1.2.25: Patient seen today for follow-up visit.  Appears clinically stable, reported having some sinus congestion but otherwise at baseline.  She was recently admitted to [facility] for complaint of acute metabolic encephalopathy and UTI, she was treated with IV antibiotics.     3/3/25: CT Chest/Abdomen/Pelvis W contrast:  Impression:   1.Stable low-density soft tissue within the left suprahilar region, left hilum, and subcarinal station. This likely relates to posttreatment changes.   2.No new or enlarging area of abnormal soft tissue within the mediastinum or hilum.   3.Multiple stable small pulmonary nodules.   4.Subacute healing right anterior fourth, fifth, and sixth rib fractures.   5.No evidence of metastatic disease within the abdomen or pelvis.   6.Rounded area of thickened enhancement at the fundus of the gallbladder measuring 10 mm x 5 mm. This could represent an abnormal gallbladder polyp, mass, or focal adenomyomatosis. This appears new from prior examination. Recommend further evaluation     3/9/25: Patient seen today for follow-up visit.  Overall clinically stable.  Continues to have issues with chronic deconditioning and has limited mobility, mostly wheelchair-bound.  She continues to smoke around 1-2 packs/day.      Subjective:  4/24/25: Pt seen today, doing better clinically. No acute concerns. Gaining weight steadily and has better energy levels. However continues to smoke. Has been seen by General Surgery for GB polyp and is recommended for MRI abdomen, due later this month.    Past Medical History:   Diagnosis  Date    Cancer     lung cancer    Carotid stenosis     COPD (chronic obstructive pulmonary disease)     Coronary artery disease     Esophageal stricture     Dr Domingo    Hyperlipidemia     Metabolic encephalopathy 06/28/2024    2nd to UTI    Pulmonary embolism     seen at [facility], fluid around her heart at the time-right lung    UTI (urinary tract infection) 06/28/2024       Past Surgical History:   Procedure Laterality Date    ANGIOPLASTY / STENTING FEMORAL      CARDIAC CATHETERIZATION      CAROTID STENT      CORONARY STENT PLACEMENT      ESOPHAGOSCOPY / EGD      HYSTERECTOMY      TOTAL HIP ARTHROPLASTY Left 8/1/2024    Procedure: TOTAL HIP ARTHROPLASTY;  Surgeon: Olu Joshua II, MD;  Location: [facility] MAIN OR;  Service: Orthopedics;  Laterality: Left;         Current Outpatient Medications:     aspirin 81 MG EC tablet, Take 1 tablet by mouth Daily., Disp: 30 tablet, Rfl: 2    atorvastatin (LIPITOR) 40 MG tablet, TAKE 1 TABLET BY MOUTH EVERY DAY, Disp: 90 tablet, Rfl: 1    benzonatate (TESSALON) 100 MG capsule, Take 1 capsule by mouth 3 (Three) Times a Day As Needed for Cough., Disp: , Rfl:     Cholecalciferol 50 MCG (2000 UT) tablet, Take 1 tablet by mouth Daily. Indications: Vitamin D Deficiency, Disp: , Rfl:     furosemide (LASIX) 20 MG tablet, TAKE 0.5 TABLETS BY MOUTH DAILY AS NEEDED (SWELLING) FOR UP TO 30 DAYS. (Patient not taking: Reported on 4/11/2025), Disp: 45 tablet, Rfl: 1    levothyroxine (SYNTHROID, LEVOTHROID) 25 MCG tablet, Take 1 tablet by mouth Every Morning. Indications: Underactive Thyroid, Disp: 90 tablet, Rfl: 0    meclizine (ANTIVERT) 25 MG tablet, Take 1 tablet by mouth 3 (Three) Times a Day As Needed for Dizziness. Indications: Dizzy, Disp: 30 tablet, Rfl: 0    Metoprolol Succinate 25 MG capsule extended-release 24 hour sprinkle, Take 25 mg by mouth Daily. Indications: tachycardia, Disp: , Rfl:     montelukast (Singulair) 10 MG tablet, Take 1 tablet by mouth Every Night., Disp:  30 tablet, Rfl: 1    nitrofurantoin (MACRODANTIN) 100 MG capsule, Take 1 capsule by mouth 4 (Four) Times a Day. (Patient not taking: Reported on 4/11/2025), Disp: , Rfl:     oxyCODONE-acetaminophen (PERCOCET) 5-325 MG per tablet, Take 1 tablet by mouth Every 8 (Eight) Hours. Indications: Pain, Disp: , Rfl:     pantoprazole (PROTONIX) 40 MG EC tablet, Take 1 tablet by mouth Daily. Indications: Gastroesophageal Reflux Disease, Disp: 90 tablet, Rfl: 1    ticagrelor (Brilinta) 60 MG tablet tablet, Take 1 tablet by mouth 2 (Two) Times a Day., Disp: 180 tablet, Rfl: 3    vitamin B-12 (CYANOCOBALAMIN) 1000 MCG tablet, Take 1 tablet by mouth Daily. Indications: Inadequate Vitamin B12, Disp: 30 tablet, Rfl: 5    Allergies   Allergen Reactions    Albuterol Shortness Of Breath     Pt states she has sensitivity to albuterol.  She states it causes her to be SOA.    Hydrocodone Palpitations    Sulfa Antibiotics Swelling    Bacitracin Other (See Comments)    Benzalkonium Chloride Hives    Codeine Other (See Comments)    Neomycin Other (See Comments)    Nickel Rash    Penicillins Rash     Beta lactam allergy details  Antibiotic reaction: unknown  Age at reaction: adult  Dose to reaction time: unknown  Reason for antibiotic: unknown  Epinephrine required for reaction?: no  Tolerated antibiotics: unknown        Polymyxin B Other (See Comments)       Family History   Problem Relation Age of Onset    Heart disease Mother     Hypertension Mother     Dementia Father     Hyperlipidemia Father     Lung cancer Father     Diabetes Paternal Uncle     Leukemia Paternal Grandmother        Cancer-related family history includes Lung cancer in her father.    Social History     Tobacco Use    Smoking status: Every Day     Current packs/day: 1.00     Average packs/day: 2.0 packs/day for 40.6 years (80.6 ttl pk-yrs)     Types: Cigarettes     Start date: 09/1984     Passive exposure: Current    Smokeless tobacco: Never   Vaping Use    Vaping status:  "Never Used   Substance Use Topics    Alcohol use: Not Currently    Drug use: Never     Social History     Social History Narrative    Not on file        1/2/2025.    ROS:   Review of Systems   Constitutional:  Positive for fatigue.   Musculoskeletal:  Positive for back pain and gait problem.       Objective:  Vital signs:  Vitals:    04/24/25 1130   BP: 122/79   Pulse: 102   SpO2: 95%   Weight: 65.3 kg (144 lb)   Height: 160 cm (63\")   PainSc: 0-No pain       Body mass index is 25.51 kg/m².  ECOG  (2) Ambulatory and capable of self care, unable to carry out work activity, up and about > 50% or waking hours    Physical Exam:   Physical Exam  Constitutional:       Appearance: She is normal weight. She is ill-appearing.   HENT:      Head: Normocephalic and atraumatic.      Right Ear: External ear normal.      Left Ear: External ear normal.      Nose: Nose normal.      Mouth/Throat:      Mouth: Mucous membranes are moist.      Pharynx: Oropharynx is clear.   Eyes:      Extraocular Movements: Extraocular movements intact.      Conjunctiva/sclera: Conjunctivae normal.      Pupils: Pupils are equal, round, and reactive to light.   Cardiovascular:      Rate and Rhythm: Normal rate.      Pulses: Normal pulses.   Pulmonary:      Effort: Pulmonary effort is normal.   Abdominal:      General: Abdomen is flat.      Palpations: Abdomen is soft.   Musculoskeletal:         General: Normal range of motion.      Cervical back: Normal range of motion and neck supple.   Skin:     General: Skin is warm.   Neurological:      Mental Status: She is alert.   Psychiatric:         Mood and Affect: Mood normal.         Behavior: Behavior normal.         Thought Content: Thought content normal.         Judgment: Judgment normal.         Lab Results - Last 18 Months   Lab Units 03/10/25  1027 02/06/25  1005 12/05/24  0258   WBC 10*3/mm3 5.26 6.00 5.71   HEMOGLOBIN g/dL 11.3* 12.0 12.2   HEMATOCRIT % 35.9 35.9 38.2   PLATELETS 10*3/mm3 277 340 " "251   MCV fL 99.2* 95.2 94.1     Lab Results - Last 18 Months   Lab Units 03/10/25  1027 02/06/25  1005 12/05/24  0258 12/03/24  0423 12/02/24  0953   SODIUM mmol/L 140 144 141   < > 147*   POTASSIUM mmol/L 3.9 4.8 4.3   < > 3.8   CHLORIDE mmol/L 104 106 103   < > 105   CO2 mmol/L 24.8 28.5 24.2   < > 27.3   BUN mg/dL 20 17 28*   < > 33*   CREATININE mg/dL 1.06* 1.13* 0.91   < > 0.97   CALCIUM mg/dL 9.8 10.1 9.7   < > 10.3   BILIRUBIN mg/dL 0.2 0.4  --   --  0.4   ALK PHOS U/L 115 145*  --   --  108   ALT (SGPT) U/L 13 8  --   --  11   AST (SGOT) U/L 20 11  --   --  18   GLUCOSE mg/dL 152* 91 137*   < > 133*    < > = values in this interval not displayed.       Lab Results   Component Value Date    GLUCOSE 152 (H) 03/10/2025    BUN 20 03/10/2025    CREATININE 1.06 (H) 03/10/2025    EGFRIFAFRI >60 09/09/2021    BCR 18.9 03/10/2025    K 3.9 03/10/2025    CO2 24.8 03/10/2025    CALCIUM 9.8 03/10/2025    ALBUMIN 4.1 03/10/2025    LABIL2 1.3 09/09/2021    AST 20 03/10/2025    ALT 13 03/10/2025       Lab Results - Last 18 Months   Lab Units 08/01/24  0235 07/31/24  1551   INR  0.95 <0.93*   APTT seconds  --  23.1*       Lab Results   Component Value Date    IRON 56 03/10/2025    TIBC 444 03/10/2025    FERRITIN 50.20 03/10/2025       Lab Results   Component Value Date    FOLATE 9.77 09/10/2024       No results found for: \"OCCULTBLD\"    Lab Results   Component Value Date    RETICCTPCT 1.80 03/10/2025     Lab Results   Component Value Date    YEAFKUOY80 402 09/10/2024     No results found for: \"SPEP\", \"UPEP\"  No results found for: \"LDH\", \"URICACID\"  No results found for: \"YOLANDA\", \"RF\", \"SEDRATE\"  No results found for: \"FIBRINOGEN\", \"HAPTOGLOBIN\"  Lab Results   Component Value Date    PTT 23.1 (L) 07/31/2024    INR 0.95 08/01/2024     No results found for: \"\"  No results found for: \"CEA\"  No components found for: \"CA-19-9\"  No results found for: \"PSA\"  No results found for: \"SEDRATE\"    Assessment & Plan     Patient is a " 69 y.o. female with limited stage small cell carcinoma s/p chemoradiation and PCI now on surveillance with no recurrence.     Limited stage small cell carcinoma  Good response to treatment. S/p chemo radiation with cisplatin/etoposide in 2020.  5/2023 CT imaging with no evidence of recurrence  Repeat imaging as above in March 2024 with no concerns for recurrence MRI brain reviewed as well  Restaging Scans from July 2024 are АНДРЕЙ, no concern for disease recurrence/progression.  Restaging scans reviewed from 3/3/2025, reported АНДРЕЙ without any evidence of disease   progression. Recheck Scans in 6 months, I do not think she would need surveillance beyond that point if scans are АНДРЕЙ.   MRI brain not consistent with metastatic disease.  Recheck  MRI brain in  6 months.    Gallbladder polyp:  -Noted on CT scan, measuring about 1 centimeter in size.  Ordered ultrasound abdomen and will refer patient to surgery for further evaluation and to consider cholecystectomy.  -Noted discordant findings on US abd and CT scan. Planned for MRI liver, due later this month.     History of PE   Now off Eliquis, she took herself off eliquis after 6 months  No signs or symptoms of recurrent VTE continue monitor.  Continue to monitor for signs of recurrent VTEs.     History of pericardial effusion  S/p pericardiocentesis negative for cytology  No recurrence  Monitor for now     Iron Deficiency anemia:  She is intolerant to oral iron, Noted low Ferritin and TSAT levels, Hb has declined below 12  -Will schedule her for venofer 300mg x 3 weekly doses  Recheck labs in 3 months.     IgA MGUS:  SPEP and KHUSHBOO showed IgA Kappa M Oz (0.2g./dl). FLC ratio 2.12  -This is likely low risk MGUS, per iSTOPMM risk model, The predicted risk of having >=10% bone marrow plasma cells is 7.4%., Will continue to monitor Myeloma labs,   -Her anemia has improved after recent hospitalization, Hb improved from 7.5 to 11.4. Remainder of CBC is WNL. Renal function and  S. Ca levels WNL.   Monitor every 6 months.        Left hip Fracture:  S/p CLINTON. She is gradually recovering.  Patient continues to be wheelchair-bound.  Encouraged to follow with physical therapy.  Referral placed     Forgetfulness/cognitive impairment:  -Unclear etiology, continue to follow with neurology/vascular surgery.  MRI brain suggestive of subacute infarct.  Patient is currently on dual antiplatelet therapy and statins.  Continue     Smoking   Continue to smoking 1-2 PPD, again counseled on quitting smoking.        3 month follow up with labs, to order restging scans on follow up.    Thank you very much for providing the opportunity to participate in this patient’s care. Please do not hesitate to call if there are any other questions.

## 2025-04-22 ENCOUNTER — TREATMENT (OUTPATIENT)
Dept: PHYSICAL THERAPY | Facility: CLINIC | Age: 70
End: 2025-04-22
Payer: MEDICARE

## 2025-04-22 DIAGNOSIS — R29.898 BILATERAL LEG WEAKNESS: Primary | ICD-10-CM

## 2025-04-22 DIAGNOSIS — C34.90 SMALL CELL CARCINOMA OF LUNG, UNSPECIFIED LATERALITY, UNSPECIFIED PART OF LUNG: ICD-10-CM

## 2025-04-22 DIAGNOSIS — Z74.09 DECREASED MOBILITY AND ENDURANCE: ICD-10-CM

## 2025-04-22 PROCEDURE — 97112 NEUROMUSCULAR REEDUCATION: CPT | Performed by: PHYSICAL THERAPIST

## 2025-04-22 PROCEDURE — 97110 THERAPEUTIC EXERCISES: CPT | Performed by: PHYSICAL THERAPIST

## 2025-04-22 NOTE — PROGRESS NOTES
"Chief Complaint  Memory Loss    Subjective          Miryam Fernández presents to [facility] NEUROLOGY for memory  History of Present Illness    Miryam Fernández is a 70-year-old female seen today in follow-up for memory decline.  She is accompanied by her  who provides primary history.  The patient was last seen 10/23/2024.  Patient has a history of small cell lung cancer in 2022 was treated with chemoradiation at which time both the patient and her daughter appreciated changes in her memory.  Patient had a fall in 2024 with disorientation and MRI showed evolution of small subacute infarcts of the right basal ganglia and corona radiata.    Both her  and the patient states there has not been a significant decline in memory.  She was admitted to the hospital at the beginning of the year for acute metabolic encephalopathy from a urinary tract infection.  She has been taking memantine 5 mg daily denies visual auditory hallucinations.      Home sleep study was completed though she only had an hour and a half of sleep data, it indicated that she likely had moderate sleep apnea.  She tried PAP therapy that she had at her home but was not able to tolerate it.  She is on able to use a mandibular advancement device due to dentures.          13/30 Last Office Visit 10/23/2024    Maximum   Score Patient's   Score Questions   5 1 \"What is the year?Season?Date?Day of the week?Month?\"   5 0 \"Where are we now: State?County?Town/city?Hospital?Floor?\"   3 3 3 Unrelated objects Number of trials:___   5 5 Count backward from 100 by sevens or spell WORLD backwards   3 0 Name 3 things from above   2 1 Identify 2 objects   1 1 Repeat the phrase: No ifs, ands,or buts.   3 2 Take paper in right hand, fold it in half, and put it on the floor.   1 1 Please read this and do what it says. \"Close your eyes\"   1 0 Make up and write a sentence about anything. Noun and verb   1 0 Copy this picture 10 angles must be " present.   30 14 Total MMSE          ===================================================   Kand[facility] Dv[facility] Office Visit   10-  Assessment and Plan    Diagnoses and all orders for this visit:     1. Obstructive sleep apnea, adult (Primary)  -     Cancel: Ambulatory Referral to Pulmonology  -     Ambulatory Referral to Pulmonology     2. Memory loss  -     MRI Brain With & Without Contrast; Future  -     vitamin B-12 (CYANOCOBALAMIN) 1000 MCG tablet; Take 1 tablet by mouth Daily. Indications: Inadequate Vitamin B12  Dispense: 30 tablet; Refill: 5  -     memantine (NAMENDA) 10 MG tablet; Take 1 tablet by mouth 2 (Two) Times a Day. Indications: Memory loss  Dispense: 60 tablet; Refill: 5  -     Home Sleep Study; Future     3. Has daytime drowsiness  -     Home Sleep Study; Future     4. Hypersomnia, unspecified  -     Home Sleep Study; Future     5. Chronic bronchitis, unspecified chronic bronchitis type  -     Cancel: Ambulatory Referral to Pulmonology  -     Ambulatory Referral to Pulmonology     6. Small cell carcinoma of hilum of lung, unspecified laterality  -     Cancel: Ambulatory Referral to Pulmonology  -     Ambulatory Referral to Pulmonology       Miryam Fernández is seen today for memory decline.  She was last seen by neurology in the hospital in July after a fall and disorientation.  She was found to have subacute infarcts and extensive small vessel ischemic disease.  Patient's memory issues have been present since chemoradiation for small cell lung cancer in 2020.  She continues to smoke 1 to 2 packs/day and has high blood pressure and high cholesterol is currently controlled on medication.  Patient lives with her  and there are no safety can concerns other than increased risk for falls at this time.  MMSE today was 13/30.       Will increase Namenda by 5 mg per dose every month up to 10 mg p.o. twice daily.  We discussed the potential side effects of this medication.     Repeat MRI brain  with and without contrast to better evaluate for possible metastatic disease as recommended by radiologist in July.  We reviewed the images today.  She does not have significant temporoparietal or hippocampal atrophy as seen with Alzheimer's.     We discussed reducing risk factors for recurrent stroke and progressive memory decline due to vascular dementia.  She should quit smoking.  The patient does have a history of MORA and COPD but did not tolerate PAP therapy.  Her daughter believes her last sleep study was over 10 years ago and we do not have the results.  Will order home sleep study to reevaluate severity of sleep apnea.  If she does not tolerate PAP therapy, she may benefit from nocturnal oxygen.  She states that she feels so much better when she is in the hospital on oxygen.     The patient has a history of small cell lung cancer and COPD.  Will refer her to pulmonology for management.     I would like for her to start B12 1000 mcg p.o. daily.  Her B12 level was 400 but I would like this over 500 if possible.    Current Outpatient Medications:     aspirin 81 MG EC tablet, Take 1 tablet by mouth Daily., Disp: 30 tablet, Rfl: 2    atorvastatin (LIPITOR) 40 MG tablet, TAKE 1 TABLET BY MOUTH EVERY DAY, Disp: 90 tablet, Rfl: 1    benzonatate (TESSALON) 100 MG capsule, Take 1 capsule by mouth 3 (Three) Times a Day As Needed for Cough., Disp: , Rfl:     Cholecalciferol 50 MCG (2000 UT) tablet, Take 1 tablet by mouth Daily. Indications: Vitamin D Deficiency, Disp: , Rfl:     furosemide (LASIX) 20 MG tablet, TAKE 0.5 TABLETS BY MOUTH DAILY AS NEEDED (SWELLING) FOR UP TO 30 DAYS. (Patient taking differently: No sig reported), Disp: 45 tablet, Rfl: 1    levothyroxine (SYNTHROID, LEVOTHROID) 25 MCG tablet, Take 1 tablet by mouth Every Morning. Indications: Underactive Thyroid, Disp: 90 tablet, Rfl: 0    meclizine (ANTIVERT) 25 MG tablet, Take 1 tablet by mouth 3 (Three) Times a Day As Needed for Dizziness. Indications:  "Dizzy, Disp: 30 tablet, Rfl: 0    memantine (NAMENDA) 5 MG tablet, Take 1 tablet by mouth 2 (Two) Times a Day., Disp: 60 tablet, Rfl: 2    Metoprolol Succinate 25 MG capsule extended-release 24 hour sprinkle, Take 25 mg by mouth Daily. Indications: tachycardia, Disp: , Rfl:     montelukast (Singulair) 10 MG tablet, Take 1 tablet by mouth Every Night., Disp: 30 tablet, Rfl: 1    nitrofurantoin (MACRODANTIN) 100 MG capsule, Take 1 capsule by mouth 4 (Four) Times a Day., Disp: , Rfl:     oxyCODONE-acetaminophen (PERCOCET) 5-325 MG per tablet, Take 1 tablet by mouth Every 8 (Eight) Hours. Indications: Pain, Disp: , Rfl:     pantoprazole (PROTONIX) 40 MG EC tablet, Take 1 tablet by mouth Daily. Indications: Gastroesophageal Reflux Disease, Disp: 90 tablet, Rfl: 1    ticagrelor (Brilinta) 60 MG tablet tablet, Take 1 tablet by mouth 2 (Two) Times a Day., Disp: 180 tablet, Rfl: 3    vitamin B-12 (CYANOCOBALAMIN) 1000 MCG tablet, Take 1 tablet by mouth Daily. Indications: Inadequate Vitamin B12, Disp: 30 tablet, Rfl: 5    Review of Systems   Constitutional:  Positive for fatigue.   Eyes:  Positive for visual disturbance.   Neurological:  Positive for headaches.   Psychiatric/Behavioral:  Positive for confusion.           Objective:    Vital Signs:   /72   Pulse 91   Ht 160 cm (63\")   Wt 63 kg (139 lb)   BMI 24.62 kg/m²     Physical Exam  Vitals reviewed.   Constitutional:       Appearance: She is well-developed.   HENT:      Head: Normocephalic and atraumatic.   Eyes:      General: Lids are normal.      Extraocular Movements: Extraocular movements intact.      Pupils: Pupils are equal, round, and reactive to light.   Cardiovascular:      Rate and Rhythm: Normal rate.      Heart sounds: No murmur heard.  Pulmonary:      Effort: Pulmonary effort is normal.   Musculoskeletal:      Cervical back: Normal range of motion and neck supple.   Skin:     General: Skin is warm and dry.   Neurological:      Mental Status: She " is alert and oriented to person, place, and time.      Cranial Nerves: Cranial nerves 2-12 are intact. No cranial nerve deficit.      Motor: Motor function is intact. No tremor.      Coordination: Finger-Nose-Finger Test normal. Rapid alternating movements normal.      Gait: Gait abnormal.   Psychiatric:         Attention and Perception: Attention normal.         Mood and Affect: Mood normal.         Speech: Speech normal.         Behavior: Behavior normal.         Cognition and Memory: Cognition normal. Memory is impaired. She exhibits impaired recent memory.         Judgment: Judgment normal.      Comments: MMSE 14/30        Result Review :   The following data was reviewed by: KENNY Blake on 04/23/2025:  CMP          12/5/2024    02:58 2/6/2025    10:05 3/10/2025    10:27   CMP   Glucose 137  91  152    BUN 28  17  20    Creatinine 0.91  1.13  1.06    EGFR 69.3  53.1  57.3    Sodium 141  144  140    Potassium 4.3  4.8  3.9    Chloride 103  106  104    Calcium 9.7  10.1  9.8    Total Protein  7.5  6.7    Albumin  3.6  4.1    Globulin  3.9  2.6    Total Bilirubin  0.4  0.2    Alkaline Phosphatase  145  115    AST (SGOT)  11  20    ALT (SGPT)  8  13    Albumin/Globulin Ratio  0.9  1.6    BUN/Creatinine Ratio 30.8  15.0  18.9    Anion Gap 13.8  9.5  11.2      CBC          12/5/2024    02:58 2/6/2025    10:05 3/10/2025    10:27   CBC   WBC 5.71  6.00  5.26    RBC 4.06  3.77  3.62    Hemoglobin 12.2  12.0  11.3    Hematocrit 38.2  35.9  35.9    MCV 94.1  95.2  99.2    MCH 30.0  31.8  31.2    MCHC 31.9  33.4  31.5    RDW 14.6  13.5  14.7    Platelets 251  340  277      Lipid Panel          7/22/2024    05:28   Lipid Panel   Total Cholesterol 207    Triglycerides 125    HDL Cholesterol 38    VLDL Cholesterol 23    LDL Cholesterol  146    LDL/HDL Ratio 3.79      TSH          7/19/2024    23:23 12/2/2024    09:53 2/6/2025    10:05   TSH   TSH 3.060  1.890  1.550      Most Recent A1C          7/22/2024     12:09   HGBA1C Most Recent   Hemoglobin A1C 5.44    Free light chain analysis  Kappa 20 2.3H, lambda 12.5, ratio 1.78H  Iron profile: Iron 56, iron saturation 13L, transferrin 298, TIBC 444  Ferritin 50.2             MR angiogram of the brain    IMPRESSION:    1.  No evidence of hemodynamically significant stenosis.  Report electronically signed by: Mateusz Silva DO on 2024, Dec 23, 03:59 PM EST  Exam End: 12/23/24 15:20    MRI brain with and without 11/18/2024  Impression:  Punctate tiny area of probable acute lacunar infarct noted within the subcortical white matter of the right posterior frontal lobe. Given reported history of small cell lung cancer, tiny metastatic lesion would be a less likely consideration.     Otherwise redemonstrated chronic findings including relatively extensive confluent bilateral white matter changes potentially reflecting a combination of posttreatment effects and usual chronic microvascular change. No acute or unexpected findings   otherwise    Neurological Exam  Mental Status  Alert. Oriented to person, place, and time. Recalls 3 of 3 objects immediately. At 5 minutes recalls 0 of 3 objects. Speech is normal. MMSE score: 14.  Up from 13/30 in October 2024..    Cranial Nerves  CN II: Visual acuity is normal. Visual fields full to confrontation.  CN III, IV, VI: Extraocular movements intact bilaterally. Normal lids and orbits bilaterally. Pupils equal round and reactive to light bilaterally.  CN V: Facial sensation is normal.  CN VII: Full and symmetric facial movement.  CN IX, X: Palate elevates symmetrically. Normal gag reflex.  CN XI: Shoulder shrug strength is normal.  CN XII: Tongue midline without atrophy or fasciculations.    Motor   No abnormal involuntary movements. No pronator drift.  General Weakness throughout.    Sensory  Light touch is normal in upper and lower extremities.     Coordination  No tremorRight: Finger-to-nose normal. Rapid alternating movement normal.Left:  Finger-to-nose normal. Rapid alternating movement normal.    Gait   Abnormal gait.Casual gait: Unable to rise from chair without using arms.  Ambulates with a walker, slowed.         Assessment and Plan    Diagnoses and all orders for this visit:    1. Memory loss (Primary)  -     Discontinue: memantine (NAMENDA) 5 MG tablet; Take 1 tablet by mouth Every Night for 14 days, THEN 1 tablet 2 (Two) Times a Day.  Dispense: 60 tablet; Refill: 2  -     memantine (NAMENDA) 5 MG tablet; Take 1 tablet by mouth 2 (Two) Times a Day.  Dispense: 60 tablet; Refill: 2    2. Cerebrovascular disease in cancer patient    3. Obstructive sleep apnea, adult    4. Meniere's disease, unspecified laterality        Miryam Fernández is seen today in follow-up for memory decline.  She had an episode of confusion in the beginning of the year due to urinary tract infection.  The patient has untreated moderate sleep apnea but was unable to tolerate CPAP therapy.  Both her  and her are very poor historians.  He has been taking 5 mg of memantine daily and has not increased the dose as recommended.  MMSE today is 14/30, up from 13/30 in October 2024.    Because she is unable to tolerate CPAP and she has dentures (unable to do MAD), I would like for her to at least increase her head of the bed while she is sleeping.    Recommend increasing memantine to 5 mg twice a day as previously recommended.    She states her dizziness is worse and reports that this is due to Ménière's disease.  She is taking meclizine 25 mg 3 times a day.  I explained to her that meclizine can affect memory and cognition and this should be only taken as needed.  We discussed the referral to ENT specialist for management of Ménière's disease, but the patient is not interested    She will follow-up in 6 months     I spent 46 minutes caring for Miryam on this date of service. This time includes time spent by me in the following activities:preparing for the visit, reviewing  tests, obtaining and/or reviewing a separately obtained history, performing a medically appropriate examination and/or evaluation , counseling and educating the patient/family/caregiver, ordering medications, tests, or procedures, and documenting information in the medical record  Follow Up   Return in about 6 months (around 10/23/2025).  Patient was given instructions and counseling regarding her condition or for health maintenance advice. Please see specific information pulled into the AVS if appropriate.     This document has been electronically signed by KENNY Phillips  on April 23, 2025 11:54 EDT

## 2025-04-22 NOTE — PROGRESS NOTES
Physical Therapy Daily Note  Office: [address], [place] [address]  P: [phone]  F: [phone]    Patient: Miryam Fernández   : 1955  Diagnosis/ICD-10 Code:  Bilateral leg weakness [R29.898]  Referring practitioner: Gunnar Moon MD  Today's Date: 2025  Patient seen for 4 sessions      ICD-10-CM ICD-9-CM   1. Bilateral leg weakness  R29.898 729.89   2. Small cell carcinoma of lung, unspecified laterality, unspecified part of lung  C34.90 162.9   3. Decreased mobility and endurance  Z74.09 780.99                                                                                                                                                                                                                                                                                                                                   VISIT#:  sessions (PN due: 2025/Recert due 2025 )     Precautions/Restrictions: Increased fall risk     Subjective  Miryam Fernández reports she is having some issues with dizziness. She has spoken with PCP about it in the past because it's been going on for a while. Usually is more when she is moving from lying down to sitting or sitting to standing. Doesn't bother her too much while walking as long as she is using her rollator.       Objective  Amb with mod unsteadiness with rollator walker   Vitals: 120 bpm, 96% O2 after ambulating into clinic and decreased to 108 bpm after resting a few minutes; 109 bpm and 92% O2 after nustep and increased to 95% after pursed lip breathing ; 107 bpm, 98% O2 after supine exercises; /84 mmHg    See Exercise, Manual, and Modality Logs for complete treatment.     Home Exercises:  [id number]     Assessment/Plan   Pt noted she has had more dizziness today. Pt had slightly elevated HR after ambulating into clinic at 120 bpm, however decreased to 108 bpm after resting for ~3 mins. BP was WNL. HR was slightly elevated  throughout entire session, however pt was exercising. Was decreasing with rest each time. Pt instructed to reach out to PCP if dizziness does not improve or if it worsens. Mod fatigue by end of session.       Progress per Plan of Care and Progress strengthening /stabilization /functional activity            Timed:         Manual Therapy:         mins  99615;     Therapeutic Exercise:    23     mins  14556;     Neuromuscular Eleanor:    15    mins  23931;    Therapeutic Activity:          mins  67923;     Gait Training:           mins  99254;     Ultrasound:          mins  83223;    Ionto                                   mins   90665  Self Care                            mins   51795    Un-Timed:  Electrical Stimulation:         mins  20373 ( );  Traction          mins 90319    Timed Treatment:   38   mins   Total Treatment:     38   mins       Zonia Adamson, PT, DPT, OCS     [place] License # [id number]

## 2025-04-23 ENCOUNTER — OFFICE VISIT (OUTPATIENT)
Dept: NEUROLOGY | Facility: CLINIC | Age: 70
End: 2025-04-23
Payer: MEDICARE

## 2025-04-23 VITALS
SYSTOLIC BLOOD PRESSURE: 113 MMHG | DIASTOLIC BLOOD PRESSURE: 72 MMHG | HEIGHT: 63 IN | WEIGHT: 139 LBS | BODY MASS INDEX: 24.63 KG/M2 | HEART RATE: 91 BPM

## 2025-04-23 DIAGNOSIS — C80.1: ICD-10-CM

## 2025-04-23 DIAGNOSIS — G47.33 OBSTRUCTIVE SLEEP APNEA, ADULT: ICD-10-CM

## 2025-04-23 DIAGNOSIS — I67.9: ICD-10-CM

## 2025-04-23 DIAGNOSIS — H81.09 MENIERE'S DISEASE, UNSPECIFIED LATERALITY: ICD-10-CM

## 2025-04-23 DIAGNOSIS — R41.3 MEMORY LOSS: Primary | ICD-10-CM

## 2025-04-23 RX ORDER — MEMANTINE HYDROCHLORIDE 5 MG/1
5 TABLET ORAL 2 TIMES DAILY
Qty: 60 TABLET | Refills: 2 | Status: SHIPPED | OUTPATIENT
Start: 2025-04-23 | End: 2026-04-23

## 2025-04-23 RX ORDER — MEMANTINE HYDROCHLORIDE 5 MG/1
TABLET ORAL
Qty: 60 TABLET | Refills: 2 | Status: SHIPPED | OUTPATIENT
Start: 2025-04-23 | End: 2025-04-23

## 2025-04-24 ENCOUNTER — OFFICE VISIT (OUTPATIENT)
Dept: ONCOLOGY | Facility: CLINIC | Age: 70
End: 2025-04-24
Payer: MEDICARE

## 2025-04-24 VITALS
BODY MASS INDEX: 25.52 KG/M2 | HEART RATE: 102 BPM | WEIGHT: 144 LBS | HEIGHT: 63 IN | OXYGEN SATURATION: 95 % | SYSTOLIC BLOOD PRESSURE: 122 MMHG | DIASTOLIC BLOOD PRESSURE: 79 MMHG

## 2025-04-24 DIAGNOSIS — C34.90 SMALL CELL CARCINOMA OF LUNG, UNSPECIFIED LATERALITY, UNSPECIFIED PART OF LUNG: ICD-10-CM

## 2025-04-24 DIAGNOSIS — R29.898 BILATERAL LEG WEAKNESS: ICD-10-CM

## 2025-04-24 DIAGNOSIS — K82.8 GALLBLADDER MASS: ICD-10-CM

## 2025-04-24 DIAGNOSIS — D64.9 ANEMIA, UNSPECIFIED TYPE: ICD-10-CM

## 2025-04-24 DIAGNOSIS — D47.2 MGUS (MONOCLONAL GAMMOPATHY OF UNKNOWN SIGNIFICANCE): Primary | ICD-10-CM

## 2025-04-25 ENCOUNTER — TREATMENT (OUTPATIENT)
Dept: PHYSICAL THERAPY | Facility: CLINIC | Age: 70
End: 2025-04-25
Payer: MEDICARE

## 2025-04-25 DIAGNOSIS — C34.90 SMALL CELL CARCINOMA OF LUNG, UNSPECIFIED LATERALITY, UNSPECIFIED PART OF LUNG: ICD-10-CM

## 2025-04-25 DIAGNOSIS — Z74.09 DECREASED MOBILITY AND ENDURANCE: ICD-10-CM

## 2025-04-25 DIAGNOSIS — R29.898 BILATERAL LEG WEAKNESS: Primary | ICD-10-CM

## 2025-04-25 PROCEDURE — 97112 NEUROMUSCULAR REEDUCATION: CPT | Performed by: PHYSICAL THERAPIST

## 2025-04-25 PROCEDURE — 97110 THERAPEUTIC EXERCISES: CPT | Performed by: PHYSICAL THERAPIST

## 2025-04-25 NOTE — LETTER
Re-Assessment/Progress Note  Office: [address] [facility] [facility] [address], [place] [address]  P: [phone]   F: [phone]        Patient: Miryam Fernández   : 1955  Diagnosis/ICD-10 Code:  Bilateral leg weakness [R29.898]  Referring practitioner: Gunnar Moon MD  Date of Initial Visit: Type: THERAPY  Noted: 3/28/2025  Today's Date: 2025  Patient seen for 5 sessions      ICD-10-CM ICD-9-CM   1. Bilateral leg weakness  R29.898 729.89   2. Small cell carcinoma of lung, unspecified laterality, unspecified part of lung  C34.90 162.9   3. Decreased mobility and endurance  Z74.09 780.99       Subjective:   Miryam Fernández reports she feels like she is doing a little better. Overall she still feels tired. Still having trouble standing for very long and has to use rollator when she's walking. Still having trouble with her balance and getting up and down.       Subjective Questionnaire: ABC: 23% confident  Clinical Progress: Min progress  Home Program Compliance: Yes  Treatment has included: therapeutic exercise, neuromuscular re-education, manual therapy, therapeutic activity, and gait training    Objective          Strength/Myotome Testing     Left Hip   Planes of Motion   Flexion: 4+  Extension: 4  Abduction: 4  Adduction: 4+    Right Hip   Planes of Motion   Flexion: 4+  Extension: 4  Abduction: 4  Adduction: 4+    Left Knee   Flexion: 4+  Extension: 4+    Right Knee   Flexion: 4+  Extension: 4+    Left Ankle/Foot   Dorsiflexion: 4+  Plantar flexion: 4+    Right Ankle/Foot   Dorsiflexion: 4+  Plantar flexion: 4+    Ambulation   Weight-Bearing Status   Assistive device used: rollator walker    Ambulation: Level Surfaces   Ambulation with assistive device: independent    Observational Gait   Decreased walking speed and stride length.   Base of support: increased    Functional Assessment     Comments  STS - min use of UE's to assist, requires cuing to lock rollator and use surface she is sitting on to push up  from for safety  TU sec using rollator and SBA for safety          Assessment & Plan       Assessment  Impairments: abnormal coordination, abnormal gait, abnormal muscle firing, abnormal muscle tone, activity intolerance, impaired balance, impaired physical strength, lacks appropriate home exercise program, safety issue and weight-bearing intolerance   Functional limitations: carrying objects, lifting, walking, pulling, pushing, standing, stooping and reaching overhead   Assessment details: Pt is a 70 year old with c/o weakness, decreased endurance and decreased balance. Pt continues to have safety concerns. Requires cuing for proper safety with STS. Is ambulating independently, however requires SBA for turning due to decreased balance. Pt is able to perform a few STS without use of hands for support but also does require SBA-Ephraim for safety/balance. Pt has progressed with strength in the LE's with improvement noted during MMT this date. Also has improved some with her TUG score with decreased timed score noted, demonstrating improved gait speed. Pt is independent with bed mobility tasks. Score on the ABC scale has also improved which demonstrates pt's confidence in her balance while doing tasks is improving.     Functional limitations are listed above. Pt will benefit from PT in order to address impairments and improve overall function.     Prognosis: good    Goals  Plan Goals: STG (3 weeks):  Pt will be independent with home exercises to assist with improved strength and continue to improve function. - met  Pt will demonstrate increased score on the ABC confidence scale to 60% to demonstrate decreased overall impairment. - progressing   Pt will demonstrate improved timed score on the TUG to <25 seconds to demonstrate decreased risk for falls. - progressing      LTG (6 weeks):  Pt will be able to step up onto curb or step with use of AD and no assist required to assist with community amb and getting into/out  of home. -progressing   Pt will demonstrate improved LE strength to 4+/5 overall to assist with function.  - mostly met  Pt will demonstrate increased endurance and be able to perform 45 min session of exercises/activity with few rest breaks required. - progressing   Pt will be able to perform transfers with little to no use of UE's to assist. - progressing  Pt will demonstrate improved steadiness with ambulation with use of AD and be able to amb >30 mins with no dizziness and min fatigue. - not met       Plan  Therapy options: will be seen for skilled therapy services  Planned modality interventions: TENS, thermotherapy (hydrocollator packs) and cryotherapy  Planned therapy interventions: abdominal trunk stabilization, ADL retraining, balance/weight-bearing training, body mechanics training, fine motor coordination training, flexibility, functional ROM exercises, gait training, home exercise program, joint mobilization, manual therapy, motor coordination training, neuromuscular re-education, postural training, soft tissue mobilization, spinal/joint mobilization, strengthening, stretching, therapeutic activities and transfer training  Frequency: 2x week  Duration in weeks: 8  Treatment plan discussed with: patient    Progress toward previous goals: Partially Met        Recommendations: Continue as planned  Timeframe: 2 months  Prognosis to achieve goals: fair          Based upon review of the patient's progress and continued therapy plan, it is my medical opinion that Miryam Fernández should continue physical therapy treatment at [facility]  [address] [place] [address] [address] [place] [address] [phone].    Signature: __________________________________  Gunnar Moon MD    Timed:         Manual Therapy:         mins  78828;     Therapeutic Exercise:    15     mins  77815;     Neuromuscular Eleanor:    8    mins  05270;    Therapeutic Activity:          mins  59216;     Gait  Training:           mins  17463;     Ultrasound:          mins  86345;    Ionto                                   mins   82571  Self Care                            mins   61662    Un-Timed:  Electrical Stimulation:         mins  46454 ( );  Traction          mins 76168  Re-Eval                               mins  44809      Timed Treatment:   23   mins   Total Treatment:     23   mins (pt in clinic for ~40 mins total, however only charged for one-on-one care)         PT Signature: Zonia Adamson, PT, DPT, OCS     [place] License # [id number]

## 2025-04-25 NOTE — PROGRESS NOTES
Re-Assessment/Progress Note  Office: [address] [facility] [address], [place] [address]  P: [phone]   F: [phone]        Patient: Miryam Fernández   : 1955  Diagnosis/ICD-10 Code:  Bilateral leg weakness [R29.898]  Referring practitioner: Gunnar Moon MD  Date of Initial Visit: Type: THERAPY  Noted: 3/28/2025  Today's Date: 2025  Patient seen for 5 sessions      ICD-10-CM ICD-9-CM   1. Bilateral leg weakness  R29.898 729.89   2. Small cell carcinoma of lung, unspecified laterality, unspecified part of lung  C34.90 162.9   3. Decreased mobility and endurance  Z74.09 780.99       Subjective:   Miryam Fernández reports she feels like she is doing a little better. Overall she still feels tired. Still having trouble standing for very long and has to use rollator when she's walking. Still having trouble with her balance and getting up and down.       Subjective Questionnaire: ABC: 23% confident  Clinical Progress: Min progress  Home Program Compliance: Yes  Treatment has included: therapeutic exercise, neuromuscular re-education, manual therapy, therapeutic activity, and gait training    Objective          Strength/Myotome Testing     Left Hip   Planes of Motion   Flexion: 4+  Extension: 4  Abduction: 4  Adduction: 4+    Right Hip   Planes of Motion   Flexion: 4+  Extension: 4  Abduction: 4  Adduction: 4+    Left Knee   Flexion: 4+  Extension: 4+    Right Knee   Flexion: 4+  Extension: 4+    Left Ankle/Foot   Dorsiflexion: 4+  Plantar flexion: 4+    Right Ankle/Foot   Dorsiflexion: 4+  Plantar flexion: 4+    Ambulation   Weight-Bearing Status   Assistive device used: rollator walker    Ambulation: Level Surfaces   Ambulation with assistive device: independent    Observational Gait   Decreased walking speed and stride length.   Base of support: increased    Functional Assessment     Comments  STS - min use of UE's to assist, requires cuing to lock rollator and use surface she is sitting on to push up  from for safety  TU sec using rollator and SBA for safety          Assessment & Plan       Assessment  Impairments: abnormal coordination, abnormal gait, abnormal muscle firing, abnormal muscle tone, activity intolerance, impaired balance, impaired physical strength, lacks appropriate home exercise program, safety issue and weight-bearing intolerance   Functional limitations: carrying objects, lifting, walking, pulling, pushing, standing, stooping and reaching overhead   Assessment details: Pt is a 70 year old with c/o weakness, decreased endurance and decreased balance. Pt continues to have safety concerns. Requires cuing for proper safety with STS. Is ambulating independently, however requires SBA for turning due to decreased balance. Pt is able to perform a few STS without use of hands for support but also does require SBA-Ephraim for safety/balance. Pt has progressed with strength in the LE's with improvement noted during MMT this date. Also has improved some with her TUG score with decreased timed score noted, demonstrating improved gait speed. Pt is independent with bed mobility tasks. Score on the ABC scale has also improved which demonstrates pt's confidence in her balance while doing tasks is improving.     Functional limitations are listed above. Pt will benefit from PT in order to address impairments and improve overall function.     Prognosis: good    Goals  Plan Goals: STG (3 weeks):  Pt will be independent with home exercises to assist with improved strength and continue to improve function. - met  Pt will demonstrate increased score on the ABC confidence scale to 60% to demonstrate decreased overall impairment. - progressing   Pt will demonstrate improved timed score on the TUG to <25 seconds to demonstrate decreased risk for falls. - progressing      LTG (6 weeks):  Pt will be able to step up onto curb or step with use of AD and no assist required to assist with community amb and getting into/out  of home. -progressing   Pt will demonstrate improved LE strength to 4+/5 overall to assist with function.  - mostly met  Pt will demonstrate increased endurance and be able to perform 45 min session of exercises/activity with few rest breaks required. - progressing   Pt will be able to perform transfers with little to no use of UE's to assist. - progressing  Pt will demonstrate improved steadiness with ambulation with use of AD and be able to amb >30 mins with no dizziness and min fatigue. - not met       Plan  Therapy options: will be seen for skilled therapy services  Planned modality interventions: TENS, thermotherapy (hydrocollator packs) and cryotherapy  Planned therapy interventions: abdominal trunk stabilization, ADL retraining, balance/weight-bearing training, body mechanics training, fine motor coordination training, flexibility, functional ROM exercises, gait training, home exercise program, joint mobilization, manual therapy, motor coordination training, neuromuscular re-education, postural training, soft tissue mobilization, spinal/joint mobilization, strengthening, stretching, therapeutic activities and transfer training  Frequency: 2x week  Duration in weeks: 8  Treatment plan discussed with: patient    Progress toward previous goals: Partially Met        Recommendations: Continue as planned  Timeframe: 2 months  Prognosis to achieve goals: fair          Based upon review of the patient's progress and continued therapy plan, it is my medical opinion that Miryam Fernández should continue physical therapy treatment at [facility] PHYSICAL THERAPY  [address] [place] [address] [address] [place] [address] [phone].    Signature: __________________________________  Gunnar Moon MD    Timed:         Manual Therapy:         mins  71325;     Therapeutic Exercise:    15     mins  45305;     Neuromuscular Eleanor:    8    mins  41318;    Therapeutic Activity:          mins  44399;     Gait  Training:           mins  37499;     Ultrasound:          mins  08000;    Ionto                                   mins   56127  Self Care                            mins   01125    Un-Timed:  Electrical Stimulation:         mins  90780 ( );  Traction          mins 50341  Re-Eval                               mins  00181      Timed Treatment:   23   mins   Total Treatment:     23   mins (pt in clinic for ~40 mins total, however only charged for one-on-one care)         PT Signature: Zonia Adamson, PT, DPT, OCS     [place] License # [id number]

## 2025-04-26 DIAGNOSIS — R41.3 MEMORY LOSS: ICD-10-CM

## 2025-04-28 RX ORDER — LANOLIN ALCOHOL/MO/W.PET/CERES
1000 CREAM (GRAM) TOPICAL DAILY
Qty: 90 TABLET | Refills: 1 | Status: SHIPPED | OUTPATIENT
Start: 2025-04-28

## 2025-04-29 ENCOUNTER — HOSPITAL ENCOUNTER (OUTPATIENT)
Dept: MRI IMAGING | Facility: HOSPITAL | Age: 70
Discharge: HOME | End: 2025-04-29
Admitting: NURSE PRACTITIONER
Payer: MEDICARE

## 2025-04-29 DIAGNOSIS — K82.8 GALLBLADDER MASS: ICD-10-CM

## 2025-04-29 PROCEDURE — 74183 MRI ABD W/O CNTR FLWD CNTR: CPT

## 2025-04-29 PROCEDURE — A9579 GAD-BASE MR CONTRAST NOS,1ML: HCPCS | Performed by: NURSE PRACTITIONER

## 2025-04-29 PROCEDURE — 25010000002 GADOTERIDOL PER 1 ML: Performed by: NURSE PRACTITIONER

## 2025-04-29 RX ADMIN — GADOTERIDOL 20 ML: 279.3 INJECTION, SOLUTION INTRAVENOUS at 09:34

## 2025-04-30 ENCOUNTER — TREATMENT (OUTPATIENT)
Dept: PHYSICAL THERAPY | Facility: CLINIC | Age: 70
End: 2025-04-30
Payer: MEDICARE

## 2025-04-30 DIAGNOSIS — C34.90 SMALL CELL CARCINOMA OF LUNG, UNSPECIFIED LATERALITY, UNSPECIFIED PART OF LUNG: ICD-10-CM

## 2025-04-30 DIAGNOSIS — Z74.09 DECREASED MOBILITY AND ENDURANCE: ICD-10-CM

## 2025-04-30 DIAGNOSIS — R29.898 BILATERAL LEG WEAKNESS: Primary | ICD-10-CM

## 2025-04-30 PROCEDURE — 97110 THERAPEUTIC EXERCISES: CPT | Performed by: PHYSICAL THERAPIST

## 2025-04-30 PROCEDURE — 97116 GAIT TRAINING THERAPY: CPT | Performed by: PHYSICAL THERAPIST

## 2025-04-30 NOTE — PROGRESS NOTES
Physical Therapy Daily Treatment Note  [facility] Physical Therapy [place] [address] [address]  [place]. [address]  P: [phone] F: [phone]    Patient: Miryam Fernández   : 1955  Referring practitioner: Gunnar Moon MD  Date of Initial Visit: Type: THERAPY  Noted: 3/28/2025  Today's Date: 2025  Patient seen for 6 sessions       Visit Diagnoses:    ICD-10-CM ICD-9-CM   1. Bilateral leg weakness  R29.898 729.89   2. Small cell carcinoma of lung, unspecified laterality, unspecified part of lung  C34.90 162.9   3. Decreased mobility and endurance  Z74.09 780.99         Subjective:  Miryam Fernández reports: no pain at this time, feels about the same.       Objective   See Exercise, Manual, and Modality Logs for complete treatment. Started with seated there ex.  Took HR and 02% prior to tx which is listed in flowsheet. Focus during ambulation on picking feet up and not shuffling and taking bigger steps.     02% after performance of stairs: 99%  HR after performance of stairs: 111 bpm    02% after ambulation : 97%  HR after ambulation : 93 bpm    Performed standing there ex at counter with CGA.            Assessment/Plan:  Pt req several v/c's and demonstration with step ups. Pt kept getting confused on which foot to step up with. CGA req for most activities.  Once given instruction to pt it takes her a few seconds to process what to do and then will req v/c's throughout. Pt tolerated tx session but was  fatigued towards the end.              Timed:         Manual Therapy:         mins  60521;     Therapeutic Exercise:    15     mins  90433;     Neuromuscular Eleanor:        mins  44832;    Therapeutic Activity:          mins  98160;     Gait Training:      15     mins  68084;     Ultrasound:          mins  25244;    Self Care                            mins  54850      Un-Timed:  Electrical Stimulation:         mins  34582 ( );  Traction          mins 30161        Timed  Treatment:   30   mins   Total Treatment:     30   mins    Eve Schmitt PTA  [place] License #: [id number]    Physical Therapist Assistant

## 2025-05-01 ENCOUNTER — RESULTS FOLLOW-UP (OUTPATIENT)
Dept: ONCOLOGY | Facility: CLINIC | Age: 70
End: 2025-05-01
Payer: MEDICARE

## 2025-05-02 ENCOUNTER — TREATMENT (OUTPATIENT)
Dept: PHYSICAL THERAPY | Facility: CLINIC | Age: 70
End: 2025-05-02
Payer: MEDICARE

## 2025-05-02 ENCOUNTER — TELEPHONE (OUTPATIENT)
Dept: SURGERY | Facility: CLINIC | Age: 70
End: 2025-05-02
Payer: MEDICARE

## 2025-05-02 DIAGNOSIS — C34.90 SMALL CELL CARCINOMA OF LUNG, UNSPECIFIED LATERALITY, UNSPECIFIED PART OF LUNG: ICD-10-CM

## 2025-05-02 DIAGNOSIS — R29.898 BILATERAL LEG WEAKNESS: Primary | ICD-10-CM

## 2025-05-02 DIAGNOSIS — K82.8 GALLBLADDER MASS: Primary | ICD-10-CM

## 2025-05-02 DIAGNOSIS — Z74.09 DECREASED MOBILITY AND ENDURANCE: ICD-10-CM

## 2025-05-02 PROCEDURE — 97530 THERAPEUTIC ACTIVITIES: CPT | Performed by: PHYSICAL THERAPIST

## 2025-05-02 PROCEDURE — 97112 NEUROMUSCULAR REEDUCATION: CPT | Performed by: PHYSICAL THERAPIST

## 2025-05-02 PROCEDURE — 97110 THERAPEUTIC EXERCISES: CPT | Performed by: PHYSICAL THERAPIST

## 2025-05-02 NOTE — TELEPHONE ENCOUNTER
The scan is sufficient. Please let her know that she does not have gallbladder cancer. However, she might have some stones in her common bile duct, as well as non-worrisome lesion in her pancreas known as a IPMN. Let's refer her to [facility] for a possible ERCP, EUS. If she needs her gallbladder out, they will let us know.   poke to  as she is still in rehab. he is happy to move forward the GB work up with [facility].

## 2025-05-02 NOTE — PROGRESS NOTES
Physical Therapy Daily Note  Office: [address] [facility] [address], [place] [address]  P: [phone]  F: [phone]    Patient: Miryam Fernández   : 1955  Diagnosis/ICD-10 Code:  Bilateral leg weakness [R29.898]  Referring practitioner: Gunnar Moon MD  Today's Date: 2025  Patient seen for 7 sessions      ICD-10-CM ICD-9-CM   1. Bilateral leg weakness  R29.898 729.89   2. Small cell carcinoma of lung, unspecified laterality, unspecified part of lung  C34.90 162.9   3. Decreased mobility and endurance  Z74.09 780.99                                                                                                                                                                                                                                                                                                                                   VISIT#:  sessions (PN due: 2025/Recert due 2025 )     Precautions/Restrictions: Increased fall risk     Subjective  Miryam Fernández reports she is doing well today and has been pretty good lately.       Objective  Amb with min-mod unsteadiness with rollator walker   Vitals: 98% O2 and 88 bpm after nustep     See Exercise, Manual, and Modality Logs for complete treatment.     Home Exercises:  [id number]     Assessment/Plan   Pt demonstrates much progress with HL/supine and seated exercises. More difficulty with standing exercises due to decreased balance. However, did not require seated rest break during standing exercises. Did have difficulty with directions and required more consistent cuing for proper technique.       Progress per Plan of Care and Progress strengthening /stabilization /functional activity            Timed:         Manual Therapy:         mins  94695;     Therapeutic Exercise:    15     mins  41600;     Neuromuscular Eleanor:    15    mins  70013;    Therapeutic Activity:    8      mins  20438;     Gait Training:           mins  08010;      Ultrasound:          mins  03501;    Ionto                                   mins   64262  Self Care                            mins   36366    Un-Timed:  Electrical Stimulation:         mins  00677 ( );  Traction          mins 42678    Timed Treatment:   38   mins   Total Treatment:     38   mins       Zonia Adamson, PT, DPT, OCS     [place] License # [id number]

## 2025-05-05 RX ORDER — MUPIROCIN 20 MG/G
OINTMENT TOPICAL
Qty: 22 G | OUTPATIENT
Start: 2025-05-05

## 2025-05-13 ENCOUNTER — TELEPHONE (OUTPATIENT)
Dept: PHYSICAL THERAPY | Facility: CLINIC | Age: 70
End: 2025-05-13

## 2025-05-13 NOTE — TELEPHONE ENCOUNTER
PT NO SHOW. CALLED FOR TRACKING SPOKE TO DTR TO LET HER KNOW WE HAVE TO FOLLOW HOSP ATTENDANCE POLICY.SHE UNDERSTOOD.

## 2025-05-14 ENCOUNTER — OFFICE VISIT (OUTPATIENT)
Dept: PULMONOLOGY | Facility: HOSPITAL | Age: 70
End: 2025-05-14
Payer: MEDICARE

## 2025-05-14 VITALS
WEIGHT: 144 LBS | HEIGHT: 63 IN | SYSTOLIC BLOOD PRESSURE: 144 MMHG | BODY MASS INDEX: 25.52 KG/M2 | HEART RATE: 86 BPM | OXYGEN SATURATION: 98 % | DIASTOLIC BLOOD PRESSURE: 77 MMHG

## 2025-05-14 DIAGNOSIS — J44.9 CHRONIC OBSTRUCTIVE PULMONARY DISEASE, UNSPECIFIED COPD TYPE: Primary | ICD-10-CM

## 2025-05-14 PROCEDURE — G0463 HOSPITAL OUTPT CLINIC VISIT: HCPCS

## 2025-05-14 NOTE — PROGRESS NOTES
PULMONARY/ CRITICAL CARE/ SLEEP MEDICINE OUTPATIENT CONSULT/ FOLLOW UP NOTE        Patient Name:  Miryam Fernández    :  1955    Medical Record:  [id number]    PRIMARY CARE PHYSICIAN     Lashell De La Torre APRN    REASON FOR CONSULTATION    Miryam Fernández is a 70 y.o. female who is referred for consultation for COPD  REVIEW OF SYSTEMS    Constitutional:  Denies fever or chills   Eyes:  Denies change in visual acuity   HENT:  Denies nasal congestion or sore throat   Respiratory:  Denies cough or shortness of breath   Cardiovascular:  Denies chest pain or edema   GI:  Denies abdominal pain, nausea, vomiting, bloody stools or diarrhea   :  Denies dysuria   Musculoskeletal:  Denies back pain or joint pain   Integument:  Denies rash   Neurologic:  Denies headache, focal weakness or sensory changes   Endocrine:  Denies polyuria or polydipsia   Lymphatic:  Denies swollen glands   Psychiatric:  Denies depression or anxiety     MEDICAL HISTORY    Past Medical History:   Diagnosis Date    Allergic     Sulfer drugs    Anxiety     Cancer     lung cancer    Carotid stenosis     Cataract     Colon polyp     COPD (chronic obstructive pulmonary disease)     Coronary artery disease     Deep vein thrombosis     Depression     Diverticulitis of colon     Emphysema of lung     Esophageal stricture     Dr Domingo    Hyperlipidemia     Hypertension 2000    Metabolic encephalopathy 2024    2nd to UTI    PONV (postoperative nausea and vomiting)     Pulmonary embolism     seen at [facility], fluid around her heart at the time-right lung    Stroke     UTI (urinary tract infection) 2024        SURGICAL HISTORY    Past Surgical History:   Procedure Laterality Date    ANGIOPLASTY / STENTING FEMORAL      CARDIAC CATHETERIZATION      CAROTID STENT      CORONARY STENT PLACEMENT      ESOPHAGOSCOPY / EGD      EYE SURGERY      HYSTERECTOMY      TOTAL HIP ARTHROPLASTY Left 2024     Procedure: TOTAL HIP ARTHROPLASTY;  Surgeon: Olu Joshua II, MD;  Location: [facility] MAIN OR;  Service: Orthopedics;  Laterality: Left;        FAMILY HISTORY    Family History   Problem Relation Age of Onset    Heart disease Mother     Hypertension Mother     Arthritis Mother     Dementia Father     Hyperlipidemia Father     Lung cancer Father     COPD Father     Heart disease Father     Diabetes Paternal Uncle     Leukemia Paternal Grandmother     Arthritis Daughter     Depression Daughter     Mental illness Daughter     Cancer Sister     Drug abuse Son     Arthritis Daughter     Depression Daughter     Mental illness Daughter     Cancer Sister     Drug abuse Son        SOCIAL HISTORY    Social History     Tobacco Use    Smoking status: Every Day     Current packs/day: 1.00     Average packs/day: 2.0 packs/day for 40.7 years (80.7 ttl pk-yrs)     Types: Cigarettes     Start date: 09/1984     Passive exposure: Current    Smokeless tobacco: Never   Substance Use Topics    Alcohol use: Not Currently        ALLERGIES    Allergies   Allergen Reactions    Albuterol Shortness Of Breath     Pt states she has sensitivity to albuterol.  She states it causes her to be SOA.    Hydrocodone Palpitations    Sulfa Antibiotics Swelling    Bacitracin Other (See Comments)    Benzalkonium Chloride Hives    Codeine Other (See Comments)    Neomycin Other (See Comments)    Nickel Rash    Penicillins Rash     Beta lactam allergy details  Antibiotic reaction: unknown  Age at reaction: adult  Dose to reaction time: unknown  Reason for antibiotic: unknown  Epinephrine required for reaction?: no  Tolerated antibiotics: unknown        Polymyxin B Other (See Comments)         MEDICATIONS    Current Outpatient Medications on File Prior to Visit   Medication Sig Dispense Refill    aspirin 81 MG EC tablet Take 1 tablet by mouth Daily. 30 tablet 2    atorvastatin (LIPITOR) 40 MG tablet TAKE 1 TABLET BY MOUTH EVERY DAY 90 tablet 1     "benzonatate (TESSALON) 100 MG capsule Take 1 capsule by mouth 3 (Three) Times a Day As Needed for Cough.      Cholecalciferol 50 MCG (2000 UT) tablet Take 1 tablet by mouth Daily. Indications: Vitamin D Deficiency      levothyroxine (SYNTHROID, LEVOTHROID) 25 MCG tablet Take 1 tablet by mouth Every Morning. Indications: Underactive Thyroid 90 tablet 0    meclizine (ANTIVERT) 25 MG tablet Take 1 tablet by mouth 3 (Three) Times a Day As Needed for Dizziness. Indications: Dizzy 30 tablet 0    memantine (NAMENDA) 5 MG tablet Take 1 tablet by mouth 2 (Two) Times a Day. 60 tablet 2    Metoprolol Succinate 25 MG capsule extended-release 24 hour sprinkle Take 25 mg by mouth Daily. Indications: tachycardia      montelukast (Singulair) 10 MG tablet Take 1 tablet by mouth Every Night. 30 tablet 1    oxyCODONE-acetaminophen (PERCOCET) 5-325 MG per tablet Take 1 tablet by mouth Every 8 (Eight) Hours. Indications: Pain      pantoprazole (PROTONIX) 40 MG EC tablet Take 1 tablet by mouth Daily. Indications: Gastroesophageal Reflux Disease 90 tablet 1    ticagrelor (Brilinta) 60 MG tablet tablet Take 1 tablet by mouth 2 (Two) Times a Day. 180 tablet 3    vitamin B-12 (CYANOCOBALAMIN) 1000 MCG tablet TAKE 1 TABLET BY MOUTH DAILY. INDICATIONS: INADEQUATE VITAMIN B12 90 tablet 1     No current facility-administered medications on file prior to visit.       PHYSICAL EXAM    Vitals:    05/14/25 0900   BP: 144/77   BP Location: Left arm   Patient Position: Sitting   Pulse: 86   SpO2: 98%   Weight: 65.3 kg (144 lb)   Height: 160 cm (63\")        Constitutional:  Well developed, well nourished, no acute distress, non-toxic appearance   Eyes:  PERRL, conjunctiva normal   HENT:  Atraumatic, external ears normal, nose normal, oropharynx moist, no pharyngeal exudates. mallampatti   Neck- normal range of motion, no tenderness, supple   Respiratory:  No respiratory distress, normal breath sounds, no rales, no wheezing   Cardiovascular:  Normal " rate, normal rhythm, no murmurs, no gallops, no rubs   GI:  Soft, nondistended, normal bowel sounds, nontender, no organomegaly, no mass, no rebound, no guarding   :  No costovertebral angle tenderness   Musculoskeletal:  No edema, no tenderness, no deformities. Back- no tenderness  Integument:  Well hydrated, no rash   Lymphatic:  No lymphadenopathy noted   Neurologic:  Alert & oriented x 3, CN 2-12 normal, normal motor function, normal sensory function, no focal deficits noted   Psychiatric:  Speech and behavior appropriate     MRI Abdomen With & Without Contrast  Result Date: 5/1/2025  Impression: 1. Benign focal adenomyomatosis in the gallbladder fundus. 2. Mild prominence of the biliary tree with layering T1 hyperintense signal in the downstream common bile duct suspicious for debris versus small stones. Consider ERCP based on clinical factors. 3. 1.5 cm cystic lesion of the pancreas favoring a sidebranch IPMN without worrisome features. Recommend 1 year follow-up MRI/MRCP to ensure stability. 4. Complete pancreatic divisum. 5. Similar pericardial effusion compared to 3/3/2025 CT. 6. Other ancillary findings as above.. Electronically Signed: Chris Ford MD  5/1/2025 12:22 PM EDT  Workstation ID: [id number]    US Abdomen Limited  Result Date: 3/23/2025  Impression: 1.No sonographic abnormality in the gallbladder. In view of the recent CT findings further evaluation with an MRI of the abdomen with contrast could be considered 2.No biliary ductal dilatation. 3.No sonographic abnormalities in the right upper quadrant Electronically Signed: Mango Moore MD  3/23/2025 2:48 PM EDT  Workstation ID: [id number]    CT Chest With Contrast Diagnostic  Result Date: 3/6/2025  Impression: CT CHEST: 1.Stable low-density soft tissue within the left suprahilar region, left hilum, and subcarinal station. This likely relates to posttreatment changes. 2.No new or enlarging area of abnormal soft tissue within the mediastinum or  hilum. 3.Multiple stable small pulmonary nodules. 4.Subacute healing right anterior fourth, fifth, and sixth rib fractures. CT ABDOMEN AND PELVIS: 1.No evidence of metastatic disease within the abdomen or pelvis. 2.Rounded area of thickened enhancement at the fundus of the gallbladder measuring 10 mm x 5 mm. This could represent an abnormal gallbladder polyp, mass, or focal adenomyomatosis. This appears new from prior examination. Recommend further evaluation with right upper quadrant ultrasound and possible MRI.. Electronically Signed: Chay Ortega  3/6/2025 10:54 AM EST  Workstation ID: [id number]    CT Abdomen Pelvis With Contrast  Result Date: 3/6/2025  Impression: CT CHEST: 1.Stable low-density soft tissue within the left suprahilar region, left hilum, and subcarinal station. This likely relates to posttreatment changes. 2.No new or enlarging area of abnormal soft tissue within the mediastinum or hilum. 3.Multiple stable small pulmonary nodules. 4.Subacute healing right anterior fourth, fifth, and sixth rib fractures. CT ABDOMEN AND PELVIS: 1.No evidence of metastatic disease within the abdomen or pelvis. 2.Rounded area of thickened enhancement at the fundus of the gallbladder measuring 10 mm x 5 mm. This could represent an abnormal gallbladder polyp, mass, or focal adenomyomatosis. This appears new from prior examination. Recommend further evaluation with right upper quadrant ultrasound and possible MRI.. Electronically Signed: Chay Ortega  3/6/2025 10:54 AM EST  Workstation ID: [id number]    MRI Brain With & Without Contrast  Result Date: 3/5/2025  Impression: New signal abnormality present within the right caudate, demonstrating imaging features favoring subacute infarct over metastasis as above. Consider short interval follow-up to ensure expected resolution. Otherwise no evidence of intracranial metastatic disease. Electronically Signed: Jeison Soriano MD  3/5/2025 10:24 AM EST  Workstation ID:  [id number]     Results for orders placed during the hospital encounter of 07/19/24    Adult Transthoracic Echo Complete W/ Cont if Necessary Per Protocol    Interpretation Summary    Left ventricular systolic function is normal. Calculated left ventricular EF = 61.7% Left ventricular ejection fraction appears to be 61 - 65%.    Left ventricular diastolic function is consistent with (grade I) impaired relaxation.    Saline test results are negative for right to left atrial level shunt.    Estimated right ventricular systolic pressure from tricuspid regurgitation is normal (<35 mmHg).      ASSESSMENT & PLAN:      50-pack-year history of smoking  COPD     Pulmonary function test 1/12/2024.     Spirometry  FVC 2.3 L which is 77%, improved to 85% postbronchodilator which is 10% change  FEV1 1.64 L which is 71%, improved to 77% postbronchodilator  FEV1/FVC ratio 71.     Lung volumes  Residual volume 142%  Total lung capacity 109%.     Diffusion capacity 48%.         History of lung cancer  Recent fall and femur fracture August 2024     hyperlipidemia, hypertension,     Plan  Had long discussion with patient about smoking cessation  Start Trelegy inhaler  CT scan surveillance per oncology  Last was March 2025 no new findings, chronic left hilar scarring            This document has been electronically signed by  Jose Manuel Payne MD  10:00 EDT

## 2025-05-22 RX ORDER — MECLIZINE HYDROCHLORIDE 25 MG/1
25 TABLET ORAL 3 TIMES DAILY PRN
Qty: 30 TABLET | Refills: 0 | Status: SHIPPED | OUTPATIENT
Start: 2025-05-22

## 2025-05-22 RX ORDER — MUPIROCIN 20 MG/G
OINTMENT TOPICAL
Qty: 22 G | Refills: 0 | Status: SHIPPED | OUTPATIENT
Start: 2025-05-22

## 2025-06-09 RX ORDER — MUPIROCIN 20 MG/G
OINTMENT TOPICAL
Qty: 22 G | Refills: 0 | Status: SHIPPED | OUTPATIENT
Start: 2025-06-09

## 2025-06-12 ENCOUNTER — HOSPITAL ENCOUNTER (OUTPATIENT)
Dept: CT IMAGING | Facility: HOSPITAL | Age: 70
Discharge: HOME | End: 2025-06-12
Admitting: STUDENT IN AN ORGANIZED HEALTH CARE EDUCATION/TRAINING PROGRAM
Payer: MEDICARE

## 2025-06-12 DIAGNOSIS — C34.90 SMALL CELL CARCINOMA OF LUNG, UNSPECIFIED LATERALITY, UNSPECIFIED PART OF LUNG: ICD-10-CM

## 2025-06-12 LAB
CREAT BLDA-MCNC: 1.1 MG/DL (ref 0.6–1.3)
EGFRCR SERPLBLD CKD-EPI 2021: 54.8 ML/MIN/1.73

## 2025-06-12 PROCEDURE — 71260 CT THORAX DX C+: CPT

## 2025-06-12 PROCEDURE — 25510000001 IOPAMIDOL PER 1 ML: Performed by: STUDENT IN AN ORGANIZED HEALTH CARE EDUCATION/TRAINING PROGRAM

## 2025-06-12 PROCEDURE — 82565 ASSAY OF CREATININE: CPT

## 2025-06-12 PROCEDURE — 74177 CT ABD & PELVIS W/CONTRAST: CPT

## 2025-06-12 RX ORDER — IOPAMIDOL 755 MG/ML
100 INJECTION, SOLUTION INTRAVASCULAR
Status: COMPLETED | OUTPATIENT
Start: 2025-06-12 | End: 2025-06-12

## 2025-06-12 RX ADMIN — IOPAMIDOL 100 ML: 755 INJECTION, SOLUTION INTRAVENOUS at 11:48

## 2025-06-18 DIAGNOSIS — R41.3 MEMORY LOSS: ICD-10-CM

## 2025-06-18 RX ORDER — MEMANTINE HYDROCHLORIDE 5 MG/1
5 TABLET ORAL 2 TIMES DAILY
Qty: 180 TABLET | Refills: 0 | Status: SHIPPED | OUTPATIENT
Start: 2025-06-18

## 2025-06-19 ENCOUNTER — ON CAMPUS - OUTPATIENT (AMBULATORY)
Dept: URBAN - METROPOLITAN AREA HOSPITAL 77 | Facility: HOSPITAL | Age: 70
End: 2025-06-19
Payer: COMMERCIAL

## 2025-06-19 DIAGNOSIS — R13.10 DYSPHAGIA, UNSPECIFIED: ICD-10-CM

## 2025-06-19 DIAGNOSIS — R93.2 ABNORMAL FINDINGS ON DIAGNOSTIC IMAGING OF LIVER AND BILIARY: ICD-10-CM

## 2025-06-19 PROCEDURE — 43450 DILATE ESOPHAGUS 1/MULT PASS: CPT | Performed by: INTERNAL MEDICINE

## 2025-06-19 PROCEDURE — 43259 EGD US EXAM DUODENUM/JEJUNUM: CPT | Performed by: INTERNAL MEDICINE

## 2025-06-21 ENCOUNTER — APPOINTMENT (OUTPATIENT)
Dept: GENERAL RADIOLOGY | Facility: HOSPITAL | Age: 70
End: 2025-06-21
Payer: MEDICARE

## 2025-06-21 ENCOUNTER — HOSPITAL ENCOUNTER (EMERGENCY)
Facility: HOSPITAL | Age: 70
Discharge: HOME | End: 2025-06-21
Attending: EMERGENCY MEDICINE
Payer: MEDICARE

## 2025-06-21 VITALS
TEMPERATURE: 98 F | BODY MASS INDEX: 25.31 KG/M2 | WEIGHT: 142.86 LBS | HEART RATE: 70 BPM | DIASTOLIC BLOOD PRESSURE: 56 MMHG | RESPIRATION RATE: 18 BRPM | OXYGEN SATURATION: 97 % | SYSTOLIC BLOOD PRESSURE: 136 MMHG | HEIGHT: 63 IN

## 2025-06-21 DIAGNOSIS — S51.812A SKIN TEAR OF LEFT FOREARM WITHOUT COMPLICATION, INITIAL ENCOUNTER: Primary | ICD-10-CM

## 2025-06-21 DIAGNOSIS — S50.02XA CONTUSION OF LEFT ELBOW, INITIAL ENCOUNTER: ICD-10-CM
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
## 2025-06-21 PROCEDURE — 73080 X-RAY EXAM OF ELBOW: CPT

## 2025-06-21 PROCEDURE — 99283 EMERGENCY DEPT VISIT LOW MDM: CPT

## 2025-06-21 NOTE — ED PROVIDER NOTES
Subjective   History of Present Illness  Chief complaint: Fall    70-year-old female presents after a fall.  Patient apparently tripped over a dog toy last night and fell onto her left arm.  She denies hitting her head or any loss of consciousness.  She is having pain and swelling to the left elbow area as well as an abrasion to the left forearm near the elbow.  She denies any other injuries.  She states she has been able to ambulate since the fall.    History provided by:  Patient      Review of Systems   Constitutional:  Negative for fever.   HENT:  Negative for congestion.    Respiratory:  Negative for cough and shortness of breath.    Cardiovascular:  Negative for chest pain.   Musculoskeletal:         Left arm injury   Neurological:  Negative for headaches.       Past Medical History:   Diagnosis Date    Allergic     Sulfer drugs    Anxiety 2000    Cancer     lung cancer    Carotid stenosis     Cataract 2022    Colon polyp 2020    COPD (chronic obstructive pulmonary disease)     Coronary artery disease     Deep vein thrombosis 2022    Depression 2020    Diverticulitis of colon 2024    Emphysema of lung 2022    Esophageal stricture     Dr Domingo    Hyperlipidemia     Hypertension 2000    Metabolic encephalopathy 06/28/2024    2nd to UTI    PONV (postoperative nausea and vomiting) 2020    Pulmonary embolism     seen at [facility], fluid around her heart at the time-right lung    Stroke 2024    UTI (urinary tract infection) 06/28/2024       Allergies   Allergen Reactions    Albuterol Shortness Of Breath     Pt states she has sensitivity to albuterol.  She states it causes her to be SOA.    Hydrocodone Palpitations    Sulfa Antibiotics Swelling    Bacitracin Other (See Comments)    Benzalkonium Chloride Hives    Codeine Other (See Comments)    Neomycin Other (See Comments)    Nickel Rash    Penicillins Rash     Beta lactam allergy details  Antibiotic reaction: unknown  Age at reaction: adult  Dose to reaction time:  "unknown  Reason for antibiotic: unknown  Epinephrine required for reaction?: no  Tolerated antibiotics: unknown        Polymyxin B Other (See Comments)       Past Surgical History:   Procedure Laterality Date    ANGIOPLASTY / STENTING FEMORAL      CARDIAC CATHETERIZATION      CAROTID STENT      CORONARY STENT PLACEMENT      ESOPHAGOSCOPY / EGD      EYE SURGERY  2023    HYSTERECTOMY      TOTAL HIP ARTHROPLASTY Left 08/01/2024    Procedure: TOTAL HIP ARTHROPLASTY;  Surgeon: Olu Joshua II, MD;  Location[facility] MAIN OR;  Service: Orthopedics;  Laterality: Left;       Family History   Problem Relation Age of Onset    Heart disease Mother     Hypertension Mother     Arthritis Mother     Dementia Father     Hyperlipidemia Father     Lung cancer Father     COPD Father     Heart disease Father     Diabetes Paternal Uncle     Leukemia Paternal Grandmother     Arthritis Daughter     Depression Daughter     Mental illness Daughter     Cancer Sister     Drug abuse Son     Arthritis Daughter     Depression Daughter     Mental illness Daughter     Cancer Sister     Drug abuse Son        Social History     Socioeconomic History    Marital status:    Tobacco Use    Smoking status: Every Day     Current packs/day: 1.00     Average packs/day: 2.0 packs/day for 40.8 years (80.8 ttl pk-yrs)     Types: Cigarettes     Start date: 09/1984     Passive exposure: Current    Smokeless tobacco: Never   Vaping Use    Vaping status: Never Used   Substance and Sexual Activity    Alcohol use: Not Currently    Drug use: Never    Sexual activity: Defer       /56   Pulse 70   Temp 97.5 °F (36.4 °C) (Oral)   Resp 17   Ht 160 cm (63\")   Wt 64.8 kg (142 lb 13.7 oz)   SpO2 97%   BMI 25.31 kg/m²       Objective   Physical Exam  Vitals and nursing note reviewed.   Constitutional:       Appearance: Normal appearance.   HENT:      Head: Normocephalic and atraumatic.      Mouth/Throat:      Mouth: Mucous membranes are moist. "   Cardiovascular:      Rate and Rhythm: Normal rate and regular rhythm.   Pulmonary:      Effort: Pulmonary effort is normal. No respiratory distress.   Musculoskeletal:      Comments: There is a skin tear to the left forearm just distal to the elbow.  There is soft tissue swelling and ecchymosis surrounding the left elbow with no obvious deformity.  Neurovascular intact distally.  Extremities otherwise unremarkable.   Skin:     General: Skin is warm and dry.   Neurological:      Mental Status: She is alert and oriented to person, place, and time.         Procedures           ED Course      XR Elbow 3+ View Left  Result Date: 6/21/2025  Impression: No evidence of fracture or malalignment. Electronically Signed: Phoenix Zaman MD  6/21/2025 2:09 PM EDT  Workstation ID: [id number]                                                     Medical Decision Making  Amount and/or Complexity of Data Reviewed  Radiology: ordered.      I interpretation of left elbow x-ray shows no fracture or dislocation.  Patient's skin tear was cleaned and dressed.  She states her tetanus shot is up-to-date.  She is stable for discharge.      Final diagnoses:   Skin tear of left forearm without complication, initial encounter   Contusion of left elbow, initial encounter   Fall, initial encounter       ED Disposition  ED Disposition       ED Disposition   Discharge    Condition   Stable    Comment   --               Lashell De La Torre, KENNY  [address]  [place] [facility]  [phone]    Call in 2 days           Medication List      No changes were made to your prescriptions during this visit.            Amor Anaya MD  06/21/25 [id number]

## 2025-07-11 ENCOUNTER — OFFICE VISIT (OUTPATIENT)
Dept: FAMILY MEDICINE CLINIC | Facility: CLINIC | Age: 70
End: 2025-07-11
Payer: MEDICARE

## 2025-07-11 VITALS
HEIGHT: 63 IN | WEIGHT: 138 LBS | DIASTOLIC BLOOD PRESSURE: 70 MMHG | BODY MASS INDEX: 24.45 KG/M2 | OXYGEN SATURATION: 95 % | HEART RATE: 97 BPM | SYSTOLIC BLOOD PRESSURE: 118 MMHG

## 2025-07-11 DIAGNOSIS — I25.10 CORONARY ARTERY DISEASE DUE TO LIPID RICH PLAQUE: ICD-10-CM

## 2025-07-11 DIAGNOSIS — I71.40 ABDOMINAL AORTIC ANEURYSM (AAA) WITHOUT RUPTURE, UNSPECIFIED PART: ICD-10-CM

## 2025-07-11 DIAGNOSIS — I10 PRIMARY HYPERTENSION: ICD-10-CM

## 2025-07-11 DIAGNOSIS — J44.9 CHRONIC OBSTRUCTIVE PULMONARY DISEASE, UNSPECIFIED COPD TYPE: ICD-10-CM

## 2025-07-11 DIAGNOSIS — C34.90 SMALL CELL CARCINOMA OF LUNG, UNSPECIFIED LATERALITY, UNSPECIFIED PART OF LUNG: ICD-10-CM

## 2025-07-11 DIAGNOSIS — Z12.31 SCREENING MAMMOGRAM FOR BREAST CANCER: ICD-10-CM

## 2025-07-11 DIAGNOSIS — E55.9 VITAMIN D DEFICIENCY: ICD-10-CM

## 2025-07-11 DIAGNOSIS — E78.2 MIXED HYPERLIPIDEMIA: ICD-10-CM

## 2025-07-11 DIAGNOSIS — R41.3 MEMORY LOSS: ICD-10-CM

## 2025-07-11 DIAGNOSIS — H81.09 MENIERE'S DISEASE, UNSPECIFIED LATERALITY: Primary | ICD-10-CM

## 2025-07-11 DIAGNOSIS — I65.23 BILATERAL CAROTID ARTERY STENOSIS: ICD-10-CM

## 2025-07-11 DIAGNOSIS — M51.360 DEGENERATION OF INTERVERTEBRAL DISC OF LUMBAR REGION WITH DISCOGENIC BACK PAIN: ICD-10-CM

## 2025-07-11 DIAGNOSIS — Z00.00 ENCOUNTER FOR SUBSEQUENT ANNUAL WELLNESS VISIT (AWV) IN MEDICARE PATIENT: ICD-10-CM

## 2025-07-11 DIAGNOSIS — I25.83 CORONARY ARTERY DISEASE DUE TO LIPID RICH PLAQUE: ICD-10-CM

## 2025-07-11 LAB
DEPRECATED RDW RBC AUTO: 48.1 FL (ref 37–54)
ERYTHROCYTE [DISTWIDTH] IN BLOOD BY AUTOMATED COUNT: 13.9 % (ref 12.3–15.4)
HBA1C MFR BLD: 5.3 % (ref 4.8–5.6)
HCT VFR BLD AUTO: 40.3 % (ref 34–46.6)
HGB BLD-MCNC: 13.3 G/DL (ref 12–15.9)
MCH RBC QN AUTO: 31.4 PG (ref 26.6–33)
MCHC RBC AUTO-ENTMCNC: 33 G/DL (ref 31.5–35.7)
MCV RBC AUTO: 95 FL (ref 79–97)
PLATELET # BLD AUTO: 269 10*3/MM3 (ref 140–450)
PMV BLD AUTO: 10.8 FL (ref 6–12)
RBC # BLD AUTO: 4.24 10*6/MM3 (ref 3.77–5.28)
WBC NRBC COR # BLD AUTO: 5.45 10*3/MM3 (ref 3.4–10.8)

## 2025-07-11 PROCEDURE — 80061 LIPID PANEL: CPT | Performed by: NURSE PRACTITIONER

## 2025-07-11 PROCEDURE — 83036 HEMOGLOBIN GLYCOSYLATED A1C: CPT | Performed by: NURSE PRACTITIONER

## 2025-07-11 PROCEDURE — 84443 ASSAY THYROID STIM HORMONE: CPT | Performed by: NURSE PRACTITIONER

## 2025-07-11 PROCEDURE — 36415 COLL VENOUS BLD VENIPUNCTURE: CPT | Performed by: NURSE PRACTITIONER

## 2025-07-11 PROCEDURE — 82306 VITAMIN D 25 HYDROXY: CPT | Performed by: NURSE PRACTITIONER

## 2025-07-11 PROCEDURE — 80053 COMPREHEN METABOLIC PANEL: CPT | Performed by: NURSE PRACTITIONER

## 2025-07-11 PROCEDURE — 85027 COMPLETE CBC AUTOMATED: CPT | Performed by: NURSE PRACTITIONER

## 2025-07-11 RX ORDER — CHOLECALCIFEROL (VITAMIN D3) 50 MCG
50 TABLET ORAL DAILY
Qty: 30 EACH | Refills: 11 | Status: SHIPPED | OUTPATIENT
Start: 2025-07-11

## 2025-07-11 RX ORDER — UBIDECARENONE 75 MG
100 CAPSULE ORAL DAILY
Qty: 90 TABLET | Refills: 3 | Status: SHIPPED | OUTPATIENT
Start: 2025-07-11

## 2025-07-11 NOTE — PROGRESS NOTES
Subjective   The ABCs of the Annual Wellness Visit  Medicare Wellness Visit      Miryam Fernández is a 70 y.o. patient who presents for a Medicare Wellness Visit.    The following portions of the patient's history were reviewed and   updated as appropriate: allergies, current medications, past family history, past medical history, past social history, past surgical history, and problem list.    Compared to one year ago, the patient's physical   health is worse.  Compared to one year ago, the patient's mental   health is worse.    Recent Hospitalizations:  This patient has had a [facility] admission record on file within the last 365 days.  Current Medical Providers:  Patient Care Team:  Lashell De La Torre APRN as PCP - General (Nurse Practitioner)  Juan Luis Mattson MD as Consulting Physician (Cardiothoracic Surgery)  Shola Hatch MD as Consulting Physician (Urology)  Gunnar Moon MD as Consulting Physician (Hematology and Oncology)  Garry Grace MD as Surgeon (General Surgery)    Outpatient Medications Prior to Visit   Medication Sig Dispense Refill    aspirin 81 MG EC tablet Take 1 tablet by mouth Daily. 30 tablet 2    atorvastatin (LIPITOR) 40 MG tablet TAKE 1 TABLET BY MOUTH EVERY DAY 90 tablet 1    benzonatate (TESSALON) 100 MG capsule Take 1 capsule by mouth 3 (Three) Times a Day As Needed for Cough.      levothyroxine (SYNTHROID, LEVOTHROID) 25 MCG tablet Take 1 tablet by mouth Every Morning. Indications: Underactive Thyroid 90 tablet 0    meclizine (ANTIVERT) 25 MG tablet TAKE 1 TABLET BY MOUTH 3 (THREE) TIMES A DAY AS NEEDED FOR DIZZINESS. INDICATIONS: DIZZY 30 tablet 0    memantine (NAMENDA) 5 MG tablet TAKE 1 TABLET BY MOUTH TWICE A  tablet 0    Metoprolol Succinate 25 MG capsule extended-release 24 hour sprinkle Take 25 mg by mouth Daily. Indications: tachycardia      montelukast (Singulair) 10 MG tablet Take 1 tablet by mouth Every Night. 30 tablet 1    mupirocin  (BACTROBAN) 2 % ointment APPLY 1 APPLICATION TOPICALLY TO THE APPROPRIATE AREA AS DIRECTED 3 TIMES A DAY. 22 g 0    oxyCODONE-acetaminophen (PERCOCET) 5-325 MG per tablet Take 1 tablet by mouth Every 8 (Eight) Hours. Indications: Pain      pantoprazole (PROTONIX) 40 MG EC tablet Take 1 tablet by mouth Daily. Indications: Gastroesophageal Reflux Disease 90 tablet 1    ticagrelor (Brilinta) 60 MG tablet tablet Take 1 tablet by mouth 2 (Two) Times a Day. 180 tablet 3    vitamin B-12 (CYANOCOBALAMIN) 1000 MCG tablet TAKE 1 TABLET BY MOUTH DAILY. INDICATIONS: INADEQUATE VITAMIN B12 90 tablet 1    Cholecalciferol 50 MCG (2000 UT) tablet Take 1 tablet by mouth Daily. Indications: Vitamin D Deficiency       No facility-administered medications prior to visit.     Opioid medication/s are on active medication list.  and I have evaluated her active treatment plan and pain score trends (see table).  Vitals:    07/11/25 1302   PainSc: 0-No pain     I have reviewed the chart for potential of high risk medication and harmful drug interactions in the elderly.        Aspirin is on active medication list. Aspirin use is indicated based on review of current medical condition/s. Pros and cons of this therapy have been discussed today. Benefits of this medication outweigh potential harm.  Patient has been encouraged to continue taking this medication.  .      Patient Active Problem List   Diagnosis    Obstructive sleep apnea, adult    Tobacco use    Normocytic anemia    Meniere's disease    Lung nodule    Hilar lymphadenopathy    Hyperlipidemia    Heartburn    Gastroesophageal reflux disease    Dysphagia    Chronic obstructive pulmonary disease    CAD (coronary artery disease)    Bruises easily    Chronic pain    Small cell lung cancer    Carotid stenosis    Coronary artery disease    Primary hypertension    Long term (current) use of opiate analgesic    DDD (degenerative disc disease), lumbar    Screening mammogram for breast cancer     "Preventative health care    Vitamin D deficiency    Need for influenza vaccination    Moderate episode of recurrent major depressive disorder    Need for vaccination against Streptococcus pneumoniae    Frequent falls    Weakness    Nasal congestion    Memory loss    Localized swelling of both lower legs    Meniere disease, bilateral    Abdominal aortic aneurysm (AAA) without rupture    Thoracic aortic aneurysm without rupture    Plantar wart    Screening for osteoporosis    Asymptomatic menopause    Screening for malignant neoplasm of colon    Skin tear of left forearm without complication    Encounter for subsequent annual wellness visit (AWV) in Medicare patient    Acute cystitis    Altered mental status    Left displaced femoral neck fracture    Closed fracture of left hip    Moderate malnutrition    PAD (peripheral artery disease)    S/P insertion of iliac artery stent    Celiac artery stenosis    Renal artery stenosis    UTI (urinary tract infection)    Hypothyroidism    Cerebrovascular disease in cancer patient     Advance Care Planning Advance Directive is not on file.  ACP discussion was held with the patient during this visit. Patient does not have an advance directive, information provided.            Objective   Vitals:    07/11/25 1302   BP: 118/70   BP Location: Left arm   Patient Position: Sitting   Cuff Size: Adult   Pulse: 97   SpO2: 95%   Weight: 62.6 kg (138 lb)   Height: 160 cm (63\")   PainSc: 0-No pain       Estimated body mass index is 24.45 kg/m² as calculated from the following:    Height as of this encounter: 160 cm (63\").    Weight as of this encounter: 62.6 kg (138 lb).    BMI is within normal parameters. No other follow-up for BMI required.         Gait and Balance Evaluation:  Walker      Does the patient have evidence of cognitive impairment? Yes    ATTENTION  What is the year: incorrect  What is the month of the year: correct  What is the day of the week?: incorrect  What is the date?: " correct  MEMORY  Repeat address three times, only score third attempt: [address], [place]: 4  HOW MANY ANIMALS DID THE PATIENT NAME  Verbal Fluency -- Animal Names (0-25): 11-13  CLOCK DRAWING  Clock Drawing: All Correct  MEMORY RECALL  Tell me what you remember about that name and address we were repeating at the beginnin  ACE TOTAL SCORE  Total ACE Score - <25/30 strongly suggests cognitive impairment; <21/30 almost certainly shows dementia: 17                                                                                                  Health  Risk Assessment    Smoking Status:  Social History     Tobacco Use   Smoking Status Every Day    Current packs/day: 1.00    Average packs/day: 2.0 packs/day for 40.9 years (80.9 ttl pk-yrs)    Types: Cigarettes    Start date: 1984    Passive exposure: Current   Smokeless Tobacco Never     Alcohol Consumption:  Social History     Substance and Sexual Activity   Alcohol Use Not Currently       Fall Risk Screen  STEADI Fall Risk Assessment was completed, and patient is at HIGH risk for falls. Assessment completed on:2025    Depression Screening   Little interest or pleasure in doing things? Not at all   Feeling down, depressed, or hopeless? Not at all   PHQ-2 Total Score 0      Health Habits and Functional and Cognitive Screenin/11/2025    12:00 PM   Functional & Cognitive Status   Do you have difficulty preparing food and eating? Yes   Do you have difficulty bathing yourself, getting dressed or grooming yourself? No   Do you have difficulty using the toilet? No   Do you have difficulty moving around from place to place? No   Do you have trouble with steps or getting out of a bed or a chair? No   Current Diet Well Balanced Diet   Dental Exam Up to date   Eye Exam Up to date   Exercise (times per week) 7 times per week   Current Exercises Include Stationary Bicycling/Spin Class   Do you need help using the phone?  No   Are  you deaf or do you have serious difficulty hearing?  No   Do you need help to go to places out of walking distance? Yes   Do you need help shopping? Yes   Do you need help preparing meals?  Yes   Do you need help with housework?  Yes   Do you need help with laundry? Yes   Do you need help taking your medications? Yes   Do you need help managing money? No   Do you ever drive or ride in a car without wearing a seat belt? No   Have you felt unusual fatigue (could be tiredness), stress, anger or loneliness in the last month? No   Who do you live with? Spouse   If you need help, do you have trouble finding someone available to you? No   Have you been bothered in the last four weeks by sexual problems? No   Do you have difficulty concentrating, remembering or making decisions? Yes           Age-appropriate Screening Schedule:  Refer to the list below for future screening recommendations based on patient's age, sex and/or medical conditions. Orders for these recommended tests are listed in the plan section. The patient has been provided with a written plan.    Health Maintenance List  Health Maintenance   Topic Date Due    TDAP/TD VACCINES (1 - Tdap) Never done    ZOSTER VACCINE (1 of 2) Never done    COVID-19 Vaccine (6 - 2024-25 season) 09/01/2024    LIPID PANEL  07/22/2025    INFLUENZA VACCINE  10/01/2025    MAMMOGRAM  12/21/2025    DXA SCAN  07/03/2026    ANNUAL WELLNESS VISIT  07/11/2026    COLORECTAL CANCER SCREENING  05/04/2030    HEPATITIS C SCREENING  Completed    Pneumococcal Vaccine 50+  Completed    LUNG CANCER SCREENING  Discontinued                                                                                                                                                CMS Preventative Services Quick Reference  Risk Factors Identified During Encounter  Chronic Pain: Percocet PRN  Fall Risk-High or Moderate: Discussed Fall Prevention in the home  Immunizations Discussed/Encouraged: Tdap, Influenza, Prevnar  20 (Pneumococcal 20-valent conjugate), Shingrix, and RSV (Respiratory Syncytial Virus)  Dental Screening Recommended  Vision Screening Recommended    The above risks/problems have been discussed with the patient.  Pertinent information has been shared with the patient in the After Visit Summary.  An After Visit Summary and PPPS were made available to the patient.    Follow Up:   Next Medicare Wellness visit to be scheduled in 1 year.         Additional E&M Note during same encounter follows:  Patient has additional, significant, and separately identifiable condition(s)/problem(s) that require work above and beyond the Medicare Wellness Visit     Chief Complaint  Medicare Wellness-subsequent    Subjective   HPI  Miryam is also being seen today for additional medical problem/s.    Review of Systems   Constitutional:  Negative for fever.   Respiratory:  Negative for cough and shortness of breath.    Cardiovascular:  Negative for chest pain and leg swelling.   Genitourinary:  Negative for dysuria.   Musculoskeletal:  Positive for back pain.   Neurological:  Negative for dizziness.   Psychiatric/Behavioral:  The patient is not nervous/anxious.       Hypertension, prescribed metoprolol XL 25 mg daily but is not taking. HX CAD and PVD with prior endarterectomy with PCI.  She also takes Brilinta 60 mg twice daily per Cardiology, followed by Dr. Lorenzana. Patient is taking Lipitor 20 mg daily for hyperlipidemia.     Hx Ménière's disease, has intermittent dizziness - taking Meclizine PRN.      Patient is prescribed Percocet 5/325 mg PRN for chronic low back pain. She has LESI in 2019 without relief. MRI in 2019 showed mild degenerative changes at L4-5. Also hx spondylosis.       Cognitive impairment, prescribed Namenda 5 mg twice daily. MMSE 12/30. Patient last seen by Neurology in April.      AAA, followed by Vascular surgery.     Hx COPD, taking Singulair 10 mg nightly. She is also using albuterol PRN. Patient also has hx  "small cell lung CA, treated with chemo in 2020. Patient is followed by Dr. Payne.       Objective   Vital Signs:  /70 (BP Location: Left arm, Patient Position: Sitting, Cuff Size: Adult)   Pulse 97   Ht 160 cm (63\")   Wt 62.6 kg (138 lb)   SpO2 95%   BMI 24.45 kg/m²     Physical Exam  Constitutional:       Appearance: Normal appearance.   HENT:      Head: Normocephalic.   Cardiovascular:      Rate and Rhythm: Normal rate and regular rhythm.   Pulmonary:      Effort: Pulmonary effort is normal.      Breath sounds: Decreased breath sounds present.   Abdominal:      General: Abdomen is flat. Bowel sounds are normal.      Palpations: Abdomen is soft.   Musculoskeletal:         General: Normal range of motion.      Cervical back: Neck supple.      Right lower leg: No edema.      Left lower leg: No edema.   Skin:     General: Skin is warm and dry.   Neurological:      Mental Status: She is alert and oriented to person, place, and time.      Gait: Gait abnormal (ambulating with walker).   Psychiatric:         Attention and Perception: Attention normal.         Mood and Affect: Mood normal.         Speech: Speech normal.           Data reviewed : Consultant notes Hematology/General Surgery/Neurology  CMP          2/6/2025    10:05 3/10/2025    10:27 6/12/2025    11:31   CMP   Glucose 91  152     BUN 17  20     Creatinine 1.13  1.06  1.10    EGFR 53.1  57.3  54.8    Sodium 144  140     Potassium 4.8  3.9     Chloride 106  104     Calcium 10.1  9.8     Total Protein 7.5  6.7     Albumin 3.6  4.1     Globulin 3.9  2.6     Total Bilirubin 0.4  0.2     Alkaline Phosphatase 145  115     AST (SGOT) 11  20     ALT (SGPT) 8  13     Albumin/Globulin Ratio 0.9  1.6     BUN/Creatinine Ratio 15.0  18.9     Anion Gap 9.5  11.2       CBC          12/5/2024    02:58 2/6/2025    10:05 3/10/2025    10:27   CBC   WBC 5.71  6.00  5.26    RBC 4.06  3.77  3.62    Hemoglobin 12.2  12.0  11.3    Hematocrit 38.2  35.9  35.9    MCV 94.1  " 95.2  99.2    MCH 30.0  31.8  31.2    MCHC 31.9  33.4  31.5    RDW 14.6  13.5  14.7    Platelets 251  340  277      Lipid Panel          7/22/2024    05:28   Lipid Panel   Total Cholesterol 207    Triglycerides 125    HDL Cholesterol 38    VLDL Cholesterol 23    LDL Cholesterol  146    LDL/HDL Ratio 3.79      TSH          7/19/2024    23:23 12/2/2024    09:53 2/6/2025    10:05   TSH   TSH 3.060  1.890  1.550      Most Recent A1C          7/22/2024    12:09   HGBA1C Most Recent   Hemoglobin A1C 5.44           Assessment and Plan      Meniere's disease, unspecified laterality  Stable, Meclizine PRN       Memory loss  Reviewed Neurology visit note  Continue Namenda as prescribed       Vitamin D deficiency    Orders:    Vitamin D,25-Hydroxy    Abdominal aortic aneurysm (AAA) without rupture, unspecified part  Follow up with Vascular Surgery       Primary hypertension  Hypertension is stable and controlled  Continue current treatment regimen.  Blood pressure will be reassessed in 6 months.         Mixed hyperlipidemia   Lipid abnormalities are stable    Plan:  Continue same medication/s without change.      Discussed medication dosage, use, side effects, and goals of treatment in detail.    Counseled patient on lifestyle modifications to help control hyperlipidemia.     Patient Treatment Goals:   LDL goal is under 100    Followup at the next regular appointment.         Coronary artery disease due to lipid rich plaque  Coronary Artery Disease (OPTIONAL): Coronary artery disease is stable.  Continue current treatment regimen.  Cardiac status will be reassessed per Psychiatry.         Bilateral carotid artery stenosis  Follow up with Cardiology       Small cell carcinoma of lung, unspecified laterality, unspecified part of lung  Follow up with Hematology       Degeneration of intervertebral disc of lumbar region with discogenic back pain  Percocet PRN       Chronic obstructive pulmonary disease, unspecified COPD type  COPD  is stable.    Plan:  Continue same medication/s without change.    Discussed medication dosage, use, side effects, and goals of treatment in detail.    Warning signs of respiratory distress were reviewed with the patient. .    Patient Treatment Goals:   symptom prevention, minimizing limitation in activity, prevention of exacerbations and use of ER/inpatient care, maintenance of optimal pulmonary function, and minimization of adverse effects of treatment    Followup at the next regular appointment.         Encounter for subsequent annual wellness visit (AWV) in Medicare patient    Orders:    CBC (No Diff)    Comprehensive Metabolic Panel    Hemoglobin A1c    Lipid Panel    TSH    Screening mammogram for breast cancer    Orders:    Mammo Screening Digital Tomosynthesis Bilateral With CAD; Future          I spent 35 minutes caring for Miryam on this date of service. This time includes time spent by me in the following activities:preparing for the visit, reviewing tests, obtaining and/or reviewing a separately obtained history, performing a medically appropriate examination and/or evaluation , counseling and educating the patient/family/caregiver, ordering medications, tests, or procedures, documenting information in the medical record, and care coordination  Follow Up   Return in about 4 months (around 11/11/2025) for COPD.  Patient was given instructions and counseling regarding her condition or for health maintenance advice. Please see specific information pulled into the AVS if appropriate.

## 2025-07-11 NOTE — PROGRESS NOTES
Venipuncture Blood Specimen Collection  Venipuncture performed in the right arm by Leanne Palma MA with good hemostasis. Patient tolerated the procedure well without complications.   07/11/25   Leanne Palma MA

## 2025-07-11 NOTE — ASSESSMENT & PLAN NOTE
Coronary Artery Disease (OPTIONAL): Coronary artery disease is stable.  Continue current treatment regimen.  Cardiac status will be reassessed per Psychiatry.

## 2025-07-11 NOTE — ASSESSMENT & PLAN NOTE
Lipid abnormalities are stable    Plan:  Continue same medication/s without change.      Discussed medication dosage, use, side effects, and goals of treatment in detail.    Counseled patient on lifestyle modifications to help control hyperlipidemia.     Patient Treatment Goals:   LDL goal is under 100    Followup at the next regular appointment.

## 2025-07-12 LAB
25(OH)D3 SERPL-MCNC: 37.4 NG/ML (ref 30–100)
ALBUMIN SERPL-MCNC: 4.1 G/DL (ref 3.5–5.2)
ALBUMIN/GLOB SERPL: 1.1 G/DL
ALP SERPL-CCNC: 130 U/L (ref 39–117)
ALT SERPL W P-5'-P-CCNC: 7 U/L (ref 1–33)
ANION GAP SERPL CALCULATED.3IONS-SCNC: 15.2 MMOL/L (ref 5–15)
AST SERPL-CCNC: 14 U/L (ref 1–32)
BILIRUB SERPL-MCNC: 0.3 MG/DL (ref 0–1.2)
BUN SERPL-MCNC: 16 MG/DL (ref 8–23)
BUN/CREAT SERPL: 14.5 (ref 7–25)
CALCIUM SPEC-SCNC: 10.3 MG/DL (ref 8.6–10.5)
CHLORIDE SERPL-SCNC: 102 MMOL/L (ref 98–107)
CHOLEST SERPL-MCNC: 315 MG/DL (ref 0–200)
CO2 SERPL-SCNC: 22.8 MMOL/L (ref 22–29)
CREAT SERPL-MCNC: 1.1 MG/DL (ref 0.57–1)
EGFRCR SERPLBLD CKD-EPI 2021: 54.8 ML/MIN/1.73
GLOBULIN UR ELPH-MCNC: 3.6 GM/DL
GLUCOSE SERPL-MCNC: 86 MG/DL (ref 65–99)
HDLC SERPL-MCNC: 49 MG/DL (ref 40–60)
LDLC SERPL CALC-MCNC: 221 MG/DL (ref 0–100)
LDLC/HDLC SERPL: 4.5 {RATIO}
POTASSIUM SERPL-SCNC: 3.8 MMOL/L (ref 3.5–5.2)
PROT SERPL-MCNC: 7.7 G/DL (ref 6–8.5)
SODIUM SERPL-SCNC: 140 MMOL/L (ref 136–145)
TRIGL SERPL-MCNC: 228 MG/DL (ref 0–150)
TSH SERPL DL<=0.05 MIU/L-ACNC: 2.39 UIU/ML (ref 0.27–4.2)
VLDLC SERPL-MCNC: 45 MG/DL (ref 5–40)

## 2025-07-14 ENCOUNTER — RESULTS FOLLOW-UP (OUTPATIENT)
Dept: FAMILY MEDICINE CLINIC | Facility: CLINIC | Age: 70
End: 2025-07-14
Payer: MEDICARE

## 2025-07-14 NOTE — PROGRESS NOTES
Kidney function slightly decreased.  Avoiding routine use of NSAIDs such as Aleve, Motrin, Advil or ibuprofen would be recommended.  Total cholesterol and LDL are very elevated, please have patient check her medications to make sure her Lipitor is being taken daily.  If so, we will need to increase this dose.  Vitamin D level is improving, continue supplementation.  Everything else looks good.

## 2025-07-14 NOTE — LETTER
Miryam RICHARDS Jolene  [address] [facility] [facility] [place] [address]    July 16, 2025     Dear Ms. Fernández:    Below are the results from your recent visit:    Resulted Orders   CBC (No Diff)   Result Value Ref Range    WBC 5.45 3.40 - 10.80 10*3/mm3    RBC 4.24 3.77 - 5.28 10*6/mm3    Hemoglobin 13.3 12.0 - 15.9 g/dL    Hematocrit 40.3 34.0 - 46.6 %    MCV 95.0 79.0 - 97.0 fL    MCH 31.4 26.6 - 33.0 pg    MCHC 33.0 31.5 - 35.7 g/dL    RDW 13.9 12.3 - 15.4 %    RDW-SD 48.1 37.0 - 54.0 fl    MPV 10.8 6.0 - 12.0 fL    Platelets 269 140 - 450 10*3/mm3   Comprehensive Metabolic Panel   Result Value Ref Range    Glucose 86 65 - 99 mg/dL    BUN 16.0 8.0 - 23.0 mg/dL    Creatinine 1.10 (H) 0.57 - 1.00 mg/dL    Sodium 140 136 - 145 mmol/L    Potassium 3.8 3.5 - 5.2 mmol/L    Chloride 102 98 - 107 mmol/L    CO2 22.8 22.0 - 29.0 mmol/L    Calcium 10.3 8.6 - 10.5 mg/dL    Total Protein 7.7 6.0 - 8.5 g/dL    Albumin 4.1 3.5 - 5.2 g/dL    ALT (SGPT) 7 1 - 33 U/L    AST (SGOT) 14 1 - 32 U/L    Alkaline Phosphatase 130 (H) 39 - 117 U/L    Total Bilirubin 0.3 0.0 - 1.2 mg/dL    Globulin 3.6 gm/dL    A/G Ratio 1.1 g/dL    BUN/Creatinine Ratio 14.5 7.0 - 25.0    Anion Gap 15.2 (H) 5.0 - 15.0 mmol/L    eGFR 54.8 (L) >60.0 mL/min/1.73   Hemoglobin A1c   Result Value Ref Range    Hemoglobin A1C 5.30 4.80 - 5.60 %   Lipid Panel   Result Value Ref Range    Total Cholesterol 315 (H) 0 - 200 mg/dL    Triglycerides 228 (H) 0 - 150 mg/dL    HDL Cholesterol 49 40 - 60 mg/dL    LDL Cholesterol  221 (H) 0 - 100 mg/dL    VLDL Cholesterol 45 (H) 5 - 40 mg/dL    LDL/HDL Ratio 4.50    TSH   Result Value Ref Range    TSH 2.390 0.270 - 4.200 uIU/mL   Vitamin D,25-Hydroxy   Result Value Ref Range    25 Hydroxy, Vitamin D 37.4 30.0 - 100.0 ng/ml       We have tried reaching you several time by phone without success regarding your lab results. Please contact the office at your earliest convenience to discuss results at [phone].             Sincerely,          KENNY Sánchez

## 2025-07-19 DIAGNOSIS — E03.9 HYPOTHYROIDISM, UNSPECIFIED TYPE: ICD-10-CM

## 2025-07-21 RX ORDER — LEVOTHYROXINE SODIUM 25 UG/1
25 TABLET ORAL
Qty: 90 TABLET | Refills: 0 | Status: SHIPPED | OUTPATIENT
Start: 2025-07-21

## 2025-07-29 ENCOUNTER — HOSPITAL ENCOUNTER (OUTPATIENT)
Dept: MAMMOGRAPHY | Facility: HOSPITAL | Age: 70
Discharge: HOME | End: 2025-07-29
Admitting: NURSE PRACTITIONER
Payer: MEDICARE

## 2025-07-29 DIAGNOSIS — Z12.31 SCREENING MAMMOGRAM FOR BREAST CANCER: ICD-10-CM

## 2025-07-29 PROCEDURE — 77067 SCR MAMMO BI INCL CAD: CPT

## 2025-07-29 PROCEDURE — 77063 BREAST TOMOSYNTHESIS BI: CPT

## 2025-07-30 ENCOUNTER — TELEPHONE (OUTPATIENT)
Dept: FAMILY MEDICINE CLINIC | Facility: CLINIC | Age: 70
End: 2025-07-30
Payer: MEDICARE

## 2025-07-30 NOTE — TELEPHONE ENCOUNTER
Zari with [facility] called requesting a verbal for continuation of visitations for once weekly x4 more weeks. Verbal provided by Lisbet SHUKLA

## (undated) DEVICE — GLV SURG SENSICARE PI ORTHO SZ8.5 LF STRL

## (undated) DEVICE — ZIP 24 SURGICAL SKIN CLOSURE DEVICE, PSA: Brand: ZIP 24 SURGICAL SKIN CLOSURE DEVICE

## (undated) DEVICE — NEEDLE, QUINCKE, 20GX3.5": Brand: MEDLINE

## (undated) DEVICE — ZIPPERED TOGA, 2X LARGE: Brand: FLYTE

## (undated) DEVICE — UNDERGLV SURG BIOGEL INDICAT PI SZ8.5 BLU

## (undated) DEVICE — SOL ISO/ALC RUB 70PCT 4OZ

## (undated) DEVICE — CONTAINER,SPECIMEN,OR STERILE,4OZ: Brand: MEDLINE

## (undated) DEVICE — UNDRGLV SURG BIOGEL PUNCTUREINDICATION SZ7 PF STRL

## (undated) DEVICE — PENCL SMOKE/EVAC MEGADYNE TELESCP 15FT

## (undated) DEVICE — KT SURG TURNOVER 050

## (undated) DEVICE — IRRIGATOR BULB ASEPTO 60CC STRL

## (undated) DEVICE — GLV SURG SENSICARE PI ORTHO PF SZ7 LF STRL

## (undated) DEVICE — PICO 7 10CM X 20CM: Brand: PICO™ 7

## (undated) DEVICE — SYR LUERLOK 30CC

## (undated) DEVICE — ADHS LIQ MASTISOL 2/3ML

## (undated) DEVICE — 3M™ IOBAN™ 2 ANTIMICROBIAL INCISE DRAPE 6650EZ: Brand: IOBAN™ 2

## (undated) DEVICE — PK TOTL HIP 50

## (undated) DEVICE — TBG IRRI TUR Y/TYP NONVENT 98IN LF

## (undated) DEVICE — DRAPE,U/ SHT,SPLIT,PLAS,STERIL: Brand: MEDLINE

## (undated) DEVICE — HIP PILLOW, ABDUCTION: Brand: DEROYAL

## (undated) DEVICE — SUT ETHIB 2 CV V37 MS/4 30IN MX69G